# Patient Record
Sex: MALE | Race: WHITE | NOT HISPANIC OR LATINO
[De-identification: names, ages, dates, MRNs, and addresses within clinical notes are randomized per-mention and may not be internally consistent; named-entity substitution may affect disease eponyms.]

---

## 2023-04-03 ENCOUNTER — APPOINTMENT (OUTPATIENT)
Dept: HEPATOLOGY | Facility: CLINIC | Age: 63
End: 2023-04-03
Payer: MEDICARE

## 2023-04-03 VITALS
WEIGHT: 223 LBS | HEART RATE: 62 BPM | SYSTOLIC BLOOD PRESSURE: 127 MMHG | HEIGHT: 73 IN | RESPIRATION RATE: 15 BRPM | DIASTOLIC BLOOD PRESSURE: 77 MMHG | TEMPERATURE: 98.1 F | BODY MASS INDEX: 29.55 KG/M2 | OXYGEN SATURATION: 95 %

## 2023-04-03 DIAGNOSIS — Z79.4 TYPE 2 DIABETES MELLITUS W/OUT COMPLICATIONS: ICD-10-CM

## 2023-04-03 DIAGNOSIS — R21 RASH AND OTHER NONSPECIFIC SKIN ERUPTION: ICD-10-CM

## 2023-04-03 DIAGNOSIS — N18.2 CHRONIC KIDNEY DISEASE, STAGE 2 (MILD): ICD-10-CM

## 2023-04-03 DIAGNOSIS — E11.9 TYPE 2 DIABETES MELLITUS W/OUT COMPLICATIONS: ICD-10-CM

## 2023-04-03 DIAGNOSIS — F41.9 ANXIETY DISORDER, UNSPECIFIED: ICD-10-CM

## 2023-04-03 DIAGNOSIS — K70.30 ALCOHOLIC CIRRHOSIS OF LIVER W/OUT ASCITES: ICD-10-CM

## 2023-04-03 PROBLEM — Z00.00 ENCOUNTER FOR PREVENTIVE HEALTH EXAMINATION: Status: ACTIVE | Noted: 2023-04-03

## 2023-04-03 LAB
25(OH)D3 SERPL-MCNC: 44.3 NG/ML
ALBUMIN SERPL ELPH-MCNC: 4.5 G/DL
ALP BLD-CCNC: 146 U/L
ALT SERPL-CCNC: 14 U/L
ANION GAP SERPL CALC-SCNC: 13 MMOL/L
AST SERPL-CCNC: 22 U/L
BASOPHILS # BLD AUTO: 0.06 K/UL
BASOPHILS NFR BLD AUTO: 0.8 %
BILIRUB SERPL-MCNC: 1.2 MG/DL
BUN SERPL-MCNC: 27 MG/DL
CALCIUM SERPL-MCNC: 9.9 MG/DL
CHLORIDE SERPL-SCNC: 100 MMOL/L
CO2 SERPL-SCNC: 26 MMOL/L
CREAT SERPL-MCNC: 2.49 MG/DL
EGFR: 28 ML/MIN/1.73M2
EOSINOPHIL # BLD AUTO: 0.37 K/UL
EOSINOPHIL NFR BLD AUTO: 4.6 %
FERRITIN SERPL-MCNC: 38 NG/ML
GLUCOSE SERPL-MCNC: 109 MG/DL
HCT VFR BLD CALC: 40.3 %
HGB BLD-MCNC: 12.7 G/DL
IMM GRANULOCYTES NFR BLD AUTO: 0.1 %
IRON SATN MFR SERPL: 25 %
IRON SERPL-MCNC: 102 UG/DL
LYMPHOCYTES # BLD AUTO: 2.83 K/UL
LYMPHOCYTES NFR BLD AUTO: 35.4 %
MAGNESIUM SERPL-MCNC: 1.7 MG/DL
MAN DIFF?: NORMAL
MCHC RBC-ENTMCNC: 27.2 PG
MCHC RBC-ENTMCNC: 31.5 GM/DL
MCV RBC AUTO: 86.3 FL
MONOCYTES # BLD AUTO: 0.63 K/UL
MONOCYTES NFR BLD AUTO: 7.9 %
NEUTROPHILS # BLD AUTO: 4.1 K/UL
NEUTROPHILS NFR BLD AUTO: 51.2 %
PLATELET # BLD AUTO: 415 K/UL
POTASSIUM SERPL-SCNC: 4.6 MMOL/L
PROT SERPL-MCNC: 7.8 G/DL
RBC # BLD: 4.67 M/UL
RBC # FLD: 14.6 %
SODIUM SERPL-SCNC: 138 MMOL/L
TIBC SERPL-MCNC: 404 UG/DL
UIBC SERPL-MCNC: 302 UG/DL
WBC # FLD AUTO: 8 K/UL

## 2023-04-03 PROCEDURE — 99205 OFFICE O/P NEW HI 60 MIN: CPT

## 2023-04-04 LAB
CYCLOSPORINE SER-MCNC: 735 NG/ML
ESTIMATED AVERAGE GLUCOSE: 146 MG/DL
HBA1C MFR BLD HPLC: 6.7 %

## 2023-04-04 RX ORDER — CALCIUM CARBONATE/VITAMIN D3 500-10/5ML
400 LIQUID (ML) ORAL
Refills: 0 | Status: ACTIVE | COMMUNITY

## 2023-04-04 RX ORDER — MULTIVIT WITH MIN/MFOLATE/K2 340-15/3 G
50 MCG POWDER (GRAM) ORAL
Refills: 0 | Status: ACTIVE | COMMUNITY
Start: 2023-04-04

## 2023-04-04 NOTE — HISTORY OF PRESENT ILLNESS
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 \par He had a hx of b/l LE DVT (R Gaastrocnemius and L CFV through SFJ/ poplitial and post tibial s/p 3M A/C with resolution)\par Colonoscopy 3/24/21 - 1 TA repeat in 5-7Y\par \par PMH: DM ETOH USE, HLD, HTN, Cirrhosis, Axnsiety\par PSX: OLT\par No Smoking, drug use - ETOH use as above\par \par He presents to transfer his care from [facility] to [facility] since he lives in [place] and I am his primary Hepatologist\par He lives with his son \par SIster is a nurse that lives in the city\par He has had issues with financial / insurance issues in the past \par Currently he feels ok but has developed new rash in his nose and cheeks with some oozing - raised lesion not tender\par Has appointment with Derm pending\par \par \par Meds:\par Amlodi[pine 10mg\par ASA 81mg\par Vit D3 2000\par Cyclo 100mg BID\par Lantus 10u\par Admelog 5units qAC\par Metoprolol 100mg BID\par Magnesium 400mg TID\par Risperidone 1mg Daily\par MTV\par Pantoprazole\par Januvia\par

## 2023-04-04 NOTE — PHYSICAL EXAM
[Well Nourished] : well nourished [Healthy Appearing] : healthy appearing [No Acute Distress] : no acute distress [EOMI] : extra ocular movement intact [Normal Hearing] : normal hearing [Normal Oropharynx] : normal oropharynx [No Neck Mass] : no neck mass [Supple] : the neck was supple [No JVD] : no jugular venous distention [No Respiratory Distress] : no respiratory distress [No Accessory Muscle Use] : no accessory muscle use [Clear to Auscultation] : lungs were clear to auscultation bilaterally [Normal S1, S2] : normal S1 and S2 [No Murmurs] : no murmurs heard [Normal Rate/Rhythm] : normal rate/rhythm [Normal Bowel Sounds] : normal bowel sounds [Non Tender] : non-tender [Soft] : soft [No CVA Tenderness] : no CVA tenderness [No Spinal Tenderness] : no spinal tenderness [Normal Gait] : normal gait [Normal Strength/Tone] : normal strength/tone [No Focal Deficits] : no focal deficits [Normal Reflexes] : normal reflexes [No Motor Deficits] : no motor deficits [Normal Affect] : normal affect [Remote Memory Intact] : remote memory intact [Normal Insight/Judgement] : normal insight/judgement [Scleral Icterus] : no scleral icterus [Hepatojugular Reflux] : no hepatojugular reflux [Abdominal Bruit] : no abdominal bruit [Ascites Fluid Wave] : no ascites fluid wave [Splenomegaly] : no splenomegaly [Spider Angioma] : no spider angioma [Jaundice] : no jaundice [Palmar Erythema] : no palmar erythema [Asterixis] : no asterixis [de-identified] : dried skin, nodule on nose and on cheek - non tender

## 2023-05-01 RX ORDER — MULTIVITAMIN
TABLET ORAL DAILY
Qty: 90 | Refills: 3 | Status: ACTIVE | COMMUNITY
Start: 2023-04-04 | End: 1900-01-01

## 2023-05-01 RX ORDER — ASPIRIN 81 MG
81 TABLET, DELAYED RELEASE (ENTERIC COATED) ORAL
Refills: 0 | Status: DISCONTINUED | COMMUNITY
End: 2023-05-01

## 2023-05-04 ENCOUNTER — APPOINTMENT (OUTPATIENT)
Dept: DERMATOLOGY | Facility: CLINIC | Age: 63
End: 2023-05-04
Payer: MEDICARE

## 2023-05-04 DIAGNOSIS — L21.9 SEBORRHEIC DERMATITIS, UNSPECIFIED: ICD-10-CM

## 2023-05-04 DIAGNOSIS — D48.9 NEOPLASM OF UNCERTAIN BEHAVIOR, UNSPECIFIED: ICD-10-CM

## 2023-05-04 DIAGNOSIS — D48.5 NEOPLASM OF UNCERTAIN BEHAVIOR OF SKIN: ICD-10-CM

## 2023-05-04 PROCEDURE — 99204 OFFICE O/P NEW MOD 45 MIN: CPT | Mod: 25

## 2023-05-04 PROCEDURE — 11103 TANGNTL BX SKIN EA SEP/ADDL: CPT

## 2023-05-04 PROCEDURE — 11102 TANGNTL BX SKIN SINGLE LES: CPT

## 2023-05-04 RX ORDER — KETOCONAZOLE 20.5 MG/ML
2 SHAMPOO, SUSPENSION TOPICAL
Qty: 1 | Refills: 9 | Status: ACTIVE | COMMUNITY
Start: 2023-05-04 | End: 1900-01-01

## 2023-05-04 RX ORDER — KETOCONAZOLE 20 MG/G
2 CREAM TOPICAL
Qty: 1 | Refills: 5 | Status: ACTIVE | COMMUNITY
Start: 2023-05-04 | End: 1900-01-01

## 2023-05-11 LAB — PHOSPHATIDYETHANOL (PETH), WHOLE BLOOD: NEGATIVE

## 2023-05-12 ENCOUNTER — NON-APPOINTMENT (OUTPATIENT)
Age: 63
End: 2023-05-12

## 2023-05-15 LAB — DERMATOLOGY BIOPSY: NORMAL

## 2023-05-30 ENCOUNTER — APPOINTMENT (OUTPATIENT)
Dept: NEPHROLOGY | Facility: CLINIC | Age: 63
End: 2023-05-30
Payer: MEDICARE

## 2023-05-30 VITALS
RESPIRATION RATE: 15 BRPM | HEIGHT: 73 IN | SYSTOLIC BLOOD PRESSURE: 149 MMHG | TEMPERATURE: 97 F | BODY MASS INDEX: 29.16 KG/M2 | WEIGHT: 220 LBS | HEART RATE: 60 BPM | OXYGEN SATURATION: 95 % | DIASTOLIC BLOOD PRESSURE: 81 MMHG

## 2023-05-30 DIAGNOSIS — Z78.9 OTHER SPECIFIED HEALTH STATUS: ICD-10-CM

## 2023-05-30 PROCEDURE — 99205 OFFICE O/P NEW HI 60 MIN: CPT

## 2023-05-30 NOTE — HISTORY OF PRESENT ILLNESS
[FreeTextEntry1] : 63  years old fe-male, born in [place] ,referred by Dr. Mejia for evaluation of CKD. Patient has OLT in 2019 at [facility]. He believes this was from an infection. He knew about abnormal kidney function at that time.\par He is unsure why his kidney function is abnormal.\par Patient has known CKD (2019), received dialysis around the time of liver transplant for approx 1 week. He is  on follow up with Dr. Mejia for management of liver transplant. He is here for nephrology consultation.\par He has seen a nephrologist at [facility] but does not recall name and last seen approx 8 months prior to this.\par He has known DM (age 53) On insulin (age 53); HTN (age 53). H/o Hyperlipidemia/  no h/o Gout.\par No known h/o kidney stone/ Prostatism.\par He had DVT in left leg during post op period after liver transplant and received anticoagulation for approx 6 months.\par No hematuria/Transfusions\par Nocturia: 3 times\par No h/o Sleep apnea. No h/o Thyroid disease.\par Does not take any Coumadin/eliquis/Plavix/Brilinta. No known h/o tuberculosis or hepatitis.\par No h/o NSAID/pain medication use.\par No major weight loss/weight loss surgeries\par Has no h/o Pneumonia / UTI.\par No known h/o active CAD/CVA/PVD/neoplasia/active infections/bleeding/open wounds/falls/seizures/psych issues/COVID.\par COVID vaccination: Yes\par Reports no major allergies. \par Most recent hospitalization/for: 2019 for liver transplant\par Past surgeries:\par Liver transplant 2019\par No history of kidney/ bladder/ prostate surgery.\par Non smoker.\par Family: \par Lives with: Son, who is 31 years old.\par Parents are . Father -  Mother- 90 years old, healthy Siblings- 2 sisters, healthy.\par Children: two sons, Healthy. \par No family history of kidney disease\par Independent for ADL\par Able to walk two blocks, can climb stairs without difficulty.\par Assist devices: none\par Driving: Does not drive\par ROS: Has h/o shortness of breath on exertion. \par Vision: Ok\par Hearing:Ok\par Functional/employment status: retired insurance  (retired in 2019)\par Dialysis history: In 2019 for 1 week\par Kidney Biopsy: None\par \par Prior Studies:\par Cardiology: None\par Cancer Screen: Has had colonoscopy; Had basal cell ca\par Consultants:Dr Mejia, Hepatologist\par Primary MD: In [facility], not seen in more than 2 years\par Nephrologist: At [facility], has not seen in 8 months\par  \par Allergies: NKDA\par Medications:\par Amlodipine 5 mg BID\par Metoprolol Tartrate 25 Mg BID\par Januvia 50 mg Once daily\par  mg BID\par Aspirin 81 mg Once daily\par Vit D\par Magnesium TID\par Multivitamin\par Insulin Lantus 10 U in AM\par Humalog 5 u Premeal TID\par \par \par \par \par

## 2023-05-30 NOTE — ASSESSMENT
[FreeTextEntry1] : Elevated creatinine, Known CKD: Will repeat blood chemistry including phosphorus, uric acid,  immunofixation and Hepatitis B and C panel.\par Will get repeat urinalysis and urine protein/creatinine.\par Will get renal and post void bladder sonogram\par CKD risk factors- CSA use, DM, Hypertension, Overweight\par Discussed current level of renal function  and its implications.\par Discussed renal function preservation strategies including: Sleep hygiene, avoiding NSAIDs and herbal medications, avoiding excessive animal protein and sodium in diet, maintaining optimized weight, blood pressure, glucose and lipids.\par Discussed hyperinsulinemia, metabolic syndrome and implication for cardiovascular health.\par F/u in 6 weeks to review labs and imaging.\par

## 2023-05-30 NOTE — REASON FOR VISIT
[Consultation] : a consultation visit [FreeTextEntry1] : Here for evaluation of chronic kidney disease

## 2023-05-30 NOTE — PHYSICAL EXAM
[General Appearance - Alert] : alert [General Appearance - In No Acute Distress] : in no acute distress [Sclera] : the sclera and conjunctiva were normal [Extraocular Movements] : extraocular movements were intact [Outer Ear] : the ears and nose were normal in appearance [Jugular Venous Distention Increased] : there was no jugular-venous distention [Auscultation Breath Sounds / Voice Sounds] : lungs were clear to auscultation bilaterally [Heart Sounds Gallop] : no gallops [Heart Sounds Pericardial Friction Rub] : no pericardial rub [Edema] : there was no peripheral edema [Abdomen Soft] : soft [Abdomen Tenderness] : non-tender [Cervical Lymph Nodes Enlarged Posterior Bilaterally] : posterior cervical [Cervical Lymph Nodes Enlarged Anterior Bilaterally] : anterior cervical [Supraclavicular Lymph Nodes Enlarged Bilaterally] : supraclavicular [Involuntary Movements] : no involuntary movements were seen [] : no rash [FreeTextEntry1] : keratotic lesions noted [No Focal Deficits] : no focal deficits [Oriented To Time, Place, And Person] : oriented to person, place, and time [Impaired Insight] : insight and judgment were intact [Affect] : the affect was normal

## 2023-06-05 LAB
ALBUMIN SERPL ELPH-MCNC: 4.4 G/DL
ALP BLD-CCNC: 223 U/L
ALT SERPL-CCNC: 55 U/L
ANION GAP SERPL CALC-SCNC: 11 MMOL/L
APPEARANCE: CLEAR
AST SERPL-CCNC: 51 U/L
BACTERIA: NEGATIVE /HPF
BILIRUB SERPL-MCNC: 1.1 MG/DL
BILIRUBIN URINE: NEGATIVE
BLOOD URINE: NEGATIVE
BUN SERPL-MCNC: 22 MG/DL
CALCIUM SERPL-MCNC: 9.5 MG/DL
CALCIUM SERPL-MCNC: 9.5 MG/DL
CAST: 0 /LPF
CHLORIDE SERPL-SCNC: 99 MMOL/L
CHOLEST SERPL-MCNC: 429 MG/DL
CO2 SERPL-SCNC: 27 MMOL/L
COLOR: YELLOW
CREAT SERPL-MCNC: 2.14 MG/DL
CREAT SPEC-SCNC: 58 MG/DL
CREAT SPEC-SCNC: 58 MG/DL
CREAT/PROT UR: 0.3 RATIO
EGFR: 34 ML/MIN/1.73M2
EPITHELIAL CELLS: 0 /HPF
GLUCOSE QUALITATIVE U: NEGATIVE MG/DL
GLUCOSE SERPL-MCNC: 160 MG/DL
HBV SURFACE AG SER QL: NONREACTIVE
HCV AB SER QL: NONREACTIVE
HCV S/CO RATIO: 0.09 S/CO
HDLC SERPL-MCNC: 47 MG/DL
KETONES URINE: NEGATIVE MG/DL
LDLC SERPL CALC-MCNC: NORMAL MG/DL
LEUKOCYTE ESTERASE URINE: NEGATIVE
M PROTEIN SPEC IFE-MCNC: NORMAL
MAGNESIUM SERPL-MCNC: 1.9 MG/DL
MICROALBUMIN 24H UR DL<=1MG/L-MCNC: 7.5 MG/DL
MICROALBUMIN/CREAT 24H UR-RTO: 130 MG/G
MICROSCOPIC-UA: NORMAL
NITRITE URINE: NEGATIVE
NONHDLC SERPL-MCNC: 382 MG/DL
PARATHYROID HORMONE INTACT: 99 PG/ML
PH URINE: 7
PHOSPHATE SERPL-MCNC: 3.2 MG/DL
POTASSIUM SERPL-SCNC: 4.4 MMOL/L
PROT SERPL-MCNC: 7.5 G/DL
PROT UR-MCNC: 19 MG/DL
PROTEIN URINE: 30 MG/DL
RED BLOOD CELLS URINE: 0 /HPF
SODIUM SERPL-SCNC: 137 MMOL/L
SPECIFIC GRAVITY URINE: 1.01
TRIGL SERPL-MCNC: 781 MG/DL
URATE SERPL-MCNC: 9.4 MG/DL
UROBILINOGEN URINE: 0.2 MG/DL
WHITE BLOOD CELLS URINE: 0 /HPF

## 2023-06-14 ENCOUNTER — APPOINTMENT (OUTPATIENT)
Dept: DERMATOLOGY | Facility: CLINIC | Age: 63
End: 2023-06-14
Payer: MEDICARE

## 2023-06-14 PROCEDURE — 99214 OFFICE O/P EST MOD 30 MIN: CPT

## 2023-07-06 ENCOUNTER — APPOINTMENT (OUTPATIENT)
Dept: HEPATOLOGY | Facility: CLINIC | Age: 63
End: 2023-07-06
Payer: MEDICARE

## 2023-07-06 VITALS
HEIGHT: 73 IN | HEART RATE: 59 BPM | BODY MASS INDEX: 29.16 KG/M2 | TEMPERATURE: 97.3 F | WEIGHT: 220 LBS | RESPIRATION RATE: 14 BRPM | OXYGEN SATURATION: 94 % | DIASTOLIC BLOOD PRESSURE: 70 MMHG | SYSTOLIC BLOOD PRESSURE: 127 MMHG

## 2023-07-06 LAB
ALBUMIN SERPL ELPH-MCNC: 4.5 G/DL
ALP BLD-CCNC: 225 U/L
ALT SERPL-CCNC: 29 U/L
ANION GAP SERPL CALC-SCNC: 15 MMOL/L
AST SERPL-CCNC: 31 U/L
BILIRUB SERPL-MCNC: 1 MG/DL
BUN SERPL-MCNC: 20 MG/DL
CALCIUM SERPL-MCNC: 9 MG/DL
CHLORIDE SERPL-SCNC: 99 MMOL/L
CMV DNA SPEC QL NAA+PROBE: NOT DETECTED IU/ML
CMVPCR LOG: NOT DETECTED LOG10IU/ML
CO2 SERPL-SCNC: 22 MMOL/L
CREAT SERPL-MCNC: 2.16 MG/DL
CYCLOSPORINE SER-MCNC: 179 NG/ML
EGFR: 33 ML/MIN/1.73M2
GLUCOSE SERPL-MCNC: 156 MG/DL
MAGNESIUM SERPL-MCNC: 2.2 MG/DL
POTASSIUM SERPL-SCNC: 4.5 MMOL/L
PROT SERPL-MCNC: 7.7 G/DL
SODIUM SERPL-SCNC: 137 MMOL/L

## 2023-07-06 PROCEDURE — 99214 OFFICE O/P EST MOD 30 MIN: CPT

## 2023-07-10 NOTE — ASSESSMENT
[FreeTextEntry1] : 62M ETOH Cirrhosis s/p OLT complicated by DVT hyponatremia CKD on cyclosporine monotherapy - doing well\par Transitioning care to LI to follow me\par \par Repeat labs today and probably next week given he took cyclosporine today\par Has had stable graft function for a very long time\par Needs clarification of insurance at this time with financial\par F/U Derm for MOHS\par Check labs including CMV\par Urine testing for ETOH\par Bone scan UTD\par Colonoscopy UTD\par Vaccines UTD\par \par Referral to renal for management of CKD\par \par RTC 3M\par

## 2023-07-10 NOTE — PHYSICAL EXAM
[Well Nourished] : well nourished [Healthy Appearing] : healthy appearing [No Acute Distress] : no acute distress [EOMI] : extra ocular movement intact [Normal Hearing] : normal hearing [Normal Oropharynx] : normal oropharynx [No Neck Mass] : no neck mass [Supple] : the neck was supple [No JVD] : no jugular venous distention [No Respiratory Distress] : no respiratory distress [No Accessory Muscle Use] : no accessory muscle use [Clear to Auscultation] : lungs were clear to auscultation bilaterally [Normal S1, S2] : normal S1 and S2 [No Murmurs] : no murmurs heard [Normal Rate/Rhythm] : normal rate/rhythm [Normal Bowel Sounds] : normal bowel sounds [Non Tender] : non-tender [Soft] : soft [No CVA Tenderness] : no CVA tenderness [No Spinal Tenderness] : no spinal tenderness [Normal Gait] : normal gait [Normal Strength/Tone] : normal strength/tone [No Focal Deficits] : no focal deficits [Normal Reflexes] : normal reflexes [No Motor Deficits] : no motor deficits [Normal Affect] : normal affect [Remote Memory Intact] : remote memory intact [Normal Insight/Judgement] : normal insight/judgement [Scleral Icterus] : no scleral icterus [Hepatojugular Reflux] : no hepatojugular reflux [Abdominal Bruit] : no abdominal bruit [Ascites Fluid Wave] : no ascites fluid wave [Splenomegaly] : no splenomegaly [Spider Angioma] : no spider angioma [Jaundice] : no jaundice [Palmar Erythema] : no palmar erythema [Asterixis] : no asterixis [de-identified] : dried skin, nodule on nose and on cheek - non tender

## 2023-07-10 NOTE — HISTORY OF PRESENT ILLNESS
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 to [facility] since he lives in [place] and I am his primary Hepatologist\par He lives with his son \par SIster is a nurse that lives in the city\par He has had issues with financial / insurance issues in the past \par Currently he feels ok but has developed new rash in his nose and cheeks with some oozing - raised lesion not tender\par Has appointment with Derm pending for MOHS surgery for established BCC of nose chest and cheek\par He is planning on goint to [facility] for family trip next month as well\par Otherwise feeling good\par Had mild elevation in liver tests a few months ago \par \par Meds:\par Amlodipine 10mg\par ASA 81mg\par Vit D3 2000\par Cyclo 100mg BID\par Lantus 10u\par Ademelog 5units qAC\par Metoprolol 100mg BID\par Magnesium 400mg TID\par Risperidone 1mg Daily\par MTV\par Pantoprazole\par Januvia\par

## 2023-07-18 ENCOUNTER — APPOINTMENT (OUTPATIENT)
Dept: DERMATOLOGY | Facility: CLINIC | Age: 63
End: 2023-07-18
Payer: MEDICARE

## 2023-07-18 PROCEDURE — 17311 MOHS 1 STAGE H/N/HF/G: CPT

## 2023-07-18 PROCEDURE — 14061 TIS TRNFR E/N/E/L10.1-30SQCM: CPT

## 2023-07-21 ENCOUNTER — APPOINTMENT (OUTPATIENT)
Dept: DERMATOLOGY | Facility: CLINIC | Age: 63
End: 2023-07-21

## 2023-07-25 ENCOUNTER — APPOINTMENT (OUTPATIENT)
Dept: DERMATOLOGY | Facility: CLINIC | Age: 63
End: 2023-07-25
Payer: MEDICARE

## 2023-07-25 PROCEDURE — 99024 POSTOP FOLLOW-UP VISIT: CPT

## 2023-08-15 ENCOUNTER — APPOINTMENT (OUTPATIENT)
Dept: DERMATOLOGY | Facility: CLINIC | Age: 63
End: 2023-08-15
Payer: MEDICARE

## 2023-08-15 DIAGNOSIS — C44.519 BASAL CELL CARCINOMA OF SKIN OF OTHER PART OF TRUNK: ICD-10-CM

## 2023-08-15 PROCEDURE — 17313 MOHS 1 STAGE T/A/L: CPT | Mod: 79

## 2023-08-15 PROCEDURE — 17312 MOHS ADDL STAGE: CPT | Mod: 79

## 2023-08-15 PROCEDURE — 13132 CMPLX RPR F/C/C/M/N/AX/G/H/F: CPT | Mod: 79

## 2023-08-15 PROCEDURE — 17311 MOHS 1 STAGE H/N/HF/G: CPT | Mod: 59,79

## 2023-08-15 PROCEDURE — 12032 INTMD RPR S/A/T/EXT 2.6-7.5: CPT | Mod: 79

## 2023-08-16 PROBLEM — C44.519 BCC (BASAL CELL CARCINOMA), CHEST: Status: ACTIVE | Noted: 2023-06-14

## 2023-08-17 ENCOUNTER — APPOINTMENT (OUTPATIENT)
Dept: CARDIOLOGY | Facility: CLINIC | Age: 63
End: 2023-08-17
Payer: MEDICARE

## 2023-08-17 ENCOUNTER — NON-APPOINTMENT (OUTPATIENT)
Age: 63
End: 2023-08-17

## 2023-08-17 VITALS
HEART RATE: 94 BPM | DIASTOLIC BLOOD PRESSURE: 68 MMHG | OXYGEN SATURATION: 96 % | BODY MASS INDEX: 27.83 KG/M2 | HEIGHT: 73 IN | WEIGHT: 210 LBS | SYSTOLIC BLOOD PRESSURE: 130 MMHG

## 2023-08-17 PROCEDURE — 99205 OFFICE O/P NEW HI 60 MIN: CPT

## 2023-08-17 PROCEDURE — 93000 ELECTROCARDIOGRAM COMPLETE: CPT

## 2023-08-17 RX ORDER — ICOSAPENT ETHYL 1 G/1
1 CAPSULE ORAL
Qty: 360 | Refills: 3 | Status: ACTIVE | COMMUNITY
Start: 2023-08-17 | End: 1900-01-01

## 2023-08-17 NOTE — DISCUSSION/SUMMARY
[FreeTextEntry1] : In summary  Pleasant, 63 year old with a past medical history of HTN, Hyperlipidemia, [id number] S/p OLT 04/2019 (STATINS CONTRAINDICATED) On Immunoppressive therapy, CKD  =============== =============== Hyperlipidemia, [id number] S/p OLT (STATINS CONTRAINDICATED) On Immunoppressive therapy  - Statins absolutely contraindicated; But Praluent NOT yet indicated until see LDL or Calcium Score - DM per Endo - We discussed diet/exercise to be followed by nutritional counseling by an appointment to be made with our RD at the lipid center. - Start Vascepa         - Confirmed NO interactions with Cyclosporine - Calcium Score  - TTE  Mn CP new - TTE    [facility], it was a pleasure to participate in the care of your patient.   With kind thanks for the referral.  Sarath Moreno MD [facility] QUINN ASTUDILLO Director, Preventive Cardiology & Lipidology [facility]                                                                                                                                                                                                                                                                                                                                                              60 minutes spent in patient encounter explaining and formulating rationale for treatment plan. >50% of time spent in direct counseling reviewing all tests, labs, and imaging and conferring with patient, family member, and other physicians regarding patient care >50% of time spent in direct counseling reviewing all tests, labs, and imaging and conferring with patient, family member, and other physicians regarding patient care

## 2023-08-17 NOTE — HISTORY OF PRESENT ILLNESS
[FreeTextEntry1] : Dear ,Alcides   I had the pleasure of seeing your patient ANTHONY RODRIGUEZ for Cardiometabolic evaluation.   As you know, he  is a Pleasant, 63 year old with a past medical history of HTN, Hyperlipidemia, [id number] S/p OLT 04/2019 (STATINS CONTRAINDICATED) On Immunoppressive therapy, CKD  =============== =============== Hyperlipidemia, [id number] S/p OLT (STATINS CONTRAINDICATED) On Immunoppressive therapy  - Statins absolutely contraindicated; But Praluent NOT yet indicated until see LDL or Calcium Score - DM per Endo - We discussed diet/exercise to be followed by nutritional counseling by an appointment to be made with our RD at the lipid center. - Start Vascepa         - Confirmed NO interactions with Cyclosporine - Calcium Score  - TTE  Anton UMANA new - TTE    ----------------------------

## 2023-08-20 ENCOUNTER — NON-APPOINTMENT (OUTPATIENT)
Age: 63
End: 2023-08-20

## 2023-08-22 ENCOUNTER — APPOINTMENT (OUTPATIENT)
Dept: DERMATOLOGY | Facility: CLINIC | Age: 63
End: 2023-08-22
Payer: MEDICARE

## 2023-08-22 ENCOUNTER — APPOINTMENT (OUTPATIENT)
Dept: ULTRASOUND IMAGING | Facility: HOSPITAL | Age: 63
End: 2023-08-22

## 2023-08-22 ENCOUNTER — APPOINTMENT (OUTPATIENT)
Dept: CARDIOLOGY | Facility: CLINIC | Age: 63
End: 2023-08-22

## 2023-08-22 DIAGNOSIS — C44.319 BASAL CELL CARCINOMA OF SKIN OF OTHER PARTS OF FACE: ICD-10-CM

## 2023-08-22 PROCEDURE — 99024 POSTOP FOLLOW-UP VISIT: CPT

## 2023-08-29 ENCOUNTER — APPOINTMENT (OUTPATIENT)
Dept: NEPHROLOGY | Facility: CLINIC | Age: 63
End: 2023-08-29

## 2023-09-03 PROBLEM — C44.319 BASAL CELL CARCINOMA (BCC) OF LEFT CHEEK: Status: ACTIVE | Noted: 2023-06-14

## 2023-09-05 ENCOUNTER — APPOINTMENT (OUTPATIENT)
Dept: DERMATOLOGY | Facility: CLINIC | Age: 63
End: 2023-09-05

## 2023-10-10 ENCOUNTER — APPOINTMENT (OUTPATIENT)
Dept: HEPATOLOGY | Facility: CLINIC | Age: 63
End: 2023-10-10

## 2023-10-17 ENCOUNTER — APPOINTMENT (OUTPATIENT)
Dept: DERMATOLOGY | Facility: CLINIC | Age: 63
End: 2023-10-17

## 2023-10-30 ENCOUNTER — APPOINTMENT (OUTPATIENT)
Dept: HEPATOLOGY | Facility: CLINIC | Age: 63
End: 2023-10-30
Payer: MEDICARE

## 2023-10-30 VITALS
RESPIRATION RATE: 14 BRPM | OXYGEN SATURATION: 97 % | SYSTOLIC BLOOD PRESSURE: 132 MMHG | BODY MASS INDEX: 29.16 KG/M2 | HEIGHT: 73 IN | TEMPERATURE: 97.3 F | HEART RATE: 62 BPM | WEIGHT: 220 LBS | DIASTOLIC BLOOD PRESSURE: 73 MMHG

## 2023-10-30 PROCEDURE — 99214 OFFICE O/P EST MOD 30 MIN: CPT

## 2023-10-30 RX ORDER — CEPHALEXIN 500 MG/1
500 TABLET ORAL
Qty: 14 | Refills: 0 | Status: DISCONTINUED | COMMUNITY
Start: 2023-07-18 | End: 2023-10-30

## 2023-10-30 RX ORDER — CYCLOSPORINE 100 MG/1
100 CAPSULE, GELATIN COATED ORAL
Qty: 180 | Refills: 3 | Status: DISCONTINUED | COMMUNITY
Start: 2023-04-03 | End: 2023-10-30

## 2023-10-30 RX ORDER — INSULIN LISPRO 100 [IU]/ML
100 INJECTION, SOLUTION INTRAVENOUS; SUBCUTANEOUS
Qty: 2 | Refills: 3 | Status: DISCONTINUED | COMMUNITY
Start: 2023-04-03 | End: 2023-10-30

## 2023-10-30 RX ORDER — CYCLOSPORINE 25 MG/1
25 CAPSULE, GELATIN COATED ORAL TWICE DAILY
Qty: 540 | Refills: 3 | Status: ACTIVE | COMMUNITY
Start: 2023-10-30 | End: 1900-01-01

## 2023-10-30 RX ORDER — CEPHALEXIN 500 MG/1
500 CAPSULE ORAL TWICE DAILY
Qty: 14 | Refills: 0 | Status: DISCONTINUED | COMMUNITY
Start: 2023-08-15 | End: 2023-10-30

## 2023-10-30 RX ORDER — INSULIN GLARGINE 100 [IU]/ML
100 INJECTION, SOLUTION SUBCUTANEOUS DAILY
Qty: 1 | Refills: 1 | Status: DISCONTINUED | COMMUNITY
Start: 2023-04-03 | End: 2023-10-30

## 2023-11-24 ENCOUNTER — RX RENEWAL (OUTPATIENT)
Age: 63
End: 2023-11-24

## 2023-11-24 RX ORDER — SITAGLIPTIN 50 MG/1
50 TABLET, FILM COATED ORAL DAILY
Qty: 90 | Refills: 1 | Status: ACTIVE | COMMUNITY
Start: 2023-04-04 | End: 1900-01-01

## 2023-11-30 ENCOUNTER — RX RENEWAL (OUTPATIENT)
Age: 63
End: 2023-11-30

## 2023-12-18 ENCOUNTER — APPOINTMENT (OUTPATIENT)
Dept: CARDIOLOGY | Facility: CLINIC | Age: 63
End: 2023-12-18

## 2024-01-25 ENCOUNTER — RX RENEWAL (OUTPATIENT)
Age: 64
End: 2024-01-25

## 2024-01-25 RX ORDER — ASPIRIN 81 MG/1
81 TABLET, COATED ORAL
Qty: 90 | Refills: 1 | Status: ACTIVE | COMMUNITY
Start: 2024-01-25 | End: 1900-01-01

## 2024-04-23 ENCOUNTER — APPOINTMENT (OUTPATIENT)
Dept: HEPATOLOGY | Facility: CLINIC | Age: 64
End: 2024-04-23
Payer: MEDICARE

## 2024-04-23 ENCOUNTER — LABORATORY RESULT (OUTPATIENT)
Age: 64
End: 2024-04-23

## 2024-04-23 VITALS
BODY MASS INDEX: 30.75 KG/M2 | RESPIRATION RATE: 16 BRPM | TEMPERATURE: 98.6 F | HEART RATE: 60 BPM | WEIGHT: 232 LBS | HEIGHT: 73 IN | DIASTOLIC BLOOD PRESSURE: 64 MMHG | OXYGEN SATURATION: 96 % | SYSTOLIC BLOOD PRESSURE: 109 MMHG

## 2024-04-23 DIAGNOSIS — C44.311 BASAL CELL CARCINOMA OF SKIN OF NOSE: ICD-10-CM

## 2024-04-23 DIAGNOSIS — N18.4 CHRONIC KIDNEY DISEASE, STAGE 4 (SEVERE): ICD-10-CM

## 2024-04-23 LAB
25(OH)D3 SERPL-MCNC: 42.6 NG/ML
ALBUMIN SERPL ELPH-MCNC: 4.1 G/DL
ALP BLD-CCNC: 558 U/L
ALT SERPL-CCNC: 71 U/L
ANION GAP SERPL CALC-SCNC: 13 MMOL/L
AST SERPL-CCNC: 91 U/L
BASOPHILS # BLD AUTO: 0.04 K/UL
BASOPHILS NFR BLD AUTO: 0.5 %
BILIRUB SERPL-MCNC: 1.4 MG/DL
BUN SERPL-MCNC: 23 MG/DL
CALCIUM SERPL-MCNC: 9.3 MG/DL
CHLORIDE SERPL-SCNC: 93 MMOL/L
CHOLEST SERPL-MCNC: 384 MG/DL
CO2 SERPL-SCNC: 25 MMOL/L
CREAT SERPL-MCNC: 1.85 MG/DL
CYCLOSPORINE SER-MCNC: 120 NG/ML
EGFR: 40 ML/MIN/1.73M2
EOSINOPHIL # BLD AUTO: 0.23 K/UL
EOSINOPHIL NFR BLD AUTO: 3.1 %
GGT SERPL-CCNC: 1002 U/L
GLUCOSE SERPL-MCNC: 418 MG/DL
HCT VFR BLD CALC: 38.5 %
HDLC SERPL-MCNC: 33 MG/DL
HGB BLD-MCNC: 12.7 G/DL
IMM GRANULOCYTES NFR BLD AUTO: 0.3 %
INR PPP: 0.97 RATIO
LDLC SERPL CALC-MCNC: 207 MG/DL
LYMPHOCYTES # BLD AUTO: 2.37 K/UL
LYMPHOCYTES NFR BLD AUTO: 32.1 %
MAN DIFF?: NORMAL
MCHC RBC-ENTMCNC: 29.6 PG
MCHC RBC-ENTMCNC: 33 GM/DL
MCV RBC AUTO: 89.7 FL
MONOCYTES # BLD AUTO: 0.47 K/UL
MONOCYTES NFR BLD AUTO: 6.4 %
NEUTROPHILS # BLD AUTO: 4.26 K/UL
NEUTROPHILS NFR BLD AUTO: 57.6 %
NONHDLC SERPL-MCNC: 350 MG/DL
PLATELET # BLD AUTO: 314 K/UL
POTASSIUM SERPL-SCNC: 4.5 MMOL/L
PROT SERPL-MCNC: 7.2 G/DL
PT BLD: 11.1 SEC
RBC # BLD: 4.29 M/UL
RBC # FLD: 13.6 %
SODIUM SERPL-SCNC: 131 MMOL/L
TRIGL SERPL-MCNC: 640 MG/DL
WBC # FLD AUTO: 7.39 K/UL

## 2024-04-23 PROCEDURE — 99215 OFFICE O/P EST HI 40 MIN: CPT

## 2024-04-23 NOTE — ASSESSMENT
[FreeTextEntry1] : 63M s/p OLT with post transplant CKD otherwise doing well  #OLT - repeat labs today - on Cyclo monotherapy 75mg BID - Check CMV VL - Check PETH  #DM - A1c 6.7 - Endo referral - hopeful to get off insulin - maybe start GLP1 vs SGLT2?  #HCM - DERM F/U for  - DEXA - Colon 2026 hx of TA in 2021 - Vaccines up to date

## 2024-04-23 NOTE — PHYSICAL EXAM
[Well Nourished] : well nourished [Healthy Appearing] : healthy appearing [No Acute Distress] : no acute distress [EOMI] : extra ocular movement intact [Normal Hearing] : normal hearing [Normal Oropharynx] : normal oropharynx [No Neck Mass] : no neck mass [Supple] : the neck was supple [No JVD] : no jugular venous distention [No Respiratory Distress] : no respiratory distress [No Accessory Muscle Use] : no accessory muscle use [Clear to Auscultation] : lungs were clear to auscultation bilaterally [Normal S1, S2] : normal S1 and S2 [No Murmurs] : no murmurs heard [Normal Rate/Rhythm] : normal rate/rhythm [Normal Bowel Sounds] : normal bowel sounds [Non Tender] : non-tender [Soft] : soft [No CVA Tenderness] : no CVA tenderness [No Spinal Tenderness] : no spinal tenderness [Normal Gait] : normal gait [Normal Strength/Tone] : normal strength/tone [No Focal Deficits] : no focal deficits [Normal Reflexes] : normal reflexes [No Motor Deficits] : no motor deficits [Normal Affect] : normal affect [Remote Memory Intact] : remote memory intact [Normal Insight/Judgement] : normal insight/judgement [Scleral Icterus] : no scleral icterus [Hepatojugular Reflux] : no hepatojugular reflux [Abdominal Bruit] : no abdominal bruit [Ascites Fluid Wave] : no ascites fluid wave [Splenomegaly] : no splenomegaly [Spider Angioma] : no spider angioma [Jaundice] : no jaundice [Palmar Erythema] : no palmar erythema [Asterixis] : no asterixis [de-identified] : Ventral hernia [de-identified] : dried skin, nodule on nose and on cheek - non tender

## 2024-04-23 NOTE — HISTORY OF PRESENT ILLNESS
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  He had a hx of b/l LE DVT (R Gaastrocnemius and L CFV through SFJ/ poplitial and post tibial s/p 3M A/C with resolution) Colonoscopy 3/24/21 - 1 TA repeat in 5-7Y  PMH: DM ETOH USE, HLD, HTN, Cirrhosis, Anxiety PSX: OLT No Smoking, drug use - ETOH use as above  He presents to transfer his care from [facility] to [facility] since he lives in [place] and I am his primary Hepatologist He lives with his son  Sister is a nurse that lives in the city He has had issues with financial / insurance issues in the past but has since resolved   LOV 10/2023 Otherwise feeling good Had mild elevation in liver tests a few months ago that have since resolved He was referred to renal for management of post transplant CKD - has not gotten Renal US Cardiology has started on lipid lowering agents  His cyclo levels have not been accurate as he takes them after ingesting pill - he has remained on 75 mg BID  Meds: Amlodipine 10mg ASA 81mg Vit D3 2000 Cyclo 75mg BID Lantus 5 Ademelog 5units qAC Metoprolol 100mg BID Magnesium 400mg TID Risperidone 1mg Daily MTV Pantoprazole Januvia

## 2024-04-24 PROBLEM — C44.311 BASAL CELL CARCINOMA (BCC) OF DORSUM OF NOSE: Status: ACTIVE | Noted: 2023-06-14

## 2024-04-24 PROBLEM — N18.4 CHRONIC KIDNEY DISEASE (CKD), STAGE IV (SEVERE): Status: ACTIVE | Noted: 2023-05-30

## 2024-04-24 LAB
CMV DNA SPEC QL NAA+PROBE: NOT DETECTED IU/ML
CMVPCR LOG: NOT DETECTED LOG10IU/ML
ESTIMATED AVERAGE GLUCOSE: 301 MG/DL
HBA1C MFR BLD HPLC: 12.1 %

## 2024-04-24 RX ORDER — URSODIOL 300 MG/1
300 CAPSULE ORAL
Qty: 60 | Refills: 5 | Status: ACTIVE | COMMUNITY
Start: 2024-04-24 | End: 1900-01-01

## 2024-04-25 ENCOUNTER — APPOINTMENT (OUTPATIENT)
Dept: MRI IMAGING | Facility: CLINIC | Age: 64
End: 2024-04-25
Payer: MEDICARE

## 2024-04-25 PROCEDURE — 74183 MRI ABD W/O CNTR FLWD CNTR: CPT

## 2024-04-25 PROCEDURE — A9585: CPT

## 2024-04-26 LAB
ALBUMIN SERPL ELPH-MCNC: 4.2 G/DL
ALP BLD-CCNC: 570 U/L
ALT SERPL-CCNC: 81 U/L
ANION GAP SERPL CALC-SCNC: 13 MMOL/L
AST SERPL-CCNC: 103 U/L
BILIRUB SERPL-MCNC: 1.3 MG/DL
BUN SERPL-MCNC: 23 MG/DL
CALCIUM SERPL-MCNC: 8.6 MG/DL
CHLORIDE SERPL-SCNC: 95 MMOL/L
CO2 SERPL-SCNC: 25 MMOL/L
CREAT SERPL-MCNC: 1.83 MG/DL
EGFR: 40 ML/MIN/1.73M2
GLUCOSE SERPL-MCNC: 275 MG/DL
POTASSIUM SERPL-SCNC: 4.4 MMOL/L
PROT SERPL-MCNC: 7.5 G/DL
SODIUM SERPL-SCNC: 133 MMOL/L

## 2024-04-28 ENCOUNTER — INPATIENT (INPATIENT)
Facility: HOSPITAL | Age: 64
LOS: 9 days | Discharge: HOME | DRG: 445 | End: 2024-05-08
Payer: MEDICARE

## 2024-04-28 VITALS
OXYGEN SATURATION: 95 % | TEMPERATURE: 98 F | WEIGHT: 225.97 LBS | DIASTOLIC BLOOD PRESSURE: 78 MMHG | RESPIRATION RATE: 18 BRPM | SYSTOLIC BLOOD PRESSURE: 149 MMHG | HEART RATE: 58 BPM | HEIGHT: 73 IN

## 2024-04-28 DIAGNOSIS — T86.40 UNSPECIFIED COMPLICATION OF LIVER TRANSPLANT: ICD-10-CM

## 2024-04-28 DIAGNOSIS — E66.9 OBESITY, UNSPECIFIED: ICD-10-CM

## 2024-04-28 DIAGNOSIS — E11.65 TYPE 2 DIABETES MELLITUS WITH HYPERGLYCEMIA: ICD-10-CM

## 2024-04-28 DIAGNOSIS — Z94.4 LIVER TRANSPLANT STATUS: Chronic | ICD-10-CM

## 2024-04-28 DIAGNOSIS — E78.5 HYPERLIPIDEMIA, UNSPECIFIED: ICD-10-CM

## 2024-04-28 LAB
ALBUMIN SERPL ELPH-MCNC: 4 G/DL — SIGNIFICANT CHANGE UP (ref 3.3–5)
ALP SERPL-CCNC: 488 U/L — HIGH (ref 40–120)
ALT FLD-CCNC: 63 U/L — HIGH (ref 10–45)
ANION GAP SERPL CALC-SCNC: 12 MMOL/L — SIGNIFICANT CHANGE UP (ref 5–17)
AST SERPL-CCNC: 53 U/L — HIGH (ref 10–40)
BILIRUB SERPL-MCNC: 1.4 MG/DL — HIGH (ref 0.2–1.2)
BUN SERPL-MCNC: 27 MG/DL — HIGH (ref 7–23)
CALCIUM SERPL-MCNC: 9.6 MG/DL — SIGNIFICANT CHANGE UP (ref 8.4–10.5)
CHLORIDE SERPL-SCNC: 93 MMOL/L — LOW (ref 96–108)
CO2 SERPL-SCNC: 25 MMOL/L — SIGNIFICANT CHANGE UP (ref 22–31)
CREAT SERPL-MCNC: 1.86 MG/DL — HIGH (ref 0.5–1.3)
CYCLOSPORINE SER-MCNC: 204 NG/ML — SIGNIFICANT CHANGE UP (ref 150–400)
EGFR: 40 ML/MIN/1.73M2 — LOW
GLUCOSE BLDC GLUCOMTR-MCNC: 205 MG/DL — HIGH (ref 70–99)
GLUCOSE BLDC GLUCOMTR-MCNC: 256 MG/DL — HIGH (ref 70–99)
GLUCOSE BLDC GLUCOMTR-MCNC: 351 MG/DL — HIGH (ref 70–99)
GLUCOSE SERPL-MCNC: 356 MG/DL — HIGH (ref 70–99)
HCT VFR BLD CALC: 36.2 % — LOW (ref 39–50)
HGB BLD-MCNC: 12 G/DL — LOW (ref 13–17)
INR BLD: 1.08 RATIO — SIGNIFICANT CHANGE UP (ref 0.85–1.18)
MCHC RBC-ENTMCNC: 29 PG — SIGNIFICANT CHANGE UP (ref 27–34)
MCHC RBC-ENTMCNC: 33.1 GM/DL — SIGNIFICANT CHANGE UP (ref 32–36)
MCV RBC AUTO: 87.4 FL — SIGNIFICANT CHANGE UP (ref 80–100)
NRBC # BLD: 0 /100 WBCS — SIGNIFICANT CHANGE UP (ref 0–0)
PLATELET # BLD AUTO: 285 K/UL — SIGNIFICANT CHANGE UP (ref 150–400)
POTASSIUM SERPL-MCNC: 4.7 MMOL/L — SIGNIFICANT CHANGE UP (ref 3.5–5.3)
POTASSIUM SERPL-SCNC: 4.7 MMOL/L — SIGNIFICANT CHANGE UP (ref 3.5–5.3)
PROT SERPL-MCNC: 7.8 G/DL — SIGNIFICANT CHANGE UP (ref 6–8.3)
PROTHROM AB SERPL-ACNC: 11.3 SEC — SIGNIFICANT CHANGE UP (ref 9.5–13)
RBC # BLD: 4.14 M/UL — LOW (ref 4.2–5.8)
RBC # FLD: 13.2 % — SIGNIFICANT CHANGE UP (ref 10.3–14.5)
SODIUM SERPL-SCNC: 130 MMOL/L — LOW (ref 135–145)
WBC # BLD: 7.44 K/UL — SIGNIFICANT CHANGE UP (ref 3.8–10.5)
WBC # FLD AUTO: 7.44 K/UL — SIGNIFICANT CHANGE UP (ref 3.8–10.5)

## 2024-04-28 PROCEDURE — 99222 1ST HOSP IP/OBS MODERATE 55: CPT | Mod: GC

## 2024-04-28 PROCEDURE — 99222 1ST HOSP IP/OBS MODERATE 55: CPT

## 2024-04-28 RX ORDER — CYCLOSPORINE 100 MG/1
75 CAPSULE ORAL
Refills: 0 | Status: DISCONTINUED | OUTPATIENT
Start: 2024-04-28 | End: 2024-04-29

## 2024-04-28 RX ORDER — ONDANSETRON 8 MG/1
4 TABLET, FILM COATED ORAL EVERY 8 HOURS
Refills: 0 | Status: DISCONTINUED | OUTPATIENT
Start: 2024-04-28 | End: 2024-04-29

## 2024-04-28 RX ORDER — INSULIN LISPRO 100/ML
7 VIAL (ML) SUBCUTANEOUS
Refills: 0 | Status: DISCONTINUED | OUTPATIENT
Start: 2024-04-28 | End: 2024-05-01

## 2024-04-28 RX ORDER — AMLODIPINE BESYLATE 2.5 MG/1
5 TABLET ORAL DAILY
Refills: 0 | Status: DISCONTINUED | OUTPATIENT
Start: 2024-04-28 | End: 2024-05-04

## 2024-04-28 RX ORDER — GLUCAGON INJECTION, SOLUTION 0.5 MG/.1ML
1 INJECTION, SOLUTION SUBCUTANEOUS ONCE
Refills: 0 | Status: DISCONTINUED | OUTPATIENT
Start: 2024-04-28 | End: 2024-05-08

## 2024-04-28 RX ORDER — SODIUM CHLORIDE 9 MG/ML
1000 INJECTION, SOLUTION INTRAVENOUS
Refills: 0 | Status: DISCONTINUED | OUTPATIENT
Start: 2024-04-28 | End: 2024-05-08

## 2024-04-28 RX ORDER — DEXTROSE 50 % IN WATER 50 %
12.5 SYRINGE (ML) INTRAVENOUS ONCE
Refills: 0 | Status: DISCONTINUED | OUTPATIENT
Start: 2024-04-28 | End: 2024-05-08

## 2024-04-28 RX ORDER — DEXTROSE 10 % IN WATER 10 %
125 INTRAVENOUS SOLUTION INTRAVENOUS ONCE
Refills: 0 | Status: DISCONTINUED | OUTPATIENT
Start: 2024-04-28 | End: 2024-05-08

## 2024-04-28 RX ORDER — RISPERIDONE 4 MG/1
1 TABLET ORAL AT BEDTIME
Refills: 0 | Status: DISCONTINUED | OUTPATIENT
Start: 2024-04-28 | End: 2024-05-08

## 2024-04-28 RX ORDER — ASPIRIN/CALCIUM CARB/MAGNESIUM 324 MG
81 TABLET ORAL DAILY
Refills: 0 | Status: DISCONTINUED | OUTPATIENT
Start: 2024-04-28 | End: 2024-05-03

## 2024-04-28 RX ORDER — DEXTROSE 50 % IN WATER 50 %
15 SYRINGE (ML) INTRAVENOUS ONCE
Refills: 0 | Status: DISCONTINUED | OUTPATIENT
Start: 2024-04-28 | End: 2024-05-08

## 2024-04-28 RX ORDER — INSULIN GLARGINE 100 [IU]/ML
25 INJECTION, SOLUTION SUBCUTANEOUS AT BEDTIME
Refills: 0 | Status: DISCONTINUED | OUTPATIENT
Start: 2024-04-28 | End: 2024-04-28

## 2024-04-28 RX ORDER — INSULIN LISPRO 100/ML
5 VIAL (ML) SUBCUTANEOUS
Refills: 0 | Status: DISCONTINUED | OUTPATIENT
Start: 2024-04-28 | End: 2024-04-28

## 2024-04-28 RX ORDER — METOPROLOL TARTRATE 50 MG
100 TABLET ORAL EVERY 12 HOURS
Refills: 0 | Status: DISCONTINUED | OUTPATIENT
Start: 2024-04-28 | End: 2024-05-03

## 2024-04-28 RX ORDER — INSULIN LISPRO 100/ML
3 VIAL (ML) SUBCUTANEOUS
Refills: 0 | Status: DISCONTINUED | OUTPATIENT
Start: 2024-04-28 | End: 2024-04-28

## 2024-04-28 RX ORDER — INSULIN LISPRO 100/ML
VIAL (ML) SUBCUTANEOUS
Refills: 0 | Status: DISCONTINUED | OUTPATIENT
Start: 2024-04-28 | End: 2024-05-08

## 2024-04-28 RX ORDER — INSULIN LISPRO 100/ML
8 VIAL (ML) SUBCUTANEOUS
Refills: 0 | Status: DISCONTINUED | OUTPATIENT
Start: 2024-04-28 | End: 2024-04-28

## 2024-04-28 RX ORDER — INSULIN LISPRO 100/ML
6 VIAL (ML) SUBCUTANEOUS ONCE
Refills: 0 | Status: COMPLETED | OUTPATIENT
Start: 2024-04-28 | End: 2024-04-28

## 2024-04-28 RX ORDER — LANOLIN ALCOHOL/MO/W.PET/CERES
3 CREAM (GRAM) TOPICAL AT BEDTIME
Refills: 0 | Status: DISCONTINUED | OUTPATIENT
Start: 2024-04-28 | End: 2024-05-08

## 2024-04-28 RX ORDER — INSULIN GLARGINE 100 [IU]/ML
20 INJECTION, SOLUTION SUBCUTANEOUS AT BEDTIME
Refills: 0 | Status: DISCONTINUED | OUTPATIENT
Start: 2024-04-28 | End: 2024-04-29

## 2024-04-28 RX ORDER — HEPARIN SODIUM 5000 [USP'U]/ML
5000 INJECTION INTRAVENOUS; SUBCUTANEOUS EVERY 12 HOURS
Refills: 0 | Status: DISCONTINUED | OUTPATIENT
Start: 2024-04-28 | End: 2024-04-29

## 2024-04-28 RX ORDER — ACETAMINOPHEN 500 MG
650 TABLET ORAL EVERY 6 HOURS
Refills: 0 | Status: DISCONTINUED | OUTPATIENT
Start: 2024-04-28 | End: 2024-05-08

## 2024-04-28 RX ORDER — DEXTROSE 50 % IN WATER 50 %
25 SYRINGE (ML) INTRAVENOUS ONCE
Refills: 0 | Status: DISCONTINUED | OUTPATIENT
Start: 2024-04-28 | End: 2024-05-08

## 2024-04-28 RX ORDER — INSULIN LISPRO 100/ML
VIAL (ML) SUBCUTANEOUS AT BEDTIME
Refills: 0 | Status: DISCONTINUED | OUTPATIENT
Start: 2024-04-28 | End: 2024-04-30

## 2024-04-28 RX ADMIN — Medication 100 MILLIGRAM(S): at 18:12

## 2024-04-28 RX ADMIN — HEPARIN SODIUM 5000 UNIT(S): 5000 INJECTION INTRAVENOUS; SUBCUTANEOUS at 18:10

## 2024-04-28 RX ADMIN — Medication 8 UNIT(S): at 18:09

## 2024-04-28 RX ADMIN — Medication 3 UNIT(S): at 13:08

## 2024-04-28 RX ADMIN — Medication 6: at 18:09

## 2024-04-28 RX ADMIN — INSULIN GLARGINE 20 UNIT(S): 100 INJECTION, SOLUTION SUBCUTANEOUS at 22:42

## 2024-04-28 RX ADMIN — RISPERIDONE 1 MILLIGRAM(S): 4 TABLET ORAL at 21:07

## 2024-04-28 RX ADMIN — Medication 6 UNIT(S): at 13:16

## 2024-04-28 RX ADMIN — CYCLOSPORINE 75 MILLIGRAM(S): 100 CAPSULE ORAL at 21:06

## 2024-04-28 RX ADMIN — Medication 81 MILLIGRAM(S): at 13:16

## 2024-04-28 NOTE — PATIENT PROFILE ADULT - FALL HARM RISK - UNIVERSAL INTERVENTIONS
Bed in lowest position, wheels locked, appropriate side rails in place/Call bell, personal items and telephone in reach/Instruct patient to call for assistance before getting out of bed or chair/Non-slip footwear when patient is out of bed/[place] to call system/Physically safe environment - no spills, clutter or unnecessary equipment/Purposeful Proactive Rounding/Room/bathroom lighting operational, light cord in reach

## 2024-04-28 NOTE — PATIENT PROFILE ADULT - DO YOU NEED ADDITIONAL SERVICES TO MANAGE ANY OF THESE MEDICAL CONDITIONS AT HOME?
Initiate Treatment: Triamcinolone cream Detail Level: Zone Initiate Treatment: Bactrim 800mg-160mg x1 month \\nAbsorica 40mg 1x daily Plan: Patient confirmed in Ipledge. no

## 2024-04-28 NOTE — CONSULT NOTE ADULT - ATTENDING COMMENTS
63M with PMH of etoh cirrhosis s/p OLT 4/2019 who was sent in for elevated bilirubin and liver enzymes. Endocrine consulted for: Uncontrolled T2DM. Adjust insulin as above. Not currently ordered for steroids. Please inform endocrine if this plan changes. Needs outpatient endocrine follow up.

## 2024-04-28 NOTE — H&P ADULT - ATTENDING COMMENTS
63M ETOH Cirrhosis ACLF s/p OLT 5 years ago presenting with CBD stone and biliary obstruction possibly stricture    Repeat liver tests today  Continue Cylo 75mg BID  Plan for ERCP tomorrow  NPO at midnight  Poorly controlled DM - plan for ENDO consult in AM

## 2024-04-28 NOTE — CONSULT NOTE ADULT - SUBJECTIVE AND OBJECTIVE BOX
NOTE INCOMPLETE/ IN PROGRESS  *Please wait for attending attestation for official recommendations.     HPI:  Mr. Saab is a 63 yrs old male w/ hx of alcoholic cirrhosis complicated by HRS (chronic CKD at this time) s/p OLT 2019 at [facility] and b/l LE DVT (R Gaastrocnemius and L CFV through SFJ/ poplitial and post tibial s/p 3M A/C with resolution) who presented for elevated bilirubin and liver enzymes. Pt. had  donor w/ duct to duct without stent CMV R-/D+. 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 Patient transferred to [facility] for medical care and his primary hepatologist is Dr. Mejia. Last seen in clinic on , was found to have new onset liver enzyme elevation w/ bilirubin around 1.4, predominantly cholestatic pattern. Underwent MRCP which showed small 8 mm CBD stone upstream of the anastomosis site. Patient is asymptomatic, denied abd pain, nausea, and vomiting. No fevers or chills. Tolerating PO diet well. Denied bloody stools. Admitted under transplant hepatology.  (2024 11:54)      Consulted for:    PAST MEDICAL & SURGICAL HISTORY:  Hypertension      Diabetes mellitus      Alcoholic cirrhosis      Liver transplanted          FAMILY HISTORY:      Social History:    Outpatient Medications:    MEDICATIONS  (STANDING):  amLODIPine   Tablet 5 milliGRAM(s) Oral daily  aspirin  chewable 81 milliGRAM(s) Oral daily  cycloSPORINE  , modified (GENGRAF) 75 milliGRAM(s) Oral <User Schedule>  dextrose 10% Bolus 125 milliLiter(s) IV Bolus once  dextrose 5%. 1000 milliLiter(s) (100 mL/Hr) IV Continuous <Continuous>  dextrose 5%. 1000 milliLiter(s) (50 mL/Hr) IV Continuous <Continuous>  dextrose 50% Injectable 25 Gram(s) IV Push once  dextrose 50% Injectable 12.5 Gram(s) IV Push once  glucagon  Injectable 1 milliGRAM(s) IntraMuscular once  heparin   Injectable 5000 Unit(s) SubCutaneous every 12 hours  insulin glargine Injectable (LANTUS) 25 Unit(s) SubCutaneous at bedtime  insulin lispro (ADMELOG) corrective regimen sliding scale   SubCutaneous at bedtime  insulin lispro (ADMELOG) corrective regimen sliding scale   SubCutaneous three times a day before meals  insulin lispro Injectable (ADMELOG) 8 Unit(s) SubCutaneous three times a day before meals  metoprolol tartrate 100 milliGRAM(s) Oral every 12 hours  risperiDONE   Tablet 1 milliGRAM(s) Oral at bedtime    MEDICATIONS  (PRN):  acetaminophen     Tablet .. 650 milliGRAM(s) Oral every 6 hours PRN Temp greater or equal to 38C (100.4F), Mild Pain (1 - 3)  aluminum hydroxide/magnesium hydroxide/simethicone Suspension 30 milliLiter(s) Oral every 4 hours PRN Dyspepsia  dextrose Oral Gel 15 Gram(s) Oral once PRN Blood Glucose LESS THAN 70 milliGRAM(s)/deciliter  melatonin 3 milliGRAM(s) Oral at bedtime PRN Insomnia  ondansetron Injectable 4 milliGRAM(s) IV Push every 8 hours PRN Nausea and/or Vomiting      Allergies    No Known Allergies    Intolerances      Review of Systems:  Constitutional: No fever  Eyes: No blurry vision  Neuro: No tremors  HEENT: No pain  Cardiovascular: No chest pain, palpitations  Respiratory: No SOB, no cough  GI: No nausea, vomiting, abdominal pain  : No dysuria  Skin: no rash  Psych: no depression  Endocrine: no polyuria, polydipsia  Hem/lymph: no swelling  Osteoporosis: no fractures    ALL OTHER SYSTEMS REVIEWED AND NEGATIVE    UNABLE TO OBTAIN    PHYSICAL EXAM:  VITALS: T(C): 36.7 (24 @ 17:04)  T(F): 98.1 (24 @ 17:04), Max: 98.4 (24 @ 11:23)  HR: 52 (24 @ 17:04) (52 - 58)  BP: 135/76 (24 @ 17:04) (135/76 - 149/78)  RR:  (18 - 18)  SpO2:  (95% - 97%)  Wt(kg): --  GENERAL: NAD, well-groomed, well-developed  EYES: No proptosis, no lid lag, anicteric  HEENT:  Atraumatic, Normocephalic, moist mucous membranes  THYROID: Normal size, no palpable nodules  RESPIRATORY: Clear to auscultation bilaterally; No rales, rhonchi, wheezing  CARDIOVASCULAR: Regular rate and rhythm; No murmurs; no peripheral edema  GI: Soft, nontender, non distended, normal bowel sounds  SKIN: Dry, intact, No rashes or lesions  MUSCULOSKELETAL: Full range of motion, normal strength  NEURO: sensation intact, extraocular movements intact, no tremor  PSYCH: Alert and oriented x 3, normal affect, normal mood  CUSHING'S SIGNS: no striae      CAPILLARY BLOOD GLUCOSE      POCT Blood Glucose.: 351 mg/dL (2024 13:07)                            12.0   7.44  )-----------( 285      ( 2024 13:51 )             36.2           130<L>  |  93<L>  |  27<H>  ----------------------------<  356<H>  4.7   |  25  |  1.86<H>    eGFR: 40<L>    Ca    9.6          TPro  7.8  /  Alb  4.0  /  TBili  1.4<H>  /  DBili  x   /  AST  53<H>  /  ALT  63<H>  /  AlkPhos  488<H>        Thyroid Function Tests:              Radiology:              HPI:  Mr. Saab is a 63 yrs old male w/ hx of alcoholic cirrhosis complicated by HRS (chronic CKD at this time) s/p OLT 2019 at [facility] and b/l LE DVT (R Gaastrocnemius and L CFV through SFJ/ poplitial and post tibial s/p 3M A/C with resolution) who presented for elevated bilirubin and liver enzymes. Pt. had  donor w/ duct to duct without stent CMV R-/D+. 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 Patient transferred to [facility] for medical care and his primary hepatologist is Dr. Mejia. Last seen in clinic on , was found to have new onset liver enzyme elevation w/ bilirubin around 1.4, predominantly cholestatic pattern. Underwent MRCP which showed small 8 mm CBD stone upstream of the anastomosis site. Patient is asymptomatic, denied abd pain, nausea, and vomiting. No fevers or chills. Tolerating PO diet well. Denied bloody stools. Admitted under transplant hepatology.  (2024 11:54)      Consulted for: Uncontrolled T2DM    Diabetes history:  Diagnosed T2DM 7 yrs ago prior to liver transplant  Home regimen: Lantus 5 units QAM, Humalog 5 unit TIDAC, Januvia 50mg daily  Previous meds: Metformin  Recent A1c 12.1 outpatient (per HIE)  SMBG: Checks fasting FS every AM: 130s. Does not check any other time  Not on chronic steroids  Complications: No CAD, CVA, retinopathy. Occasional neuropathy. + CKD.      PAST MEDICAL & SURGICAL HISTORY:  Hypertension      Diabetes mellitus      Alcoholic cirrhosis      Liver transplanted          FAMILY HISTORY:      Social History:    Outpatient Medications:    MEDICATIONS  (STANDING):  amLODIPine   Tablet 5 milliGRAM(s) Oral daily  aspirin  chewable 81 milliGRAM(s) Oral daily  cycloSPORINE  , modified (GENGRAF) 75 milliGRAM(s) Oral <User Schedule>  dextrose 10% Bolus 125 milliLiter(s) IV Bolus once  dextrose 5%. 1000 milliLiter(s) (100 mL/Hr) IV Continuous <Continuous>  dextrose 5%. 1000 milliLiter(s) (50 mL/Hr) IV Continuous <Continuous>  dextrose 50% Injectable 25 Gram(s) IV Push once  dextrose 50% Injectable 12.5 Gram(s) IV Push once  glucagon  Injectable 1 milliGRAM(s) IntraMuscular once  heparin   Injectable 5000 Unit(s) SubCutaneous every 12 hours  insulin glargine Injectable (LANTUS) 25 Unit(s) SubCutaneous at bedtime  insulin lispro (ADMELOG) corrective regimen sliding scale   SubCutaneous at bedtime  insulin lispro (ADMELOG) corrective regimen sliding scale   SubCutaneous three times a day before meals  insulin lispro Injectable (ADMELOG) 8 Unit(s) SubCutaneous three times a day before meals  metoprolol tartrate 100 milliGRAM(s) Oral every 12 hours  risperiDONE   Tablet 1 milliGRAM(s) Oral at bedtime    MEDICATIONS  (PRN):  acetaminophen     Tablet .. 650 milliGRAM(s) Oral every 6 hours PRN Temp greater or equal to 38C (100.4F), Mild Pain (1 - 3)  aluminum hydroxide/magnesium hydroxide/simethicone Suspension 30 milliLiter(s) Oral every 4 hours PRN Dyspepsia  dextrose Oral Gel 15 Gram(s) Oral once PRN Blood Glucose LESS THAN 70 milliGRAM(s)/deciliter  melatonin 3 milliGRAM(s) Oral at bedtime PRN Insomnia  ondansetron Injectable 4 milliGRAM(s) IV Push every 8 hours PRN Nausea and/or Vomiting      Allergies    No Known Allergies    Intolerances      Review of Systems:  Constitutional: No fever  Eyes: No blurry vision  Neuro: No tremors  HEENT: No pain  Cardiovascular: No chest pain, palpitations  Respiratory: No SOB, no cough  GI: No nausea, vomiting, abdominal pain  : No dysuria  Skin: no rash  Psych: no depression  Endocrine: no polyuria, polydipsia  Hem/lymph: no swelling  Osteoporosis: no fractures    ALL OTHER SYSTEMS REVIEWED AND NEGATIVE    UNABLE TO OBTAIN    PHYSICAL EXAM:  VITALS: T(C): 36.7 (24 @ 17:04)  T(F): 98.1 (24 @ 17:04), Max: 98.4 (24 @ 11:23)  HR: 52 (24 @ 17:04) (52 - 58)  BP: 135/76 (24 @ 17:04) (135/76 - 149/78)  RR:  (18 - 18)  SpO2:  (95% - 97%)  Wt(kg): --  GENERAL: NAD, well-groomed, well-developed  EYES: No proptosis, no lid lag, anicteric  HEENT:  Atraumatic, Normocephalic, moist mucous membranes  THYROID: Normal size, no palpable nodules  RESPIRATORY: Clear to auscultation bilaterally; No rales, rhonchi, wheezing  CARDIOVASCULAR: Regular rate and rhythm; No murmurs; no peripheral edema  GI: Soft, nontender, non distended, normal bowel sounds  SKIN: Dry, intact, No rashes or lesions  MUSCULOSKELETAL: Full range of motion, normal strength  NEURO: sensation intact, extraocular movements intact, no tremor  PSYCH: Alert and oriented x 3, normal affect, normal mood  CUSHING'S SIGNS: no striae      CAPILLARY BLOOD GLUCOSE      POCT Blood Glucose.: 351 mg/dL (2024 13:07)                            12.0   7.44  )-----------( 285      ( 2024 13:51 )             36.2           130<L>  |  93<L>  |  27<H>  ----------------------------<  356<H>  4.7   |  25  |  1.86<H>    eGFR: 40<L>    Ca    9.6          TPro  7.8  /  Alb  4.0  /  TBili  1.4<H>  /  DBili  x   /  AST  53<H>  /  ALT  63<H>  /  AlkPhos  488<H>        Thyroid Function Tests:              Radiology:              HPI:  Mr. Saab is a 63 yrs old male w/ hx of alcoholic cirrhosis complicated by HRS (chronic CKD at this time) s/p OLT 2019 at [facility] and b/l LE DVT (R Gaastrocnemius and L CFV through SFJ/ poplitial and post tibial s/p 3M A/C with resolution) who presented for elevated bilirubin and liver enzymes. Pt. had  donor w/ duct to duct without stent CMV R-/D+. 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 Patient transferred to [facility] for medical care and his primary hepatologist is Dr. Mejia. Last seen in clinic on , was found to have new onset liver enzyme elevation w/ bilirubin around 1.4, predominantly cholestatic pattern. Underwent MRCP which showed small 8 mm CBD stone upstream of the anastomosis site. Patient is asymptomatic, denied abd pain, nausea, and vomiting. No fevers or chills. Tolerating PO diet well. Denied bloody stools. Admitted under transplant hepatology.  (2024 11:54)      Consulted for: Uncontrolled T2DM    Diabetes history:  Diagnosed T2DM 7 yrs ago prior to liver transplant  Home regimen: Lantus 5 units QAM, Humalog 5 unit TIDAC, Januvia 50mg daily  Previous meds: Metformin  Recent A1c 12.1 outpatient (per HIE)  SMBG: Checks fasting FS every AM: 130s. Does not check any other time  Not on chronic steroids  Complications: No CAD, CVA, retinopathy. Occasional neuropathy. + CKD.      PAST MEDICAL & SURGICAL HISTORY:  Hypertension      Diabetes mellitus      Alcoholic cirrhosis      Liver transplanted          FAMILY HISTORY:  no family hx of DM    Social History:  no alcohol    Outpatient Medications:  Lantus 5 units qam  humalog 5 units TIDAC  januvia 50mg daily    MEDICATIONS  (STANDING):  amLODIPine   Tablet 5 milliGRAM(s) Oral daily  aspirin  chewable 81 milliGRAM(s) Oral daily  cycloSPORINE  , modified (GENGRAF) 75 milliGRAM(s) Oral <User Schedule>  dextrose 10% Bolus 125 milliLiter(s) IV Bolus once  dextrose 5%. 1000 milliLiter(s) (100 mL/Hr) IV Continuous <Continuous>  dextrose 5%. 1000 milliLiter(s) (50 mL/Hr) IV Continuous <Continuous>  dextrose 50% Injectable 25 Gram(s) IV Push once  dextrose 50% Injectable 12.5 Gram(s) IV Push once  glucagon  Injectable 1 milliGRAM(s) IntraMuscular once  heparin   Injectable 5000 Unit(s) SubCutaneous every 12 hours  insulin glargine Injectable (LANTUS) 25 Unit(s) SubCutaneous at bedtime  insulin lispro (ADMELOG) corrective regimen sliding scale   SubCutaneous at bedtime  insulin lispro (ADMELOG) corrective regimen sliding scale   SubCutaneous three times a day before meals  insulin lispro Injectable (ADMELOG) 8 Unit(s) SubCutaneous three times a day before meals  metoprolol tartrate 100 milliGRAM(s) Oral every 12 hours  risperiDONE   Tablet 1 milliGRAM(s) Oral at bedtime    MEDICATIONS  (PRN):  acetaminophen     Tablet .. 650 milliGRAM(s) Oral every 6 hours PRN Temp greater or equal to 38C (100.4F), Mild Pain (1 - 3)  aluminum hydroxide/magnesium hydroxide/simethicone Suspension 30 milliLiter(s) Oral every 4 hours PRN Dyspepsia  dextrose Oral Gel 15 Gram(s) Oral once PRN Blood Glucose LESS THAN 70 milliGRAM(s)/deciliter  melatonin 3 milliGRAM(s) Oral at bedtime PRN Insomnia  ondansetron Injectable 4 milliGRAM(s) IV Push every 8 hours PRN Nausea and/or Vomiting      Allergies    No Known Allergies    Intolerances      Review of Systems:  Constitutional: No fever  Eyes: No blurry vision  Neuro: No tremors  HEENT: No pain  Cardiovascular: No chest pain, palpitations  Respiratory: No SOB, no cough  GI: No nausea, vomiting, abdominal pain  : No dysuria  Skin: no rash  Psych: no depression  Endocrine: no polyuria, polydipsia  Hem/lymph: no swelling  Osteoporosis: no fractures    ALL OTHER SYSTEMS REVIEWED AND NEGATIVE      PHYSICAL EXAM:  VITALS: T(C): 36.7 (24 @ 17:04)  T(F): 98.1 (24 @ 17:04), Max: 98.4 (24 @ 11:23)  HR: 52 (24 @ 17:04) (52 - 58)  BP: 135/76 (24 @ 17:04) (135/76 - 149/78)  RR:  (18 - 18)  SpO2:  (95% - 97%)  Wt(kg): --  GENERAL: NAD, obese  EYES: No proptosis, no lid lag, anicteric  HEENT:  Atraumatic, Normocephalic, moist mucous membranes  RESPIRATORY: no respiratory distress  CARDIOVASCULAR: no peripheral edema  GI: nondistended  SKIN: Dry  MUSCULOSKELETAL: PENG  NEURO: sensation intact, extraocular movements intact, no tremor  PSYCH: Alert and oriented x 3, normal affect, normal mood      CAPILLARY BLOOD GLUCOSE      POCT Blood Glucose.: 351 mg/dL (2024 13:07)                            12.0   7.44  )-----------( 285      ( 2024 13:51 )             36.2           130<L>  |  93<L>  |  27<H>  ----------------------------<  356<H>  4.7   |  25  |  1.86<H>    eGFR: 40<L>    Ca    9.6          TPro  7.8  /  Alb  4.0  /  TBili  1.4<H>  /  DBili  x   /  AST  53<H>  /  ALT  63<H>  /  AlkPhos  488<H>        Thyroid Function Tests:              Radiology:

## 2024-04-28 NOTE — CONSULT NOTE ADULT - ASSESSMENT
Impression:   63 yrs old male w/ hx of alcoholic cirrhosis complicated by HRS (chronic CKD at this time) s/p OLT 4/2019 at [facility] and b/l LE DVT (R Gaastrocnemius and L CFV through SFJ/ poplitial and post tibial s/p 3M A/C with resolution) who presented for elevated bilirubin and liver enzymes.    #Elevated liver enzymes s/p OLT 2019 for alcoholic cirrhosis  Had mildly elevated ALP 200s, w/  normal bilirubin and liver enzymes in 2023, now recent labs from 4/22 and 4/24 showed bili 1.4 w/ ALP 500s, and AST//90s. Patient was asymptomatic, but underwent MRCP which showed CBD stone  8 mm upstream of the anastomosis, mildly dilated 1.1 cm.   - Differentials choledocholithiasis, less concerned for cholangitis at this time w/ no fevers and minimal dilation of the CBD 1.1 cm on imaging s/p CCY.     Recommendations:   - There is no emergent indication for ERCP at this time.   - Will plan for possible ERCP tomorrow.   - Please keep him NPO after midnight.   - No absolute contraindications to continue with ASA daily.   - CMP daily.     Will need to discuss with attending tomorrow.     GI will continue to follow.     All recommendations are tentative until note is attested by an attending.     Pablo Agrawal, PGY-5  Gastroenterology/Hepatology Fellow  Available on [employer] Teams  [phone] (Short Range Pager)  [phone] (Long Range Pager)    After 5pm, please contact the on-call GI fellow. [phone]

## 2024-04-28 NOTE — H&P ADULT - NSHPLABSRESULTS_GEN_ALL_CORE
MRCP on 4/25    PROCEDURE DATE:  04/25/2024        INTERPRETATION:  CLINICAL INFORMATION: History of liver transplant. Elevated LFTs. Rule out biliary obstruction.    COMPARISON: None.    CONTRAST/COMPLICATIONS:  IV Contrast: Gadavist  10 cc administered   0 cc discarded  Oral Contrast: NONE  Complications: None reported at time of study completion    PROCEDURE:  MRI of the abdomen was performed.  MRCP was performed.    FINDINGS:  LOWER CHEST: Within normal limits.    LIVER: Status post liver transplant. Normal liver morphology. Mild hepatic steatosis. No focal liver lesion.  BILE DUCTS: Mild expected narrowing at the biliary anastomosis which is otherwise patent (6, 12). An 8 mm common duct stone just upstream from the anastomosis (6, 13). Minimally dilated common duct measuring up to 1.1 cm, although no significant intrahepatic biliary ductal dilatation.  GALLBLADDER: Cholecystectomy.  SPLEEN: Mildly enlarged.  PANCREAS: Within normal limits.  ADRENALS: Within normal limits.  KIDNEYS/URETERS: No hydronephrosis. Renal cysts.    VISUALIZED PORTIONS:  BOWEL: Within normal limits.  PERITONEUM: No ascites.  VESSELS: Patent transplant vasculature.  RETROPERITONEUM/LYMPH NODES: No lymphadenopathy.  ABDOMINAL WALL: Within normal limits.  BONES: Vertebral body hemangiomas.    IMPRESSION:  *  Status post liver transplant with normal liver morphology. Mild steatosis. Patent transplant vasculature.  *  Patent biliary anastomosis. An 8 mm ductal stone just upstream from the anastomosis. Slightly dilated common hepatic duct without intrahepatic biliary ductal dilatation.

## 2024-04-28 NOTE — CONSULT NOTE ADULT - ASSESSMENT
63M w/ hx of alcoholic cirrhosis complicated by HRS (chronic CKD at this time) s/p OLT 4/2019 at [facility] and b/l LE DVT who was sent in for elevated bilirubin and liver enzymes. Underwent MRCP which showed small 8 mm CBD stone upstream of the anastomosis site. Endocrine consulted for: Uncontrolled T2DM    #T2DM uncontrolled with hyperglycemia  Diagnosed T2DM 7 yrs ago prior to liver transplant  Home regimen: Lantus 5 units QAM, Humalog 5 unit TIDAC, Januvia 50mg daily  Previous meds: Metformin  Recent A1c 12.1 outpatient (per HIE)  SMBG: Checks fasting FS every AM: 130s. Does not check any other time  Not on chronic steroids  Complications: No CAD, CVA, retinopathy. Occasional neuropathy. + CKD.    Inpatient plan:  - Inpatient BG goal 100-180  - Will start weight based basal bolus insulin (weight in kg x 0.4):  - Lantus 20 units QHS  - Admelog 8 units TIDAC  - Mod dose admelog correction scale TIDAC  - Mod dose admelog correction scale QHS  - CC diet  - Please inform endocrine team if starting steroids    Discharge plan:  - Discharge regimen: Basal/bolus insulin, dose TBD. Would benefit from GLP1 agonist (would stop DPP4i if starting GLP1)  - Endocrine follow up: needs to establish follow up.  - Routine ophthalmology and podiatry follow up    #Obesity  - would benefit from GLP1 on discharge    #HLD  - check lipid panel outpatient      Philipp Cornelius MD  Endocrine Fellow  Can be reached via teams. For follow up questions, discharge recommendations, or new consults, please call answering service at [phone] (weekdays); [phone] (nights/weekends). 63M w/ hx of alcoholic cirrhosis complicated by HRS (chronic CKD at this time) s/p OLT 4/2019 at [facility] and b/l LE DVT who was sent in for elevated bilirubin and liver enzymes. Underwent MRCP which showed small 8 mm CBD stone upstream of the anastomosis site. Endocrine consulted for: Uncontrolled T2DM    #T2DM uncontrolled with hyperglycemia  Diagnosed T2DM 7 yrs ago prior to liver transplant  Home regimen: Lantus 5 units QAM, Humalog 5 unit TIDAC, Januvia 50mg daily  Previous meds: Metformin  Recent A1c 12.1 outpatient (per HIE)  SMBG: Checks fasting FS every AM: 130s. Does not check any other time  Not on chronic steroids  Complications: No CAD, CVA, retinopathy. Occasional neuropathy. + CKD.    Inpatient plan:  - Inpatient BG goal 100-180  - Will start weight based basal bolus insulin (weight in kg x 0.4):  - Lantus 20 units QHS  - Admelog 7 units TIDAC  - Mod dose admelog correction scale TIDAC  - Mod dose admelog correction scale QHS  - CC diet  - Please inform endocrine team if starting steroids    Discharge plan:  - Discharge regimen: Basal/bolus insulin, dose TBD. Would benefit from GLP1 agonist (would stop DPP4i if starting GLP1)  - Endocrine follow up: needs to establish follow up.  - Routine ophthalmology and podiatry follow up    #Obesity  - would benefit from GLP1 on discharge    #HLD  - check lipid panel outpatient      Philipp Cornelius MD  Endocrine Fellow  Can be reached via teams. For follow up questions, discharge recommendations, or new consults, please call answering service at [phone] (weekdays); [phone] (nights/weekends). 63M w/ hx of alcoholic cirrhosis complicated by HRS (chronic CKD at this time) s/p OLT 4/2019 at [facility] and b/l LE DVT who was sent in for elevated bilirubin and liver enzymes. Underwent MRCP which showed small 8 mm CBD stone upstream of the anastomosis site. Endocrine consulted for: Uncontrolled T2DM    #T2DM uncontrolled with hyperglycemia  Diagnosed T2DM 7 yrs ago prior to liver transplant  Home regimen: Lantus 5 units QAM, Humalog 5 unit TIDAC, Januvia 50mg daily  Previous meds: Metformin  Recent A1c 12.1 outpatient (per HIE)  SMBG: Checks fasting FS every AM: 130s. Does not check any other time  Not on chronic steroids and not currently ordered for steroids  Complications: No CAD, CVA, retinopathy. Occasional neuropathy. + CKD.    Inpatient plan:  - Inpatient BG goal 100-180  - Will start weight based basal bolus insulin (weight in kg x 0.4):  - Lantus 20 units QHS  - Admelog 7 units TIDAC  - Mod dose admelog correction scale TIDAC  - Mod dose admelog correction scale QHS  - CC diet  - Please inform endocrine team if starting steroids    Discharge plan:  - Discharge regimen: Basal/bolus insulin, dose TBD. Would benefit from GLP1 agonist (would stop DPP4i if starting GLP1)  - Endocrine follow up: needs to establish follow up.  - Routine ophthalmology and podiatry follow up    #Obesity  - would benefit from GLP1 on discharge    #HLD  - check lipid panel outpatient      Philipp Cornelius MD  Endocrine Fellow  Can be reached via teams. For follow up questions, discharge recommendations, or new consults, please call answering service at [phone] (weekdays); [phone] (nights/weekends).

## 2024-04-28 NOTE — H&P ADULT - NSHPPHYSICALEXAM_GEN_ALL_CORE
GENERAL:  No acute distress, obese male, sitting on the chair.   HEENT:  NCAT, no scleral icterus   CHEST:  no respiratory distress  HEART:  Regular rate and rhythm  ABDOMEN:  Soft, non-tender, moderately distended,   EXTREMITIES: No edema  SKIN:  Dry skin  NEURO:  Alert and oriented x 3, no asterixis

## 2024-04-28 NOTE — CONSULT NOTE ADULT - NSCONSULTADDITIONALINFOA_GEN_ALL_CORE
[facility] GI Attending Note    Patient seen and examined in the late afternoon of 4/29/24  Discussed with GI fellow earlier in the day.    See GI Fellow Chart Note on 4/29/24 for details.

## 2024-04-28 NOTE — H&P ADULT - HISTORY OF PRESENT ILLNESS
Mr. Saab is a 63 yrs old male w/ hx of alcoholic cirrhosis complicated by HRS (chronic CKD at this time) s/p OLT 2019 at [facility] and b/l LE DVT (R Gaastrocnemius and L CFV through SFJ/ poplitial and post tibial s/p 3M A/C with resolution) who presented for elevated bilirubin and liver enzymes. Pt. had  donor w/ duct to duct without stent CMV R-/D+. 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 Patient transferred to [facility] for medical care and his primary hepatologist is Dr. Mejia. Last seen in clinic on , was found to have new onset liver enzyme elevation w/ bilirubin around 1.4, predominantly cholestatic pattern. Underwent ERCP which showed small 8 mm CBD stone upstream of the anastomosis site. Patient is asymptomatic, denied abd pain, nausea, and vomiting. No fevers or chills. Tolerating PO diet well. Denied bloody stools. Admitted under transplant hepatology.  Mr. Saab is a 63 yrs old male w/ hx of alcoholic cirrhosis complicated by HRS (chronic CKD at this time) s/p OLT 2019 at [facility] and b/l LE DVT (R Gaastrocnemius and L CFV through SFJ/ poplitial and post tibial s/p 3M A/C with resolution) who presented for elevated bilirubin and liver enzymes. Pt. had  donor w/ duct to duct without stent CMV R-/D+. 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 Patient transferred to [facility] for medical care and his primary hepatologist is Dr. Mejia. Last seen in clinic on , was found to have new onset liver enzyme elevation w/ bilirubin around 1.4, predominantly cholestatic pattern. Underwent MRCP which showed small 8 mm CBD stone upstream of the anastomosis site. Patient is asymptomatic, denied abd pain, nausea, and vomiting. No fevers or chills. Tolerating PO diet well. Denied bloody stools. Admitted under transplant hepatology.

## 2024-04-28 NOTE — CONSULT NOTE ADULT - SUBJECTIVE AND OBJECTIVE BOX
HPI:    Mr. Saab is a 63 yrs old male w/ hx of alcoholic cirrhosis complicated by HRS (chronic CKD at this time) [facility] b/l LE DVT (R Gaastrocnemius and L CFV through SFJ/ poplitial and post tibial s/p 3M A/C with resolution) who presented for elevated bilirubin and liver enzymes. Pt. had  donor w/ duct to duct without stent CMV R-/D+. 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 Patient transferred to [facility] for medical care and his primary hepatologist is Dr. Mejia. Last seen in clinic on , was found to have new onset liver enzyme elevation w/ bilirubin around 1.4, predominantly cholestatic pattern. Underwent MRCP which showed small 8 mm CBD stone upstream of the anastomosis site. Patient is asymptomatic, denied abd pain, nausea, and vomiting. No fevers or chills. Tolerating PO diet well. Denied bloody stools. Advanced GI consulted for possible ERCP.     Allergies:  No Known Allergies    Home Medications:  Amlodipine 10mg  ASA 81mg  Vit D3 2000  Cyclo 75mg BID  Lantus 5  Ademelog 5units qAC  Metoprolol 100mg BID  Magnesium 400mg TID  Risperidone 1mg Daily  MTV  Pantoprazole  [facility] Medications:  acetaminophen     Tablet .. 650 milliGRAM(s) Oral every 6 hours PRN  aluminum hydroxide/magnesium hydroxide/simethicone Suspension 30 milliLiter(s) Oral every 4 hours PRN  amLODIPine   Tablet 5 milliGRAM(s) Oral daily  aspirin  chewable 81 milliGRAM(s) Oral daily  cycloSPORINE  , modified (GENGRAF) 75 milliGRAM(s) Oral <User Schedule>  dextrose 10% Bolus 125 milliLiter(s) IV Bolus once  dextrose 5%. 1000 milliLiter(s) IV Continuous <Continuous>  dextrose 5%. 1000 milliLiter(s) IV Continuous <Continuous>  dextrose 50% Injectable 25 Gram(s) IV Push once  dextrose 50% Injectable 12.5 Gram(s) IV Push once  dextrose Oral Gel 15 Gram(s) Oral once PRN  glucagon  Injectable 1 milliGRAM(s) IntraMuscular once  insulin lispro (ADMELOG) corrective regimen sliding scale   SubCutaneous at bedtime  insulin lispro (ADMELOG) corrective regimen sliding scale   SubCutaneous three times a day before meals  insulin lispro Injectable (ADMELOG) 3 Unit(s) SubCutaneous three times a day before meals  melatonin 3 milliGRAM(s) Oral at bedtime PRN  metoprolol tartrate 100 milliGRAM(s) Oral every 12 hours  ondansetron Injectable 4 milliGRAM(s) IV Push every 8 hours PRN  risperiDONE   Tablet 1 milliGRAM(s) Oral at bedtime    PMHX/PSHX:  Hypertension    Diabetes mellitus    Alcoholic cirrhosis    Liver transplanted    Family history:      Denies family history of colon cancer/polyps, stomach cancer/polyps, pancreatic cancer/masses, liver cancer/disease, ovarian cancer and endometrial cancer.    Social History:   Tob: Denies  EtOH: Denies  Illicit Drugs: Denies    ROS:     General:  No wt loss, fevers, chills, night sweats, fatigue  Eyes:  Good vision, no reported pain  ENT:  No sore throat, pain, runny nose, dysphagia  CV:  No pain, palpitations, hypo/hypertension  Pulm:  No dyspnea, cough, tachypnea, wheezing  GI:  see HPI  :  No pain, bleeding, incontinence, nocturia  Muscle:  No pain, weakness  Neuro:  No weakness, tingling, memory problems  Psych:  No fatigue, insomnia, mood problems, depression  Endocrine:  No polyuria, polydipsia, cold/heat intolerance  Heme:  No petechiae, ecchymosis, easy bruisability  Skin:  No rash, tattoos, scars, edema    PHYSICAL EXAM:     GENERAL:  No acute distress, obese male, sitting on the chair.   HEENT:  NCAT, no scleral icterus   CHEST:  no respiratory distress  HEART:  Regular rate and rhythm  ABDOMEN:  Soft, non-tender, moderately distended,   EXTREMITIES: No edema  SKIN:  Dry skin  NEURO:  Alert and oriented x 3, no asterixis    Vital Signs:  Vital Signs Last 24 Hrs  T(C): 36.9 (2024 11:23), Max: 36.9 (2024 11:23)  T(F): 98.4 (2024 11:23), Max: 98.4 (2024 11:23)  HR: 58 (2024 11:23) (58 - 58)  BP: 149/78 (2024 11:23) (149/78 - 149/78)  BP(mean): --  RR: 18 (2024 11:23) (18 - 18)  SpO2: 95% (2024 11:23) (95% - 95%)    Parameters below as of 2024 11:23  Patient On (Oxygen Delivery Method): room air      Daily Height in cm: 185.42 (2024 11:23)    Daily     LABS:    No recent labs.       Imaging:      MRCP on       Labs, Radiology, Cardiology, and Other Results: MRCP on     PROCEDURE DATE:  2024        INTERPRETATION:  CLINICAL INFORMATION: History of liver transplant. Elevated LFTs. Rule out biliary obstruction.    COMPARISON: None.    CONTRAST/COMPLICATIONS:  IV Contrast: Gadavist  10 cc administered   0 cc discarded  Oral Contrast: NONE  Complications: None reported at time of study completion    PROCEDURE:  MRI of the abdomen was performed.  MRCP was performed.    FINDINGS:  LOWER CHEST: Within normal limits.    LIVER: Status post liver transplant. Normal liver morphology. Mild hepatic steatosis. No focal liver lesion.  BILE DUCTS: Mild expected narrowing at the biliary anastomosis which is otherwise patent (6, 12). An 8 mm common duct stone just upstream from the anastomosis (6, 13). Minimally dilated common duct measuring up to 1.1 cm, although no significant intrahepatic biliary ductal dilatation.  GALLBLADDER: Cholecystectomy.  SPLEEN: Mildly enlarged.  PANCREAS: Within normal limits.  ADRENALS: Within normal limits.  KIDNEYS/URETERS: No hydronephrosis. Renal cysts.    VISUALIZED PORTIONS:  BOWEL: Within normal limits.  PERITONEUM: No ascites.  VESSELS: Patent transplant vasculature.  RETROPERITONEUM/LYMPH NODES: No lymphadenopathy.  ABDOMINAL WALL: Within normal limits.  BONES: Vertebral body hemangiomas.    IMPRESSION:  *  Status post liver transplant with normal liver morphology. Mild steatosis. Patent transplant vasculature.  *  Patent biliary anastomosis. An 8 mm ductal stone just upstream from the anastomosis. Slightly dilated common hepatic duct without intrahepatic biliary ductal dilatation.

## 2024-04-28 NOTE — H&P ADULT - ASSESSMENT
Impression:   63 yrs old male w/ hx of alcoholic cirrhosis complicated by HRS (chronic CKD at this time) s/p OLT 4/2019 at [facility] and b/l LE DVT (R Gaastrocnemius and L CFV through SFJ/ poplitial and post tibial s/p 3M A/C with resolution) who presented for elevated bilirubin and liver enzymes.    #Elevated liver enzymes s/p OLT 2019 for alcoholic cirrhosis  Had mildly elevated ALP 200s, w/  normal bilirubin and liver enzymes in 2023, now recent labs from 4/22 and 4/24 showed bili 1.4 w/ ALP 500s, and AST//90s. Patient was asymptomatic, but underwent MRCP which showed CBD stone  8 mm upstream of the anastomosis, mildly dilated 1.1 cm.   - Differentials likely obstruction, less concerned for ACR or sepsis at this time.   - will plan for possible ERCP tomorrow and consult advanced endoscopy.   - NPO after midnight for procedure.   - Will continue with cyclosporine 75 mg BID, home dose, last level 120 on 4/23, goal 100-150.   - will continue with ASA daily.     #Diabetes Mellitus  - Last A1C high 12, prior to that he was 6.7.   - Patient takes Januvia, along with Lantus 5 units in the morning and SA 5 units before meals.   - Will consult endocrinology.     #HTN   - continue with amlodipine 5 mg daily.   -Also on metoprolol 100 mg BID.     Discussed the case with Dr. Mejia.       All recommendations are tentative until note is attested by an attending.     Pablo Agrawal, PGY-5  Gastroenterology/Hepatology Fellow  Available on Microsoft Teams  [phone] (Short Range Pager)  [phone] (Long Range Pager)    After 5pm, please contact the on-call GI fellow. [phone]

## 2024-04-28 NOTE — H&P ADULT - NSHPREVIEWOFSYSTEMS_GEN_ALL_CORE
General:  No wt loss, fevers, chills, night sweats, fatigue  Eyes:  Good vision, no reported pain  ENT:  No sore throat, pain, runny nose, dysphagia  CV:  No pain, palpitations, hypo/hypertension  Pulm:  No dyspnea, cough, tachypnea, wheezing  GI:  see HPI  :  No pain, bleeding, incontinence, nocturia  Muscle:  No pain, weakness  Neuro:  No weakness, tingling, memory problems  Psych:  No fatigue, insomnia, mood problems, depression  Endocrine:  No polyuria, polydipsia, cold/heat intolerance  Heme:  No petechiae, ecchymosis, easy bruisability  Skin:  No rash, tattoos, scars, edema

## 2024-04-28 NOTE — PATIENT PROFILE ADULT - FUNCTIONAL SCREEN CURRENT LEVEL: COMMUNICATION, MLM
Detail Level: Detailed Detail Level: Zone Detail Level: Generalized Detail Level: Simple 0 = understands/communicates without difficulty

## 2024-04-29 LAB
A1C WITH ESTIMATED AVERAGE GLUCOSE RESULT: 11.8 % — HIGH (ref 4–5.6)
A1C WITH ESTIMATED AVERAGE GLUCOSE RESULT: 12.2 % — HIGH (ref 4–5.6)
ADD ON TEST-SPECIMEN IN LAB: SIGNIFICANT CHANGE UP
ALBUMIN SERPL ELPH-MCNC: 3.7 G/DL — SIGNIFICANT CHANGE UP (ref 3.3–5)
ALBUMIN SERPL ELPH-MCNC: 4.2 G/DL
ALP BLD-CCNC: 543 IU/L
ALP BLD-CCNC: 582 U/L
ALP BONE CFR SERPL: 32 %
ALP INTEST CFR SERPL: 0 %
ALP LIVER CFR SERPL: 68 %
ALP SERPL-CCNC: 443 U/L — HIGH (ref 40–120)
ALT FLD-CCNC: 53 U/L — HIGH (ref 10–45)
ALT SERPL-CCNC: 83 U/L
ANION GAP SERPL CALC-SCNC: 10 MMOL/L — SIGNIFICANT CHANGE UP (ref 5–17)
ANION GAP SERPL CALC-SCNC: 14 MMOL/L
AST SERPL-CCNC: 103 U/L
AST SERPL-CCNC: 45 U/L — HIGH (ref 10–40)
BASOPHILS # BLD AUTO: 0.04 K/UL
BASOPHILS NFR BLD AUTO: 0.5 %
BILIRUB DIRECT SERPL-MCNC: 0.7 MG/DL
BILIRUB SERPL-MCNC: 1.1 MG/DL — SIGNIFICANT CHANGE UP (ref 0.2–1.2)
BILIRUB SERPL-MCNC: 1.3 MG/DL
BUN SERPL-MCNC: 24 MG/DL
BUN SERPL-MCNC: 30 MG/DL — HIGH (ref 7–23)
CALCIUM SERPL-MCNC: 8.5 MG/DL
CALCIUM SERPL-MCNC: 9.3 MG/DL — SIGNIFICANT CHANGE UP (ref 8.4–10.5)
CHLORIDE SERPL-SCNC: 94 MMOL/L
CHLORIDE SERPL-SCNC: 97 MMOL/L — SIGNIFICANT CHANGE UP (ref 96–108)
CO2 SERPL-SCNC: 25 MMOL/L
CO2 SERPL-SCNC: 26 MMOL/L — SIGNIFICANT CHANGE UP (ref 22–31)
CREAT SERPL-MCNC: 1.78 MG/DL
CREAT SERPL-MCNC: 1.96 MG/DL — HIGH (ref 0.5–1.3)
CYCLOSPORINE SER-MCNC: 219 NG/ML — SIGNIFICANT CHANGE UP (ref 150–400)
EBV DNA SERPL NAA+PROBE-ACNC: NOT DETECTED IU/ML
EBVPCR LOG: NOT DETECTED LOG10IU/ML
EGFR: 37 ML/MIN/1.73M2 — LOW
EGFR: 42 ML/MIN/1.73M2
EOSINOPHIL # BLD AUTO: 0.2 K/UL
EOSINOPHIL NFR BLD AUTO: 2.4 %
ESTIMATED AVERAGE GLUCOSE: 292 MG/DL — HIGH (ref 68–114)
ESTIMATED AVERAGE GLUCOSE: 303 MG/DL — HIGH (ref 68–114)
GGT SERPL-CCNC: 1044 U/L
GLUCOSE BLDC GLUCOMTR-MCNC: 154 MG/DL — HIGH (ref 70–99)
GLUCOSE BLDC GLUCOMTR-MCNC: 192 MG/DL — HIGH (ref 70–99)
GLUCOSE BLDC GLUCOMTR-MCNC: 221 MG/DL — HIGH (ref 70–99)
GLUCOSE BLDC GLUCOMTR-MCNC: 225 MG/DL — HIGH (ref 70–99)
GLUCOSE BLDC GLUCOMTR-MCNC: 275 MG/DL — HIGH (ref 70–99)
GLUCOSE BLDC GLUCOMTR-MCNC: 289 MG/DL — HIGH (ref 70–99)
GLUCOSE SERPL-MCNC: 249 MG/DL — HIGH (ref 70–99)
GLUCOSE SERPL-MCNC: 274 MG/DL
HCT VFR BLD CALC: 35.6 % — LOW (ref 39–50)
HCT VFR BLD CALC: 38.7 %
HGB BLD-MCNC: 12.2 G/DL — LOW (ref 13–17)
HGB BLD-MCNC: 12.9 G/DL
IMM GRANULOCYTES NFR BLD AUTO: 0 %
INR BLD: 1.09 RATIO — SIGNIFICANT CHANGE UP (ref 0.85–1.18)
INR PPP: 0.95 RATIO
LYMPHOCYTES # BLD AUTO: 3.01 K/UL
LYMPHOCYTES NFR BLD AUTO: 36.1 %
MAGNESIUM SERPL-MCNC: 1.9 MG/DL — SIGNIFICANT CHANGE UP (ref 1.6–2.6)
MAN DIFF?: NORMAL
MCHC RBC-ENTMCNC: 29.4 PG — SIGNIFICANT CHANGE UP (ref 27–34)
MCHC RBC-ENTMCNC: 29.5 PG
MCHC RBC-ENTMCNC: 33.3 GM/DL
MCHC RBC-ENTMCNC: 34.3 GM/DL — SIGNIFICANT CHANGE UP (ref 32–36)
MCV RBC AUTO: 85.8 FL — SIGNIFICANT CHANGE UP (ref 80–100)
MCV RBC AUTO: 88.6 FL
MONOCYTES # BLD AUTO: 0.46 K/UL
MONOCYTES NFR BLD AUTO: 5.5 %
NEUTROPHILS # BLD AUTO: 4.63 K/UL
NEUTROPHILS NFR BLD AUTO: 55.5 %
NRBC # BLD: 0 /100 WBCS — SIGNIFICANT CHANGE UP (ref 0–0)
PETH 16:0/18:1: NEGATIVE NG/ML
PETH 16:0/18:2: NEGATIVE NG/ML
PETH COMMENTS: NORMAL
PHOSPHATE SERPL-MCNC: 3.4 MG/DL — SIGNIFICANT CHANGE UP (ref 2.5–4.5)
PLATELET # BLD AUTO: 242 K/UL — SIGNIFICANT CHANGE UP (ref 150–400)
PLATELET # BLD AUTO: 306 K/UL
POTASSIUM SERPL-MCNC: 4.2 MMOL/L — SIGNIFICANT CHANGE UP (ref 3.5–5.3)
POTASSIUM SERPL-SCNC: 4.2 MMOL/L — SIGNIFICANT CHANGE UP (ref 3.5–5.3)
POTASSIUM SERPL-SCNC: 4.3 MMOL/L
PROT SERPL-MCNC: 7.3 G/DL
PROT SERPL-MCNC: 7.4 G/DL — SIGNIFICANT CHANGE UP (ref 6–8.3)
PROTHROM AB SERPL-ACNC: 11.4 SEC — SIGNIFICANT CHANGE UP (ref 9.5–13)
PT BLD: 10.8 SEC
RBC # BLD: 4.15 M/UL — LOW (ref 4.2–5.8)
RBC # BLD: 4.37 M/UL
RBC # FLD: 13.2 % — SIGNIFICANT CHANGE UP (ref 10.3–14.5)
RBC # FLD: 13.4 %
SODIUM SERPL-SCNC: 133 MMOL/L
SODIUM SERPL-SCNC: 133 MMOL/L — LOW (ref 135–145)
WBC # BLD: 6.66 K/UL — SIGNIFICANT CHANGE UP (ref 3.8–10.5)
WBC # FLD AUTO: 6.66 K/UL — SIGNIFICANT CHANGE UP (ref 3.8–10.5)
WBC # FLD AUTO: 8.34 K/UL

## 2024-04-29 PROCEDURE — 99222 1ST HOSP IP/OBS MODERATE 55: CPT | Mod: GC

## 2024-04-29 PROCEDURE — 99232 SBSQ HOSP IP/OBS MODERATE 35: CPT | Mod: GC

## 2024-04-29 PROCEDURE — 99232 SBSQ HOSP IP/OBS MODERATE 35: CPT

## 2024-04-29 RX ORDER — INSULIN GLARGINE 100 [IU]/ML
22 INJECTION, SOLUTION SUBCUTANEOUS AT BEDTIME
Refills: 0 | Status: DISCONTINUED | OUTPATIENT
Start: 2024-04-29 | End: 2024-04-30

## 2024-04-29 RX ORDER — INSULIN GLARGINE 100 [IU]/ML
24 INJECTION, SOLUTION SUBCUTANEOUS AT BEDTIME
Refills: 0 | Status: DISCONTINUED | OUTPATIENT
Start: 2024-04-29 | End: 2024-04-29

## 2024-04-29 RX ORDER — ALBUMIN HUMAN 25 %
100 VIAL (ML) INTRAVENOUS EVERY 8 HOURS
Refills: 0 | Status: COMPLETED | OUTPATIENT
Start: 2024-04-29 | End: 2024-04-29

## 2024-04-29 RX ORDER — INSULIN LISPRO 100/ML
6 VIAL (ML) SUBCUTANEOUS ONCE
Refills: 0 | Status: COMPLETED | OUTPATIENT
Start: 2024-04-29 | End: 2024-04-29

## 2024-04-29 RX ADMIN — AMLODIPINE BESYLATE 5 MILLIGRAM(S): 2.5 TABLET ORAL at 06:22

## 2024-04-29 RX ADMIN — Medication 100 MILLIGRAM(S): at 21:41

## 2024-04-29 RX ADMIN — Medication 50 MILLILITER(S): at 08:28

## 2024-04-29 RX ADMIN — Medication 50 MILLILITER(S): at 21:52

## 2024-04-29 RX ADMIN — CYCLOSPORINE 75 MILLIGRAM(S): 100 CAPSULE ORAL at 08:28

## 2024-04-29 RX ADMIN — Medication 81 MILLIGRAM(S): at 12:28

## 2024-04-29 RX ADMIN — Medication 6 UNIT(S): at 13:11

## 2024-04-29 RX ADMIN — Medication 6: at 08:53

## 2024-04-29 RX ADMIN — Medication 100 MILLIGRAM(S): at 06:22

## 2024-04-29 RX ADMIN — Medication 50 MILLILITER(S): at 14:07

## 2024-04-29 RX ADMIN — Medication 2: at 21:40

## 2024-04-29 RX ADMIN — INSULIN GLARGINE 22 UNIT(S): 100 INJECTION, SOLUTION SUBCUTANEOUS at 21:52

## 2024-04-29 RX ADMIN — Medication 4: at 12:28

## 2024-04-29 RX ADMIN — Medication 7 UNIT(S): at 17:34

## 2024-04-29 RX ADMIN — Medication 2: at 17:34

## 2024-04-29 RX ADMIN — RISPERIDONE 1 MILLIGRAM(S): 4 TABLET ORAL at 21:41

## 2024-04-29 NOTE — PROGRESS NOTE ADULT - ASSESSMENT
63 yrs old male w/ hx of alcoholic cirrhosis complicated by HRS (chronic CKD at this time) s/p OLT 4/2019 at [facility] and b/l LE DVT (R Gaastrocnemius and L CFV through SFJ/ poplitial and post tibial s/p 3M A/C with resolution) who presented for elevated bilirubin and liver enzymes.    #Elevated liver enzymes s/p OLT 2019 for alcoholic cirrhosis  Had mildly elevated ALP 200s, w/  normal bilirubin and liver enzymes in 2023, now recent labs from 4/22 and 4/24 showed bili 1.4 w/ ALP 500s, and AST//90s. Patient was asymptomatic, but underwent MRCP which showed CBD stone  8 mm upstream of the anastomosis, mildly dilated 1.1 cm.   - Differentials likely obstruction, less concerned for ACR or sepsis at this time.   - ERCP today  - recheck cyclosporine level today  - Will continue with cyclosporine 75 mg BID, home dose. goal 100-150.   - continue ASA daily    #Diabetes Mellitus  - Last A1C high 12, prior to that he was 6.7.   - Patient takes Januvia, along with Lantus 5 units in the morning and SA 5 units before meals.   - appreciate endocrine recs  - nutrition consult    #HTN   -continue amlodipine 5 mg daily.   -cw metoprolol 100 mg BID.       All recommendations preliminary until note signed by service attending.    Thank you for involving us in the care of this patient. Please contact should any concern or questions arise.    Keith Hicks MD   Gastroenterology/Hepatology Fellow PGY-5  Available on Microsoft Teams 7am - 5pm  [phone]    NON-URGENT CONSULTS:  Please email:  [email or web address]   OR  [email or web address]    After 5pm, please contact the on-call GI fellow. [phone]    AT NIGHT AND ON WEEKENDS:  Contact on-call GI fellow via answering service ([phone]) from 5pm-8am and on weekends/holidays

## 2024-04-29 NOTE — PROGRESS NOTE ADULT - ASSESSMENT
63M w/ hx of alcoholic cirrhosis complicated by HRS (chronic CKD at this time) s/p OLT 4/2019 at [facility] and b/l LE DVT who was sent in for elevated bilirubin and liver enzymes. Underwent MRCP which showed small 8 mm CBD stone upstream of the anastomosis site. Endocrine consulted for: Uncontrolled T2DM    Last 24 hour BGs in 200s with fasting hyperglycemia in am. NPO for ERCP today     #T2DM uncontrolled with hyperglycemia  Diagnosed T2DM 7 yrs ago prior to liver transplant  Home regimen: Lantus 5 units QAM, Humalog 5 unit TIDAC, Januvia 50mg daily  Previous meds: Metformin  Recent A1c 12.1 outpatient (per HIE)  SMBG: Checks fasting FS every AM: 130s. Does not check any other time  Not on chronic steroids and not currently ordered for steroids  Complications: No CAD, CVA, retinopathy. Occasional neuropathy. + CKD.    Inpatient plan:  - Inpatient BG goal 100-180  - Will start weight based basal bolus insulin (weight in kg x 0.4):  - Increase Lantus 24 units QHS  - NPO today, may need to increase premeal insulin dose once pt start to eat and when able to evaluate insulin requirement.  continue Admelog 8 units TIDAC for now ( hold meal time insulin if NPO)  - Mod dose admelog correction scale TIDAC  - Mod dose admelog correction scale QHS  - CC diet  - Please inform endocrine team if starting steroids    Discharge plan:  - Discharge regimen: Basal/bolus insulin, dose TBD. Would benefit from GLP1 agonist (would stop DPP4i if starting GLP1)  - Endocrine follow up: needs to establish follow up.  - Routine ophthalmology and podiatry follow up    #Obesity  - would benefit from GLP1 on discharge    #HLD  - check lipid panel outpatient      Contact via Microsoft Teams during business hours  To reach covering provider access AMION via sunrise tools  For Urgent matters/after-hours/weekends/holidays please page endocrine fellow on call   For nonurgent matters please email [email or web address]    Please note that this patient may be followed by different provider tomorrow.  Notify endocrine 24 hours prior to discharge for final recommendations

## 2024-04-29 NOTE — CHART NOTE - NSCHARTNOTEFT_GEN_A_CORE
ERCP was cancelled for today due to hyperglycemia. Tentatively scheduled the procedure for tomorrow.     63 yrs old male w/ hx of alcoholic cirrhosis complicated by HRS (chronic CKD at this time) s/p OLT 4/2019 at [facility] and b/l LE DVT (R Gaastrocnemius and L CFV through SFJ/ poplitial and post tibial s/p 3M A/C with resolution) who presented for elevated bilirubin and liver enzymes.    #Elevated liver enzymes s/p OLT 2019 for alcoholic cirrhosis  Had mildly elevated ALP 200s, w/  normal bilirubin and liver enzymes in 2023, now recent labs from 4/22 and 4/24 showed bili 1.4 w/ ALP 500s, and AST//90s. Patient was asymptomatic, but underwent MRCP which showed CBD stone  8 mm upstream of the anastomosis, mildly dilated 1.1 cm.   - Differentials choledocholithiasis, less concerned for cholangitis at this time w/ no fevers and minimal dilation of the CBD 1.1 cm on imaging s/p CCY.   - Bilirubin normal and ALP trending down.     Recommendations:   - Scheduled for ERCP tomorrow.   - NPO after midnight.   - Please make sure the blood sugars are controlled between 120-200.   - CMP and CBC daily.     Discussed the case with Dr. Tovar.     GI will continue to follow.     Pablo Agrawal, PGY-5  Gastroenterology/Hepatology Fellow  Available on Microsoft Teams  [phone] (Short Range Pager)  [phone] (Long Range Pager)    After 5pm, please contact the on-call GI fellow. [phone] ERCP was cancelled for today due to hyperglycemia. Tentatively scheduled the procedure for tomorrow.     63 yrs old male w/ hx of alcoholic cirrhosis complicated by HRS (chronic CKD at this time) s/p OLT 4/2019 at [facility] and b/l LE DVT (R Gaastrocnemius and L CFV through SFJ/ poplitial and post tibial s/p 3M A/C with resolution) who presented for elevated bilirubin and liver enzymes.    #Elevated liver enzymes s/p OLT 2019 for alcoholic cirrhosis  Had mildly elevated ALP 200s, w/  normal bilirubin and liver enzymes in 2023, now recent labs from 4/22 and 4/24 showed bili 1.4 w/ ALP 500s, and AST//90s. Patient was asymptomatic, but underwent MRCP which showed CBD stone  8 mm upstream of the anastomosis, mildly dilated 1.1 cm.   - Differentials choledocholithiasis, less concerned for cholangitis at this time w/ no fevers and minimal dilation of the CBD 1.1 cm on imaging s/p CCY.   - Bilirubin normal and ALP trending down.     Recommendations:   - Scheduled for ERCP tomorrow.   - NPO after midnight.   - Please make sure the blood sugars are controlled between 120-200.   - CMP and CBC daily.     Discussed the case with Dr. Tovar.     GI will continue to follow.     Pablo Agrawal, PGY-5  Gastroenterology/Hepatology Fellow  Available on Microsoft Teams  [phone] (Short Range Pager)  [phone] (Long Range Pager)    After 5pm, please contact the on-call GI fellow        Advanced [facility] GI Attending Note    Patient seen and examined in the late afternoon.  Discussed with GI fellow earlier in the day.    Impression:    #1.  Consulted for 8 mm bile duct stone in the donor main bile duct above a relative narrowing of the biliary anastomosis, presenting as abnormal LFTs  #2.  History of orthotopic liver transplantation on 4/2019 for alcohol-related cirrhosis.  On immunosuppression.  #3.  Uncontrolled diabetes, hemoglobin A1c 12.    Recommendations:    #1.  Follow CBC/CMP  #2.  Glucose control per endocrinology at request of primary team and anesthesiology attending today  #3.  N.p.o. after midnight for ERCP on 4/30/2024, may need more than one ERCP for treatment of possible biliary stricture and subsequent stone clearance  #4.  May continue aspirin 81 mg

## 2024-04-29 NOTE — PROGRESS NOTE ADULT - SUBJECTIVE AND OBJECTIVE BOX
Seen earlier today     Chief Complaint: Diabetes Mellitus follow up    INTERVAL HX: " no pain, doing okay". NPO after MN yesterday for ERCP today. Last 24 hour BGs 200s with fasting  this am       Review of Systems:  General: As above  GI: No nausea, vomiting  Endocrine: no  S&Sx of hypoglycemia    Allergies    No Known Allergies    Intolerances      MEDICATIONS  (STANDING):  albumin human 25% IVPB 100 milliLiter(s) IV Intermittent every 8 hours  amLODIPine   Tablet 5 milliGRAM(s) Oral daily  aspirin  chewable 81 milliGRAM(s) Oral daily  dextrose 10% Bolus 125 milliLiter(s) IV Bolus once  dextrose 5%. 1000 milliLiter(s) (100 mL/Hr) IV Continuous <Continuous>  dextrose 5%. 1000 milliLiter(s) (50 mL/Hr) IV Continuous <Continuous>  dextrose 50% Injectable 25 Gram(s) IV Push once  dextrose 50% Injectable 12.5 Gram(s) IV Push once  glucagon  Injectable 1 milliGRAM(s) IntraMuscular once  insulin glargine Injectable (LANTUS) 24 Unit(s) SubCutaneous at bedtime  insulin lispro (ADMELOG) corrective regimen sliding scale   SubCutaneous at bedtime  insulin lispro (ADMELOG) corrective regimen sliding scale   SubCutaneous three times a day before meals  insulin lispro Injectable (ADMELOG) 7 Unit(s) SubCutaneous three times a day before meals  metoprolol tartrate 100 milliGRAM(s) Oral every 12 hours  risperiDONE   Tablet 1 milliGRAM(s) Oral at bedtime        insulin glargine Injectable (LANTUS)   20 Unit(s) SubCutaneous (04-28-24 @ 22:42)    insulin lispro (ADMELOG) corrective regimen sliding scale   4 Unit(s) SubCutaneous (04-29-24 @ 12:28)   6 Unit(s) SubCutaneous (04-29-24 @ 08:53)   6 Unit(s) SubCutaneous (04-28-24 @ 18:09)    insulin lispro Injectable (ADMELOG)   8 Unit(s) SubCutaneous (04-28-24 @ 18:09)    insulin lispro Injectable (ADMELOG).   6 Unit(s) SubCutaneous (04-29-24 @ 13:11)        PHYSICAL EXAM:  VITALS: T(C): 36.7 (04-29-24 @ 13:10)  T(F): 98 (04-29-24 @ 13:10), Max: 98.1 (04-28-24 @ 17:04)  HR: 54 (04-29-24 @ 13:10) (48 - 54)  BP: 134/75 (04-29-24 @ 13:10) (122/71 - 152/75)  RR:  (16 - 19)  SpO2:  (92% - 97%)  Wt(kg): --  GENERAL: Male sitting in chair , in NAD  Respiratory: Respirations unlabored   Extremities: Warm, no edema  NEURO: Alert , appropriate     LABS:  POCT Blood Glucose.: 192 mg/dL (04-29-24 @ 14:02)  POCT Blood Glucose.: 221 mg/dL (04-29-24 @ 12:53)  POCT Blood Glucose.: 225 mg/dL (04-29-24 @ 12:11)  POCT Blood Glucose.: 289 mg/dL (04-29-24 @ 08:26)  POCT Blood Glucose.: 205 mg/dL (04-28-24 @ 21:40)  POCT Blood Glucose.: 256 mg/dL (04-28-24 @ 18:01)  POCT Blood Glucose.: 351 mg/dL (04-28-24 @ 13:07)                          12.2   6.66  )-----------( 242      ( 29 Apr 2024 06:49 )             35.6     04-29    133<L>  |  97  |  30<H>  ----------------------------<  249<H>  4.2   |  26  |  1.96<H>    Ca    9.3      29 Apr 2024 06:49  Phos  3.4     04-29  Mg     1.9     04-29    TPro  7.4  /  Alb  3.7  /  TBili  1.1  /  DBili  x   /  AST  45<H>  /  ALT  53<H>  /  AlkPhos  443<H>  04-29    eGFR: 37 mL/min/1.73m2 (29 Apr 2024 06:49)      Thyroid Function Tests:      A1C with Estimated Average Glucose Result: 12.2 % (04-29-24 @ 06:49)  A1C with Estimated Average Glucose Result: 11.8 % (04-29-24 @ 06:49)    Estimated Average Glucose: 303 mg/dL (04-29-24 @ 06:49)  Estimated Average Glucose: 292 mg/dL (04-29-24 @ 06:49)        Diet, NPO after Midnight:      NPO Start Date: 29-Apr-2024,   NPO Start Time: 23:59  Except Medications (04-29-24 @ 15:05) [Active]  Diet, Regular:   Consistent Carbohydrate No Snacks (CSTCHO) (04-28-24 @ 11:51) [Active]

## 2024-04-29 NOTE — PROGRESS NOTE ADULT - SUBJECTIVE AND OBJECTIVE BOX
Interval Events:   -NAEON  -plan for ERCP today    Hospital Medications:  acetaminophen     Tablet .. 650 milliGRAM(s) Oral every 6 hours PRN  albumin human 25% IVPB 100 milliLiter(s) IV Intermittent every 8 hours  aluminum hydroxide/magnesium hydroxide/simethicone Suspension 30 milliLiter(s) Oral every 4 hours PRN  amLODIPine   Tablet 5 milliGRAM(s) Oral daily  aspirin  chewable 81 milliGRAM(s) Oral daily  dextrose 10% Bolus 125 milliLiter(s) IV Bolus once  dextrose 5%. 1000 milliLiter(s) IV Continuous <Continuous>  dextrose 5%. 1000 milliLiter(s) IV Continuous <Continuous>  dextrose 50% Injectable 25 Gram(s) IV Push once  dextrose 50% Injectable 12.5 Gram(s) IV Push once  dextrose Oral Gel 15 Gram(s) Oral once PRN  glucagon  Injectable 1 milliGRAM(s) IntraMuscular once  insulin glargine Injectable (LANTUS) 20 Unit(s) SubCutaneous at bedtime  insulin lispro (ADMELOG) corrective regimen sliding scale   SubCutaneous at bedtime  insulin lispro (ADMELOG) corrective regimen sliding scale   SubCutaneous three times a day before meals  insulin lispro Injectable (ADMELOG) 7 Unit(s) SubCutaneous three times a day before meals  melatonin 3 milliGRAM(s) Oral at bedtime PRN  metoprolol tartrate 100 milliGRAM(s) Oral every 12 hours  ondansetron Injectable 4 milliGRAM(s) IV Push every 8 hours PRN  risperiDONE   Tablet 1 milliGRAM(s) Oral at bedtime      PHYSICAL EXAM:   Vital Signs:  Vital Signs Last 24 Hrs  T(C): 36.7 (2024 09:41), Max: 36.7 (2024 17:04)  T(F): 98 (2024 09:41), Max: 98.1 (2024 17:04)  HR: 52 (:41) (48 - 54)  BP: 122/71 (2024 09:41) (122/71 - 152/75)  BP(mean): --  RR: 18 (2024 09:41) (16 - 19)  SpO2: 93% (2024 09:41) (92% - 97%)    Parameters below as of 2024 09:41  Patient On (Oxygen Delivery Method): room air      Daily     Daily Weight in k.9 (2024 06:17)    GENERAL:  No acute distress  HEENT:  NCAT, no scleral icterus   CHEST:  no respiratory distress  HEART:  Regular rate and rhythm  ABDOMEN:  Soft, non-tender, moderately distended  EXTREMITIES: No edema  SKIN:  Dry skin  NEURO:  Alert and oriented x 3, no asterixis                        12.2   6.66  )-----------( 242      ( 2024 06:49 )             35.6         133<L>  |  97  |  30<H>  ----------------------------<  249<H>  4.2   |  26  |  1.96<H>    Ca    9.3      2024 06:49  Phos  3.4       Mg     1.9         TPro  7.4  /  Alb  3.7  /  TBili  1.1  /  DBili  x   /  AST  45<H>  /  ALT  53<H>  /  AlkPhos  443<H>      LIVER FUNCTIONS - ( 2024 06:49 )  Alb: 3.7 g/dL / Pro: 7.4 g/dL / ALK PHOS: 443 U/L / ALT: 53 U/L / AST: 45 U/L / GGT: x

## 2024-04-30 ENCOUNTER — RESULT REVIEW (OUTPATIENT)
Age: 64
End: 2024-04-30

## 2024-04-30 LAB
ALBUMIN SERPL ELPH-MCNC: 4.3 G/DL — SIGNIFICANT CHANGE UP (ref 3.3–5)
ALP SERPL-CCNC: 435 U/L — HIGH (ref 40–120)
ALT FLD-CCNC: 51 U/L — HIGH (ref 10–45)
ANION GAP SERPL CALC-SCNC: 15 MMOL/L — SIGNIFICANT CHANGE UP (ref 5–17)
APTT BLD: 27 SEC — SIGNIFICANT CHANGE UP (ref 24.5–35.6)
AST SERPL-CCNC: 60 U/L — HIGH (ref 10–40)
BILIRUB SERPL-MCNC: 1 MG/DL — SIGNIFICANT CHANGE UP (ref 0.2–1.2)
BILIRUB SERPL-MCNC: 1 MG/DL — SIGNIFICANT CHANGE UP (ref 0.2–1.2)
BLD GP AB SCN SERPL QL: NEGATIVE — SIGNIFICANT CHANGE UP
BUN SERPL-MCNC: 32 MG/DL — HIGH (ref 7–23)
CALCIUM SERPL-MCNC: 9.2 MG/DL — SIGNIFICANT CHANGE UP (ref 8.4–10.5)
CHLORIDE SERPL-SCNC: 96 MMOL/L — SIGNIFICANT CHANGE UP (ref 96–108)
CO2 SERPL-SCNC: 22 MMOL/L — SIGNIFICANT CHANGE UP (ref 22–31)
CREAT SERPL-MCNC: 1.72 MG/DL — HIGH (ref 0.5–1.3)
CREAT SERPL-MCNC: 1.73 MG/DL — HIGH (ref 0.5–1.3)
CYCLOSPORINE SER-MCNC: 100 NG/ML — LOW (ref 150–400)
EGFR: 43 ML/MIN/1.73M2 — LOW
EGFR: 44 ML/MIN/1.73M2 — LOW
GLUCOSE BLDC GLUCOMTR-MCNC: 172 MG/DL — HIGH (ref 70–99)
GLUCOSE BLDC GLUCOMTR-MCNC: 196 MG/DL — HIGH (ref 70–99)
GLUCOSE BLDC GLUCOMTR-MCNC: 209 MG/DL — HIGH (ref 70–99)
GLUCOSE BLDC GLUCOMTR-MCNC: 262 MG/DL — HIGH (ref 70–99)
GLUCOSE BLDC GLUCOMTR-MCNC: 266 MG/DL — HIGH (ref 70–99)
GLUCOSE BLDC GLUCOMTR-MCNC: 269 MG/DL — HIGH (ref 70–99)
GLUCOSE BLDC GLUCOMTR-MCNC: 299 MG/DL — HIGH (ref 70–99)
GLUCOSE SERPL-MCNC: 272 MG/DL — HIGH (ref 70–99)
HCT VFR BLD CALC: 32.5 % — LOW (ref 39–50)
HGB BLD-MCNC: 11 G/DL — LOW (ref 13–17)
INR BLD: 0.93 RATIO — SIGNIFICANT CHANGE UP (ref 0.85–1.18)
INR BLD: 0.94 RATIO — SIGNIFICANT CHANGE UP (ref 0.85–1.18)
MAGNESIUM SERPL-MCNC: 1.9 MG/DL — SIGNIFICANT CHANGE UP (ref 1.6–2.6)
MCHC RBC-ENTMCNC: 29.3 PG — SIGNIFICANT CHANGE UP (ref 27–34)
MCHC RBC-ENTMCNC: 33.8 GM/DL — SIGNIFICANT CHANGE UP (ref 32–36)
MCV RBC AUTO: 86.7 FL — SIGNIFICANT CHANGE UP (ref 80–100)
MELD SCORE WITH DIALYSIS: 23 POINTS — SIGNIFICANT CHANGE UP
MELD SCORE WITHOUT DIALYSIS: 16 POINTS — SIGNIFICANT CHANGE UP
NRBC # BLD: 0 /100 WBCS — SIGNIFICANT CHANGE UP (ref 0–0)
PHOSPHATE SERPL-MCNC: 3.3 MG/DL — SIGNIFICANT CHANGE UP (ref 2.5–4.5)
PLATELET # BLD AUTO: 243 K/UL — SIGNIFICANT CHANGE UP (ref 150–400)
POTASSIUM SERPL-MCNC: 4.6 MMOL/L — SIGNIFICANT CHANGE UP (ref 3.5–5.3)
POTASSIUM SERPL-SCNC: 4.6 MMOL/L — SIGNIFICANT CHANGE UP (ref 3.5–5.3)
PROT SERPL-MCNC: 7.6 G/DL — SIGNIFICANT CHANGE UP (ref 6–8.3)
PROTHROM AB SERPL-ACNC: 10.3 SEC — SIGNIFICANT CHANGE UP (ref 9.5–13)
PROTHROM AB SERPL-ACNC: 10.4 SEC — SIGNIFICANT CHANGE UP (ref 9.5–13)
RBC # BLD: 3.75 M/UL — LOW (ref 4.2–5.8)
RBC # FLD: 13.2 % — SIGNIFICANT CHANGE UP (ref 10.3–14.5)
RH IG SCN BLD-IMP: POSITIVE — SIGNIFICANT CHANGE UP
SODIUM SERPL-SCNC: 133 MMOL/L — LOW (ref 135–145)
SODIUM SERPL-SCNC: 133 MMOL/L — LOW (ref 135–145)
WBC # BLD: 6.01 K/UL — SIGNIFICANT CHANGE UP (ref 3.8–10.5)
WBC # FLD AUTO: 6.01 K/UL — SIGNIFICANT CHANGE UP (ref 3.8–10.5)

## 2024-04-30 PROCEDURE — 88112 CYTOPATH CELL ENHANCE TECH: CPT | Mod: 26

## 2024-04-30 PROCEDURE — 88305 TISSUE EXAM BY PATHOLOGIST: CPT | Mod: 26

## 2024-04-30 PROCEDURE — 88341 IMHCHEM/IMCYTCHM EA ADD ANTB: CPT | Mod: 26

## 2024-04-30 PROCEDURE — 43264 ERCP REMOVE DUCT CALCULI: CPT | Mod: GC

## 2024-04-30 PROCEDURE — 99232 SBSQ HOSP IP/OBS MODERATE 35: CPT

## 2024-04-30 PROCEDURE — 99232 SBSQ HOSP IP/OBS MODERATE 35: CPT | Mod: GC

## 2024-04-30 PROCEDURE — 43260 ERCP W/SPECIMEN COLLECTION: CPT | Mod: GC

## 2024-04-30 PROCEDURE — 74328 X-RAY BILE DUCT ENDOSCOPY: CPT | Mod: 26

## 2024-04-30 PROCEDURE — 88342 IMHCHEM/IMCYTCHM 1ST ANTB: CPT | Mod: 26

## 2024-04-30 PROCEDURE — 43239 EGD BIOPSY SINGLE/MULTIPLE: CPT | Mod: GC

## 2024-04-30 PROCEDURE — 43274 ERCP DUCT STENT PLACEMENT: CPT | Mod: GC

## 2024-04-30 DEVICE — AUTOTOME CANNULATING SPHINCTEROTOME RX 44 20MM: Type: IMPLANTABLE DEVICE | Status: FUNCTIONAL

## 2024-04-30 DEVICE — CATH KT BLN DIL RX OLB 8MMX4CM: Type: IMPLANTABLE DEVICE | Status: FUNCTIONAL

## 2024-04-30 DEVICE — CATH BLLN EXTRACT DIST GUIDE WIRE 15MM 3LUM: Type: IMPLANTABLE DEVICE | Status: FUNCTIONAL

## 2024-04-30 DEVICE — BLLN EXTRACT FUSION QUATRO 8.5 10 12 15MM: Type: IMPLANTABLE DEVICE | Status: FUNCTIONAL

## 2024-04-30 DEVICE — IMPLANTABLE DEVICE: Type: IMPLANTABLE DEVICE | Status: FUNCTIONAL

## 2024-04-30 DEVICE — GWIRE JAGTOME REVOLUTION RX 260CM/0.025IN: Type: IMPLANTABLE DEVICE | Status: FUNCTIONAL

## 2024-04-30 RX ORDER — INSULIN GLARGINE 100 [IU]/ML
24 INJECTION, SOLUTION SUBCUTANEOUS AT BEDTIME
Refills: 0 | Status: DISCONTINUED | OUTPATIENT
Start: 2024-04-30 | End: 2024-05-01

## 2024-04-30 RX ORDER — INSULIN LISPRO 100/ML
6 VIAL (ML) SUBCUTANEOUS ONCE
Refills: 0 | Status: DISCONTINUED | OUTPATIENT
Start: 2024-04-30 | End: 2024-04-30

## 2024-04-30 RX ORDER — INSULIN LISPRO 100/ML
2 VIAL (ML) SUBCUTANEOUS AT BEDTIME
Refills: 0 | Status: DISCONTINUED | OUTPATIENT
Start: 2024-04-30 | End: 2024-05-01

## 2024-04-30 RX ORDER — INSULIN LISPRO 100/ML
VIAL (ML) SUBCUTANEOUS
Refills: 0 | Status: DISCONTINUED | OUTPATIENT
Start: 2024-04-30 | End: 2024-05-08

## 2024-04-30 RX ORDER — CYCLOSPORINE 100 MG/1
50 CAPSULE ORAL
Refills: 0 | Status: DISCONTINUED | OUTPATIENT
Start: 2024-04-30 | End: 2024-05-02

## 2024-04-30 RX ORDER — INSULIN LISPRO 100/ML
6 VIAL (ML) SUBCUTANEOUS ONCE
Refills: 0 | Status: COMPLETED | OUTPATIENT
Start: 2024-04-30 | End: 2024-04-30

## 2024-04-30 RX ORDER — SODIUM CHLORIDE 9 MG/ML
1000 INJECTION, SOLUTION INTRAVENOUS ONCE
Refills: 0 | Status: COMPLETED | OUTPATIENT
Start: 2024-04-30 | End: 2024-04-30

## 2024-04-30 RX ADMIN — CYCLOSPORINE 50 MILLIGRAM(S): 100 CAPSULE ORAL at 21:42

## 2024-04-30 RX ADMIN — Medication 2 UNIT(S): at 22:37

## 2024-04-30 RX ADMIN — Medication 6: at 08:52

## 2024-04-30 RX ADMIN — RISPERIDONE 1 MILLIGRAM(S): 4 TABLET ORAL at 22:38

## 2024-04-30 RX ADMIN — Medication 6: at 20:48

## 2024-04-30 RX ADMIN — Medication 4: at 12:39

## 2024-04-30 RX ADMIN — INSULIN GLARGINE 24 UNIT(S): 100 INJECTION, SOLUTION SUBCUTANEOUS at 21:42

## 2024-04-30 RX ADMIN — AMLODIPINE BESYLATE 5 MILLIGRAM(S): 2.5 TABLET ORAL at 08:52

## 2024-04-30 RX ADMIN — Medication 6 UNIT(S): at 06:42

## 2024-04-30 RX ADMIN — Medication 2: at 22:37

## 2024-04-30 RX ADMIN — SODIUM CHLORIDE 1000 MILLILITER(S): 9 INJECTION, SOLUTION INTRAVENOUS at 18:32

## 2024-04-30 NOTE — PROGRESS NOTE ADULT - SUBJECTIVE AND OBJECTIVE BOX
Interval Events:   -NAEON  -ERCP today    Hospital Medications:  acetaminophen     Tablet .. 650 milliGRAM(s) Oral every 6 hours PRN  amLODIPine   Tablet 5 milliGRAM(s) Oral daily  aspirin  chewable 81 milliGRAM(s) Oral daily  cycloSPORINE  , modified (GENGRAF) 50 milliGRAM(s) Oral <User Schedule>  dextrose 10% Bolus 125 milliLiter(s) IV Bolus once  dextrose 5%. 1000 milliLiter(s) IV Continuous <Continuous>  dextrose 5%. 1000 milliLiter(s) IV Continuous <Continuous>  dextrose 50% Injectable 25 Gram(s) IV Push once  dextrose 50% Injectable 12.5 Gram(s) IV Push once  dextrose Oral Gel 15 Gram(s) Oral once PRN  glucagon  Injectable 1 milliGRAM(s) IntraMuscular once  insulin glargine Injectable (LANTUS) 22 Unit(s) SubCutaneous at bedtime  insulin lispro (ADMELOG) corrective regimen sliding scale   SubCutaneous at bedtime  insulin lispro (ADMELOG) corrective regimen sliding scale   SubCutaneous three times a day before meals  insulin lispro Injectable (ADMELOG) 7 Unit(s) SubCutaneous three times a day before meals  melatonin 3 milliGRAM(s) Oral at bedtime PRN  metoprolol tartrate 100 milliGRAM(s) Oral every 12 hours  risperiDONE   Tablet 1 milliGRAM(s) Oral at bedtime      PHYSICAL EXAM:   Vital Signs:  Vital Signs Last 24 Hrs  T(C): 36.9 (2024 10:38), Max: 37.1 (2024 09:18)  T(F): 98.5 (2024 10:38), Max: 98.7 (2024 09:18)  HR: 60 (2024 10:38) (52 - 60)  BP: 150/81 (2024 10:38) (134/75 - 154/75)  BP(mean): --  RR: 18 (2024 10:38) (17 - 18)  SpO2: 100% (2024 10:38) (94% - 100%)    Parameters below as of 2024 09:18  Patient On (Oxygen Delivery Method): room air      Daily     Daily Weight in k.9 (2024 05:04)    GENERAL:  No acute distress  HEENT:  NCAT, no scleral icterus   CHEST:  no respiratory distress  HEART:  Regular rate and rhythm  ABDOMEN:  Soft, non-tender, moderately distended  EXTREMITIES: No edema  SKIN:  Dry skin  NEURO:  Alert and oriented x 3, no asterixis                                   11.0   6.01  )-----------( 243      ( 2024 06:40 )             32.5     04-30    133<L>  |  96  |  32<H>  ----------------------------<  272<H>  4.6   |  22  |  1.72<H>    Ca    9.2      2024 06:40  Phos  3.3     -  Mg     1.9         TPro  7.6  /  Alb  4.3  /  TBili  1.0  /  DBili  x   /  AST  60<H>  /  ALT  51<H>  /  AlkPhos  435<H>      LIVER FUNCTIONS - ( 2024 06:40 )  Alb: 4.3 g/dL / Pro: 7.6 g/dL / ALK PHOS: 435 U/L / ALT: 51 U/L / AST: 60 U/L / GGT: x             Interval Diagnostic Studies: see sunrise for full report

## 2024-04-30 NOTE — PROGRESS NOTE ADULT - ASSESSMENT
63M w/ hx of alcoholic cirrhosis complicated by HRS (chronic CKD at this time) s/p OLT 4/2019 at [facility] and b/l LE DVT who was sent in for elevated bilirubin and liver enzymes. Underwent MRCP which showed small 8 mm CBD stone upstream of the anastomosis site. Endocrine consulted for: Uncontrolled T2DM    Last 24 hour BGs 154-275s with fasting hyperglycemia in am. NPO for ERCP today     #T2DM uncontrolled with hyperglycemia  Diagnosed T2DM 7 yrs ago prior to liver transplant  Home regimen: Lantus 5 units QAM, Humalog 5 unit TIDAC, Januvia 50mg daily  Previous meds: Metformin  Recent A1c 12.1 outpatient (per HIE)  SMBG: Checks fasting FS every AM: 130s. Does not check any other time  Not on chronic steroids and not currently ordered for steroids  Complications: No CAD, CVA, retinopathy. Occasional neuropathy. + CKD.    Inpatient plan:  - Inpatient BG goal 100-180  - Increase Lantus 24 units QHS  - add snack insulin admelog 2 units at bedtime hold if not having a snack   - continue Admelog 7 units TIDAC for now ( hold meal time insulin if NPO)  - Mod dose admelog correction scale TIDAC  - Mod dose admelog correction scale QHS/2am  - CC diet  - Please inform endocrine team if starting steroids    Discharge plan:  - Discharge regimen: Basal/bolus insulin, dose TBD. Would benefit from GLP1 agonist (would stop DPP4i if starting GLP1)  - Endocrine follow up: needs to establish follow up.  - Routine ophthalmology and podiatry follow up    #Obesity  - would benefit from GLP1 on discharge    #HLD  - check lipid panel outpatient      Contact via Microsoft Teams during business hours  To reach covering provider access AMION via [employer]  For Urgent matters/after-hours/weekends/holidays please page endocrine fellow on call   For nonurgent matters please email [email or web address]    Please note that this patient may be followed by different provider tomorrow.  Notify endocrine 24 hours prior to discharge for final recommendations

## 2024-04-30 NOTE — PROGRESS NOTE ADULT - ASSESSMENT
63 yrs old male w/ hx of alcoholic cirrhosis complicated by HRS (chronic CKD at this time) s/p OLT 4/2019 at [facility] and b/l LE DVT (R Gaastrocnemius and L CFV through SFJ/ poplitial and post tibial s/p 3M A/C with resolution) who presented for elevated bilirubin and liver enzymes.    #Elevated liver enzymes s/p OLT 2019 for alcoholic cirrhosis  Had mildly elevated ALP 200s, w/  normal bilirubin and liver enzymes in 2023, now recent labs from 4/22 and 4/24 showed bili 1.4 w/ ALP 500s, and AST//90s. Patient was asymptomatic, but underwent MRCP which showed CBD stone  8 mm upstream of the anastomosis, mildly dilated 1.1 cm.   - Differentials likely obstruction, less concerned for ACR or sepsis at this time.   - ERCP today  - cyclosporine 50 mg BID, goal 100-150. (decr from 75 BiD for level 200 4/29)  - continue ASA daily    #Diabetes Mellitus  - Last A1C high 12, prior to that he was 6.7.   - Patient takes Januvia, along with Lantus 5 units in the morning and SA 5 units before meals.   - appreciate endocrine recs  - nutrition consult    #HTN   -continue amlodipine 5 mg daily.   -cw metoprolol 100 mg BID.       All recommendations preliminary until note signed by service attending.    Thank you for involving us in the care of this patient. Please contact should any concern or questions arise.    Keith Hicks MD   Gastroenterology/Hepatology Fellow PGY-5  Available on Microsoft Teams 7am - 5pm  [email or web address]    NON-URGENT CONSULTS:  Please email:  [email or web address]   OR  [email or web address]    After 5pm, please contact the on-call GI fellow. [phone]    AT NIGHT AND ON WEEKENDS:  Contact on-call GI fellow via answering service ([phone]) from 5pm-8am and on weekends/holidays

## 2024-04-30 NOTE — PROGRESS NOTE ADULT - SUBJECTIVE AND OBJECTIVE BOX
seen earlier today     Chief Complaint: Diabetes Mellitus follow up    INTERVAL HX: tolerating po, had oreos /gram crackers and coffee for snack last night prior ot bedtime BG check  counseled patient on healthier snack choices , bg above goal , scheduled for ercp today npo today      Review of Systems:  General: As above  GI: No nausea, vomiting  Endocrine: no  S&Sx of hypoglycemia    Allergies    No Known Allergies    Intolerances      MEDICATIONS  (STANDING):  amLODIPine   Tablet 5 milliGRAM(s) Oral daily  aspirin  chewable 81 milliGRAM(s) Oral daily  cycloSPORINE  , modified (GENGRAF) 50 milliGRAM(s) Oral <User Schedule>  dextrose 10% Bolus 125 milliLiter(s) IV Bolus once  dextrose 5%. 1000 milliLiter(s) (50 mL/Hr) IV Continuous <Continuous>  dextrose 5%. 1000 milliLiter(s) (100 mL/Hr) IV Continuous <Continuous>  dextrose 50% Injectable 25 Gram(s) IV Push once  dextrose 50% Injectable 12.5 Gram(s) IV Push once  glucagon  Injectable 1 milliGRAM(s) IntraMuscular once  insulin glargine Injectable (LANTUS) 22 Unit(s) SubCutaneous at bedtime  insulin lispro (ADMELOG) corrective regimen sliding scale   SubCutaneous three times a day before meals  insulin lispro (ADMELOG) corrective regimen sliding scale   SubCutaneous at bedtime  insulin lispro Injectable (ADMELOG) 7 Unit(s) SubCutaneous three times a day before meals  insulin lispro Injectable (ADMELOG) 2 Unit(s) SubCutaneous at bedtime  metoprolol tartrate 100 milliGRAM(s) Oral every 12 hours  risperiDONE   Tablet 1 milliGRAM(s) Oral at bedtime        insulin glargine Injectable (LANTUS)   22 Unit(s) SubCutaneous (04-29-24 @ 21:52)    insulin lispro (ADMELOG) corrective regimen sliding scale   4 Unit(s) SubCutaneous (04-30-24 @ 12:39)   6 Unit(s) SubCutaneous (04-30-24 @ 08:52)   2 Unit(s) SubCutaneous (04-29-24 @ 17:34)    insulin lispro (ADMELOG) corrective regimen sliding scale   2 Unit(s) SubCutaneous (04-29-24 @ 21:40)    insulin lispro Injectable (ADMELOG)   7 Unit(s) SubCutaneous (04-29-24 @ 17:34)    insulin lispro Injectable (ADMELOG).   6 Unit(s) SubCutaneous (04-30-24 @ 06:42)        PHYSICAL EXAM:  VITALS: T(C): 36.9 (04-30-24 @ 12:51)  T(F): 98.4 (04-30-24 @ 12:51), Max: 98.7 (04-30-24 @ 09:18)  HR: 55 (04-30-24 @ 12:51) (52 - 60)  BP: 146/77 (04-30-24 @ 12:51) (141/75 - 154/75)  RR:  (17 - 18)  SpO2:  (94% - 100%)  Wt(kg): --  GENERAL: NAD  Respiratory: Respirations unlabored   Extremities: Warm, no edema  NEURO: Alert , appropriate     LABS:  POCT Blood Glucose.: 209 mg/dL (04-30-24 @ 12:36)  POCT Blood Glucose.: 196 mg/dL (04-30-24 @ 10:37)  POCT Blood Glucose.: 269 mg/dL (04-30-24 @ 08:15)  POCT Blood Glucose.: 262 mg/dL (04-30-24 @ 06:15)  POCT Blood Glucose.: 275 mg/dL (04-29-24 @ 21:17)  POCT Blood Glucose.: 154 mg/dL (04-29-24 @ 16:45)  POCT Blood Glucose.: 192 mg/dL (04-29-24 @ 14:02)  POCT Blood Glucose.: 221 mg/dL (04-29-24 @ 12:53)  POCT Blood Glucose.: 225 mg/dL (04-29-24 @ 12:11)  POCT Blood Glucose.: 289 mg/dL (04-29-24 @ 08:26)  POCT Blood Glucose.: 205 mg/dL (04-28-24 @ 21:40)  POCT Blood Glucose.: 256 mg/dL (04-28-24 @ 18:01)  POCT Blood Glucose.: 351 mg/dL (04-28-24 @ 13:07)                          11.0   6.01  )-----------( 243      ( 30 Apr 2024 06:40 )             32.5     04-30    133<L>  |  96  |  32<H>  ----------------------------<  272<H>  4.6   |  22  |  1.72<H>    Ca    9.2      30 Apr 2024 06:40  Phos  3.3     04-30  Mg     1.9     04-30    TPro  7.6  /  Alb  4.3  /  TBili  1.0  /  DBili  x   /  AST  60<H>  /  ALT  51<H>  /  AlkPhos  435<H>  04-30    eGFR: 44 mL/min/1.73m2 (30 Apr 2024 06:40)  eGFR: 43 mL/min/1.73m2 (30 Apr 2024 06:40)      Thyroid Function Tests:      A1C with Estimated Average Glucose Result: 12.2 % (04-29-24 @ 06:49)  A1C with Estimated Average Glucose Result: 11.8 % (04-29-24 @ 06:49)    Estimated Average Glucose: 303 mg/dL (04-29-24 @ 06:49)  Estimated Average Glucose: 292 mg/dL (04-29-24 @ 06:49)        Diet, NPO after Midnight:      NPO Start Date: 29-Apr-2024,   NPO Start Time: 23:59  Except Medications (04-29-24 @ 15:05) [Active]  Diet, Regular:   Consistent Carbohydrate No Snacks (CSTCHO) (04-28-24 @ 11:51) [Active]

## 2024-05-01 LAB
ADD ON TEST-SPECIMEN IN LAB: SIGNIFICANT CHANGE UP
ALBUMIN SERPL ELPH-MCNC: 4.4 G/DL — SIGNIFICANT CHANGE UP (ref 3.3–5)
ALBUMIN SERPL ELPH-MCNC: 4.4 G/DL — SIGNIFICANT CHANGE UP (ref 3.3–5)
ALP SERPL-CCNC: 506 U/L — HIGH (ref 40–120)
ALP SERPL-CCNC: 514 U/L — HIGH (ref 40–120)
ALT FLD-CCNC: 58 U/L — HIGH (ref 10–45)
ALT FLD-CCNC: 58 U/L — HIGH (ref 10–45)
ANION GAP SERPL CALC-SCNC: 13 MMOL/L — SIGNIFICANT CHANGE UP (ref 5–17)
ANION GAP SERPL CALC-SCNC: 16 MMOL/L — SIGNIFICANT CHANGE UP (ref 5–17)
APTT BLD: 27.4 SEC — SIGNIFICANT CHANGE UP (ref 24.5–35.6)
AST SERPL-CCNC: 56 U/L — HIGH (ref 10–40)
AST SERPL-CCNC: 65 U/L — HIGH (ref 10–40)
BILIRUB SERPL-MCNC: 1.4 MG/DL — HIGH (ref 0.2–1.2)
BILIRUB SERPL-MCNC: 1.7 MG/DL — HIGH (ref 0.2–1.2)
BUN SERPL-MCNC: 31 MG/DL — HIGH (ref 7–23)
BUN SERPL-MCNC: 37 MG/DL — HIGH (ref 7–23)
CALCIUM SERPL-MCNC: 9.3 MG/DL — SIGNIFICANT CHANGE UP (ref 8.4–10.5)
CALCIUM SERPL-MCNC: 9.3 MG/DL — SIGNIFICANT CHANGE UP (ref 8.4–10.5)
CHLORIDE SERPL-SCNC: 92 MMOL/L — LOW (ref 96–108)
CHLORIDE SERPL-SCNC: 97 MMOL/L — SIGNIFICANT CHANGE UP (ref 96–108)
CO2 SERPL-SCNC: 21 MMOL/L — LOW (ref 22–31)
CO2 SERPL-SCNC: 21 MMOL/L — LOW (ref 22–31)
CREAT SERPL-MCNC: 1.67 MG/DL — HIGH (ref 0.5–1.3)
CREAT SERPL-MCNC: 1.76 MG/DL — HIGH (ref 0.5–1.3)
CYCLOSPORINE SER-MCNC: 120 NG/ML — LOW (ref 150–400)
EGFR: 42 ML/MIN/1.73M2 — LOW
EGFR: 45 ML/MIN/1.73M2 — LOW
GLUCOSE BLDC GLUCOMTR-MCNC: 256 MG/DL — HIGH (ref 70–99)
GLUCOSE BLDC GLUCOMTR-MCNC: 295 MG/DL — HIGH (ref 70–99)
GLUCOSE BLDC GLUCOMTR-MCNC: 296 MG/DL — HIGH (ref 70–99)
GLUCOSE BLDC GLUCOMTR-MCNC: 299 MG/DL — HIGH (ref 70–99)
GLUCOSE BLDC GLUCOMTR-MCNC: 392 MG/DL — HIGH (ref 70–99)
GLUCOSE BLDC GLUCOMTR-MCNC: 446 MG/DL — HIGH (ref 70–99)
GLUCOSE BLDC GLUCOMTR-MCNC: 448 MG/DL — HIGH (ref 70–99)
GLUCOSE SERPL-MCNC: 287 MG/DL — HIGH (ref 70–99)
GLUCOSE SERPL-MCNC: 401 MG/DL — HIGH (ref 70–99)
HCT VFR BLD CALC: 36.1 % — LOW (ref 39–50)
HGB BLD-MCNC: 12.2 G/DL — LOW (ref 13–17)
INR BLD: 1.01 RATIO — SIGNIFICANT CHANGE UP (ref 0.85–1.18)
LIDOCAIN IGE QN: 53 U/L — SIGNIFICANT CHANGE UP (ref 7–60)
MAGNESIUM SERPL-MCNC: 1.9 MG/DL — SIGNIFICANT CHANGE UP (ref 1.6–2.6)
MCHC RBC-ENTMCNC: 29.1 PG — SIGNIFICANT CHANGE UP (ref 27–34)
MCHC RBC-ENTMCNC: 33.8 GM/DL — SIGNIFICANT CHANGE UP (ref 32–36)
MCV RBC AUTO: 86.2 FL — SIGNIFICANT CHANGE UP (ref 80–100)
NRBC # BLD: 0 /100 WBCS — SIGNIFICANT CHANGE UP (ref 0–0)
PHOSPHATE SERPL-MCNC: 1.9 MG/DL — LOW (ref 2.5–4.5)
PLATELET # BLD AUTO: 235 K/UL — SIGNIFICANT CHANGE UP (ref 150–400)
POTASSIUM SERPL-MCNC: 5 MMOL/L — SIGNIFICANT CHANGE UP (ref 3.5–5.3)
POTASSIUM SERPL-MCNC: 6.3 MMOL/L — CRITICAL HIGH (ref 3.5–5.3)
POTASSIUM SERPL-SCNC: 5 MMOL/L — SIGNIFICANT CHANGE UP (ref 3.5–5.3)
POTASSIUM SERPL-SCNC: 6.3 MMOL/L — CRITICAL HIGH (ref 3.5–5.3)
PROT SERPL-MCNC: 7.9 G/DL — SIGNIFICANT CHANGE UP (ref 6–8.3)
PROT SERPL-MCNC: 8.2 G/DL — SIGNIFICANT CHANGE UP (ref 6–8.3)
PROTHROM AB SERPL-ACNC: 10.6 SEC — SIGNIFICANT CHANGE UP (ref 9.5–13)
RBC # BLD: 4.19 M/UL — LOW (ref 4.2–5.8)
RBC # FLD: 13 % — SIGNIFICANT CHANGE UP (ref 10.3–14.5)
SODIUM SERPL-SCNC: 129 MMOL/L — LOW (ref 135–145)
SODIUM SERPL-SCNC: 131 MMOL/L — LOW (ref 135–145)
WBC # BLD: 5.06 K/UL — SIGNIFICANT CHANGE UP (ref 3.8–10.5)
WBC # FLD AUTO: 5.06 K/UL — SIGNIFICANT CHANGE UP (ref 3.8–10.5)

## 2024-05-01 PROCEDURE — 99232 SBSQ HOSP IP/OBS MODERATE 35: CPT

## 2024-05-01 PROCEDURE — 99232 SBSQ HOSP IP/OBS MODERATE 35: CPT | Mod: GC

## 2024-05-01 RX ORDER — ALBUMIN HUMAN 25 %
100 VIAL (ML) INTRAVENOUS ONCE
Refills: 0 | Status: COMPLETED | OUTPATIENT
Start: 2024-05-01 | End: 2024-05-01

## 2024-05-01 RX ORDER — INSULIN LISPRO 100/ML
3 VIAL (ML) SUBCUTANEOUS AT BEDTIME
Refills: 0 | Status: DISCONTINUED | OUTPATIENT
Start: 2024-05-01 | End: 2024-05-02

## 2024-05-01 RX ORDER — INSULIN GLARGINE 100 [IU]/ML
28 INJECTION, SOLUTION SUBCUTANEOUS AT BEDTIME
Refills: 0 | Status: DISCONTINUED | OUTPATIENT
Start: 2024-05-01 | End: 2024-05-02

## 2024-05-01 RX ORDER — FUROSEMIDE 40 MG
40 TABLET ORAL ONCE
Refills: 0 | Status: COMPLETED | OUTPATIENT
Start: 2024-05-01 | End: 2024-05-01

## 2024-05-01 RX ORDER — SODIUM ZIRCONIUM CYCLOSILICATE 10 G/10G
10 POWDER, FOR SUSPENSION ORAL ONCE
Refills: 0 | Status: COMPLETED | OUTPATIENT
Start: 2024-05-01 | End: 2024-05-01

## 2024-05-01 RX ORDER — URSODIOL 250 MG/1
300 TABLET, FILM COATED ORAL EVERY 12 HOURS
Refills: 0 | Status: DISCONTINUED | OUTPATIENT
Start: 2024-05-01 | End: 2024-05-06

## 2024-05-01 RX ORDER — INSULIN LISPRO 100/ML
10 VIAL (ML) SUBCUTANEOUS
Refills: 0 | Status: DISCONTINUED | OUTPATIENT
Start: 2024-05-01 | End: 2024-05-01

## 2024-05-01 RX ORDER — INSULIN LISPRO 100/ML
12 VIAL (ML) SUBCUTANEOUS
Refills: 0 | Status: DISCONTINUED | OUTPATIENT
Start: 2024-05-01 | End: 2024-05-02

## 2024-05-01 RX ORDER — SODIUM CHLORIDE 9 MG/ML
500 INJECTION INTRAMUSCULAR; INTRAVENOUS; SUBCUTANEOUS ONCE
Refills: 0 | Status: COMPLETED | OUTPATIENT
Start: 2024-05-01 | End: 2024-05-01

## 2024-05-01 RX ADMIN — CYCLOSPORINE 50 MILLIGRAM(S): 100 CAPSULE ORAL at 19:55

## 2024-05-01 RX ADMIN — Medication 6: at 16:53

## 2024-05-01 RX ADMIN — Medication 81 MILLIGRAM(S): at 12:41

## 2024-05-01 RX ADMIN — AMLODIPINE BESYLATE 5 MILLIGRAM(S): 2.5 TABLET ORAL at 05:34

## 2024-05-01 RX ADMIN — URSODIOL 300 MILLIGRAM(S): 250 TABLET, FILM COATED ORAL at 09:34

## 2024-05-01 RX ADMIN — Medication 50 MILLILITER(S): at 17:20

## 2024-05-01 RX ADMIN — INSULIN GLARGINE 28 UNIT(S): 100 INJECTION, SOLUTION SUBCUTANEOUS at 21:30

## 2024-05-01 RX ADMIN — Medication 100 MILLIGRAM(S): at 17:20

## 2024-05-01 RX ADMIN — URSODIOL 300 MILLIGRAM(S): 250 TABLET, FILM COATED ORAL at 17:20

## 2024-05-01 RX ADMIN — Medication 40 MILLIGRAM(S): at 07:50

## 2024-05-01 RX ADMIN — Medication 12: at 12:40

## 2024-05-01 RX ADMIN — Medication 100 MILLIGRAM(S): at 09:34

## 2024-05-01 RX ADMIN — SODIUM CHLORIDE 1000 MILLILITER(S): 9 INJECTION INTRAMUSCULAR; INTRAVENOUS; SUBCUTANEOUS at 07:49

## 2024-05-01 RX ADMIN — SODIUM ZIRCONIUM CYCLOSILICATE 10 GRAM(S): 10 POWDER, FOR SUSPENSION ORAL at 07:50

## 2024-05-01 RX ADMIN — Medication 2: at 21:29

## 2024-05-01 RX ADMIN — Medication 2: at 01:42

## 2024-05-01 RX ADMIN — Medication 6: at 08:52

## 2024-05-01 RX ADMIN — CYCLOSPORINE 50 MILLIGRAM(S): 100 CAPSULE ORAL at 07:56

## 2024-05-01 RX ADMIN — Medication 7 UNIT(S): at 08:53

## 2024-05-01 RX ADMIN — Medication 12 UNIT(S): at 16:53

## 2024-05-01 RX ADMIN — Medication 3 UNIT(S): at 21:30

## 2024-05-01 RX ADMIN — RISPERIDONE 1 MILLIGRAM(S): 4 TABLET ORAL at 21:29

## 2024-05-01 NOTE — PROGRESS NOTE ADULT - SUBJECTIVE AND OBJECTIVE BOX
Interval Events:   -s/p ERCP  details  below  -HyperK to 6.3 this AM    Hospital Medications:  acetaminophen     Tablet .. 650 milliGRAM(s) Oral every 6 hours PRN  amLODIPine   Tablet 5 milliGRAM(s) Oral daily  aspirin  chewable 81 milliGRAM(s) Oral daily  cycloSPORINE  , modified (GENGRAF) 50 milliGRAM(s) Oral <User Schedule>  dextrose 10% Bolus 125 milliLiter(s) IV Bolus once  dextrose 5%. 1000 milliLiter(s) IV Continuous <Continuous>  dextrose 5%. 1000 milliLiter(s) IV Continuous <Continuous>  dextrose 50% Injectable 25 Gram(s) IV Push once  dextrose 50% Injectable 12.5 Gram(s) IV Push once  dextrose Oral Gel 15 Gram(s) Oral once PRN  glucagon  Injectable 1 milliGRAM(s) IntraMuscular once  insulin glargine Injectable (LANTUS) 24 Unit(s) SubCutaneous at bedtime  insulin lispro (ADMELOG) corrective regimen sliding scale   SubCutaneous <User Schedule>  insulin lispro (ADMELOG) corrective regimen sliding scale   SubCutaneous three times a day before meals  insulin lispro Injectable (ADMELOG) 2 Unit(s) SubCutaneous at bedtime  insulin lispro Injectable (ADMELOG) 7 Unit(s) SubCutaneous three times a day before meals  melatonin 3 milliGRAM(s) Oral at bedtime PRN  metoprolol tartrate 100 milliGRAM(s) Oral every 12 hours  risperiDONE   Tablet 1 milliGRAM(s) Oral at bedtime  ursodiol Capsule 300 milliGRAM(s) Oral every 12 hours      PHYSICAL EXAM:   Vital Signs:  Vital Signs Last 24 Hrs  T(C): 36.4 (01 May 2024 08:58), Max: 36.8 (2024 18:30)  T(F): 97.6 (01 May 2024 08:58), Max: 98.2 (2024 18:30)  HR: 76 (01 May 2024 08:58) (50 - 76)  BP: 165/77 (01 May 2024 08:58) (130/68 - 172/78)  BP(mean): --  RR: 17 (01 May 2024 08:58) (16 - 18)  SpO2: 96% (01 May 2024 08:58) (96% - 99%)    Parameters below as of 01 May 2024 08:58  Patient On (Oxygen Delivery Method): room air      Daily Height in cm: 185.42 (2024 16:35)    Daily Weight in k.1 (01 May 2024 05:00)    GENERAL:  No acute distress  HEENT:  NCAT, no scleral icterus   CHEST:  no respiratory distress  HEART:  Regular rate and rhythm  ABDOMEN:  Soft, non-tender, moderately distended  EXTREMITIES: No edema  SKIN:  Dry skin  NEURO:  Alert and oriented x 3, no asterixis                        12.2   5.06  )-----------( 235      ( 01 May 2024 06:22 )             36.1     05-    131<L>  |  97  |  31<H>  ----------------------------<  287<H>  6.3<HH>   |  21<L>  |  1.67<H>    Ca    9.3      01 May 2024 06:25  Phos  1.9     05-  Mg     1.9         TPro  7.9  /  Alb  4.4  /  TBili  1.7<H>  /  DBili  x   /  AST  65<H>  /  ALT  58<H>  /  AlkPhos  514<H>  05-    LIVER FUNCTIONS - ( 01 May 2024 06:25 )  Alb: 4.4 g/dL / Pro: 7.9 g/dL / ALK PHOS: 514 U/L / ALT: 58 U/L / AST: 65 U/L / GGT: x             Interval Diagnostic Studies:   < from: ERCP (24 @ 15:10) >  Impression:          - Small hiatal hernia.              - A few papules (nodules) found in the gastric cardia s/p biopsy.                       - The major papilla appeared to be bulging.                       - ERCP notable for anastomotic stricture and a filling defect consistent with                   a stone proximal to the stricture s/p sphincterotomy, brushing, balloon                        extraction of debris, dilation of anastomotic stricture to 8 mm and stent                        placement (8 mm x 10 mm Viabel)  Recommendation:      - [facility] for [facility].                       - Clear liquid diet today.                       - Followup pathology.                       - Repeat ERCP in 3 months to remove stone and stent.    < end of copied text >

## 2024-05-01 NOTE — DIETITIAN INITIAL EVALUATION ADULT - ORAL INTAKE PTA/DIET HISTORY
PTA per pt  -Intake: good PO intake:  Breakfast: smoothie with strawberries (1 cup), non-fat plain Greek yogurt, almond milk, honey  Lunch: ham, turkey sandwich on potato bread  Dinner: baked or breaded and fried chicken, rice or pasta (1/3 of plate), non-starchy veggies)   -Chewing/Swallowing: denies difficulty   -Allergies/Intolerances: NKFA

## 2024-05-01 NOTE — PROVIDER CONTACT NOTE (OTHER) - REASON
Blood glucose level: 448
Pt HR sustaining 48BPM on tele monitoring
pt glucose still elevated
Pt had episode of WCT for 5 beats on tele monitoring

## 2024-05-01 NOTE — PROGRESS NOTE ADULT - ASSESSMENT
Impression:   63 yrs old male w/ hx of alcoholic cirrhosis complicated by HRS (chronic CKD at this time) s/p OLT 4/2019 at [facility] and b/l LE DVT (R Gaastrocnemius and L CFV through SFJ/ poplitial and post tibial s/p 3M A/C with resolution) who presented for elevated bilirubin and liver enzymes.    #Elevated liver enzymes s/p OLT 2019 for alcoholic cirrhosis  #Anastomotic stricture with CBD stone  -s/p ERCP on 4/20/24 s/p sphincterotomy, brushing, balloon extraction of debris, dilation of anastomotic stricture to 8 mm and stent lacement (8 mm x 10 mm Viabel)    Recommendations:   - Will need repeat ERCP in 3 months  - Advance diet as tolerated  - rest per LT team    Recommendations preliminary until signed by attending.     Les Espinosa MD  Gastroenterology/Hepatology Fellow    [facility] Routine Consult 24/7: [email or web address] Consult 24/7: [email or web address]  For urgent consult during the weekends (all day) and Weeknights (5PM to 7 AM) please:  1. Contact on call GI team via page followed by TEAMS Call if no response  2. If no response, call the answering service ([phone])

## 2024-05-01 NOTE — PROGRESS NOTE ADULT - SUBJECTIVE AND OBJECTIVE BOX
Interval Events:   Patient denies any abdominal pain, nausea /vomiting, diarrhea or melena / bloody bm.     Hospital Medications:  acetaminophen     Tablet .. 650 milliGRAM(s) Oral every 6 hours PRN  amLODIPine   Tablet 5 milliGRAM(s) Oral daily  aspirin  chewable 81 milliGRAM(s) Oral daily  cycloSPORINE  , modified (GENGRAF) 50 milliGRAM(s) Oral <User Schedule>  dextrose 10% Bolus 125 milliLiter(s) IV Bolus once  dextrose 5%. 1000 milliLiter(s) IV Continuous <Continuous>  dextrose 5%. 1000 milliLiter(s) IV Continuous <Continuous>  dextrose 50% Injectable 25 Gram(s) IV Push once  dextrose 50% Injectable 12.5 Gram(s) IV Push once  dextrose Oral Gel 15 Gram(s) Oral once PRN  glucagon  Injectable 1 milliGRAM(s) IntraMuscular once  insulin glargine Injectable (LANTUS) 24 Unit(s) SubCutaneous at bedtime  insulin lispro (ADMELOG) corrective regimen sliding scale   SubCutaneous three times a day before meals  insulin lispro (ADMELOG) corrective regimen sliding scale   SubCutaneous <User Schedule>  insulin lispro Injectable (ADMELOG) 2 Unit(s) SubCutaneous at bedtime  insulin lispro Injectable (ADMELOG) 7 Unit(s) SubCutaneous three times a day before meals  melatonin 3 milliGRAM(s) Oral at bedtime PRN  metoprolol tartrate 100 milliGRAM(s) Oral every 12 hours  risperiDONE   Tablet 1 milliGRAM(s) Oral at bedtime  ursodiol Capsule 300 milliGRAM(s) Oral every 12 hours      ROS: All system reviewed and negative except as mentioned above.    PHYSICAL EXAM:   Vital Signs:  Vital Signs Last 24 Hrs  T(C): 36.4 (01 May 2024 08:58), Max: 36.9 (2024 12:51)  T(F): 97.6 (01 May 2024 08:58), Max: 98.4 (2024 12:51)  HR: 76 (01 May 2024 08:58) (50 - 76)  BP: 165/77 (01 May 2024 08:58) (130/68 - 172/78)  BP(mean): --  RR: 17 (01 May 2024 08:58) (16 - 18)  SpO2: 96% (01 May 2024 08:58) (95% - 99%)    Parameters below as of 01 May 2024 08:58  Patient On (Oxygen Delivery Method): room air      Daily Height in cm: 185.42 (2024 16:35)    Daily Weight in k.1 (01 May 2024 05:00)    GENERAL:  NAD, Appears stated age  HEENT:  NC/AT,  conjunctivae clear and pink, sclera -anicteric  CHEST:  Normal Effort, Breath sounds clear  HEART:  RRR, S1 + S2, no murmurs  ABDOMEN:  Soft, non-tender, non-distended, normoactive bowel sounds,  no masses  EXTREMITIES:  no cyanosis or edema  SKIN:  Warm & Dry. No rash or erythema  NEURO:  Alert, oriented, no focal deficit    LABS:                        12.2   5.06  )-----------( 235      ( 01 May 2024 06:22 )             36.1     Mean Cell Volume: 86.2 fl (24 @ 06:22)    05-    131<L>  |  97  |  31<H>  ----------------------------<  287<H>  6.3<HH>   |  21<L>  |  1.67<H>    Ca    9.3      01 May 2024 06:25  Phos  1.9     05-  Mg     1.9         TPro  7.9  /  Alb  4.4  /  TBili  1.7<H>  /  DBili  x   /  AST  65<H>  /  ALT  58<H>  /  AlkPhos  514<H>      LIVER FUNCTIONS - ( 01 May 2024 06:25 )  Alb: 4.4 g/dL / Pro: 7.9 g/dL / ALK PHOS: 514 U/L / ALT: 58 U/L / AST: 65 U/L / GGT: x           PT/INR - ( 01 May 2024 06:22 )   PT: 10.6 sec;   INR: 1.01 ratio         PTT - ( 01 May 2024 06:22 )  PTT:27.4 sec  Urinalysis Basic - ( 01 May 2024 06:25 )    Color: x / Appearance: x / SG: x / pH: x  Gluc: 287 mg/dL / Ketone: x  / Bili: x / Urobili: x   Blood: x / Protein: x / Nitrite: x   Leuk Esterase: x / RBC: x / WBC x   Sq Epi: x / Non Sq Epi: x / Bacteria: x      Amylase Serum--      Lipase serum53       Ammonia--                          12.2   5.06  )-----------( 235      ( 01 May 2024 06:22 )             36.1                         11.0   6.01  )-----------( 243      ( 2024 06:40 )             32.5                         12.2   6.66  )-----------( 242      ( 2024 06:49 )             35.6                         12.0   7.44  )-----------( 285      ( 2024 13:51 )             36.2       Imaging: Images reviewed.

## 2024-05-01 NOTE — DIETITIAN INITIAL EVALUATION ADULT - PERTINENT LABORATORY DATA
05-01    131<L>  |  97  |  31<H>  ----------------------------<  287<H>  6.3<HH>   |  21<L>  |  1.67<H>    Ca    9.3      01 May 2024 06:25  Phos  1.9     05-01  Mg     1.9     05-01    TPro  7.9  /  Alb  4.4  /  TBili  1.7<H>  /  DBili  x   /  AST  65<H>  /  ALT  58<H>  /  AlkPhos  514<H>  05-01  POCT Blood Glucose.: 256 mg/dL (05-01-24 @ 08:27)  A1C with Estimated Average Glucose Result: 12.2 % (04-29-24 @ 06:49)  A1C with Estimated Average Glucose Result: 11.8 % (04-29-24 @ 06:49)

## 2024-05-01 NOTE — DIETITIAN INITIAL EVALUATION ADULT - NUTRITIONGOAL OUTCOME1
Pt will be able to verbalize 3 teach back points; continue to follow Consistent carbohydrate diet upon d/c home

## 2024-05-01 NOTE — PROVIDER CONTACT NOTE (OTHER) - ACTION/TREATMENT ORDERED:
Remain NPO another 2 hours. No extra insulin as previous dose was given 2 hours ago. Recheck at 1630
continue to monitor HR on tele monitoring. Okay for tech to lower HR parameters to 48 BMP. Escalate any abnormal s/s or change in pt status to provider
Give pt sliding scale Admelog for BGL of 448. Nothing to eat 2 hrs post admelog administration. Recheck FS @1445. If fs glucose reading <300, pt can eat and pre-meal admelog to be administered.
continue to monitor pt on tele monitoring - assess morning labs and electrolytes. Escalate any change in pt condition to provider

## 2024-05-01 NOTE — DIETITIAN INITIAL EVALUATION ADULT - REASON INDICATOR FOR ASSESSMENT
Consult: nutrition assessment/education   Sources: Electronic Medical Record, Team (Rounds), Patient

## 2024-05-01 NOTE — PROGRESS NOTE ADULT - SUBJECTIVE AND OBJECTIVE BOX
Seen earlier today     Chief Complaint: Diabetes Mellitus follow up    INTERVAL HX: s/p ERCP yesterday with sphincterotomy, brushing, balloon extraction of debris, dilation of anastomotic stricture to 8 mm and stent placement. Denies discomfort, diet advanced this am, had clear liquid earlier then regular food this am. Severe hyperglycemia(  and 448) at prelunch. As per RN, he had clear liquid diet earlier and had scrambled egg after diet was advanced. Admelog 12 units given according to correction scale and patient kept NPO until BG < 300.         Review of Systems:  General: As above  GI: No nausea, vomiting  Endocrine: no  S&Sx of hypoglycemia    Allergies    No Known Allergies    Intolerances      MEDICATIONS  (STANDING):  albumin human 25% IVPB 100 milliLiter(s) IV Intermittent once  amLODIPine   Tablet 5 milliGRAM(s) Oral daily  aspirin  chewable 81 milliGRAM(s) Oral daily  cycloSPORINE  , modified (GENGRAF) 50 milliGRAM(s) Oral <User Schedule>  dextrose 10% Bolus 125 milliLiter(s) IV Bolus once  dextrose 5%. 1000 milliLiter(s) (100 mL/Hr) IV Continuous <Continuous>  dextrose 5%. 1000 milliLiter(s) (50 mL/Hr) IV Continuous <Continuous>  dextrose 50% Injectable 25 Gram(s) IV Push once  dextrose 50% Injectable 12.5 Gram(s) IV Push once  glucagon  Injectable 1 milliGRAM(s) IntraMuscular once  insulin glargine Injectable (LANTUS) 24 Unit(s) SubCutaneous at bedtime  insulin lispro (ADMELOG) corrective regimen sliding scale   SubCutaneous <User Schedule>  insulin lispro (ADMELOG) corrective regimen sliding scale   SubCutaneous three times a day before meals  insulin lispro Injectable (ADMELOG) 2 Unit(s) SubCutaneous at bedtime  insulin lispro Injectable (ADMELOG) 10 Unit(s) SubCutaneous three times a day before meals  metoprolol tartrate 100 milliGRAM(s) Oral every 12 hours  risperiDONE   Tablet 1 milliGRAM(s) Oral at bedtime  ursodiol Capsule 300 milliGRAM(s) Oral every 12 hours        insulin glargine Injectable (LANTUS)   24 Unit(s) SubCutaneous (04-30-24 @ 21:42)    insulin lispro (ADMELOG) corrective regimen sliding scale   2 Unit(s) SubCutaneous (05-01-24 @ 01:42)   2 Unit(s) SubCutaneous (04-30-24 @ 22:37)    insulin lispro (ADMELOG) corrective regimen sliding scale   12 Unit(s) SubCutaneous (05-01-24 @ 12:40)   6 Unit(s) SubCutaneous (05-01-24 @ 08:52)   6 Unit(s) SubCutaneous (04-30-24 @ 20:48)    insulin lispro Injectable (ADMELOG)   2 Unit(s) SubCutaneous (04-30-24 @ 22:37)    insulin lispro Injectable (ADMELOG)   7 Unit(s) SubCutaneous (05-01-24 @ 08:53)        PHYSICAL EXAM:  VITALS: T(C): 36.9 (05-01-24 @ 13:13)  T(F): 98.5 (05-01-24 @ 13:13), Max: 98.5 (05-01-24 @ 13:13)  HR: 63 (05-01-24 @ 13:13) (50 - 76)  BP: 143/76 (05-01-24 @ 13:13) (130/68 - 172/78)  RR:  (16 - 18)  SpO2:  (96% - 99%)  Wt(kg): --  GENERAL: Male sitting in chair, in NAD  Respiratory: Respirations unlabored   Extremities: Warm, no edema  NEURO: Alert , appropriate     LABS:  POCT Blood Glucose.: 392 mg/dL (05-01-24 @ 14:25)  POCT Blood Glucose.: 448 mg/dL (05-01-24 @ 12:32)  POCT Blood Glucose.: 446 mg/dL (05-01-24 @ 12:28)  POCT Blood Glucose.: 256 mg/dL (05-01-24 @ 08:27)  POCT Blood Glucose.: 295 mg/dL (05-01-24 @ 01:24)  POCT Blood Glucose.: 299 mg/dL (04-30-24 @ 22:36)  POCT Blood Glucose.: 266 mg/dL (04-30-24 @ 20:46)  POCT Blood Glucose.: 172 mg/dL (04-30-24 @ 14:58)  POCT Blood Glucose.: 209 mg/dL (04-30-24 @ 12:36)  POCT Blood Glucose.: 196 mg/dL (04-30-24 @ 10:37)  POCT Blood Glucose.: 269 mg/dL (04-30-24 @ 08:15)  POCT Blood Glucose.: 262 mg/dL (04-30-24 @ 06:15)  POCT Blood Glucose.: 275 mg/dL (04-29-24 @ 21:17)  POCT Blood Glucose.: 154 mg/dL (04-29-24 @ 16:45)  POCT Blood Glucose.: 192 mg/dL (04-29-24 @ 14:02)  POCT Blood Glucose.: 221 mg/dL (04-29-24 @ 12:53)  POCT Blood Glucose.: 225 mg/dL (04-29-24 @ 12:11)  POCT Blood Glucose.: 289 mg/dL (04-29-24 @ 08:26)  POCT Blood Glucose.: 205 mg/dL (04-28-24 @ 21:40)  POCT Blood Glucose.: 256 mg/dL (04-28-24 @ 18:01)                          12.2   5.06  )-----------( 235      ( 01 May 2024 06:22 )             36.1     05-01    129<L>  |  92<L>  |  37<H>  ----------------------------<  401<H>  5.0   |  21<L>  |  1.76<H>    Ca    9.3      01 May 2024 14:43  Phos  1.9     05-01  Mg     1.9     05-01    TPro  8.2  /  Alb  4.4  /  TBili  1.4<H>  /  DBili  x   /  AST  56<H>  /  ALT  58<H>  /  AlkPhos  506<H>  05-01    eGFR: 42 mL/min/1.73m2 (01 May 2024 14:43)  eGFR: 45 mL/min/1.73m2 (01 May 2024 06:25)      Thyroid Function Tests:      A1C with Estimated Average Glucose Result: 12.2 % (04-29-24 @ 06:49)  A1C with Estimated Average Glucose Result: 11.8 % (04-29-24 @ 06:49)    Estimated Average Glucose: 303 mg/dL (04-29-24 @ 06:49)  Estimated Average Glucose: 292 mg/dL (04-29-24 @ 06:49)        Diet, Regular:   Consistent Carbohydrate No Snacks (CSTCHO) (05-01-24 @ 09:18) [Active]

## 2024-05-01 NOTE — PROVIDER CONTACT NOTE (OTHER) - BACKGROUND
pt scheduled for ERCP today
Pt admitted with elevated LFTs
Pt has history of liver transplant, scheduled for ERCP in AM
Mr. Saab is a 63 yrs old male w/ hx of alcoholic cirrhosis OLT 4/2019. Underwent MRCP which showed small 8 mm CBD stone upstream of the anastomosis site. Planning for ERCP this admission.

## 2024-05-01 NOTE — DIETITIAN INITIAL EVALUATION ADULT - ADD RECOMMEND
1) Continue Consistent carbohydrate diet   2) Reinforce diet education PRN   3) Monitor PO intake, diet tolerance, weight trends, labs, GI function, and skin integrity    Corrine Bahena MS, RDN, CDN (Teams)

## 2024-05-01 NOTE — PROGRESS NOTE ADULT - ASSESSMENT
63M w/ hx of alcoholic cirrhosis complicated by HRS (chronic CKD at this time) s/p OLT 4/2019 at [facility] and b/l LE DVT who was sent in for elevated bilirubin and liver enzymes. Underwent MRCP which showed small 8 mm CBD stone upstream of the anastomosis site. Endocrine consulted for: Uncontrolled T2DM. BG Goal 100-180mg/dl     Severe hyperglycemia (  and 448 ) after eating 2nd tray ( clear liquid diet and ? only scrambled egg  from 2nd tray).  Last 24 hour BG values 200s-448, above goal. will adjust insulin dose for BG Goal 100-180mg/dl     #T2DM uncontrolled with hyperglycemia  Diagnosed T2DM 7 yrs ago prior to liver transplant  Home regimen: Lantus 5 units QAM, Humalog 5 unit TIDAC, Januvia 50mg daily  Previous meds: Metformin  Recent A1c 12.1 outpatient (per HIE)  SMBG: Checks fasting FS every AM: 130s. Does not check any other time  Not on chronic steroids and not currently ordered for steroids  Complications: No CAD, CVA, retinopathy. Occasional neuropathy. + CKD.    Inpatient plan:  - Inpatient BG goal 100-180  - Increase Lantus 28units QHS  - Increase snack insulin admelog 3 units at bedtime hold if not having a snack   - Increase Admelog 12 units TIDAC for now ( hold meal time insulin if NPO)  - Mod dose admelog correction scale TIDAC  - Mod dose admelog correction scale QHS/2am  - CC diet  - Please inform endocrine team if starting steroids    Discharge plan:  - Discharge regimen: Basal/bolus insulin, dose TBD. Would benefit from GLP1 agonist (would stop DPP4i if starting GLP1)  - Endocrine follow up: needs to establish follow up.  - Routine ophthalmology and podiatry follow up    #Obesity  - would benefit from GLP1 on discharge    #HLD  - check lipid panel outpatient      Contact via Microsoft Teams during business hours  To reach covering provider access AMION via sunrise tools  For Urgent matters/after-hours/weekends/holidays please page endocrine fellow on call   For nonurgent matters please email [email or web address]    Please note that this patient may be followed by different provider tomorrow.  Notify endocrine 24 hours prior to discharge for final recommendations 63M w/ hx of alcoholic cirrhosis complicated by HRS (chronic CKD at this time) s/p OLT 4/2019 at [facility] and b/l LE DVT who was sent in for elevated bilirubin and liver enzymes. Underwent MRCP which showed small 8 mm CBD stone upstream of the anastomosis site. Endocrine consulted for: Uncontrolled T2DM. BG Goal 100-180mg/dl     Severe hyperglycemia (  and 448 ) after eating 2nd tray ( clear liquid diet and ? only scrambled egg  from 2nd tray).  Last 24 hour BG values 200s-448, above goal. will adjust insulin dose for BG Goal 100-180mg/dl     #T2DM uncontrolled with hyperglycemia  Diagnosed T2DM 7 yrs ago prior to liver transplant  Home regimen: Lantus 5 units QAM, Humalog 5 unit TIDAC, Januvia 50mg daily  Previous meds: Metformin  Recent A1c 12.1 outpatient (per HIE)  SMBG: Checks fasting FS every AM: 130s. Does not check any other time  Not on chronic steroids and not currently ordered for steroids  Complications: No CAD, CVA, retinopathy. Occasional neuropathy. + CKD.    Inpatient plan:  - Inpatient BG goal 100-180  - Increase Lantus 28units QHS  - Increase snack insulin admelog 3 units at bedtime hold if not having a snack   - Increase Admelog 12 units TIDAC for now ( hold meal time insulin if NPO)  - Mod dose admelog correction scale TIDAC  - Mod dose admelog correction scale QHS/2am  - CC diet  - Please inform endocrine team if starting steroids    Discharge plan:  - Discharge regimen: Basal/bolus insulin, dose TBD.   Would benefit from GLP1 agonist but can not start GLP1 due to CBD stone   Can continue Januvia 50 mg daily   He is interested in using CGM but his phone is not compatible with Freestyle josefina or Dexcom.   Please send Rx for Freestyle Josefina 3 reader X1 and sensor X2  Please make sure he has DM management supplies ( Glucometer, lancets, strips and alcohol pads)   - Endocrine follow up: needs to establish follow up.  Can follow up with [facility] - [address], [address]. [place] [address]  Tel) [phone]   Email sent to [facility] coordinator on 5/1  - Routine ophthalmology and podiatry follow up    #Obesity  - would benefit from GLP1, however can not start at this time due to CBD stone   - Can followup with Dr. Christian brand   Weight control management clinic  [address] [place]   Phone No. [phone]    #HLD  - check lipid panel outpatient      Contact via Microsoft Teams during business hours  To reach covering provider access AMION via sunrise tools  For Urgent matters/after-hours/weekends/holidays please page endocrine fellow on call   For nonurgent matters please email [email or web address]    Please note that this patient may be followed by different provider tomorrow.  Notify endocrine 24 hours prior to discharge for final recommendations

## 2024-05-01 NOTE — PROVIDER CONTACT NOTE (OTHER) - ASSESSMENT
Pt currently resting in bed - no abnormal s/s to note at this time
Pt had episode of WCT for 5 beats on tele monitoring - pt resting in bed and denies any abnormal s/s at this time
Vitals charted. Pt with no signs and symptoms
Resting comfortably in chair at this time

## 2024-05-01 NOTE — DIETITIAN INITIAL EVALUATION ADULT - PERTINENT MEDS FT
MEDICATIONS  (STANDING):  amLODIPine   Tablet 5 milliGRAM(s) Oral daily  aspirin  chewable 81 milliGRAM(s) Oral daily  cycloSPORINE  , modified (GENGRAF) 50 milliGRAM(s) Oral <User Schedule>  dextrose 10% Bolus 125 milliLiter(s) IV Bolus once  dextrose 5%. 1000 milliLiter(s) (100 mL/Hr) IV Continuous <Continuous>  dextrose 5%. 1000 milliLiter(s) (50 mL/Hr) IV Continuous <Continuous>  dextrose 50% Injectable 25 Gram(s) IV Push once  dextrose 50% Injectable 12.5 Gram(s) IV Push once  glucagon  Injectable 1 milliGRAM(s) IntraMuscular once  insulin glargine Injectable (LANTUS) 24 Unit(s) SubCutaneous at bedtime  insulin lispro (ADMELOG) corrective regimen sliding scale   SubCutaneous three times a day before meals  insulin lispro (ADMELOG) corrective regimen sliding scale   SubCutaneous <User Schedule>  insulin lispro Injectable (ADMELOG) 2 Unit(s) SubCutaneous at bedtime  insulin lispro Injectable (ADMELOG) 7 Unit(s) SubCutaneous three times a day before meals  metoprolol tartrate 100 milliGRAM(s) Oral every 12 hours  risperiDONE   Tablet 1 milliGRAM(s) Oral at bedtime  ursodiol Capsule 300 milliGRAM(s) Oral every 12 hours    MEDICATIONS  (PRN):  acetaminophen     Tablet .. 650 milliGRAM(s) Oral every 6 hours PRN Temp greater or equal to 38C (100.4F), Mild Pain (1 - 3)  dextrose Oral Gel 15 Gram(s) Oral once PRN Blood Glucose LESS THAN 70 milliGRAM(s)/deciliter  melatonin 3 milliGRAM(s) Oral at bedtime PRN Insomnia

## 2024-05-01 NOTE — PROVIDER CONTACT NOTE (OTHER) - SITUATION
Pt BG still elevated
Pt had episode of WCT for 5 beats on tele monitoring - pt resting in bed and denies any abnormal s/s at this time
Pt with blood glucose level of 446, with recheck of 448
Pt sustaining HR of 48 on tele monitoring - pt denies any abnormal s/s at this time

## 2024-05-01 NOTE — PROVIDER CONTACT NOTE (CRITICAL VALUE NOTIFICATION) - BACKGROUND
hx of alcoholic cirrhosis complicated by HRS (chronic CKD at this time) s/p OLT 4/2019 at [facility] and b/l LE DVT (R Gaastrocnemius and L CFV through SFJ/ poplitial and post tibial s/p 3M A/C with resolution, s/p ERCP

## 2024-05-01 NOTE — PROGRESS NOTE ADULT - ASSESSMENT
63 yrs old male w/ hx of alcoholic cirrhosis complicated by HRS (chronic CKD at this time) s/p OLT 4/2019 at [facility] and b/l LE DVT (R Gaastrocnemius and L CFV through SFJ/ poplitial and post tibial s/p 3M A/C with resolution) who presented for elevated bilirubin and liver enzymes.    #Elevated liver enzymes s/p OLT 2019 for alcoholic cirrhosis  Had mildly elevated ALP 200s, w/  normal bilirubin and liver enzymes in 2023, now recent labs from 4/22 and 4/24 showed bili 1.4 w/ ALP 500s, and AST//90s. Patient was asymptomatic, but underwent MRCP which showed CBD stone  8 mm upstream of the anastomosis, mildly dilated 1.1 cm.   *ERCP 5/01: notable for anastomotic stricture and a filling defect consistent with a stone proximal to the stricture s/p sphincterotomy, brushing, balloon extraction of debris, dilation of anastomotic stricture to 8 mm and stent placement (8 mm x 10 mm Viabel)  - cyclosporine 50 mg BID, goal 100-150. (decr from 75 BiD for level 200 4/29)  - continue ASA daily    #Diabetes Mellitus  - Last A1C high 12, prior to that he was 6.7.   - Patient takes Januvia, along with Lantus 5 units in the morning and SA 5 units before meals.   - appreciate endocrine recs  - nutrition consult    #HTN   -continue amlodipine 5 mg daily.   -cw metoprolol 100 mg BID.     #HyperK  -hyperK cocktail  -repeat labs this afternoon      All recommendations preliminary until note signed by service attending.    Thank you for involving us in the care of this patient. Please contact should any concern or questions arise.    Keith Hicks MD   Gastroenterology/Hepatology Fellow PGY-5  Available on Microsoft Teams 7am - 5pm  [id number]    NON-URGENT CONSULTS:  Please email:  [email or web address]   OR  [email or web address]    After 5pm, please contact the on-call GI fellow. [phone]    AT NIGHT AND ON WEEKENDS:  Contact on-call GI fellow via answering service ([phone]) from 5pm-8am and on weekends/holidays

## 2024-05-01 NOTE — DIETITIAN INITIAL EVALUATION ADULT - PERSON TAUGHT/METHOD
Used current diet recall to educate Pt on DM education:  -Breakfast: no honey  -Lunch: whole wheat bread; open face sandwich (x1 slice)   -Dinner: whole grains    Declined written ed/verbal instruction/patient instructed

## 2024-05-01 NOTE — DIETITIAN INITIAL EVALUATION ADULT - REASON FOR ADMISSION
"63 yrs old male w/ hx of alcoholic cirrhosis complicated by HRS (chronic CKD at this time) s/p OLT 4/2019 at [facility] and b/l LE DVT (R Gaastrocnemius and L CFV through SFJ/ poplitial and post tibial s/p 3M A/C with resolution) who presented for elevated bilirubin and liver enzymes."

## 2024-05-01 NOTE — DIETITIAN INITIAL EVALUATION ADULT - OTHER INFO
GI/Intake:   -Tolerating clears; requesting fully advanced diet   -Last BM documented ; no bowel regimen ordered   -Hx of ETOH cirrhosis; s/p OLT 2019; Cyclosporine ordered     Endo:   -Post-transplant steroid regimen completed   -Uncontrolled DM; A1c: 11.8%   -Reports taking Januvia and insulin daily PTA   -24 units long acting, 7 units short acting TID, 2 units short acting (with nighttime snack), sliding scale insulin ordered in-house     Renal:  -Hyperkalemic  -Hypophosphatemic   -Hyponatremic     Weight Hx:  -Current dosin pounds   -Endorses as UBW

## 2024-05-02 LAB
ALBUMIN SERPL ELPH-MCNC: 4.2 G/DL — SIGNIFICANT CHANGE UP (ref 3.3–5)
ALBUMIN SERPL ELPH-MCNC: 5.7 G/DL — HIGH (ref 3.3–5)
ALP SERPL-CCNC: 409 U/L — HIGH (ref 40–120)
ALP SERPL-CCNC: 444 U/L — HIGH (ref 40–120)
ALT FLD-CCNC: 53 U/L — HIGH (ref 10–45)
ALT FLD-CCNC: 73 U/L — HIGH (ref 10–45)
ANION GAP SERPL CALC-SCNC: 17 MMOL/L — SIGNIFICANT CHANGE UP (ref 5–17)
ANION GAP SERPL CALC-SCNC: 17 MMOL/L — SIGNIFICANT CHANGE UP (ref 5–17)
APPEARANCE UR: CLEAR — SIGNIFICANT CHANGE UP
APTT BLD: 25.6 SEC — SIGNIFICANT CHANGE UP (ref 24.5–35.6)
APTT BLD: 27.4 SEC — SIGNIFICANT CHANGE UP (ref 24.5–35.6)
AST SERPL-CCNC: 61 U/L — HIGH (ref 10–40)
AST SERPL-CCNC: 91 U/L — HIGH (ref 10–40)
BILIRUB SERPL-MCNC: 1 MG/DL — SIGNIFICANT CHANGE UP (ref 0.2–1.2)
BILIRUB SERPL-MCNC: 1.1 MG/DL — SIGNIFICANT CHANGE UP (ref 0.2–1.2)
BILIRUB UR-MCNC: NEGATIVE — SIGNIFICANT CHANGE UP
BUN SERPL-MCNC: 42 MG/DL — HIGH (ref 7–23)
BUN SERPL-MCNC: 42 MG/DL — HIGH (ref 7–23)
CALCIUM SERPL-MCNC: 9.1 MG/DL — SIGNIFICANT CHANGE UP (ref 8.4–10.5)
CALCIUM SERPL-MCNC: 9.6 MG/DL — SIGNIFICANT CHANGE UP (ref 8.4–10.5)
CHLORIDE SERPL-SCNC: 98 MMOL/L — SIGNIFICANT CHANGE UP (ref 96–108)
CHLORIDE SERPL-SCNC: 98 MMOL/L — SIGNIFICANT CHANGE UP (ref 96–108)
CO2 SERPL-SCNC: 21 MMOL/L — LOW (ref 22–31)
CO2 SERPL-SCNC: 21 MMOL/L — LOW (ref 22–31)
COLOR SPEC: YELLOW — SIGNIFICANT CHANGE UP
CREAT ?TM UR-MCNC: 62 MG/DL — SIGNIFICANT CHANGE UP
CREAT SERPL-MCNC: 2.12 MG/DL — HIGH (ref 0.5–1.3)
CREAT SERPL-MCNC: 2.36 MG/DL — HIGH (ref 0.5–1.3)
CYCLOSPORINE SER-MCNC: 79 NG/ML — LOW (ref 150–400)
DIFF PNL FLD: NEGATIVE — SIGNIFICANT CHANGE UP
EGFR: 30 ML/MIN/1.73M2 — LOW
EGFR: 34 ML/MIN/1.73M2 — LOW
GLUCOSE BLDC GLUCOMTR-MCNC: 164 MG/DL — HIGH (ref 70–99)
GLUCOSE BLDC GLUCOMTR-MCNC: 183 MG/DL — HIGH (ref 70–99)
GLUCOSE BLDC GLUCOMTR-MCNC: 232 MG/DL — HIGH (ref 70–99)
GLUCOSE BLDC GLUCOMTR-MCNC: 276 MG/DL — HIGH (ref 70–99)
GLUCOSE BLDC GLUCOMTR-MCNC: 288 MG/DL — HIGH (ref 70–99)
GLUCOSE SERPL-MCNC: 154 MG/DL — HIGH (ref 70–99)
GLUCOSE SERPL-MCNC: 255 MG/DL — HIGH (ref 70–99)
GLUCOSE UR QL: NEGATIVE MG/DL — SIGNIFICANT CHANGE UP
HCT VFR BLD CALC: 32.9 % — LOW (ref 39–50)
HCT VFR BLD CALC: 37.5 % — LOW (ref 39–50)
HGB BLD-MCNC: 11.1 G/DL — LOW (ref 13–17)
HGB BLD-MCNC: 12.7 G/DL — LOW (ref 13–17)
INR BLD: 0.97 RATIO — SIGNIFICANT CHANGE UP (ref 0.85–1.18)
INR BLD: 1.02 RATIO — SIGNIFICANT CHANGE UP (ref 0.85–1.18)
KETONES UR-MCNC: NEGATIVE MG/DL — SIGNIFICANT CHANGE UP
LEUKOCYTE ESTERASE UR-ACNC: NEGATIVE — SIGNIFICANT CHANGE UP
MAGNESIUM SERPL-MCNC: 1.9 MG/DL — SIGNIFICANT CHANGE UP (ref 1.6–2.6)
MAGNESIUM SERPL-MCNC: 2 MG/DL — SIGNIFICANT CHANGE UP (ref 1.6–2.6)
MCHC RBC-ENTMCNC: 29.6 PG — SIGNIFICANT CHANGE UP (ref 27–34)
MCHC RBC-ENTMCNC: 29.7 PG — SIGNIFICANT CHANGE UP (ref 27–34)
MCHC RBC-ENTMCNC: 33.7 GM/DL — SIGNIFICANT CHANGE UP (ref 32–36)
MCHC RBC-ENTMCNC: 33.9 GM/DL — SIGNIFICANT CHANGE UP (ref 32–36)
MCV RBC AUTO: 87.7 FL — SIGNIFICANT CHANGE UP (ref 80–100)
MCV RBC AUTO: 87.8 FL — SIGNIFICANT CHANGE UP (ref 80–100)
NITRITE UR-MCNC: NEGATIVE — SIGNIFICANT CHANGE UP
NRBC # BLD: 0 /100 WBCS — SIGNIFICANT CHANGE UP (ref 0–0)
NRBC # BLD: 0 /100 WBCS — SIGNIFICANT CHANGE UP (ref 0–0)
OSMOLALITY UR: 319 MOS/KG — SIGNIFICANT CHANGE UP (ref 300–900)
PH UR: 5.5 — SIGNIFICANT CHANGE UP (ref 5–8)
PHOSPHATE SERPL-MCNC: 2.8 MG/DL — SIGNIFICANT CHANGE UP (ref 2.5–4.5)
PHOSPHATE SERPL-MCNC: 2.9 MG/DL — SIGNIFICANT CHANGE UP (ref 2.5–4.5)
PLATELET # BLD AUTO: 230 K/UL — SIGNIFICANT CHANGE UP (ref 150–400)
PLATELET # BLD AUTO: 274 K/UL — SIGNIFICANT CHANGE UP (ref 150–400)
POTASSIUM SERPL-MCNC: 4.2 MMOL/L — SIGNIFICANT CHANGE UP (ref 3.5–5.3)
POTASSIUM SERPL-MCNC: 4.6 MMOL/L — SIGNIFICANT CHANGE UP (ref 3.5–5.3)
POTASSIUM SERPL-SCNC: 4.2 MMOL/L — SIGNIFICANT CHANGE UP (ref 3.5–5.3)
POTASSIUM SERPL-SCNC: 4.6 MMOL/L — SIGNIFICANT CHANGE UP (ref 3.5–5.3)
POTASSIUM UR-SCNC: 16 MMOL/L — SIGNIFICANT CHANGE UP
PROT ?TM UR-MCNC: 22 MG/DL — HIGH (ref 0–12)
PROT SERPL-MCNC: 7.2 G/DL — SIGNIFICANT CHANGE UP (ref 6–8.3)
PROT SERPL-MCNC: 8.6 G/DL — HIGH (ref 6–8.3)
PROT UR-MCNC: SIGNIFICANT CHANGE UP MG/DL
PROT/CREAT UR-RTO: 0.4 RATIO — HIGH (ref 0–0.2)
PROTHROM AB SERPL-ACNC: 10.7 SEC — SIGNIFICANT CHANGE UP (ref 9.5–13)
PROTHROM AB SERPL-ACNC: 10.7 SEC — SIGNIFICANT CHANGE UP (ref 9.5–13)
RBC # BLD: 3.75 M/UL — LOW (ref 4.2–5.8)
RBC # BLD: 4.27 M/UL — SIGNIFICANT CHANGE UP (ref 4.2–5.8)
RBC # FLD: 13.4 % — SIGNIFICANT CHANGE UP (ref 10.3–14.5)
RBC # FLD: 13.6 % — SIGNIFICANT CHANGE UP (ref 10.3–14.5)
SODIUM SERPL-SCNC: 136 MMOL/L — SIGNIFICANT CHANGE UP (ref 135–145)
SODIUM SERPL-SCNC: 136 MMOL/L — SIGNIFICANT CHANGE UP (ref 135–145)
SODIUM UR-SCNC: 52 MMOL/L — SIGNIFICANT CHANGE UP
SP GR SPEC: 1.01 — SIGNIFICANT CHANGE UP (ref 1–1.03)
UROBILINOGEN FLD QL: 0.2 MG/DL — SIGNIFICANT CHANGE UP (ref 0.2–1)
WBC # BLD: 7.45 K/UL — SIGNIFICANT CHANGE UP (ref 3.8–10.5)
WBC # BLD: 8.41 K/UL — SIGNIFICANT CHANGE UP (ref 3.8–10.5)
WBC # FLD AUTO: 7.45 K/UL — SIGNIFICANT CHANGE UP (ref 3.8–10.5)
WBC # FLD AUTO: 8.41 K/UL — SIGNIFICANT CHANGE UP (ref 3.8–10.5)

## 2024-05-02 PROCEDURE — 99232 SBSQ HOSP IP/OBS MODERATE 35: CPT | Mod: GC

## 2024-05-02 PROCEDURE — 99232 SBSQ HOSP IP/OBS MODERATE 35: CPT

## 2024-05-02 RX ORDER — INSULIN LISPRO 100/ML
15 VIAL (ML) SUBCUTANEOUS
Refills: 0 | Status: DISCONTINUED | OUTPATIENT
Start: 2024-05-02 | End: 2024-05-05

## 2024-05-02 RX ORDER — SODIUM CHLORIDE 9 MG/ML
500 INJECTION INTRAMUSCULAR; INTRAVENOUS; SUBCUTANEOUS ONCE
Refills: 0 | Status: COMPLETED | OUTPATIENT
Start: 2024-05-02 | End: 2024-05-02

## 2024-05-02 RX ORDER — INSULIN LISPRO 100/ML
4 VIAL (ML) SUBCUTANEOUS AT BEDTIME
Refills: 0 | Status: DISCONTINUED | OUTPATIENT
Start: 2024-05-02 | End: 2024-05-08

## 2024-05-02 RX ORDER — INSULIN GLARGINE 100 [IU]/ML
30 INJECTION, SOLUTION SUBCUTANEOUS AT BEDTIME
Refills: 0 | Status: DISCONTINUED | OUTPATIENT
Start: 2024-05-02 | End: 2024-05-05

## 2024-05-02 RX ORDER — SODIUM CHLORIDE 9 MG/ML
1000 INJECTION INTRAMUSCULAR; INTRAVENOUS; SUBCUTANEOUS
Refills: 0 | Status: DISCONTINUED | OUTPATIENT
Start: 2024-05-02 | End: 2024-05-03

## 2024-05-02 RX ORDER — CYCLOSPORINE 100 MG/1
50 CAPSULE ORAL
Refills: 0 | Status: DISCONTINUED | OUTPATIENT
Start: 2024-05-02 | End: 2024-05-03

## 2024-05-02 RX ORDER — CYCLOSPORINE 100 MG/1
75 CAPSULE ORAL
Refills: 0 | Status: DISCONTINUED | OUTPATIENT
Start: 2024-05-03 | End: 2024-05-03

## 2024-05-02 RX ORDER — ALBUMIN HUMAN 25 %
100 VIAL (ML) INTRAVENOUS ONCE
Refills: 0 | Status: COMPLETED | OUTPATIENT
Start: 2024-05-02 | End: 2024-05-02

## 2024-05-02 RX ORDER — CYCLOSPORINE 100 MG/1
25 CAPSULE ORAL ONCE
Refills: 0 | Status: COMPLETED | OUTPATIENT
Start: 2024-05-02 | End: 2024-05-02

## 2024-05-02 RX ADMIN — URSODIOL 300 MILLIGRAM(S): 250 TABLET, FILM COATED ORAL at 05:01

## 2024-05-02 RX ADMIN — AMLODIPINE BESYLATE 5 MILLIGRAM(S): 2.5 TABLET ORAL at 05:00

## 2024-05-02 RX ADMIN — Medication 6: at 08:29

## 2024-05-02 RX ADMIN — SODIUM CHLORIDE 75 MILLILITER(S): 9 INJECTION INTRAMUSCULAR; INTRAVENOUS; SUBCUTANEOUS at 17:57

## 2024-05-02 RX ADMIN — Medication 2: at 01:36

## 2024-05-02 RX ADMIN — Medication 2: at 17:52

## 2024-05-02 RX ADMIN — SODIUM CHLORIDE 1000 MILLILITER(S): 9 INJECTION INTRAMUSCULAR; INTRAVENOUS; SUBCUTANEOUS at 10:44

## 2024-05-02 RX ADMIN — Medication 100 MILLIGRAM(S): at 05:00

## 2024-05-02 RX ADMIN — Medication 4 UNIT(S): at 21:24

## 2024-05-02 RX ADMIN — Medication 81 MILLIGRAM(S): at 11:33

## 2024-05-02 RX ADMIN — Medication 15 UNIT(S): at 17:52

## 2024-05-02 RX ADMIN — INSULIN GLARGINE 30 UNIT(S): 100 INJECTION, SOLUTION SUBCUTANEOUS at 21:24

## 2024-05-02 RX ADMIN — CYCLOSPORINE 50 MILLIGRAM(S): 100 CAPSULE ORAL at 08:28

## 2024-05-02 RX ADMIN — Medication 100 MILLIGRAM(S): at 17:51

## 2024-05-02 RX ADMIN — RISPERIDONE 1 MILLIGRAM(S): 4 TABLET ORAL at 21:24

## 2024-05-02 RX ADMIN — URSODIOL 300 MILLIGRAM(S): 250 TABLET, FILM COATED ORAL at 17:51

## 2024-05-02 RX ADMIN — Medication 12 UNIT(S): at 12:46

## 2024-05-02 RX ADMIN — Medication 12 UNIT(S): at 08:29

## 2024-05-02 RX ADMIN — CYCLOSPORINE 50 MILLIGRAM(S): 100 CAPSULE ORAL at 20:11

## 2024-05-02 RX ADMIN — Medication 50 MILLILITER(S): at 10:44

## 2024-05-02 RX ADMIN — CYCLOSPORINE 25 MILLIGRAM(S): 100 CAPSULE ORAL at 17:51

## 2024-05-02 NOTE — PROGRESS NOTE ADULT - SUBJECTIVE AND OBJECTIVE BOX
Interval Events:   -nAEON  -denies acute complaints  -Cr uptrend noted    Hospital Medications:  acetaminophen     Tablet .. 650 milliGRAM(s) Oral every 6 hours PRN  amLODIPine   Tablet 5 milliGRAM(s) Oral daily  aspirin  chewable 81 milliGRAM(s) Oral daily  cycloSPORINE  , modified (GENGRAF) 50 milliGRAM(s) Oral <User Schedule>  dextrose 10% Bolus 125 milliLiter(s) IV Bolus once  dextrose 5%. 1000 milliLiter(s) IV Continuous <Continuous>  dextrose 5%. 1000 milliLiter(s) IV Continuous <Continuous>  dextrose 50% Injectable 25 Gram(s) IV Push once  dextrose 50% Injectable 12.5 Gram(s) IV Push once  dextrose Oral Gel 15 Gram(s) Oral once PRN  glucagon  Injectable 1 milliGRAM(s) IntraMuscular once  insulin glargine Injectable (LANTUS) 30 Unit(s) SubCutaneous at bedtime  insulin lispro (ADMELOG) corrective regimen sliding scale   SubCutaneous three times a day before meals  insulin lispro (ADMELOG) corrective regimen sliding scale   SubCutaneous <User Schedule>  insulin lispro Injectable (ADMELOG) 4 Unit(s) SubCutaneous at bedtime  insulin lispro Injectable (ADMELOG) 15 Unit(s) SubCutaneous three times a day before meals  melatonin 3 milliGRAM(s) Oral at bedtime PRN  metoprolol tartrate 100 milliGRAM(s) Oral every 12 hours  risperiDONE   Tablet 1 milliGRAM(s) Oral at bedtime  ursodiol Capsule 300 milliGRAM(s) Oral every 12 hours      PHYSICAL EXAM:   Vital Signs:  Vital Signs Last 24 Hrs  T(C): 36.8 (02 May 2024 15:50), Max: 36.8 (02 May 2024 08:57)  T(F): 98.2 (02 May 2024 15:50), Max: 98.3 (02 May 2024 08:57)  HR: 57 (02 May 2024 15:50) (53 - 60)  BP: 143/74 (02 May 2024 15:50) (136/68 - 151/74)  BP(mean): --  RR: 18 (02 May 2024 15:50) (17 - 18)  SpO2: 98% (02 May 2024 15:50) (92% - 98%)    Parameters below as of 02 May 2024 15:50  Patient On (Oxygen Delivery Method): room air      Daily     Daily Weight in k.5 (02 May 2024 04:54)    GENERAL:  NAD, Appears stated age  HEENT:  NC/AT,  conjunctivae clear and pink, sclera -anicteric  CHEST:  Normal Effort, Breath sounds clear  HEART:  RRR, S1 + S2, no murmurs  ABDOMEN:  Soft, non-tender, non-distended, normoactive bowel sounds,  no masses  EXTREMITIES:  no cyanosis or edema  SKIN:  Warm & Dry. No rash or erythema  NEURO:  Alert, oriented, no focal deficit                        12.7   8.41  )-----------( 274      ( 02 May 2024 16:49 )             37.5     05-02    136  |  98  |  42<H>  ----------------------------<  255<H>  4.6   |  21<L>  |  2.12<H>    Ca    9.1      02 May 2024 06:29  Phos  2.9     05-02  Mg     2.0     05-    TPro  7.2  /  Alb  4.2  /  TBili  1.0  /  DBili  x   /  AST  61<H>  /  ALT  53<H>  /  AlkPhos  409<H>  05-    LIVER FUNCTIONS - ( 02 May 2024 06:29 )  Alb: 4.2 g/dL / Pro: 7.2 g/dL / ALK PHOS: 409 U/L / ALT: 53 U/L / AST: 61 U/L / GGT: x             Interval Diagnostic Studies: see sunrise for full report

## 2024-05-02 NOTE — PROGRESS NOTE ADULT - ASSESSMENT
63 yrs old male w/ hx of alcoholic cirrhosis complicated by HRS (chronic CKD at this time) s/p OLT 4/2019 at [facility] and b/l LE DVT (R Gaastrocnemius and L CFV through SFJ/ poplitial and post tibial s/p 3M A/C with resolution) who presented for elevated bilirubin and liver enzymes.    #Elevated liver enzymes s/p OLT 2019 for alcoholic cirrhosis  Had mildly elevated ALP 200s, w/  normal bilirubin and liver enzymes in 2023, now recent labs from 4/22 and 4/24 showed bili 1.4 w/ ALP 500s, and AST//90s. Patient was asymptomatic, but underwent MRCP which showed CBD stone  8 mm upstream of the anastomosis, mildly dilated 1.1 cm.   *ERCP 5/01: notable for anastomotic stricture and a filling defect consistent with a stone proximal to the stricture s/p sphincterotomy, brushing, balloon extraction of debris, dilation of anastomotic stricture to 8 mm and stent placement (8 mm x 10 mm Viabel)  - cyclosporine 50 mg BID, goal 100-150. (decr from 75 BiD for level 200 4/29)  - continue ASA daily    #KAYLA  -encourage PO hydration  -1L LR today  -repeat BMP this afternoon  -possible DC planning pending Cr improvement    #Diabetes Mellitus  - Last A1C high 12, prior to that he was 6.7.   - Patient takes Januvia, along with Lantus 5 units in the morning and SA 5 units before meals.   - appreciate endocrine recs  - appreciate nutrition recs    #HTN   -continue amlodipine 5 mg daily.   -cw metoprolol 100 mg BID.         All recommendations preliminary until note signed by service attending.    Thank you for involving us in the care of this patient. Please contact should any concern or questions arise.    Keith Hicks MD   Gastroenterology/Hepatology Fellow PGY-5  Available on [employer] Teams 7am - 5pm  [phone]    NON-URGENT CONSULTS:  Please email:  [email or web address]   OR  [email or web address]    After 5pm, please contact the on-call GI fellow. [phone]    AT NIGHT AND ON WEEKENDS:  Contact on-call GI fellow via answering service ([phone]) from 5pm-8am and on weekends/holidays

## 2024-05-02 NOTE — PROGRESS NOTE ADULT - ASSESSMENT
63M w/ hx of alcoholic cirrhosis complicated by HRS (chronic CKD at this time) s/p OLT 4/2019 at [facility] and b/l LE DVT who was sent in for elevated bilirubin and liver enzymes. Underwent MRCP which showed small 8 mm CBD stone upstream of the anastomosis site. Endocrine consulted for: Uncontrolled T2DM. BG Goal 100-180mg/dl     Last 24 hour BG values 200s-448, above goal. will adjust insulin dose for BG Goal 100-180mg/dl     #T2DM uncontrolled with hyperglycemia  Diagnosed T2DM 7 yrs ago prior to liver transplant  Home regimen: Lantus 5 units QAM, Humalog 5 unit TIDAC, Januvia 50mg daily  Previous meds: Metformin  Recent A1c 12.1 outpatient (per HIE)  SMBG: Checks fasting FS every AM: 130s. Does not check any other time  Not on chronic steroids and not currently ordered for steroids  Complications: No CAD, CVA, retinopathy. Occasional neuropathy. + CKD.    Inpatient plan:  - Inpatient BG goal 100-180  - Increase Lantus 30 units QHS  - Increase snack insulin admelog 4 units at bedtime hold if not having a snack   - Increase Admelog 15 units TIDAC for now ( hold meal time insulin if NPO)  - Mod dose admelog correction scale TIDAC  - Mod dose admelog correction scale QHS/2am  - CC diet  - Please inform endocrine team if starting steroids    Discharge plan:  - Discharge regimen: Basal/bolus insulin, dose TBD.   Would benefit from GLP1 agonist but can not start GLP1 due to CBD stone   Can continue Januvia 50 mg daily   He is interested in using CGM but his phone is not compatible with Freestyle josefina or Dexcom.   Please send Rx for Freestyle Josefina 3 reader X1 and sensor X2  Please make sure he has DM management supplies ( Glucometer, lancets, strips and alcohol pads)   - Endocrine follow up: needs to establish follow up.  Can follow up with [facility] - [address], [address]. [place] [address]  Tel) [phone]   Email sent to scheduling  coordinator on 5/1  - Routine ophthalmology and podiatry follow up    #Obesity  - would benefit from GLP1, however can not start at this time due to CBD stone   - Can followup with Dr. Christian brand   Weight control management clinic  [address]   Phone No. [phone]    #HLD  - check lipid panel outpatient      Contact via Microsoft Teams during business hours  To reach covering provider access AMION via sunrise tools  For Urgent matters/after-hours/weekends/holidays please page endocrine fellow on call   For nonurgent matters please email [email or web address]    Please note that this patient may be followed by different provider tomorrow.  Notify endocrine 24 hours prior to discharge for final recommendations

## 2024-05-02 NOTE — PROGRESS NOTE ADULT - SUBJECTIVE AND OBJECTIVE BOX
seen earlier today     Chief Complaint: Diabetes Mellitus follow up    INTERVAL HX:  Patient reports feeling better. Does not have any problem with his appetite. Eating well..  BG levels remain hyperglycemic on current insulin doses.     Review of Systems:  General: As above  GI: No nausea, vomiting  Endocrine: no  S&Sx of hypoglycemia    Allergies    No Known Allergies    Intolerances      MEDICATIONS  (STANDING):  amLODIPine   Tablet 5 milliGRAM(s) Oral daily  aspirin  chewable 81 milliGRAM(s) Oral daily  cycloSPORINE  , modified (GENGRAF) 50 milliGRAM(s) Oral <User Schedule>  dextrose 10% Bolus 125 milliLiter(s) IV Bolus once  dextrose 5%. 1000 milliLiter(s) (50 mL/Hr) IV Continuous <Continuous>  dextrose 5%. 1000 milliLiter(s) (100 mL/Hr) IV Continuous <Continuous>  dextrose 50% Injectable 25 Gram(s) IV Push once  dextrose 50% Injectable 12.5 Gram(s) IV Push once  glucagon  Injectable 1 milliGRAM(s) IntraMuscular once  insulin glargine Injectable (LANTUS) 28 Unit(s) SubCutaneous at bedtime  insulin lispro (ADMELOG) corrective regimen sliding scale   SubCutaneous <User Schedule>  insulin lispro (ADMELOG) corrective regimen sliding scale   SubCutaneous three times a day before meals  insulin lispro Injectable (ADMELOG) 3 Unit(s) SubCutaneous at bedtime  insulin lispro Injectable (ADMELOG) 12 Unit(s) SubCutaneous three times a day before meals  metoprolol tartrate 100 milliGRAM(s) Oral every 12 hours  risperiDONE   Tablet 1 milliGRAM(s) Oral at bedtime  ursodiol Capsule 300 milliGRAM(s) Oral every 12 hours        insulin glargine Injectable (LANTUS)   28 Unit(s) SubCutaneous (05-01-24 @ 21:30)    insulin lispro (ADMELOG) corrective regimen sliding scale   2 Unit(s) SubCutaneous (05-02-24 @ 01:36)   2 Unit(s) SubCutaneous (05-01-24 @ 21:29)    insulin lispro (ADMELOG) corrective regimen sliding scale   4 Unit(s) SubCutaneous (05-02-24 @ 12:45)   6 Unit(s) SubCutaneous (05-02-24 @ 08:29)   6 Unit(s) SubCutaneous (05-01-24 @ 16:53)    insulin lispro Injectable (ADMELOG)   3 Unit(s) SubCutaneous (05-01-24 @ 21:30)    insulin lispro Injectable (ADMELOG)   12 Unit(s) SubCutaneous (05-02-24 @ 12:46)   12 Unit(s) SubCutaneous (05-02-24 @ 08:29)   12 Unit(s) SubCutaneous (05-01-24 @ 16:53)        PHYSICAL EXAM:  VITALS: T(C): 36.8 (05-02-24 @ 12:51)  T(F): 98.3 (05-02-24 @ 12:51), Max: 98.3 (05-02-24 @ 08:57)  HR: 53 (05-02-24 @ 12:51) (53 - 60)  BP: 136/68 (05-02-24 @ 12:51) (136/68 - 151/74)  RR:  (17 - 18)  SpO2:  (92% - 97%)  Wt(kg): --  GENERAL: NAD  Respiratory: Respirations unlabored   Extremities: Warm, no edema  NEURO: Alert , appropriate     LABS:  POCT Blood Glucose.: 232 mg/dL (05-02-24 @ 12:23)  POCT Blood Glucose.: 288 mg/dL (05-02-24 @ 08:15)  POCT Blood Glucose.: 276 mg/dL (05-02-24 @ 01:27)  POCT Blood Glucose.: 299 mg/dL (05-01-24 @ 21:08)  POCT Blood Glucose.: 296 mg/dL (05-01-24 @ 16:44)  POCT Blood Glucose.: 392 mg/dL (05-01-24 @ 14:25)  POCT Blood Glucose.: 448 mg/dL (05-01-24 @ 12:32)  POCT Blood Glucose.: 446 mg/dL (05-01-24 @ 12:28)  POCT Blood Glucose.: 256 mg/dL (05-01-24 @ 08:27)  POCT Blood Glucose.: 295 mg/dL (05-01-24 @ 01:24)  POCT Blood Glucose.: 299 mg/dL (04-30-24 @ 22:36)  POCT Blood Glucose.: 266 mg/dL (04-30-24 @ 20:46)  POCT Blood Glucose.: 172 mg/dL (04-30-24 @ 14:58)  POCT Blood Glucose.: 209 mg/dL (04-30-24 @ 12:36)  POCT Blood Glucose.: 196 mg/dL (04-30-24 @ 10:37)  POCT Blood Glucose.: 269 mg/dL (04-30-24 @ 08:15)  POCT Blood Glucose.: 262 mg/dL (04-30-24 @ 06:15)  POCT Blood Glucose.: 275 mg/dL (04-29-24 @ 21:17)  POCT Blood Glucose.: 154 mg/dL (04-29-24 @ 16:45)                          11.1   7.45  )-----------( 230      ( 02 May 2024 06:29 )             32.9     05-02    136  |  98  |  42<H>  ----------------------------<  255<H>  4.6   |  21<L>  |  2.12<H>    Ca    9.1      02 May 2024 06:29  Phos  2.9     05-02  Mg     2.0     05-02    TPro  7.2  /  Alb  4.2  /  TBili  1.0  /  DBili  x   /  AST  61<H>  /  ALT  53<H>  /  AlkPhos  409<H>  05-02    eGFR: 34 mL/min/1.73m2 (02 May 2024 06:29)  eGFR: 42 mL/min/1.73m2 (01 May 2024 14:43)      Thyroid Function Tests:      A1C with Estimated Average Glucose Result: 12.2 % (04-29-24 @ 06:49)  A1C with Estimated Average Glucose Result: 11.8 % (04-29-24 @ 06:49)    Estimated Average Glucose: 303 mg/dL (04-29-24 @ 06:49)  Estimated Average Glucose: 292 mg/dL (04-29-24 @ 06:49)        Diet, Regular:   Consistent Carbohydrate No Snacks (CSTCHO) (05-01-24 @ 09:18) [Active]

## 2024-05-03 ENCOUNTER — RESULT REVIEW (OUTPATIENT)
Age: 64
End: 2024-05-03

## 2024-05-03 LAB
ALBUMIN SERPL ELPH-MCNC: 4.5 G/DL — SIGNIFICANT CHANGE UP (ref 3.3–5)
ALP SERPL-CCNC: 394 U/L — HIGH (ref 40–120)
ALT FLD-CCNC: 92 U/L — HIGH (ref 10–45)
ANION GAP SERPL CALC-SCNC: 14 MMOL/L — SIGNIFICANT CHANGE UP (ref 5–17)
APTT BLD: 26.3 SEC — SIGNIFICANT CHANGE UP (ref 24.5–35.6)
AST SERPL-CCNC: 121 U/L — HIGH (ref 10–40)
BILIRUB SERPL-MCNC: 0.9 MG/DL — SIGNIFICANT CHANGE UP (ref 0.2–1.2)
BLD GP AB SCN SERPL QL: NEGATIVE — SIGNIFICANT CHANGE UP
BUN SERPL-MCNC: 38 MG/DL — HIGH (ref 7–23)
CALCIUM SERPL-MCNC: 8.8 MG/DL — SIGNIFICANT CHANGE UP (ref 8.4–10.5)
CHLORIDE SERPL-SCNC: 102 MMOL/L — SIGNIFICANT CHANGE UP (ref 96–108)
CO2 SERPL-SCNC: 22 MMOL/L — SIGNIFICANT CHANGE UP (ref 22–31)
CREAT SERPL-MCNC: 2.11 MG/DL — HIGH (ref 0.5–1.3)
CYCLOSPORINE SER-MCNC: 76 NG/ML — LOW (ref 150–400)
EGFR: 34 ML/MIN/1.73M2 — LOW
GLUCOSE BLDC GLUCOMTR-MCNC: 132 MG/DL — HIGH (ref 70–99)
GLUCOSE BLDC GLUCOMTR-MCNC: 173 MG/DL — HIGH (ref 70–99)
GLUCOSE BLDC GLUCOMTR-MCNC: 211 MG/DL — HIGH (ref 70–99)
GLUCOSE BLDC GLUCOMTR-MCNC: 233 MG/DL — HIGH (ref 70–99)
GLUCOSE BLDC GLUCOMTR-MCNC: 297 MG/DL — HIGH (ref 70–99)
GLUCOSE BLDC GLUCOMTR-MCNC: 93 MG/DL — SIGNIFICANT CHANGE UP (ref 70–99)
GLUCOSE SERPL-MCNC: 228 MG/DL — HIGH (ref 70–99)
HCT VFR BLD CALC: 34.8 % — LOW (ref 39–50)
HEV IGM SER QL: NEGATIVE — SIGNIFICANT CHANGE UP
HGB BLD-MCNC: 11.2 G/DL — LOW (ref 13–17)
INR BLD: 1.01 RATIO — SIGNIFICANT CHANGE UP (ref 0.85–1.18)
MAGNESIUM SERPL-MCNC: 1.8 MG/DL — SIGNIFICANT CHANGE UP (ref 1.6–2.6)
MCHC RBC-ENTMCNC: 29.4 PG — SIGNIFICANT CHANGE UP (ref 27–34)
MCHC RBC-ENTMCNC: 32.2 GM/DL — SIGNIFICANT CHANGE UP (ref 32–36)
MCV RBC AUTO: 91.3 FL — SIGNIFICANT CHANGE UP (ref 80–100)
NRBC # BLD: 0 /100 WBCS — SIGNIFICANT CHANGE UP (ref 0–0)
PHOSPHATE SERPL-MCNC: 3 MG/DL — SIGNIFICANT CHANGE UP (ref 2.5–4.5)
PLATELET # BLD AUTO: 196 K/UL — SIGNIFICANT CHANGE UP (ref 150–400)
POTASSIUM SERPL-MCNC: 4.4 MMOL/L — SIGNIFICANT CHANGE UP (ref 3.5–5.3)
POTASSIUM SERPL-SCNC: 4.4 MMOL/L — SIGNIFICANT CHANGE UP (ref 3.5–5.3)
PROT SERPL-MCNC: 7.5 G/DL — SIGNIFICANT CHANGE UP (ref 6–8.3)
PROTHROM AB SERPL-ACNC: 10.6 SEC — SIGNIFICANT CHANGE UP (ref 9.5–13)
RBC # BLD: 3.81 M/UL — LOW (ref 4.2–5.8)
RBC # FLD: 13.6 % — SIGNIFICANT CHANGE UP (ref 10.3–14.5)
RH IG SCN BLD-IMP: POSITIVE — SIGNIFICANT CHANGE UP
SODIUM SERPL-SCNC: 138 MMOL/L — SIGNIFICANT CHANGE UP (ref 135–145)
UUN UR-MCNC: 520 MG/DL — SIGNIFICANT CHANGE UP
WBC # BLD: 7.36 K/UL — SIGNIFICANT CHANGE UP (ref 3.8–10.5)
WBC # FLD AUTO: 7.36 K/UL — SIGNIFICANT CHANGE UP (ref 3.8–10.5)

## 2024-05-03 PROCEDURE — 99232 SBSQ HOSP IP/OBS MODERATE 35: CPT | Mod: GC

## 2024-05-03 PROCEDURE — 88313 SPECIAL STAINS GROUP 2: CPT | Mod: 26

## 2024-05-03 PROCEDURE — 88341 IMHCHEM/IMCYTCHM EA ADD ANTB: CPT | Mod: 26

## 2024-05-03 PROCEDURE — 88307 TISSUE EXAM BY PATHOLOGIST: CPT | Mod: 26

## 2024-05-03 PROCEDURE — 88342 IMHCHEM/IMCYTCHM 1ST ANTB: CPT | Mod: 26

## 2024-05-03 RX ORDER — MAGNESIUM SULFATE 500 MG/ML
1 VIAL (ML) INJECTION ONCE
Refills: 0 | Status: COMPLETED | OUTPATIENT
Start: 2024-05-03 | End: 2024-05-03

## 2024-05-03 RX ORDER — INSULIN GLARGINE 100 [IU]/ML
15 INJECTION, SOLUTION SUBCUTANEOUS ONCE
Refills: 0 | Status: COMPLETED | OUTPATIENT
Start: 2024-05-03 | End: 2024-05-03

## 2024-05-03 RX ORDER — CYCLOSPORINE 100 MG/1
75 CAPSULE ORAL
Refills: 0 | Status: DISCONTINUED | OUTPATIENT
Start: 2024-05-03 | End: 2024-05-04

## 2024-05-03 RX ORDER — METOPROLOL TARTRATE 50 MG
100 TABLET ORAL EVERY 12 HOURS
Refills: 0 | Status: DISCONTINUED | OUTPATIENT
Start: 2024-05-03 | End: 2024-05-04

## 2024-05-03 RX ADMIN — Medication 100 MILLIGRAM(S): at 17:28

## 2024-05-03 RX ADMIN — Medication 15 UNIT(S): at 17:25

## 2024-05-03 RX ADMIN — URSODIOL 300 MILLIGRAM(S): 250 TABLET, FILM COATED ORAL at 17:26

## 2024-05-03 RX ADMIN — Medication 4: at 15:01

## 2024-05-03 RX ADMIN — INSULIN GLARGINE 15 UNIT(S): 100 INJECTION, SOLUTION SUBCUTANEOUS at 22:38

## 2024-05-03 RX ADMIN — Medication 100 GRAM(S): at 14:58

## 2024-05-03 RX ADMIN — URSODIOL 300 MILLIGRAM(S): 250 TABLET, FILM COATED ORAL at 05:40

## 2024-05-03 RX ADMIN — Medication 100 MILLIGRAM(S): at 05:40

## 2024-05-03 RX ADMIN — Medication 4: at 08:50

## 2024-05-03 RX ADMIN — Medication 15 UNIT(S): at 15:01

## 2024-05-03 RX ADMIN — Medication 6: at 17:25

## 2024-05-03 RX ADMIN — CYCLOSPORINE 75 MILLIGRAM(S): 100 CAPSULE ORAL at 20:27

## 2024-05-03 RX ADMIN — CYCLOSPORINE 75 MILLIGRAM(S): 100 CAPSULE ORAL at 08:51

## 2024-05-03 RX ADMIN — AMLODIPINE BESYLATE 5 MILLIGRAM(S): 2.5 TABLET ORAL at 05:40

## 2024-05-03 RX ADMIN — RISPERIDONE 1 MILLIGRAM(S): 4 TABLET ORAL at 22:38

## 2024-05-03 NOTE — PROCEDURE NOTE - PROCEDURE FINDINGS AND DETAILS
technically successful US guided transplant liver parenchymal biopsy. tract embolization performed with avitene slurry.

## 2024-05-03 NOTE — PROGRESS NOTE ADULT - SUBJECTIVE AND OBJECTIVE BOX
Interval Events:   -LFT's uptrending  -plan for liver bx today    Hospital Medications:  acetaminophen     Tablet .. 650 milliGRAM(s) Oral every 6 hours PRN  amLODIPine   Tablet 5 milliGRAM(s) Oral daily  cycloSPORINE  , modified (GENGRAF) 50 milliGRAM(s) Oral <User Schedule>  cycloSPORINE  , modified (GENGRAF) 75 milliGRAM(s) Oral <User Schedule>  dextrose 10% Bolus 125 milliLiter(s) IV Bolus once  dextrose 5%. 1000 milliLiter(s) IV Continuous <Continuous>  dextrose 5%. 1000 milliLiter(s) IV Continuous <Continuous>  dextrose 50% Injectable 25 Gram(s) IV Push once  dextrose 50% Injectable 12.5 Gram(s) IV Push once  dextrose Oral Gel 15 Gram(s) Oral once PRN  glucagon  Injectable 1 milliGRAM(s) IntraMuscular once  insulin glargine Injectable (LANTUS) 30 Unit(s) SubCutaneous at bedtime  insulin lispro (ADMELOG) corrective regimen sliding scale   SubCutaneous <User Schedule>  insulin lispro (ADMELOG) corrective regimen sliding scale   SubCutaneous three times a day before meals  insulin lispro Injectable (ADMELOG) 4 Unit(s) SubCutaneous at bedtime  insulin lispro Injectable (ADMELOG) 15 Unit(s) SubCutaneous three times a day before meals  melatonin 3 milliGRAM(s) Oral at bedtime PRN  metoprolol tartrate 100 milliGRAM(s) Oral every 12 hours  risperiDONE   Tablet 1 milliGRAM(s) Oral at bedtime  sodium chloride 0.9%. 1000 milliLiter(s) IV Continuous <Continuous>  ursodiol Capsule 300 milliGRAM(s) Oral every 12 hours      PHYSICAL EXAM:   Vital Signs:  Vital Signs Last 24 Hrs  T(C): 36.6 (03 May 2024 09:48), Max: 36.9 (03 May 2024 01:40)  T(F): 97.9 (03 May 2024 09:19), Max: 98.4 (03 May 2024 01:40)  HR: 52 (03 May 2024 09:48) (52 - 59)  BP: 145/64 (03 May 2024 09:48) (136/68 - 160/77)  BP(mean): --  RR: 18 (03 May 2024 09:48) (16 - 18)  SpO2: 92% (03 May 2024 09:48) (92% - 98%)    Parameters below as of 03 May 2024 06:17  Patient On (Oxygen Delivery Method): room air      Daily Height in cm: 185.42 (03 May 2024 09:48)    Daily Weight in k (03 May 2024 06:17)    GENERAL:  NAD, Appears stated age  HEENT:  NC/AT,  conjunctivae clear and pink, sclera -anicteric  CHEST:  Normal Effort, Breath sounds clear  HEART:  RRR, S1 + S2, no murmurs  ABDOMEN:  Soft, non-tender, non-distended, normoactive bowel sounds,  no masses  EXTREMITIES:  no cyanosis or edema  SKIN:  Warm & Dry. No rash or erythema  NEURO:  Alert, oriented, no focal deficit                        11.2   7.36  )-----------( 196      ( 03 May 2024 06:24 )             34.8     05-03    138  |  102  |  38<H>  ----------------------------<  228<H>  4.4   |  22  |  2.11<H>    Ca    8.8      03 May 2024 06:24  Phos  3.0     05-03  Mg     1.8     05-03    TPro  7.5  /  Alb  4.5  /  TBili  0.9  /  DBili  x   /  AST  121<H>  /  ALT  92<H>  /  AlkPhos  394<H>  05-03    LIVER FUNCTIONS - ( 03 May 2024 06:24 )  Alb: 4.5 g/dL / Pro: 7.5 g/dL / ALK PHOS: 394 U/L / ALT: 92 U/L / AST: 121 U/L / GGT: x

## 2024-05-03 NOTE — PRE PROCEDURE NOTE - PRE PROCEDURE EVALUATION
Attending Physician:  Chucho                       Procedure: EGD/ERCP    Indication for Procedure: CBD stone  ________________________________________________________  PAST MEDICAL & SURGICAL HISTORY:  Hypertension      Diabetes mellitus      Alcoholic cirrhosis      Liver transplanted        ALLERGIES:  No Known Allergies    HOME MEDICATIONS:    AICD/PPM: [ ] yes   [x] no    PERTINENT LAB DATA:                        11.0   6.01  )-----------( 243      ( 30 Apr 2024 06:40 )             32.5     04-30    133<L>  |  96  |  32<H>  ----------------------------<  272<H>  4.6   |  22  |  1.72<H>    Ca    9.2      30 Apr 2024 06:40  Phos  3.3     04-30  Mg     1.9     04-30    TPro  7.6  /  Alb  4.3  /  TBili  1.0  /  DBili  x   /  AST  60<H>  /  ALT  51<H>  /  AlkPhos  435<H>  04-30    PT/INR - ( 30 Apr 2024 06:40 )   PT: 10.3 sec;   INR: 0.93 ratio         PTT - ( 30 Apr 2024 06:40 )  PTT:27.0 sec            PHYSICAL EXAMINATION:    T(C): 36.1  HR: 52  BP: 161/74  RR: 16  SpO2: 98%    Constitutional: NAD  HEENT: PERRLA, EOMI,    Neck:  No JVD  Respiratory: CTAB/L  Cardiovascular: S1 and S2  Gastrointestinal: BS+, soft, NT/ND  Extremities: No peripheral edema  Neurological: A/O x 3, no focal deficits  Psychiatric: Normal mood, normal affect  Skin: No rashes    ASA Class: I [ ]  II [ ]  III [ ]  IV [ ]    COMMENTS:    The patient is a suitable candidate for the planned procedure unless box checked [ ]  No, explain:    
Vascular & Interventional Radiology Pre-Procedure Note    Procedure Name: transplant liver parenchymal biopsy    HPI: 63y Male with h/o cirrhosis s/p liver transplant 2019 now with transaminitis of uncertain etiology presents for image guided transplant liver parenchymal biopsy.     Allergies: No Known Allergies      Medications:   amLODIPine   Tablet: 5 milliGRAM(s) Oral (05-03 @ 05:40)  aspirin  chewable: 81 milliGRAM(s) Oral (05-02 @ 11:33)  metoprolol tartrate: 100 milliGRAM(s) Oral (05-03 @ 05:40)      Data:  Vital Signs Last 24 Hrs  T(C): 36.6 (03 May 2024 09:19), Max: 36.9 (03 May 2024 01:40)  T(F): 97.9 (03 May 2024 09:19), Max: 98.4 (03 May 2024 01:40)  HR: 52 (03 May 2024 09:19) (52 - 59)  BP: 145/64 (03 May 2024 09:19) (136/68 - 160/77)  BP(mean): --  RR: 18 (03 May 2024 09:19) (16 - 18)  SpO2: 92% (03 May 2024 09:19) (92% - 98%)    Parameters below as of 03 May 2024 06:17  Patient On (Oxygen Delivery Method): room air        LABS:                        11.2   7.36  )-----------( 196      ( 03 May 2024 06:24 )             34.8     05-03    138  |  102  |  38<H>  ----------------------------<  228<H>  4.4   |  22  |  2.11<H>    Ca    8.8      03 May 2024 06:24  Phos  3.0     05-03  Mg     1.8     05-03    TPro  7.5  /  Alb  4.5  /  TBili  0.9  /  DBili  x   /  AST  121<H>  /  ALT  92<H>  /  AlkPhos  394<H>  05-03    PT/INR - ( 03 May 2024 06:24 )   PT: 10.6 sec;   INR: 1.01 ratio         PTT - ( 03 May 2024 06:24 )  PTT:26.3 sec    Plan:   -63y Male presents for transplant liver parenchymal biopsy  -Risks/Benefits/alternatives explained with the patient and witnessed informed consent obtained.

## 2024-05-03 NOTE — PRE-ANESTHESIA EVALUATION ADULT - NSANTHOSAYNRD_GEN_A_CORE
No. JONATHON screening performed.  STOP BANG Legend: 0-2 = LOW Risk; 3-4 = INTERMEDIATE Risk; 5-8 = HIGH Risk
No. JONATHON screening performed.  STOP BANG Legend: 0-2 = LOW Risk; 3-4 = INTERMEDIATE Risk; 5-8 = HIGH Risk

## 2024-05-03 NOTE — PROGRESS NOTE ADULT - ASSESSMENT
63 yrs old male w/ hx of alcoholic cirrhosis complicated by HRS (chronic CKD at this time) s/p OLT 4/2019 at [facility] and b/l LE DVT (R Gaastrocnemius and L CFV through SFJ/ poplitial and post tibial s/p 3M A/C with resolution) who presented for elevated bilirubin and liver enzymes.    #Elevated liver enzymes s/p OLT 2019 for alcoholic cirrhosis  Had mildly elevated ALP 200s, w/  normal bilirubin and liver enzymes in 2023, now recent labs from 4/22 and 4/24 showed bili 1.4 w/ ALP 500s, and AST//90s. Patient was asymptomatic, but underwent MRCP which showed CBD stone  8 mm upstream of the anastomosis, mildly dilated 1.1 cm.   *ERCP 5/01: notable for anastomotic stricture and a filling defect consistent with a stone proximal to the stricture s/p sphincterotomy, brushing, balloon extraction of debris, dilation of anastomotic stricture to 8 mm and stent placement (8 mm x 10 mm Viabel)  - cyclosporine 75/50 mg BID, goal 100-150  - continue ASA daily  - IR liver bx today iso elevated LFT's    #KAYLA (Cr improving)  -encourage PO hydration  -repeat BMP this afternoon    #Diabetes Mellitus  - Last A1C high 12, prior to that he was 6.7.   - Patient takes Januvia, along with Lantus 5 units in the morning and SA 5 units before meals.   - appreciate endocrine recs  - appreciate nutrition recs    #HTN   -continue amlodipine 5 mg daily.   -cw metoprolol 100 mg BID.         All recommendations preliminary until note signed by service attending.    Thank you for involving us in the care of this patient. Please contact should any concern or questions arise.    Keith Hicks MD   Gastroenterology/Hepatology Fellow PGY-5  Available on Microsoft Teams 7am - 5pm  [id number]    NON-URGENT CONSULTS:  Please email:  [email or web address]   OR  [email or web address]    After 5pm, please contact the on-call GI fellow. [phone]    AT NIGHT AND ON WEEKENDS:  Contact on-call GI fellow via answering service ([phone]) from 5pm-8am and on weekends/holidays

## 2024-05-04 LAB
ALBUMIN SERPL ELPH-MCNC: 3.9 G/DL — SIGNIFICANT CHANGE UP (ref 3.3–5)
ALP SERPL-CCNC: 388 U/L — HIGH (ref 40–120)
ALT FLD-CCNC: 100 U/L — HIGH (ref 10–45)
ANION GAP SERPL CALC-SCNC: 13 MMOL/L — SIGNIFICANT CHANGE UP (ref 5–17)
APTT BLD: 26.4 SEC — SIGNIFICANT CHANGE UP (ref 24.5–35.6)
AST SERPL-CCNC: 116 U/L — HIGH (ref 10–40)
BILIRUB SERPL-MCNC: 0.8 MG/DL — SIGNIFICANT CHANGE UP (ref 0.2–1.2)
BUN SERPL-MCNC: 31 MG/DL — HIGH (ref 7–23)
CALCIUM SERPL-MCNC: 8.7 MG/DL — SIGNIFICANT CHANGE UP (ref 8.4–10.5)
CHLORIDE SERPL-SCNC: 103 MMOL/L — SIGNIFICANT CHANGE UP (ref 96–108)
CO2 SERPL-SCNC: 20 MMOL/L — LOW (ref 22–31)
CREAT SERPL-MCNC: 1.82 MG/DL — HIGH (ref 0.5–1.3)
CYCLOSPORINE SER-MCNC: 199 NG/ML — SIGNIFICANT CHANGE UP (ref 150–400)
EGFR: 41 ML/MIN/1.73M2 — LOW
GLUCOSE BLDC GLUCOMTR-MCNC: 114 MG/DL — HIGH (ref 70–99)
GLUCOSE BLDC GLUCOMTR-MCNC: 127 MG/DL — HIGH (ref 70–99)
GLUCOSE BLDC GLUCOMTR-MCNC: 164 MG/DL — HIGH (ref 70–99)
GLUCOSE BLDC GLUCOMTR-MCNC: 229 MG/DL — HIGH (ref 70–99)
GLUCOSE BLDC GLUCOMTR-MCNC: 242 MG/DL — HIGH (ref 70–99)
GLUCOSE SERPL-MCNC: 244 MG/DL — HIGH (ref 70–99)
HCT VFR BLD CALC: 33.4 % — LOW (ref 39–50)
HGB BLD-MCNC: 11.1 G/DL — LOW (ref 13–17)
INR BLD: 0.93 RATIO — SIGNIFICANT CHANGE UP (ref 0.85–1.18)
MAGNESIUM SERPL-MCNC: 1.7 MG/DL — SIGNIFICANT CHANGE UP (ref 1.6–2.6)
MCHC RBC-ENTMCNC: 29.5 PG — SIGNIFICANT CHANGE UP (ref 27–34)
MCHC RBC-ENTMCNC: 33.2 GM/DL — SIGNIFICANT CHANGE UP (ref 32–36)
MCV RBC AUTO: 88.8 FL — SIGNIFICANT CHANGE UP (ref 80–100)
NON-GYNECOLOGICAL CYTOLOGY STUDY: SIGNIFICANT CHANGE UP
NRBC # BLD: 0 /100 WBCS — SIGNIFICANT CHANGE UP (ref 0–0)
PHOSPHATE SERPL-MCNC: 2.9 MG/DL — SIGNIFICANT CHANGE UP (ref 2.5–4.5)
PLATELET # BLD AUTO: 205 K/UL — SIGNIFICANT CHANGE UP (ref 150–400)
POTASSIUM SERPL-MCNC: 4.3 MMOL/L — SIGNIFICANT CHANGE UP (ref 3.5–5.3)
POTASSIUM SERPL-SCNC: 4.3 MMOL/L — SIGNIFICANT CHANGE UP (ref 3.5–5.3)
PROT SERPL-MCNC: 6.8 G/DL — SIGNIFICANT CHANGE UP (ref 6–8.3)
PROTHROM AB SERPL-ACNC: 10.2 SEC — SIGNIFICANT CHANGE UP (ref 9.5–13)
RBC # BLD: 3.76 M/UL — LOW (ref 4.2–5.8)
RBC # FLD: 13.5 % — SIGNIFICANT CHANGE UP (ref 10.3–14.5)
SODIUM SERPL-SCNC: 136 MMOL/L — SIGNIFICANT CHANGE UP (ref 135–145)
WBC # BLD: 6.16 K/UL — SIGNIFICANT CHANGE UP (ref 3.8–10.5)
WBC # FLD AUTO: 6.16 K/UL — SIGNIFICANT CHANGE UP (ref 3.8–10.5)

## 2024-05-04 RX ORDER — MAGNESIUM SULFATE 500 MG/ML
1 VIAL (ML) INJECTION ONCE
Refills: 0 | Status: COMPLETED | OUTPATIENT
Start: 2024-05-04 | End: 2024-05-04

## 2024-05-04 RX ORDER — CYCLOSPORINE 100 MG/1
75 CAPSULE ORAL
Refills: 0 | Status: DISCONTINUED | OUTPATIENT
Start: 2024-05-05 | End: 2024-05-05

## 2024-05-04 RX ORDER — AMLODIPINE BESYLATE 2.5 MG/1
5 TABLET ORAL ONCE
Refills: 0 | Status: COMPLETED | OUTPATIENT
Start: 2024-05-04 | End: 2024-05-04

## 2024-05-04 RX ORDER — METOPROLOL TARTRATE 50 MG
50 TABLET ORAL
Refills: 0 | Status: DISCONTINUED | OUTPATIENT
Start: 2024-05-04 | End: 2024-05-05

## 2024-05-04 RX ORDER — HEPARIN SODIUM 5000 [USP'U]/ML
5000 INJECTION INTRAVENOUS; SUBCUTANEOUS EVERY 12 HOURS
Refills: 0 | Status: DISCONTINUED | OUTPATIENT
Start: 2024-05-04 | End: 2024-05-08

## 2024-05-04 RX ORDER — CYCLOSPORINE 100 MG/1
50 CAPSULE ORAL
Refills: 0 | Status: DISCONTINUED | OUTPATIENT
Start: 2024-05-04 | End: 2024-05-05

## 2024-05-04 RX ORDER — AMLODIPINE BESYLATE 2.5 MG/1
10 TABLET ORAL DAILY
Refills: 0 | Status: DISCONTINUED | OUTPATIENT
Start: 2024-05-05 | End: 2024-05-08

## 2024-05-04 RX ADMIN — URSODIOL 300 MILLIGRAM(S): 250 TABLET, FILM COATED ORAL at 05:20

## 2024-05-04 RX ADMIN — INSULIN GLARGINE 30 UNIT(S): 100 INJECTION, SOLUTION SUBCUTANEOUS at 21:18

## 2024-05-04 RX ADMIN — Medication 15 UNIT(S): at 17:27

## 2024-05-04 RX ADMIN — CYCLOSPORINE 50 MILLIGRAM(S): 100 CAPSULE ORAL at 21:17

## 2024-05-04 RX ADMIN — CYCLOSPORINE 75 MILLIGRAM(S): 100 CAPSULE ORAL at 08:06

## 2024-05-04 RX ADMIN — Medication 15 UNIT(S): at 12:48

## 2024-05-04 RX ADMIN — Medication 100 GRAM(S): at 08:06

## 2024-05-04 RX ADMIN — HEPARIN SODIUM 5000 UNIT(S): 5000 INJECTION INTRAVENOUS; SUBCUTANEOUS at 17:31

## 2024-05-04 RX ADMIN — RISPERIDONE 1 MILLIGRAM(S): 4 TABLET ORAL at 21:17

## 2024-05-04 RX ADMIN — AMLODIPINE BESYLATE 5 MILLIGRAM(S): 2.5 TABLET ORAL at 12:12

## 2024-05-04 RX ADMIN — Medication 100 MILLIGRAM(S): at 05:20

## 2024-05-04 RX ADMIN — Medication 15 UNIT(S): at 08:43

## 2024-05-04 RX ADMIN — URSODIOL 300 MILLIGRAM(S): 250 TABLET, FILM COATED ORAL at 17:28

## 2024-05-04 RX ADMIN — Medication 4: at 08:42

## 2024-05-04 RX ADMIN — Medication 50 MILLIGRAM(S): at 17:27

## 2024-05-04 RX ADMIN — AMLODIPINE BESYLATE 5 MILLIGRAM(S): 2.5 TABLET ORAL at 05:20

## 2024-05-04 NOTE — PROGRESS NOTE ADULT - SUBJECTIVE AND OBJECTIVE BOX
Interval Events:   - Afebrile VSS  -  Liver bx done, pending path          MEDICATIONS  (STANDING):  amLODIPine   Tablet 5 milliGRAM(s) Oral once  cycloSPORINE  , modified (GENGRAF) 50 milliGRAM(s) Oral <User Schedule>  dextrose 10% Bolus 125 milliLiter(s) IV Bolus once  dextrose 5%. 1000 milliLiter(s) (100 mL/Hr) IV Continuous <Continuous>  dextrose 5%. 1000 milliLiter(s) (50 mL/Hr) IV Continuous <Continuous>  dextrose 50% Injectable 25 Gram(s) IV Push once  dextrose 50% Injectable 12.5 Gram(s) IV Push once  glucagon  Injectable 1 milliGRAM(s) IntraMuscular once  heparin   Injectable 5000 Unit(s) SubCutaneous every 12 hours  insulin glargine Injectable (LANTUS) 30 Unit(s) SubCutaneous at bedtime  insulin lispro (ADMELOG) corrective regimen sliding scale   SubCutaneous <User Schedule>  insulin lispro (ADMELOG) corrective regimen sliding scale   SubCutaneous three times a day before meals  insulin lispro Injectable (ADMELOG) 15 Unit(s) SubCutaneous three times a day before meals  insulin lispro Injectable (ADMELOG) 4 Unit(s) SubCutaneous at bedtime  metoprolol tartrate 50 milliGRAM(s) Oral two times a day  risperiDONE   Tablet 1 milliGRAM(s) Oral at bedtime  ursodiol Capsule 300 milliGRAM(s) Oral every 12 hours    MEDICATIONS  (PRN):  acetaminophen     Tablet .. 650 milliGRAM(s) Oral every 6 hours PRN Temp greater or equal to 38C (100.4F), Mild Pain (1 - 3)  dextrose Oral Gel 15 Gram(s) Oral once PRN Blood Glucose LESS THAN 70 milliGRAM(s)/deciliter  melatonin 3 milliGRAM(s) Oral at bedtime PRN Insomnia      PAST MEDICAL & SURGICAL HISTORY:  Hypertension      Diabetes mellitus      Alcoholic cirrhosis      Liver transplanted          Vital Signs Last 24 Hrs  T(C): 36.6 (04 May 2024 08:54), Max: 37 (04 May 2024 01:00)  T(F): 97.9 (04 May 2024 08:54), Max: 98.6 (04 May 2024 01:00)  HR: 63 (04 May 2024 08:54) (48 - 65)  BP: 161/74 (04 May 2024 08:54) (120/56 - 162/78)  BP(mean): --  RR: 20 (04 May 2024 08:54) (16 - 20)  SpO2: 95% (04 May 2024 08:54) (92% - 95%)    Parameters below as of 04 May 2024 08:54  Patient On (Oxygen Delivery Method): room air        I&O's Summary    03 May 2024 07:01  -  04 May 2024 07:00  --------------------------------------------------------  IN: 1430 mL / OUT: 3100 mL / NET: -1670 mL    04 May 2024 07:01  -  04 May 2024 12:01  --------------------------------------------------------  IN: 200 mL / OUT: 650 mL / NET: -450 mL                              11.1   6.16  )-----------( 205      ( 04 May 2024 06:31 )             33.4     05-04    136  |  103  |  31<H>  ----------------------------<  244<H>  4.3   |  20<L>  |  1.82<H>    Ca    8.7      04 May 2024 06:33  Phos  2.9     05-04  Mg     1.7     05-04    TPro  6.8  /  Alb  3.9  /  TBili  0.8  /  DBili  x   /  AST  116<H>  /  ALT  100<H>  /  AlkPhos  388<H>  05-04          PHYSICAL EXAM:       GENERAL:  NAD, Appears stated age  HEENT:  NC/AT,  conjunctivae clear and pink, sclera -anicteric  CHEST:  Normal Effort, Breath sounds clear  HEART:  RRR, S1 + S2, no murmurs  ABDOMEN:  Soft, non-tender, non-distended, normoactive bowel sounds,  no masses  EXTREMITIES:  no cyanosis or edema  SKIN:  Warm & Dry. No rash or erythema  NEURO:  Alert, oriented, no focal deficit                Interval Events:   - Afebrile VSS  -  Liver bx done, pending path      MEDICATIONS  (STANDING):  amLODIPine   Tablet 5 milliGRAM(s) Oral once  cycloSPORINE  , modified (GENGRAF) 50 milliGRAM(s) Oral <User Schedule>  dextrose 10% Bolus 125 milliLiter(s) IV Bolus once  dextrose 5%. 1000 milliLiter(s) (100 mL/Hr) IV Continuous <Continuous>  dextrose 5%. 1000 milliLiter(s) (50 mL/Hr) IV Continuous <Continuous>  dextrose 50% Injectable 25 Gram(s) IV Push once  dextrose 50% Injectable 12.5 Gram(s) IV Push once  glucagon  Injectable 1 milliGRAM(s) IntraMuscular once  heparin   Injectable 5000 Unit(s) SubCutaneous every 12 hours  insulin glargine Injectable (LANTUS) 30 Unit(s) SubCutaneous at bedtime  insulin lispro (ADMELOG) corrective regimen sliding scale   SubCutaneous <User Schedule>  insulin lispro (ADMELOG) corrective regimen sliding scale   SubCutaneous three times a day before meals  insulin lispro Injectable (ADMELOG) 15 Unit(s) SubCutaneous three times a day before meals  insulin lispro Injectable (ADMELOG) 4 Unit(s) SubCutaneous at bedtime  metoprolol tartrate 50 milliGRAM(s) Oral two times a day  risperiDONE   Tablet 1 milliGRAM(s) Oral at bedtime  ursodiol Capsule 300 milliGRAM(s) Oral every 12 hours    MEDICATIONS  (PRN):  acetaminophen     Tablet .. 650 milliGRAM(s) Oral every 6 hours PRN Temp greater or equal to 38C (100.4F), Mild Pain (1 - 3)  dextrose Oral Gel 15 Gram(s) Oral once PRN Blood Glucose LESS THAN 70 milliGRAM(s)/deciliter  melatonin 3 milliGRAM(s) Oral at bedtime PRN Insomnia      PAST MEDICAL & SURGICAL HISTORY:  Hypertension      Diabetes mellitus      Alcoholic cirrhosis      Liver transplanted          Vital Signs Last 24 Hrs  T(C): 36.6 (04 May 2024 08:54), Max: 37 (04 May 2024 01:00)  T(F): 97.9 (04 May 2024 08:54), Max: 98.6 (04 May 2024 01:00)  HR: 63 (04 May 2024 08:54) (48 - 65)  BP: 161/74 (04 May 2024 08:54) (120/56 - 162/78)  BP(mean): --  RR: 20 (04 May 2024 08:54) (16 - 20)  SpO2: 95% (04 May 2024 08:54) (92% - 95%)    Parameters below as of 04 May 2024 08:54  Patient On (Oxygen Delivery Method): room air        I&O's Summary    03 May 2024 07:01  -  04 May 2024 07:00  --------------------------------------------------------  IN: 1430 mL / OUT: 3100 mL / NET: -1670 mL    04 May 2024 07:01  -  04 May 2024 12:01  --------------------------------------------------------  IN: 200 mL / OUT: 650 mL / NET: -450 mL                              11.1   6.16  )-----------( 205      ( 04 May 2024 06:31 )             33.4     05-04    136  |  103  |  31<H>  ----------------------------<  244<H>  4.3   |  20<L>  |  1.82<H>    Ca    8.7      04 May 2024 06:33  Phos  2.9     05-04  Mg     1.7     05-04    TPro  6.8  /  Alb  3.9  /  TBili  0.8  /  DBili  x   /  AST  116<H>  /  ALT  100<H>  /  AlkPhos  388<H>  05-04        PHYSICAL EXAM:       GENERAL:  NAD, Appears stated age  HEENT:  NC/AT,  conjunctivae clear and pink, sclera -anicteric  CHEST:  Normal Effort, Breath sounds clear  HEART:  RRR, S1 + S2, no murmurs  ABDOMEN:  Soft, non-tender, non-distended, normoactive bowel sounds,  no masses  EXTREMITIES:  no cyanosis or edema  SKIN:  Warm & Dry. No rash or erythema  NEURO:  Alert, oriented, no focal deficit

## 2024-05-04 NOTE — PROGRESS NOTE ADULT - ASSESSMENT
63 yrs old male w/ hx of alcoholic cirrhosis complicated by HRS (chronic CKD at this time) s/p OLT 4/2019 at [facility] and b/l LE DVT (R Gaastrocnemius and L CFV through SFJ/ poplitial and post tibial s/p 3M A/C with resolution) who presented for elevated bilirubin and liver enzymes.    #Elevated liver enzymes s/p OLT 2019 for alcoholic cirrhosis  Had mildly elevated ALP 200s, w/  normal bilirubin and liver enzymes in 2023, now recent labs from 4/22 and 4/24 showed bili 1.4 w/ ALP 500s, and AST//90s. Patient was asymptomatic, but underwent MRCP which showed CBD stone  8 mm upstream of the anastomosis, mildly dilated 1.1 cm.   *ERCP 5/01: notable for anastomotic stricture and a filling defect consistent with a stone proximal to the stricture s/p sphincterotomy, brushing, balloon extraction of debris, dilation of anastomotic stricture to 8 mm and stent placement (8 mm x 10 mm Viabel)  - cyclosporine 75/50 mg BID, goal 100-150  - ASA held   - IR liver bx 5/3 pending path    #KAYLA (Cr improving)  -encourage PO hydration      #Diabetes Mellitus  - Last A1C high 12, prior to that he was 6.7.   - Patient takes Januvia, along with Lantus 5 units in the morning and SA 5 units before meals.   - appreciate endocrine recs  - appreciate nutrition recs    #HTN   -inc amlodipine 10  mg daily.   -dec metoprolol 50 mg BID.              63 yrs old male w/ hx of alcoholic cirrhosis complicated by HRS (chronic CKD at this time) s/p OLT 4/2019 at [facility] and b/l LE DVT (R Gaastrocnemius and L CFV through SFJ/ poplitial and post tibial s/p 3M A/C with resolution) who presented for elevated bilirubin and liver enzymes.    [] Elevated liver enzymes s/p OLT 2019 for alcoholic cirrhosis    - cyclosporine 75/50 mg BID, goal 100-150  - ASA held   - IR liver bx 5/3 pending path    [] KAYLA   - encourage PO hydration  - Cr downtrending     [] Diabetes Mellitus  - Last A1C high 12, prior he was 6.7.   - Patient takes Januvia, along with Lantus 5 units in the morning and SA 5 units before meals.   - appreciate endocrine recs  - appreciate nutrition recs    [] HTN   -inc amlodipine 10  mg daily.   -dec metoprolol 50 mg BID.

## 2024-05-05 LAB
ALBUMIN SERPL ELPH-MCNC: 4 G/DL — SIGNIFICANT CHANGE UP (ref 3.3–5)
ALP SERPL-CCNC: 415 U/L — HIGH (ref 40–120)
ALT FLD-CCNC: 117 U/L — HIGH (ref 10–45)
ANION GAP SERPL CALC-SCNC: 14 MMOL/L — SIGNIFICANT CHANGE UP (ref 5–17)
APTT BLD: 27.8 SEC — SIGNIFICANT CHANGE UP (ref 24.5–35.6)
AST SERPL-CCNC: 112 U/L — HIGH (ref 10–40)
BILIRUB SERPL-MCNC: 0.9 MG/DL — SIGNIFICANT CHANGE UP (ref 0.2–1.2)
BUN SERPL-MCNC: 24 MG/DL — HIGH (ref 7–23)
CALCIUM SERPL-MCNC: 9 MG/DL — SIGNIFICANT CHANGE UP (ref 8.4–10.5)
CHLORIDE SERPL-SCNC: 104 MMOL/L — SIGNIFICANT CHANGE UP (ref 96–108)
CO2 SERPL-SCNC: 21 MMOL/L — LOW (ref 22–31)
CREAT SERPL-MCNC: 1.56 MG/DL — HIGH (ref 0.5–1.3)
CYCLOSPORINE SER-MCNC: 87 NG/ML — LOW (ref 150–400)
EGFR: 49 ML/MIN/1.73M2 — LOW
GLUCOSE BLDC GLUCOMTR-MCNC: 152 MG/DL — HIGH (ref 70–99)
GLUCOSE BLDC GLUCOMTR-MCNC: 173 MG/DL — HIGH (ref 70–99)
GLUCOSE BLDC GLUCOMTR-MCNC: 202 MG/DL — HIGH (ref 70–99)
GLUCOSE BLDC GLUCOMTR-MCNC: 202 MG/DL — HIGH (ref 70–99)
GLUCOSE BLDC GLUCOMTR-MCNC: 235 MG/DL — HIGH (ref 70–99)
GLUCOSE SERPL-MCNC: 173 MG/DL — HIGH (ref 70–99)
HCT VFR BLD CALC: 36.2 % — LOW (ref 39–50)
HGB BLD-MCNC: 11.6 G/DL — LOW (ref 13–17)
INR BLD: 1 RATIO — SIGNIFICANT CHANGE UP (ref 0.85–1.18)
MAGNESIUM SERPL-MCNC: 1.7 MG/DL — SIGNIFICANT CHANGE UP (ref 1.6–2.6)
MCHC RBC-ENTMCNC: 29 PG — SIGNIFICANT CHANGE UP (ref 27–34)
MCHC RBC-ENTMCNC: 32 GM/DL — SIGNIFICANT CHANGE UP (ref 32–36)
MCV RBC AUTO: 90.5 FL — SIGNIFICANT CHANGE UP (ref 80–100)
NRBC # BLD: 0 /100 WBCS — SIGNIFICANT CHANGE UP (ref 0–0)
PHOSPHATE SERPL-MCNC: 3.5 MG/DL — SIGNIFICANT CHANGE UP (ref 2.5–4.5)
PLATELET # BLD AUTO: 214 K/UL — SIGNIFICANT CHANGE UP (ref 150–400)
POTASSIUM SERPL-MCNC: 4.1 MMOL/L — SIGNIFICANT CHANGE UP (ref 3.5–5.3)
POTASSIUM SERPL-SCNC: 4.1 MMOL/L — SIGNIFICANT CHANGE UP (ref 3.5–5.3)
PROT SERPL-MCNC: 7.2 G/DL — SIGNIFICANT CHANGE UP (ref 6–8.3)
PROTHROM AB SERPL-ACNC: 10.5 SEC — SIGNIFICANT CHANGE UP (ref 9.5–13)
RBC # BLD: 4 M/UL — LOW (ref 4.2–5.8)
RBC # FLD: 13.6 % — SIGNIFICANT CHANGE UP (ref 10.3–14.5)
SODIUM SERPL-SCNC: 139 MMOL/L — SIGNIFICANT CHANGE UP (ref 135–145)
WBC # BLD: 6.13 K/UL — SIGNIFICANT CHANGE UP (ref 3.8–10.5)
WBC # FLD AUTO: 6.13 K/UL — SIGNIFICANT CHANGE UP (ref 3.8–10.5)

## 2024-05-05 RX ORDER — INSULIN LISPRO 100/ML
15 VIAL (ML) SUBCUTANEOUS
Refills: 0 | Status: DISCONTINUED | OUTPATIENT
Start: 2024-05-05 | End: 2024-05-05

## 2024-05-05 RX ORDER — ASPIRIN/CALCIUM CARB/MAGNESIUM 324 MG
81 TABLET ORAL DAILY
Refills: 0 | Status: DISCONTINUED | OUTPATIENT
Start: 2024-05-05 | End: 2024-05-08

## 2024-05-05 RX ORDER — MAGNESIUM SULFATE 500 MG/ML
1 VIAL (ML) INJECTION ONCE
Refills: 0 | Status: COMPLETED | OUTPATIENT
Start: 2024-05-05 | End: 2024-05-05

## 2024-05-05 RX ORDER — METOPROLOL TARTRATE 50 MG
25 TABLET ORAL ONCE
Refills: 0 | Status: COMPLETED | OUTPATIENT
Start: 2024-05-05 | End: 2024-05-05

## 2024-05-05 RX ORDER — MYCOPHENOLATE MOFETIL 250 MG/1
1000 CAPSULE ORAL
Refills: 0 | Status: DISCONTINUED | OUTPATIENT
Start: 2024-05-05 | End: 2024-05-06

## 2024-05-05 RX ORDER — MYCOPHENOLATE MOFETIL 250 MG/1
1000 CAPSULE ORAL ONCE
Refills: 0 | Status: COMPLETED | OUTPATIENT
Start: 2024-05-05 | End: 2024-05-05

## 2024-05-05 RX ORDER — CYCLOSPORINE 100 MG/1
75 CAPSULE ORAL
Refills: 0 | Status: DISCONTINUED | OUTPATIENT
Start: 2024-05-06 | End: 2024-05-08

## 2024-05-05 RX ORDER — INSULIN LISPRO 100/ML
17 VIAL (ML) SUBCUTANEOUS
Refills: 0 | Status: DISCONTINUED | OUTPATIENT
Start: 2024-05-05 | End: 2024-05-06

## 2024-05-05 RX ORDER — INSULIN GLARGINE 100 [IU]/ML
35 INJECTION, SOLUTION SUBCUTANEOUS AT BEDTIME
Refills: 0 | Status: DISCONTINUED | OUTPATIENT
Start: 2024-05-05 | End: 2024-05-06

## 2024-05-05 RX ORDER — CYCLOSPORINE 100 MG/1
50 CAPSULE ORAL
Refills: 0 | Status: DISCONTINUED | OUTPATIENT
Start: 2024-05-05 | End: 2024-05-08

## 2024-05-05 RX ORDER — METOPROLOL TARTRATE 50 MG
75 TABLET ORAL
Refills: 0 | Status: DISCONTINUED | OUTPATIENT
Start: 2024-05-05 | End: 2024-05-08

## 2024-05-05 RX ORDER — MAGNESIUM OXIDE 400 MG ORAL TABLET 241.3 MG
400 TABLET ORAL
Refills: 0 | Status: DISCONTINUED | OUTPATIENT
Start: 2024-05-05 | End: 2024-05-06

## 2024-05-05 RX ADMIN — Medication 25 MILLIGRAM(S): at 08:49

## 2024-05-05 RX ADMIN — URSODIOL 300 MILLIGRAM(S): 250 TABLET, FILM COATED ORAL at 05:18

## 2024-05-05 RX ADMIN — Medication 81 MILLIGRAM(S): at 17:08

## 2024-05-05 RX ADMIN — HEPARIN SODIUM 5000 UNIT(S): 5000 INJECTION INTRAVENOUS; SUBCUTANEOUS at 17:09

## 2024-05-05 RX ADMIN — RISPERIDONE 1 MILLIGRAM(S): 4 TABLET ORAL at 21:02

## 2024-05-05 RX ADMIN — CYCLOSPORINE 50 MILLIGRAM(S): 100 CAPSULE ORAL at 21:01

## 2024-05-05 RX ADMIN — MAGNESIUM OXIDE 400 MG ORAL TABLET 400 MILLIGRAM(S): 241.3 TABLET ORAL at 17:14

## 2024-05-05 RX ADMIN — Medication 4: at 08:45

## 2024-05-05 RX ADMIN — CYCLOSPORINE 75 MILLIGRAM(S): 100 CAPSULE ORAL at 08:47

## 2024-05-05 RX ADMIN — MYCOPHENOLATE MOFETIL 1000 MILLIGRAM(S): 250 CAPSULE ORAL at 10:11

## 2024-05-05 RX ADMIN — INSULIN GLARGINE 35 UNIT(S): 100 INJECTION, SOLUTION SUBCUTANEOUS at 21:02

## 2024-05-05 RX ADMIN — Medication 15 UNIT(S): at 12:39

## 2024-05-05 RX ADMIN — AMLODIPINE BESYLATE 10 MILLIGRAM(S): 2.5 TABLET ORAL at 05:17

## 2024-05-05 RX ADMIN — Medication 15 UNIT(S): at 08:46

## 2024-05-05 RX ADMIN — MYCOPHENOLATE MOFETIL 1000 MILLIGRAM(S): 250 CAPSULE ORAL at 21:02

## 2024-05-05 RX ADMIN — Medication 4: at 12:38

## 2024-05-05 RX ADMIN — Medication 17 UNIT(S): at 17:08

## 2024-05-05 RX ADMIN — Medication 100 GRAM(S): at 10:11

## 2024-05-05 RX ADMIN — URSODIOL 300 MILLIGRAM(S): 250 TABLET, FILM COATED ORAL at 17:08

## 2024-05-05 RX ADMIN — HEPARIN SODIUM 5000 UNIT(S): 5000 INJECTION INTRAVENOUS; SUBCUTANEOUS at 05:17

## 2024-05-05 RX ADMIN — Medication 4: at 17:08

## 2024-05-05 RX ADMIN — Medication 75 MILLIGRAM(S): at 17:09

## 2024-05-05 NOTE — PROGRESS NOTE ADULT - SUBJECTIVE AND OBJECTIVE BOX
Interval Events:   - no acute events overnight   - HTN'sive: incr Norvasc 10mg daily and metoprolol 75mg bid      MEDICATIONS  (STANDING):  amLODIPine   Tablet 10 milliGRAM(s) Oral daily  aspirin enteric coated 81 milliGRAM(s) Oral daily  cycloSPORINE  , modified (GENGRAF) 50 milliGRAM(s) Oral <User Schedule>  dextrose 10% Bolus 125 milliLiter(s) IV Bolus once  dextrose 5%. 1000 milliLiter(s) (100 mL/Hr) IV Continuous <Continuous>  dextrose 5%. 1000 milliLiter(s) (50 mL/Hr) IV Continuous <Continuous>  dextrose 50% Injectable 25 Gram(s) IV Push once  dextrose 50% Injectable 12.5 Gram(s) IV Push once  glucagon  Injectable 1 milliGRAM(s) IntraMuscular once  heparin   Injectable 5000 Unit(s) SubCutaneous every 12 hours  insulin glargine Injectable (LANTUS) 30 Unit(s) SubCutaneous at bedtime  insulin lispro (ADMELOG) corrective regimen sliding scale   SubCutaneous <User Schedule>  insulin lispro (ADMELOG) corrective regimen sliding scale   SubCutaneous three times a day before meals  insulin lispro Injectable (ADMELOG) 15 Unit(s) SubCutaneous three times a day before meals  insulin lispro Injectable (ADMELOG) 4 Unit(s) SubCutaneous at bedtime  magnesium oxide 400 milliGRAM(s) Oral two times a day with meals  metoprolol tartrate 75 milliGRAM(s) Oral two times a day  mycophenolate mofetil 1000 milliGRAM(s) Oral <User Schedule>  risperiDONE   Tablet 1 milliGRAM(s) Oral at bedtime  ursodiol Capsule 300 milliGRAM(s) Oral every 12 hours    MEDICATIONS  (PRN):  acetaminophen     Tablet .. 650 milliGRAM(s) Oral every 6 hours PRN Temp greater or equal to 38C (100.4F), Mild Pain (1 - 3)  dextrose Oral Gel 15 Gram(s) Oral once PRN Blood Glucose LESS THAN 70 milliGRAM(s)/deciliter  melatonin 3 milliGRAM(s) Oral at bedtime PRN Insomnia      PAST MEDICAL & SURGICAL HISTORY:  Hypertension  Diabetes mellitus  Alcoholic cirrhosis  Liver transplanted    Vital Signs Last 24 Hrs  T(C): 36.6 (05 May 2024 12:24), Max: 36.7 (04 May 2024 17:15)  T(F): 97.8 (05 May 2024 12:24), Max: 98 (04 May 2024 17:15)  HR: 88 (05 May 2024 12:24) (57 - 88)  BP: 166/81 (05 May 2024 12:24) (155/79 - 179/78)  BP(mean): --  RR: 20 (05 May 2024 12:24) (18 - 20)  SpO2: 96% (05 May 2024 12:24) (93% - 97%)    Parameters below as of 05 May 2024 12:24  Patient On (Oxygen Delivery Method): room air    I&O's Summary    04 May 2024 07:01  -  05 May 2024 07:00  --------------------------------------------------------  IN: 1340 mL / OUT: 3040 mL / NET: -1700 mL    05 May 2024 07:01  -  05 May 2024 15:23  --------------------------------------------------------  IN: 0 mL / OUT: 300 mL / NET: -300 mL                        11.6   6.13  )-----------( 214      ( 05 May 2024 06:57 )             36.2     05-05    139  |  104  |  24<H>  ----------------------------<  173<H>  4.1   |  21<L>  |  1.56<H>    Ca    9.0      05 May 2024 07:02  Phos  3.5     05-05  Mg     1.7     05-05    TPro  7.2  /  Alb  4.0  /  TBili  0.9  /  DBili  x   /  AST  112<H>  /  ALT  117<H>  /  AlkPhos  415<H>  05-05      PHYSICAL EXAM:   GENERAL:  NAD, Appears stated age  HEENT:  NC/AT,  conjunctivae clear and pink, sclera -anicteric  CHEST:  Normal Effort, Breath sounds clear  HEART:  RRR, S1 + S2, no murmurs  ABDOMEN:  Soft, non-tender, non-distended, normoactive bowel sounds,  no masses  EXTREMITIES:  no cyanosis or edema  SKIN:  Warm & Dry. No rash or erythema  NEURO:  Alert, oriented, no focal deficit

## 2024-05-05 NOTE — CHART NOTE - NSCHARTNOTEFT_GEN_A_CORE
63M w/ hx of alcoholic cirrhosis complicated by HRS (chronic CKD at this time) s/p OLT 4/2019 at [facility] and b/l LE DVT who was sent in for elevated bilirubin and liver enzymes. Underwent MRCP which showed small 8 mm CBD stone upstream of the anastomosis site. Endocrine consulted for: Uncontrolled T2DM. BG Goal 100-180mg/dl     Chart reviewed. Last 24 hour BG values 100s-200s, in 200s today above goal. will adjust insulin dose for BG Goal 100-180mg/dl     Diet, Regular:   Consistent Carbohydrate {No Snacks} (CSTCHO) (05-03-24 @ 14:36) [Active]  Diet, Regular (05-03-24 @ 10:17) [Available for Activation]      POCT Blood Glucose:  202 mg/dL (05-05-24 @ 16:34)  235 mg/dL (05-05-24 @ 11:51)  202 mg/dL (05-05-24 @ 08:28)  173 mg/dL (05-05-24 @ 00:46)  164 mg/dL (05-04-24 @ 21:08)      eMAR:  insulin glargine Injectable (LANTUS)   30 Unit(s) SubCutaneous (05-04-24 @ 21:18)    insulin lispro (ADMELOG) corrective regimen sliding scale   4 Unit(s) SubCutaneous (05-05-24 @ 17:08)   4 Unit(s) SubCutaneous (05-05-24 @ 12:38)   4 Unit(s) SubCutaneous (05-05-24 @ 08:45)    insulin lispro Injectable (ADMELOG)   15 Unit(s) SubCutaneous (05-05-24 @ 12:39)   15 Unit(s) SubCutaneous (05-05-24 @ 08:46)    insulin lispro Injectable (ADMELOG)   17 Unit(s) SubCutaneous (05-05-24 @ 17:08)        #T2DM uncontrolled with hyperglycemia      - Inpatient BG goal 100-180  - Increase Lantus 35 units QHS  - Continue snack insulin admelog 4 units at bedtime hold if not having a snack   - Increase Admelog 17 units TIDAC for now ( hold meal time insulin if NPO)  - Mod dose admelog correction scale TIDAC  - Mod dose admelog correction scale QHS/2am  - CC diet  - Please inform endocrine team if starting steroids        Contact via [employer] Teams during business hours  To reach covering provider access AMION via sunrise tools  For Urgent matters/after-hours/weekends/holidays please page endocrine fellow on call   For nonurgent matters please email [email or web address]    Please note that this patient may be followed by different provider tomorrow.  Notify endocrine 24 hours prior to discharge for final recommendations

## 2024-05-05 NOTE — PROGRESS NOTE ADULT - ASSESSMENT
63 yrs old male w/ hx of alcoholic cirrhosis complicated by HRS (chronic CKD at this time) s/p OLT 4/2019 at [facility] and b/l LE DVT (R Gaastrocnemius and L CFV through SFJ/ poplitial and post tibial s/p 3M A/C with resolution) who presented for elevated bilirubin and liver enzymes.    [] Elevated liver enzymes s/p OLT 2019 for alcoholic cirrhosis  - cyclosporine 75/50 mg BID, start MMF 1/1  - IR liver bx 5/3 pending path  - resume ASA    [] KAYLA   - encourage PO hydration  - Cr downtrending     [] Diabetes Mellitus  - Last A1C high 12, prior he was 6.7.   - Patient takes Januvia, along with Lantus 5 units in the morning and SA 5 units before meals.   - appreciate endocrine recs  - appreciate nutrition recs    [] HTN   -inc amlodipine 10  mg daily.   -incr metoprolol 75 mg BID.

## 2024-05-06 LAB
ADD ON TEST-SPECIMEN IN LAB: SIGNIFICANT CHANGE UP
ALBUMIN SERPL ELPH-MCNC: 3.9 G/DL — SIGNIFICANT CHANGE UP (ref 3.3–5)
ALP SERPL-CCNC: 415 U/L — HIGH (ref 40–120)
ALT FLD-CCNC: 115 U/L — HIGH (ref 10–45)
ANION GAP SERPL CALC-SCNC: 17 MMOL/L — SIGNIFICANT CHANGE UP (ref 5–17)
APTT BLD: 26.8 SEC — SIGNIFICANT CHANGE UP (ref 24.5–35.6)
AST SERPL-CCNC: 114 U/L — HIGH (ref 10–40)
BILIRUB SERPL-MCNC: 1 MG/DL — SIGNIFICANT CHANGE UP (ref 0.2–1.2)
BLD GP AB SCN SERPL QL: NEGATIVE — SIGNIFICANT CHANGE UP
BUN SERPL-MCNC: 26 MG/DL — HIGH (ref 7–23)
CALCIUM SERPL-MCNC: 9.3 MG/DL — SIGNIFICANT CHANGE UP (ref 8.4–10.5)
CHLORIDE SERPL-SCNC: 102 MMOL/L — SIGNIFICANT CHANGE UP (ref 96–108)
CO2 SERPL-SCNC: 18 MMOL/L — LOW (ref 22–31)
CREAT SERPL-MCNC: 1.6 MG/DL — HIGH (ref 0.5–1.3)
CYCLOSPORINE SER-MCNC: 132 NG/ML — LOW (ref 150–400)
EGFR: 48 ML/MIN/1.73M2 — LOW
FLUAV AG NPH QL: SIGNIFICANT CHANGE UP
FLUBV AG NPH QL: SIGNIFICANT CHANGE UP
GGT SERPL-CCNC: 1013 U/L — HIGH (ref 9–50)
GLUCOSE BLDC GLUCOMTR-MCNC: 137 MG/DL — HIGH (ref 70–99)
GLUCOSE BLDC GLUCOMTR-MCNC: 164 MG/DL — HIGH (ref 70–99)
GLUCOSE BLDC GLUCOMTR-MCNC: 168 MG/DL — HIGH (ref 70–99)
GLUCOSE BLDC GLUCOMTR-MCNC: 197 MG/DL — HIGH (ref 70–99)
GLUCOSE BLDC GLUCOMTR-MCNC: 270 MG/DL — HIGH (ref 70–99)
GLUCOSE SERPL-MCNC: 160 MG/DL — HIGH (ref 70–99)
HAV IGM SER-ACNC: SIGNIFICANT CHANGE UP
HBV CORE IGM SER-ACNC: SIGNIFICANT CHANGE UP
HBV SURFACE AG SER-ACNC: SIGNIFICANT CHANGE UP
HCT VFR BLD CALC: 35.3 % — LOW (ref 39–50)
HCV AB S/CO SERPL IA: 0.07 S/CO — SIGNIFICANT CHANGE UP (ref 0–0.99)
HCV AB SERPL-IMP: SIGNIFICANT CHANGE UP
HGB BLD-MCNC: 11.7 G/DL — LOW (ref 13–17)
IGA FLD-MCNC: 418 MG/DL — SIGNIFICANT CHANGE UP (ref 84–499)
IGG FLD-MCNC: 1070 MG/DL — SIGNIFICANT CHANGE UP (ref 610–1660)
IGM SERPL-MCNC: 137 MG/DL — SIGNIFICANT CHANGE UP (ref 35–242)
INR BLD: 0.98 RATIO — SIGNIFICANT CHANGE UP (ref 0.85–1.18)
KAPPA LC SER QL IFE: 4.9 MG/DL — HIGH (ref 0.33–1.94)
KAPPA/LAMBDA FREE LIGHT CHAIN RATIO, SERUM: 1.57 RATIO — SIGNIFICANT CHANGE UP (ref 0.26–1.65)
LAMBDA LC SER QL IFE: 3.13 MG/DL — HIGH (ref 0.57–2.63)
MAGNESIUM SERPL-MCNC: 1.7 MG/DL — SIGNIFICANT CHANGE UP (ref 1.6–2.6)
MCHC RBC-ENTMCNC: 29.4 PG — SIGNIFICANT CHANGE UP (ref 27–34)
MCHC RBC-ENTMCNC: 33.1 GM/DL — SIGNIFICANT CHANGE UP (ref 32–36)
MCV RBC AUTO: 88.7 FL — SIGNIFICANT CHANGE UP (ref 80–100)
NRBC # BLD: 0 /100 WBCS — SIGNIFICANT CHANGE UP (ref 0–0)
PHOSPHATE SERPL-MCNC: 3.2 MG/DL — SIGNIFICANT CHANGE UP (ref 2.5–4.5)
PLATELET # BLD AUTO: 221 K/UL — SIGNIFICANT CHANGE UP (ref 150–400)
POTASSIUM SERPL-MCNC: 4.2 MMOL/L — SIGNIFICANT CHANGE UP (ref 3.5–5.3)
POTASSIUM SERPL-SCNC: 4.2 MMOL/L — SIGNIFICANT CHANGE UP (ref 3.5–5.3)
PROT SERPL-MCNC: 7.1 G/DL — SIGNIFICANT CHANGE UP (ref 6–8.3)
PROTHROM AB SERPL-ACNC: 10.3 SEC — SIGNIFICANT CHANGE UP (ref 9.5–13)
RAPID RVP RESULT: SIGNIFICANT CHANGE UP
RBC # BLD: 3.98 M/UL — LOW (ref 4.2–5.8)
RBC # FLD: 13.4 % — SIGNIFICANT CHANGE UP (ref 10.3–14.5)
RH IG SCN BLD-IMP: POSITIVE — SIGNIFICANT CHANGE UP
RSV RNA NPH QL NAA+NON-PROBE: SIGNIFICANT CHANGE UP
SARS-COV-2 RNA SPEC QL NAA+PROBE: SIGNIFICANT CHANGE UP
SARS-COV-2 RNA SPEC QL NAA+PROBE: SIGNIFICANT CHANGE UP
SODIUM SERPL-SCNC: 137 MMOL/L — SIGNIFICANT CHANGE UP (ref 135–145)
WBC # BLD: 5.55 K/UL — SIGNIFICANT CHANGE UP (ref 3.8–10.5)
WBC # FLD AUTO: 5.55 K/UL — SIGNIFICANT CHANGE UP (ref 3.8–10.5)

## 2024-05-06 PROCEDURE — 99231 SBSQ HOSP IP/OBS SF/LOW 25: CPT

## 2024-05-06 PROCEDURE — 99232 SBSQ HOSP IP/OBS MODERATE 35: CPT

## 2024-05-06 PROCEDURE — 76942 ECHO GUIDE FOR BIOPSY: CPT | Mod: 26

## 2024-05-06 PROCEDURE — 47000 NEEDLE BIOPSY OF LIVER PERQ: CPT

## 2024-05-06 RX ORDER — MAGNESIUM OXIDE 400 MG ORAL TABLET 241.3 MG
400 TABLET ORAL
Refills: 0 | Status: DISCONTINUED | OUTPATIENT
Start: 2024-05-06 | End: 2024-05-08

## 2024-05-06 RX ORDER — URSODIOL 250 MG/1
500 TABLET, FILM COATED ORAL EVERY 12 HOURS
Refills: 0 | Status: DISCONTINUED | OUTPATIENT
Start: 2024-05-06 | End: 2024-05-08

## 2024-05-06 RX ORDER — MAGNESIUM SULFATE 500 MG/ML
2 VIAL (ML) INJECTION ONCE
Refills: 0 | Status: COMPLETED | OUTPATIENT
Start: 2024-05-06 | End: 2024-05-06

## 2024-05-06 RX ORDER — INSULIN GLARGINE 100 [IU]/ML
40 INJECTION, SOLUTION SUBCUTANEOUS AT BEDTIME
Refills: 0 | Status: DISCONTINUED | OUTPATIENT
Start: 2024-05-06 | End: 2024-05-07

## 2024-05-06 RX ORDER — URSODIOL 250 MG/1
500 TABLET, FILM COATED ORAL EVERY 12 HOURS
Refills: 0 | Status: DISCONTINUED | OUTPATIENT
Start: 2024-05-06 | End: 2024-05-06

## 2024-05-06 RX ORDER — INSULIN LISPRO 100/ML
19 VIAL (ML) SUBCUTANEOUS
Refills: 0 | Status: DISCONTINUED | OUTPATIENT
Start: 2024-05-06 | End: 2024-05-07

## 2024-05-06 RX ADMIN — Medication 75 MILLIGRAM(S): at 06:02

## 2024-05-06 RX ADMIN — Medication 19 UNIT(S): at 17:32

## 2024-05-06 RX ADMIN — URSODIOL 500 MILLIGRAM(S): 250 TABLET, FILM COATED ORAL at 17:31

## 2024-05-06 RX ADMIN — URSODIOL 300 MILLIGRAM(S): 250 TABLET, FILM COATED ORAL at 06:02

## 2024-05-06 RX ADMIN — Medication 60 MILLIGRAM(S): at 14:36

## 2024-05-06 RX ADMIN — MAGNESIUM OXIDE 400 MG ORAL TABLET 400 MILLIGRAM(S): 241.3 TABLET ORAL at 08:54

## 2024-05-06 RX ADMIN — Medication 2: at 08:55

## 2024-05-06 RX ADMIN — Medication 2: at 17:31

## 2024-05-06 RX ADMIN — Medication 17 UNIT(S): at 08:55

## 2024-05-06 RX ADMIN — MAGNESIUM OXIDE 400 MG ORAL TABLET 400 MILLIGRAM(S): 241.3 TABLET ORAL at 17:31

## 2024-05-06 RX ADMIN — Medication 25 GRAM(S): at 14:37

## 2024-05-06 RX ADMIN — Medication 17 UNIT(S): at 12:29

## 2024-05-06 RX ADMIN — HEPARIN SODIUM 5000 UNIT(S): 5000 INJECTION INTRAVENOUS; SUBCUTANEOUS at 17:31

## 2024-05-06 RX ADMIN — AMLODIPINE BESYLATE 10 MILLIGRAM(S): 2.5 TABLET ORAL at 06:02

## 2024-05-06 RX ADMIN — Medication 81 MILLIGRAM(S): at 12:29

## 2024-05-06 RX ADMIN — Medication 2: at 12:29

## 2024-05-06 RX ADMIN — CYCLOSPORINE 50 MILLIGRAM(S): 100 CAPSULE ORAL at 22:25

## 2024-05-06 RX ADMIN — HEPARIN SODIUM 5000 UNIT(S): 5000 INJECTION INTRAVENOUS; SUBCUTANEOUS at 06:04

## 2024-05-06 RX ADMIN — RISPERIDONE 1 MILLIGRAM(S): 4 TABLET ORAL at 22:25

## 2024-05-06 RX ADMIN — MYCOPHENOLATE MOFETIL 1000 MILLIGRAM(S): 250 CAPSULE ORAL at 08:55

## 2024-05-06 RX ADMIN — CYCLOSPORINE 75 MILLIGRAM(S): 100 CAPSULE ORAL at 08:55

## 2024-05-06 RX ADMIN — Medication 2: at 22:25

## 2024-05-06 RX ADMIN — INSULIN GLARGINE 40 UNIT(S): 100 INJECTION, SOLUTION SUBCUTANEOUS at 22:27

## 2024-05-06 RX ADMIN — Medication 75 MILLIGRAM(S): at 17:31

## 2024-05-06 NOTE — PROGRESS NOTE ADULT - SUBJECTIVE AND OBJECTIVE BOX
Interventional Radiology Follow-Up Note    This is a 63y Male s/p transplant liver bx on 5/3 in Interventional Radiology with Dr. Olmedo.     S: Patient seen and examined @ bedside. No complaints offered. Denies abdominal pain.    Medication:     amLODIPine   Tablet: (05-06)  aspirin enteric coated: (05-06)  heparin   Injectable: (05-06)  metoprolol tartrate: (05-05)  metoprolol tartrate: (05-06)  metoprolol tartrate: (05-04)    Vitals:   T(F): 98.2, Max: 98.6 (17:00)  HR: 59  BP: 162/81  RR: 20  SpO2: 95%    Physical Exam:  General: Nontoxic, in NAD, A&O x3.  Abdomen: soft, NTND, no peritoneal signs. Dressing over bx site removed. site soft nttp, no evidence of hematoma.       LABS:  WBC 5.55 / Hgb 11.7 / Hct 35.3 / Plt 221  Na -- / K -- / CO2 -- / Cl -- / BUN -- / Cr -- / Glucose --  ALT -- / AST -- / Alk Phos -- / Tbili --  Ptt 26.8 / Pt 10.3 / INR 0.98      Assessment/Plan:  63y Male with h/o cirrhosis s/p liver transplant 2019 now with transaminitis of uncertain etiology s/p transplant liver bx on 5/3 in Interventional Radiology with Dr. Olmedo.     -h/h stable   -trend vs/labs  -f/u bx results- per primary team  -continue global management per primary team   -IR will sign off  -Please call IR at extension [phone] with any questions, concerns, or issues regarding above.      Radha Harris PA-C  Available on teams

## 2024-05-06 NOTE — PROGRESS NOTE ADULT - SUBJECTIVE AND OBJECTIVE BOX
Interval Events:   -NAEON  -severe steatohepatitis on bx, no signs of rejection  -LFT's stable/elevated    Hospital Medications:  acetaminophen     Tablet .. 650 milliGRAM(s) Oral every 6 hours PRN  amLODIPine   Tablet 10 milliGRAM(s) Oral daily  aspirin enteric coated 81 milliGRAM(s) Oral daily  cycloSPORINE  , modified (GENGRAF) 75 milliGRAM(s) Oral <User Schedule>  cycloSPORINE  , modified (GENGRAF) 50 milliGRAM(s) Oral <User Schedule>  dextrose 10% Bolus 125 milliLiter(s) IV Bolus once  dextrose 5%. 1000 milliLiter(s) IV Continuous <Continuous>  dextrose 5%. 1000 milliLiter(s) IV Continuous <Continuous>  dextrose 50% Injectable 25 Gram(s) IV Push once  dextrose 50% Injectable 12.5 Gram(s) IV Push once  dextrose Oral Gel 15 Gram(s) Oral once PRN  glucagon  Injectable 1 milliGRAM(s) IntraMuscular once  heparin   Injectable 5000 Unit(s) SubCutaneous every 12 hours  insulin glargine Injectable (LANTUS) 35 Unit(s) SubCutaneous at bedtime  insulin lispro (ADMELOG) corrective regimen sliding scale   SubCutaneous three times a day before meals  insulin lispro (ADMELOG) corrective regimen sliding scale   SubCutaneous <User Schedule>  insulin lispro Injectable (ADMELOG) 4 Unit(s) SubCutaneous at bedtime  insulin lispro Injectable (ADMELOG) 17 Unit(s) SubCutaneous three times a day before meals  magnesium oxide 400 milliGRAM(s) Oral two times a day with meals  melatonin 3 milliGRAM(s) Oral at bedtime PRN  metoprolol tartrate 75 milliGRAM(s) Oral two times a day  risperiDONE   Tablet 1 milliGRAM(s) Oral at bedtime  ursodiol Capsule 300 milliGRAM(s) Oral every 12 hours      PHYSICAL EXAM:   Vital Signs:  Vital Signs Last 24 Hrs  T(C): 36.5 (06 May 2024 09:09), Max: 37 (05 May 2024 17:00)  T(F): 97.7 (06 May 2024 09:09), Max: 98.6 (05 May 2024 17:00)  HR: 59 (06 May 2024 09:09) (59 - 88)  BP: 145/78 (06 May 2024 09:09) (145/78 - 169/90)  BP(mean): --  RR: 20 (06 May 2024 09:09) (18 - 20)  SpO2: 95% (06 May 2024 09:09) (94% - 99%)    Parameters below as of 06 May 2024 09:09  Patient On (Oxygen Delivery Method): room air      Daily     Daily Weight in k.5 (06 May 2024 06:02)    GENERAL:  NAD, Appears stated age  HEENT:  NC/AT,  conjunctivae clear and pink, sclera -anicteric  CHEST:  Normal Effort, Breath sounds clear  HEART:  RRR, S1 + S2, no murmurs  ABDOMEN:  Soft, non-tender, non-distended, normoactive bowel sounds,  no masses  EXTREMITIES:  no cyanosis or edema  SKIN:  Warm & Dry. No rash or erythema  NEURO:  Alert, oriented, no focal deficit                        11.7   5.55  )-----------( 221      ( 06 May 2024 07:12 )             35.3     05-06    137  |  102  |  26<H>  ----------------------------<  160<H>  4.2   |  18<L>  |  1.60<H>    Ca    9.3      06 May 2024 07:09  Phos  3.2     05-06  Mg     1.7     05-06    TPro  7.1  /  Alb  3.9  /  TBili  1.0  /  DBili  x   /  AST  114<H>  /  ALT  115<H>  /  AlkPhos  415<H>  05-06    LIVER FUNCTIONS - ( 06 May 2024 07:09 )  Alb: 3.9 g/dL / Pro: 7.1 g/dL / ALK PHOS: 415 U/L / ALT: 115 U/L / AST: 114 U/L / GGT: x             Interval Diagnostic Studies:   Received Date/Time: 2024 18:02 EDT   Non-Gynecologic Report - Auth (Verified)   Specimen(s) Submitted   BILE DUCT BRUSH   Final Diagnosis   BILE DUCT BRUSH   NEGATIVE FOR MALIGNANT CELLS.   Cytology slides reveal abundant columnar epithelial cells with   bland-appearing basally located nuclei and acellular debris.   Please see concurrent report: 10-S-    Screened by: Silvia Negro CT(ASCP)   Verified by: Charlotte Renee MD

## 2024-05-06 NOTE — PROGRESS NOTE ADULT - SUBJECTIVE AND OBJECTIVE BOX
seen earlier today     Chief Complaint: Diabetes Mellitus follow up    INTERVAL HX:      Review of Systems:  General: As above  GI: No nausea, vomiting  Endocrine: no  S&Sx of hypoglycemia    Allergies    No Known Allergies    Intolerances      MEDICATIONS  (STANDING):  amLODIPine   Tablet 10 milliGRAM(s) Oral daily  aspirin enteric coated 81 milliGRAM(s) Oral daily  cycloSPORINE  , modified (GENGRAF) 50 milliGRAM(s) Oral <User Schedule>  cycloSPORINE  , modified (GENGRAF) 75 milliGRAM(s) Oral <User Schedule>  dextrose 10% Bolus 125 milliLiter(s) IV Bolus once  dextrose 5%. 1000 milliLiter(s) (100 mL/Hr) IV Continuous <Continuous>  dextrose 5%. 1000 milliLiter(s) (50 mL/Hr) IV Continuous <Continuous>  dextrose 50% Injectable 25 Gram(s) IV Push once  dextrose 50% Injectable 12.5 Gram(s) IV Push once  glucagon  Injectable 1 milliGRAM(s) IntraMuscular once  heparin   Injectable 5000 Unit(s) SubCutaneous every 12 hours  insulin glargine Injectable (LANTUS) 35 Unit(s) SubCutaneous at bedtime  insulin lispro (ADMELOG) corrective regimen sliding scale   SubCutaneous <User Schedule>  insulin lispro (ADMELOG) corrective regimen sliding scale   SubCutaneous three times a day before meals  insulin lispro Injectable (ADMELOG) 4 Unit(s) SubCutaneous at bedtime  insulin lispro Injectable (ADMELOG) 17 Unit(s) SubCutaneous three times a day before meals  magnesium oxide 400 milliGRAM(s) Oral three times a day with meals  magnesium sulfate  IVPB 2 Gram(s) IV Intermittent once  metoprolol tartrate 75 milliGRAM(s) Oral two times a day  predniSONE   Tablet 60 milliGRAM(s) Oral daily  risperiDONE   Tablet 1 milliGRAM(s) Oral at bedtime  ursodiol Capsule 300 milliGRAM(s) Oral every 12 hours  ursodiol Tablet 500 milliGRAM(s) Oral every 12 hours        insulin glargine Injectable (LANTUS)   35 Unit(s) SubCutaneous (05-05-24 @ 21:02)    insulin lispro (ADMELOG) corrective regimen sliding scale   2 Unit(s) SubCutaneous (05-06-24 @ 12:29)   2 Unit(s) SubCutaneous (05-06-24 @ 08:55)   4 Unit(s) SubCutaneous (05-05-24 @ 17:08)    insulin lispro Injectable (ADMELOG)   17 Unit(s) SubCutaneous (05-06-24 @ 12:29)   17 Unit(s) SubCutaneous (05-06-24 @ 08:55)   17 Unit(s) SubCutaneous (05-05-24 @ 17:08)        PHYSICAL EXAM:  VITALS: T(C): 36.8 (05-06-24 @ 12:11)  T(F): 98.2 (05-06-24 @ 12:11), Max: 98.6 (05-05-24 @ 17:00)  HR: 59 (05-06-24 @ 12:11) (59 - 69)  BP: 162/81 (05-06-24 @ 12:11) (145/78 - 169/90)  RR:  (18 - 20)  SpO2:  (94% - 99%)  Wt(kg): --  GENERAL: NAD  Respiratory: Respirations unlabored   Extremities: Warm, no edema  NEURO: Alert , appropriate     LABS:  POCT Blood Glucose.: 197 mg/dL (05-06-24 @ 11:36)  POCT Blood Glucose.: 168 mg/dL (05-06-24 @ 08:17)  POCT Blood Glucose.: 137 mg/dL (05-06-24 @ 00:41)  POCT Blood Glucose.: 152 mg/dL (05-05-24 @ 20:58)  POCT Blood Glucose.: 202 mg/dL (05-05-24 @ 16:34)  POCT Blood Glucose.: 235 mg/dL (05-05-24 @ 11:51)  POCT Blood Glucose.: 202 mg/dL (05-05-24 @ 08:28)  POCT Blood Glucose.: 173 mg/dL (05-05-24 @ 00:46)  POCT Blood Glucose.: 164 mg/dL (05-04-24 @ 21:08)  POCT Blood Glucose.: 114 mg/dL (05-04-24 @ 17:09)  POCT Blood Glucose.: 127 mg/dL (05-04-24 @ 12:13)  POCT Blood Glucose.: 229 mg/dL (05-04-24 @ 08:39)  POCT Blood Glucose.: 242 mg/dL (05-04-24 @ 01:52)  POCT Blood Glucose.: 132 mg/dL (05-03-24 @ 22:37)  POCT Blood Glucose.: 93 mg/dL (05-03-24 @ 21:12)  POCT Blood Glucose.: 297 mg/dL (05-03-24 @ 16:39)  POCT Blood Glucose.: 233 mg/dL (05-03-24 @ 14:51)                          11.7   5.55  )-----------( 221      ( 06 May 2024 07:12 )             35.3     05-06    137  |  102  |  26<H>  ----------------------------<  160<H>  4.2   |  18<L>  |  1.60<H>    Ca    9.3      06 May 2024 07:09  Phos  3.2     05-06  Mg     1.7     05-06    TPro  7.1  /  Alb  3.9  /  TBili  1.0  /  DBili  x   /  AST  114<H>  /  ALT  115<H>  /  AlkPhos  415<H>  05-06    eGFR: 48 mL/min/1.73m2 (06 May 2024 07:09)      Thyroid Function Tests:      A1C with Estimated Average Glucose Result: 12.2 % (04-29-24 @ 06:49)  A1C with Estimated Average Glucose Result: 11.8 % (04-29-24 @ 06:49)    Estimated Average Glucose: 303 mg/dL (04-29-24 @ 06:49)  Estimated Average Glucose: 292 mg/dL (04-29-24 @ 06:49)        Diet, Regular:   Consistent Carbohydrate No Snacks (CSTCHO) (05-03-24 @ 14:36) [Active]  Diet, Regular (05-03-24 @ 10:17) [Available for Activation]              seen earlier today     Chief Complaint: Diabetes Mellitus follow up    INTERVAL HX:  Patient seen at bedside. Reports he is eating well and tolerating POs. BG over the last 24 hours mostly hyperglycemic postprandial in [id number].   FBG are within goal range of 100-180mg/dl.      Review of Systems:  General: As above  GI: No nausea, vomiting  Endocrine: no  S&Sx of hypoglycemia    Allergies    No Known Allergies    Intolerances      MEDICATIONS  (STANDING):  amLODIPine   Tablet 10 milliGRAM(s) Oral daily  aspirin enteric coated 81 milliGRAM(s) Oral daily  cycloSPORINE  , modified (GENGRAF) 50 milliGRAM(s) Oral <User Schedule>  cycloSPORINE  , modified (GENGRAF) 75 milliGRAM(s) Oral <User Schedule>  dextrose 10% Bolus 125 milliLiter(s) IV Bolus once  dextrose 5%. 1000 milliLiter(s) (100 mL/Hr) IV Continuous <Continuous>  dextrose 5%. 1000 milliLiter(s) (50 mL/Hr) IV Continuous <Continuous>  dextrose 50% Injectable 25 Gram(s) IV Push once  dextrose 50% Injectable 12.5 Gram(s) IV Push once  glucagon  Injectable 1 milliGRAM(s) IntraMuscular once  heparin   Injectable 5000 Unit(s) SubCutaneous every 12 hours  insulin glargine Injectable (LANTUS) 35 Unit(s) SubCutaneous at bedtime  insulin lispro (ADMELOG) corrective regimen sliding scale   SubCutaneous <User Schedule>  insulin lispro (ADMELOG) corrective regimen sliding scale   SubCutaneous three times a day before meals  insulin lispro Injectable (ADMELOG) 4 Unit(s) SubCutaneous at bedtime  insulin lispro Injectable (ADMELOG) 17 Unit(s) SubCutaneous three times a day before meals  magnesium oxide 400 milliGRAM(s) Oral three times a day with meals  magnesium sulfate  IVPB 2 Gram(s) IV Intermittent once  metoprolol tartrate 75 milliGRAM(s) Oral two times a day  predniSONE   Tablet 60 milliGRAM(s) Oral daily  risperiDONE   Tablet 1 milliGRAM(s) Oral at bedtime  ursodiol Capsule 300 milliGRAM(s) Oral every 12 hours  ursodiol Tablet 500 milliGRAM(s) Oral every 12 hours        insulin glargine Injectable (LANTUS)   35 Unit(s) SubCutaneous (05-05-24 @ 21:02)    insulin lispro (ADMELOG) corrective regimen sliding scale   2 Unit(s) SubCutaneous (05-06-24 @ 12:29)   2 Unit(s) SubCutaneous (05-06-24 @ 08:55)   4 Unit(s) SubCutaneous (05-05-24 @ 17:08)    insulin lispro Injectable (ADMELOG)   17 Unit(s) SubCutaneous (05-06-24 @ 12:29)   17 Unit(s) SubCutaneous (05-06-24 @ 08:55)   17 Unit(s) SubCutaneous (05-05-24 @ 17:08)        PHYSICAL EXAM:  VITALS: T(C): 36.8 (05-06-24 @ 12:11)  T(F): 98.2 (05-06-24 @ 12:11), Max: 98.6 (05-05-24 @ 17:00)  HR: 59 (05-06-24 @ 12:11) (59 - 69)  BP: 162/81 (05-06-24 @ 12:11) (145/78 - 169/90)  RR:  (18 - 20)  SpO2:  (94% - 99%)  Wt(kg): --  GENERAL: NAD  Respiratory: Respirations unlabored   Extremities: Warm, no edema  NEURO: Alert , appropriate     LABS:  POCT Blood Glucose.: 197 mg/dL (05-06-24 @ 11:36)  POCT Blood Glucose.: 168 mg/dL (05-06-24 @ 08:17)  POCT Blood Glucose.: 137 mg/dL (05-06-24 @ 00:41)  POCT Blood Glucose.: 152 mg/dL (05-05-24 @ 20:58)  POCT Blood Glucose.: 202 mg/dL (05-05-24 @ 16:34)  POCT Blood Glucose.: 235 mg/dL (05-05-24 @ 11:51)  POCT Blood Glucose.: 202 mg/dL (05-05-24 @ 08:28)  POCT Blood Glucose.: 173 mg/dL (05-05-24 @ 00:46)  POCT Blood Glucose.: 164 mg/dL (05-04-24 @ 21:08)  POCT Blood Glucose.: 114 mg/dL (05-04-24 @ 17:09)  POCT Blood Glucose.: 127 mg/dL (05-04-24 @ 12:13)  POCT Blood Glucose.: 229 mg/dL (05-04-24 @ 08:39)  POCT Blood Glucose.: 242 mg/dL (05-04-24 @ 01:52)  POCT Blood Glucose.: 132 mg/dL (05-03-24 @ 22:37)  POCT Blood Glucose.: 93 mg/dL (05-03-24 @ 21:12)  POCT Blood Glucose.: 297 mg/dL (05-03-24 @ 16:39)  POCT Blood Glucose.: 233 mg/dL (05-03-24 @ 14:51)                          11.7   5.55  )-----------( 221      ( 06 May 2024 07:12 )             35.3     05-06    137  |  102  |  26<H>  ----------------------------<  160<H>  4.2   |  18<L>  |  1.60<H>    Ca    9.3      06 May 2024 07:09  Phos  3.2     05-06  Mg     1.7     05-06    TPro  7.1  /  Alb  3.9  /  TBili  1.0  /  DBili  x   /  AST  114<H>  /  ALT  115<H>  /  AlkPhos  415<H>  05-06    eGFR: 48 mL/min/1.73m2 (06 May 2024 07:09)      Thyroid Function Tests:      A1C with Estimated Average Glucose Result: 12.2 % (04-29-24 @ 06:49)  A1C with Estimated Average Glucose Result: 11.8 % (04-29-24 @ 06:49)    Estimated Average Glucose: 303 mg/dL (04-29-24 @ 06:49)  Estimated Average Glucose: 292 mg/dL (04-29-24 @ 06:49)        Diet, Regular:   Consistent Carbohydrate No Snacks (CSTCHO) (05-03-24 @ 14:36) [Active]  Diet, Regular (05-03-24 @ 10:17) [Available for Activation]

## 2024-05-06 NOTE — PROGRESS NOTE ADULT - ASSESSMENT
63 yrs old male w/ hx of alcoholic cirrhosis complicated by HRS (chronic CKD at this time) s/p OLT 4/2019 at [facility] and b/l LE DVT (R Gaastrocnemius and L CFV through SFJ/ poplitial and post tibial s/p 3M A/C with resolution) who presented for elevated bilirubin and liver enzymes.    [] Elevated liver enzymes s/p OLT 2019 for alcoholic cirrhosis  *severe steatohepatitis on bx 5/01  - cyclosporine 75/50 mg BID  - stop cellcept  - c/w ASA  - f/u cyclo level (goal 100 - 150)  - obtain acute viral panel (A, B, C, E, CMV, EBV)  - obtain AI w/u - LILLIAN, AMA, Ig panel, Anti LKM, ASMA  - possible DC tomorrow 5/07    [] KAYLA (downtrended)  - encourage PO hydration    [] Diabetes Mellitus  - Last A1C high 12, prior he was 6.7.   - Patient takes Januvia, and  basal-bolus regimen  - appreciate endocrine recs  - appreciate nutrition recs    [] HTN   -amlodipine 10  mg daily.   -metoprolol 75 mg BID.              63 yrs old male w/ hx of alcoholic cirrhosis complicated by HRS (chronic CKD at this time) s/p OLT 4/2019 at [facility] and b/l LE DVT (R Gaastrocnemius and L CFV through SFJ/ poplitial and post tibial s/p 3M A/C with resolution) who presented for elevated bilirubin and liver enzymes.    [] Elevated liver enzymes s/p OLT 2019 for alcoholic cirrhosis  *severe steatohepatitis on bx 5/01  - cyclosporine 75/50 mg BID  - stop cellcept  - c/w ASA  - f/u cyclo level (goal 100 - 150)  - obtain acute viral panel (A, B, C, E, CMV, EBV), ensure Hep E PCR  - obtain AI w/u - LILLIAN, AMA, Ig panel, Anti LKM, ASMA  - obtain GGT  - obtain PETH  - possible DC tomorrow 5/07    [] KAYLA (downtrended)  - encourage PO hydration    [] Diabetes Mellitus  - Last A1C high 12, prior he was 6.7.   - Patient takes Januvia, and  basal-bolus regimen  - appreciate endocrine recs  - appreciate nutrition recs    [] HTN   -amlodipine 10  mg daily.   -metoprolol 75 mg BID.

## 2024-05-06 NOTE — PROGRESS NOTE ADULT - ASSESSMENT
63M w/ hx of alcoholic cirrhosis complicated by HRS (chronic CKD at this time) s/p OLT 4/2019 at [facility] and b/l LE DVT who was sent in for elevated bilirubin and liver enzymes. Underwent MRCP which showed small 8 mm CBD stone upstream of the anastomosis site. Endocrine consulted for: Uncontrolled T2DM. BG Goal 100-180mg/dl     Last 24 hour BG values 200s-448, above goal. will adjust insulin dose for BG Goal 100-180mg/dl     #T2DM uncontrolled with hyperglycemia  Diagnosed T2DM 7 yrs ago prior to liver transplant  Home regimen: Lantus 5 units QAM, Humalog 5 unit TIDAC, Januvia 50mg daily  Previous meds: Metformin  Recent A1c 12.1 outpatient (per HIE)  SMBG: Checks fasting FS every AM: 130s. Does not check any other time  Not on chronic steroids and not currently ordered for steroids  Complications: No CAD, CVA, retinopathy. Occasional neuropathy. + CKD.    Inpatient plan:  - Inpatient BG goal 100-180  - Increase Lantus 30 units QHS  - Increase snack insulin admelog 4 units at bedtime hold if not having a snack   - Increase Admelog 15 units TIDAC for now ( hold meal time insulin if NPO)  - Mod dose admelog correction scale TIDAC  - Mod dose admelog correction scale QHS/2am  - CC diet  - Please inform endocrine team if starting steroids    Discharge plan:  - Discharge regimen: Basal/bolus insulin, dose TBD.   Would benefit from GLP1 agonist but can not start GLP1 due to CBD stone   Can continue Januvia 50 mg daily   He is interested in using CGM but his phone is not compatible with Freestyle josefina or Dexcom.   Please send Rx for Freestyle Josefina 3 reader X1 and sensor X2  Please make sure he has DM management supplies ( Glucometer, lancets, strips and alcohol pads)   - Endocrine follow up: needs to establish follow up.  Can follow up with [facility] - [address], [address]. [place] [address]  Tel) [phone]   Email sent to scheduling  coordinator on 5/1  - Routine ophthalmology and podiatry follow up    #Obesity  - would benefit from GLP1, however can not start at this time due to CBD stone   - Can followup with Dr. Christian brand   Weight control management clinic  [address] [facility] [address] [facility]   Phone No. [phone]    #HLD  - check lipid panel outpatient      Contact via Microsoft Teams during business hours  To reach covering provider access AMION via sunrise tools  For Urgent matters/after-hours/weekends/holidays please page endocrine fellow on call   For nonurgent matters please email [email or web address]    Please note that this patient may be followed by different provider tomorrow.  Notify endocrine 24 hours prior to discharge for final recommendations 63M w/ hx of alcoholic cirrhosis complicated by HRS (chronic CKD at this time) s/p OLT 4/2019 at [facility] and b/l LE DVT who was sent in for elevated bilirubin and liver enzymes. Underwent MRCP which showed small 8 mm CBD stone upstream of the anastomosis site. Endocrine consulted for: Uncontrolled T2DM. BG Goal 100-180mg/dl     Last 24 hour BG values with post prandial hyperglycemia, dose adjusted yesterday. Prednisone 60mg will be started today per team. Will adjust doses for steroid effect.     #T2DM uncontrolled with hyperglycemia  Diagnosed T2DM 7 yrs ago prior to liver transplant  Home regimen: Lantus 5 units QAM, Humalog 5 unit TIDAC, Januvia 50mg daily  Previous meds: Metformin  Recent A1c 12.1 outpatient (per HIE)  SMBG: Checks fasting FS every AM: 130s. Does not check any other time  Prednisone 60mg daily started today 5/6.   Complications: No CAD, CVA, retinopathy. Occasional neuropathy. + CKD.    Inpatient plan:  - Inpatient BG goal 100-180  - Increase Lantus 40 units QHS  - Continue snack insulin admelog 4 units at bedtime hold if not having a snack   - Increase Admelog 19 units TIDAC for now ( hold meal time insulin if NPO)  - Mod dose admelog correction scale TIDAC  - Mod dose admelog correction scale QHS/2am  - CC diet  - Please inform endocrine team if there are any changes to steroid doses.    Discharge plan:  - Discharge regimen: Basal/bolus insulin, dose TBD.   Would benefit from GLP1 agonist but can not start GLP1 due to CBD stone   Can continue Januvia 50 mg daily   He is interested in using CGM but his phone is not compatible with Freestyle josefina or Dexcom.   Please send Rx for Freestyle Josefina 3 reader X1 and sensor X2  Please make sure he has DM management supplies ( Glucometer, lancets, strips and alcohol pads)   - Endocrine follow up: needs to establish follow up.  Can follow up with [facility] - [address] [place] [address], [address]. [place] [address]  Tel) [phone]   Email sent to scheduling  coordinator on 5/1. someone will contact patient for an appointment.   - Routine ophthalmology and podiatry follow up    #Obesity  - would benefit from GLP1, however can not start at this time due to CBD stone   - Can followup with Dr. Christian brand   Weight control management clinic  [address] [facility]   Phone No. [phone]    #HLD  - check lipid panel outpatient      Contact via Microsoft Teams during business hours  To reach covering provider access AMION via sunrise tools  For Urgent matters/after-hours/weekends/holidays please page endocrine fellow on call   For nonurgent matters please email [email or web address]    Please note that this patient may be followed by different provider tomorrow.  Notify endocrine 24 hours prior to discharge for final recommendations

## 2024-05-07 LAB
ALBUMIN SERPL ELPH-MCNC: 3.9 G/DL — SIGNIFICANT CHANGE UP (ref 3.3–5)
ALP SERPL-CCNC: 464 U/L — HIGH (ref 40–120)
ALT FLD-CCNC: 123 U/L — HIGH (ref 10–45)
ANA TITR SER: NEGATIVE — SIGNIFICANT CHANGE UP
ANION GAP SERPL CALC-SCNC: 16 MMOL/L — SIGNIFICANT CHANGE UP (ref 5–17)
ANNOTATION COMMENT IMP: SIGNIFICANT CHANGE UP
APTT BLD: 28.8 SEC — SIGNIFICANT CHANGE UP (ref 24.5–35.6)
AST SERPL-CCNC: 96 U/L — HIGH (ref 10–40)
BILIRUB SERPL-MCNC: 1.2 MG/DL — SIGNIFICANT CHANGE UP (ref 0.2–1.2)
BUN SERPL-MCNC: 29 MG/DL — HIGH (ref 7–23)
CALCIUM SERPL-MCNC: 9.3 MG/DL — SIGNIFICANT CHANGE UP (ref 8.4–10.5)
CHLORIDE SERPL-SCNC: 100 MMOL/L — SIGNIFICANT CHANGE UP (ref 96–108)
CMV DNA CSF QL NAA+PROBE: SIGNIFICANT CHANGE UP IU/ML
CMV DNA SPEC NAA+PROBE-LOG#: SIGNIFICANT CHANGE UP LOG10IU/ML
CO2 SERPL-SCNC: 19 MMOL/L — LOW (ref 22–31)
CREAT SERPL-MCNC: 1.63 MG/DL — HIGH (ref 0.5–1.3)
CYCLOSPORINE SER-MCNC: 100 NG/ML — LOW (ref 150–400)
EGFR: 46 ML/MIN/1.73M2 — LOW
GLUCOSE BLDC GLUCOMTR-MCNC: 303 MG/DL — HIGH (ref 70–99)
GLUCOSE BLDC GLUCOMTR-MCNC: 330 MG/DL — HIGH (ref 70–99)
GLUCOSE BLDC GLUCOMTR-MCNC: 336 MG/DL — HIGH (ref 70–99)
GLUCOSE BLDC GLUCOMTR-MCNC: 347 MG/DL — HIGH (ref 70–99)
GLUCOSE BLDC GLUCOMTR-MCNC: 384 MG/DL — HIGH (ref 70–99)
GLUCOSE BLDC GLUCOMTR-MCNC: 406 MG/DL — HIGH (ref 70–99)
GLUCOSE BLDC GLUCOMTR-MCNC: 409 MG/DL — HIGH (ref 70–99)
GLUCOSE SERPL-MCNC: 312 MG/DL — HIGH (ref 70–99)
HCT VFR BLD CALC: 35.2 % — LOW (ref 39–50)
HEV RNA SERPL NAA+PROBE-ACNC: SIGNIFICANT CHANGE UP IU/ML
HEV RNA SERPL NAA+PROBE-LOG IU: <3.3 LOG IU/ML — SIGNIFICANT CHANGE UP
HGB BLD-MCNC: 11.5 G/DL — LOW (ref 13–17)
INR BLD: 0.99 RATIO — SIGNIFICANT CHANGE UP (ref 0.85–1.18)
MAGNESIUM SERPL-MCNC: 2 MG/DL — SIGNIFICANT CHANGE UP (ref 1.6–2.6)
MCHC RBC-ENTMCNC: 29.3 PG — SIGNIFICANT CHANGE UP (ref 27–34)
MCHC RBC-ENTMCNC: 32.7 GM/DL — SIGNIFICANT CHANGE UP (ref 32–36)
MCV RBC AUTO: 89.6 FL — SIGNIFICANT CHANGE UP (ref 80–100)
MITOCHONDRIA AB SER-ACNC: SIGNIFICANT CHANGE UP
NRBC # BLD: 0 /100 WBCS — SIGNIFICANT CHANGE UP (ref 0–0)
PHOSPHATE SERPL-MCNC: 2.4 MG/DL — LOW (ref 2.5–4.5)
PLATELET # BLD AUTO: 245 K/UL — SIGNIFICANT CHANGE UP (ref 150–400)
POTASSIUM SERPL-MCNC: 4.8 MMOL/L — SIGNIFICANT CHANGE UP (ref 3.5–5.3)
POTASSIUM SERPL-SCNC: 4.8 MMOL/L — SIGNIFICANT CHANGE UP (ref 3.5–5.3)
PROT SERPL-MCNC: 7.5 G/DL — SIGNIFICANT CHANGE UP (ref 6–8.3)
PROTHROM AB SERPL-ACNC: 10.4 SEC — SIGNIFICANT CHANGE UP (ref 9.5–13)
RBC # BLD: 3.93 M/UL — LOW (ref 4.2–5.8)
RBC # FLD: 13.2 % — SIGNIFICANT CHANGE UP (ref 10.3–14.5)
SMOOTH MUSCLE AB SER-ACNC: SIGNIFICANT CHANGE UP
SODIUM SERPL-SCNC: 135 MMOL/L — SIGNIFICANT CHANGE UP (ref 135–145)
SPECIMEN SOURCE: SIGNIFICANT CHANGE UP
WBC # BLD: 5.57 K/UL — SIGNIFICANT CHANGE UP (ref 3.8–10.5)
WBC # FLD AUTO: 5.57 K/UL — SIGNIFICANT CHANGE UP (ref 3.8–10.5)

## 2024-05-07 PROCEDURE — 74183 MRI ABD W/O CNTR FLWD CNTR: CPT | Mod: 26

## 2024-05-07 PROCEDURE — 71045 X-RAY EXAM CHEST 1 VIEW: CPT | Mod: 26

## 2024-05-07 PROCEDURE — 99223 1ST HOSP IP/OBS HIGH 75: CPT

## 2024-05-07 RX ORDER — HUMAN INSULIN 100 [IU]/ML
10 INJECTION, SUSPENSION SUBCUTANEOUS ONCE
Refills: 0 | Status: COMPLETED | OUTPATIENT
Start: 2024-05-07 | End: 2024-05-07

## 2024-05-07 RX ORDER — INSULIN LISPRO 100/ML
22 VIAL (ML) SUBCUTANEOUS
Refills: 0 | Status: DISCONTINUED | OUTPATIENT
Start: 2024-05-07 | End: 2024-05-08

## 2024-05-07 RX ORDER — INSULIN GLARGINE 100 [IU]/ML
46 INJECTION, SOLUTION SUBCUTANEOUS AT BEDTIME
Refills: 0 | Status: DISCONTINUED | OUTPATIENT
Start: 2024-05-07 | End: 2024-05-08

## 2024-05-07 RX ADMIN — Medication 19 UNIT(S): at 13:02

## 2024-05-07 RX ADMIN — Medication 8: at 13:02

## 2024-05-07 RX ADMIN — HEPARIN SODIUM 5000 UNIT(S): 5000 INJECTION INTRAVENOUS; SUBCUTANEOUS at 05:43

## 2024-05-07 RX ADMIN — HUMAN INSULIN 10 UNIT(S): 100 INJECTION, SUSPENSION SUBCUTANEOUS at 17:35

## 2024-05-07 RX ADMIN — MAGNESIUM OXIDE 400 MG ORAL TABLET 400 MILLIGRAM(S): 241.3 TABLET ORAL at 17:36

## 2024-05-07 RX ADMIN — Medication 60 MILLIGRAM(S): at 05:45

## 2024-05-07 RX ADMIN — Medication 75 MILLIGRAM(S): at 17:36

## 2024-05-07 RX ADMIN — Medication 12: at 17:35

## 2024-05-07 RX ADMIN — INSULIN GLARGINE 46 UNIT(S): 100 INJECTION, SOLUTION SUBCUTANEOUS at 21:44

## 2024-05-07 RX ADMIN — MAGNESIUM OXIDE 400 MG ORAL TABLET 400 MILLIGRAM(S): 241.3 TABLET ORAL at 13:02

## 2024-05-07 RX ADMIN — CYCLOSPORINE 50 MILLIGRAM(S): 100 CAPSULE ORAL at 19:50

## 2024-05-07 RX ADMIN — RISPERIDONE 1 MILLIGRAM(S): 4 TABLET ORAL at 21:44

## 2024-05-07 RX ADMIN — Medication 4: at 21:44

## 2024-05-07 RX ADMIN — MAGNESIUM OXIDE 400 MG ORAL TABLET 400 MILLIGRAM(S): 241.3 TABLET ORAL at 08:30

## 2024-05-07 RX ADMIN — Medication 4: at 01:41

## 2024-05-07 RX ADMIN — Medication 19 UNIT(S): at 08:31

## 2024-05-07 RX ADMIN — HEPARIN SODIUM 5000 UNIT(S): 5000 INJECTION INTRAVENOUS; SUBCUTANEOUS at 17:36

## 2024-05-07 RX ADMIN — URSODIOL 500 MILLIGRAM(S): 250 TABLET, FILM COATED ORAL at 05:45

## 2024-05-07 RX ADMIN — CYCLOSPORINE 75 MILLIGRAM(S): 100 CAPSULE ORAL at 08:30

## 2024-05-07 RX ADMIN — URSODIOL 500 MILLIGRAM(S): 250 TABLET, FILM COATED ORAL at 17:37

## 2024-05-07 RX ADMIN — Medication 8: at 08:30

## 2024-05-07 RX ADMIN — AMLODIPINE BESYLATE 10 MILLIGRAM(S): 2.5 TABLET ORAL at 05:43

## 2024-05-07 RX ADMIN — Medication 81 MILLIGRAM(S): at 13:02

## 2024-05-07 RX ADMIN — Medication 22 UNIT(S): at 17:35

## 2024-05-07 RX ADMIN — Medication 75 MILLIGRAM(S): at 05:43

## 2024-05-07 NOTE — PROGRESS NOTE ADULT - SUBJECTIVE AND OBJECTIVE BOX
Interval Events:   -NAEON  -LFT's fluctuating  -started on prednisone 1 day ago, bG noted with elevation from prior [facility]    Hospital Medications:  acetaminophen     Tablet .. 650 milliGRAM(s) Oral every 6 hours PRN  amLODIPine   Tablet 10 milliGRAM(s) Oral daily  aspirin enteric coated 81 milliGRAM(s) Oral daily  cycloSPORINE  , modified (GENGRAF) 50 milliGRAM(s) Oral <User Schedule>  cycloSPORINE  , modified (GENGRAF) 75 milliGRAM(s) Oral <User Schedule>  dextrose 10% Bolus 125 milliLiter(s) IV Bolus once  dextrose 5%. 1000 milliLiter(s) IV Continuous <Continuous>  dextrose 5%. 1000 milliLiter(s) IV Continuous <Continuous>  dextrose 50% Injectable 25 Gram(s) IV Push once  dextrose 50% Injectable 12.5 Gram(s) IV Push once  dextrose Oral Gel 15 Gram(s) Oral once PRN  glucagon  Injectable 1 milliGRAM(s) IntraMuscular once  heparin   Injectable 5000 Unit(s) SubCutaneous every 12 hours  insulin glargine Injectable (LANTUS) 40 Unit(s) SubCutaneous at bedtime  insulin lispro (ADMELOG) corrective regimen sliding scale   SubCutaneous three times a day before meals  insulin lispro (ADMELOG) corrective regimen sliding scale   SubCutaneous <User Schedule>  insulin lispro Injectable (ADMELOG) 4 Unit(s) SubCutaneous at bedtime  insulin lispro Injectable (ADMELOG) 19 Unit(s) SubCutaneous three times a day before meals  magnesium oxide 400 milliGRAM(s) Oral three times a day with meals  melatonin 3 milliGRAM(s) Oral at bedtime PRN  metoprolol tartrate 75 milliGRAM(s) Oral two times a day  predniSONE   Tablet 60 milliGRAM(s) Oral daily  risperiDONE   Tablet 1 milliGRAM(s) Oral at bedtime  ursodiol Tablet 500 milliGRAM(s) Oral every 12 hours      PHYSICAL EXAM:   Vital Signs:  Vital Signs Last 24 Hrs  T(C): 36.5 (07 May 2024 09:00), Max: 37.1 (06 May 2024 17:00)  T(F): 97.7 (07 May 2024 09:00), Max: 98.7 (06 May 2024 17:00)  HR: 58 (07 May 2024 09:00) (58 - 80)  BP: 169/81 (07 May 2024 09:00) (150/69 - 178/88)  BP(mean): --  RR: 18 (07 May 2024 09:00) (18 - 20)  SpO2: 98% (07 May 2024 09:00) (94% - 98%)    Parameters below as of 07 May 2024 09:00  Patient On (Oxygen Delivery Method): room air      Daily     Daily Weight in k.8 (07 May 2024 06:21)    GENERAL:  NAD  HEENT:  NC/AT, sclera anicteric  CHEST:  Normal Effort, Breath sounds clear  HEART:  RRR, S1 + S2, no murmurs  ABDOMEN:  Soft, non-tender, non-distended  EXTREMITIES: no b/l LE edema  SKIN:  Warm & Dry. No rash or erythema  NEURO:  Alert, oriented, no focal deficit                        11.5   5.57  )-----------( 245      ( 07 May 2024 06:20 )             35.2     05-07    135  |  100  |  29<H>  ----------------------------<  312<H>  4.8   |  19<L>  |  1.63<H>    Ca    9.3      07 May 2024 06:20  Phos  2.4     05-07  Mg     2.0     05-07    TPro  7.5  /  Alb  3.9  /  TBili  1.2  /  DBili  x   /  AST  96<H>  /  ALT  123<H>  /  AlkPhos  464<H>  05-07    LIVER FUNCTIONS - ( 07 May 2024 06:20 )  Alb: 3.9 g/dL / Pro: 7.5 g/dL / ALK PHOS: 464 U/L / ALT: 123 U/L / AST: 96 U/L / GGT: x

## 2024-05-07 NOTE — CONSULT NOTE ADULT - SUBJECTIVE AND OBJECTIVE BOX
Patient is a 63y old  Male who presents with a chief complaint of elevated LFT's (07 May 2024 10:40)    HPI:    63 yrs old male w/ hx of alcoholic cirrhosis complicated by HRS (chronic CKD at this time) s/p OLT 2019 at [facility] and b/l LE DVT (R Gastrocnemius and L CFV through SFJ/ poplitial and post tibial s/p 3M A/C with resolution) who presented for elevated bilirubin and liver enzymes. Pt. had  donor w/ duct to duct without stent CMV R-/D+. 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 Patient transferred to [facility] for medical care and his primary hepatologist is Dr. Mejia. Last seen in clinic on , was found to have new onset liver enzyme elevation w/ bilirubin around 1.4, predominantly cholestatic pattern. Underwent MRCP which showed small 8 mm CBD stone upstream of the anastomosis site. Patient is asymptomatic, denied abd pain, nausea, and vomiting. No fevers or chills. Tolerating PO diet well. Denied bloody stools. Admitted under transplant hepatology.  (2024 11:54)     On  is status post ERCP which was notable for anastomotic stricture and filling defect consistent with stone proximal to stricture.  Status post enterotomy and stent placement    On May 3 status post liver parenchymal biopsy    RVP (May 6) negative    prior hospital charts reviewed [  ]  primary team notes reviewed [  ]  other consultant notes reviewed [  ]    PAST MEDICAL & SURGICAL HISTORY:  Hypertension      Diabetes mellitus      Alcoholic cirrhosis      Liver transplanted          Allergies  No Known Allergies    ANTIMICROBIALS (past 90 days)  MEDICATIONS  (STANDING):      ANTIMICROBIALS:      OTHER MEDS: MEDICATIONS  (STANDING):  acetaminophen     Tablet .. 650 every 6 hours PRN  amLODIPine   Tablet 10 daily  aspirin enteric coated 81 daily  cycloSPORINE  , modified (GENGRAF) 50 <User Schedule>  cycloSPORINE  , modified (GENGRAF) 75 <User Schedule>  dextrose 50% Injectable 25 once  dextrose 50% Injectable 12.5 once  dextrose Oral Gel 15 once PRN  glucagon  Injectable 1 once  heparin   Injectable 5000 every 12 hours  insulin glargine Injectable (LANTUS) 40 at bedtime  insulin lispro (ADMELOG) corrective regimen sliding scale  <User Schedule>  insulin lispro (ADMELOG) corrective regimen sliding scale  three times a day before meals  insulin lispro Injectable (ADMELOG) 4 at bedtime  insulin lispro Injectable (ADMELOG) 19 three times a day before meals  melatonin 3 at bedtime PRN  metoprolol tartrate 75 two times a day  predniSONE   Tablet 60 daily  risperiDONE   Tablet 1 at bedtime  ursodiol Tablet 500 every 12 hours    SOCIAL HISTORY:   hx smoking  non-smoker    FAMILY HISTORY:    REVIEW OF SYSTEMS  [  ] ROS unobtainable because:    [  ] All other systems negative except as noted below:	    Constitutional:  [ ] fever [ ] chills  [ ] weight loss  [ ] weakness  Skin:  [ ] rash [ ] phlebitis	  Eyes: [ ] icterus [ ] pain  [ ] discharge	  ENMT: [ ] sore throat  [ ] thrush [ ] ulcers [ ] exudates  Respiratory: [ ] dyspnea [ ] hemoptysis [ ] cough [ ] sputum	  Cardiovascular:  [ ] chest pain [ ] palpitations [ ] edema	  Gastrointestinal:  [ ] nausea [ ] vomiting [ ] diarrhea [ ] constipation [ ] pain	  Genitourinary:  [ ] dysuria [ ] frequency [ ] hematuria [ ] discharge [ ] flank pain  [ ] incontinence  Musculoskeletal:  [ ] myalgias [ ] arthralgias [ ] arthritis  [ ] back pain  Neurological:  [ ] headache [ ] seizures  [ ] confusion/altered mental status  Psychiatric:  [ ] anxiety [ ] depression	  Hematology/Lymphatics:  [ ] lymphadenopathy  Endocrine:  [ ] adrenal [ ] thyroid  Allergic/Immunologic:	 [ ] transplant [ ] seasonal    Vital Signs Last 24 Hrs  T(F): 97.7 (24 @ 09:00), Max: 98.7 (24 @ 17:00)  Vital Signs Last 24 Hrs  HR: 58 (24 @ 09:00) (58 - 80)  BP: 169/81 (24 @ 09:00) (150/69 - 178/88)  RR: 18 (24 @ 09:00)  SpO2: 98% (24 @ 09:00) (94% - 98%)  Wt(kg): --    PHYSICAL EXAMINATION:  General: Alert and Awake, NAD  HEENT: PERRL, EOMI, No subconjunctival hemorrhages, Oropharynx Clear, MMM  Neck: Supple, No RUTH  Cardiac: RRR, No M/R/G  Resp: CTAB, No Wh/Rh/Ra  Abdomen: NBS, NT/ND, No HSM, No rigidity or guarding  MSK: No LE edema. No stigmata of IE. No evidence of phlebitis. No evidence of synovitis.  : No cherry  Skin: No rashes or lesions. Skin is warm and dry to the touch.   Neuro: Alert and Awake. CN 2-12 Grossly intact. Moves all four extremities spontaneously.  Psych: Calm, Pleasant, Cooperative                          11.5   5.57  )-----------( 245      ( 07 May 2024 06:20 )             35.2         135  |  100  |  29<H>  ----------------------------<  312<H>  4.8   |  19<L>  |  1.63<H>    Ca    9.3      07 May 2024 06:20  Phos  2.4       Mg     2.0         TPro  7.5  /  Alb  3.9  /  TBili  1.2  /  DBili  x   /  AST  96<H>  /  ALT  123<H>  /  AlkPhos  464<H>      Urinalysis Basic - ( 07 May 2024 06:20 )    Color: x / Appearance: x / SG: x / pH: x  Gluc: 312 mg/dL / Ketone: x  / Bili: x / Urobili: x   Blood: x / Protein: x / Nitrite: x   Leuk Esterase: x / RBC: x / WBC x   Sq Epi: x / Non Sq Epi: x / Bacteria: x    MICROBIOLOGY:          Rapid RVP Result: NotDetec ( @ 18:28)        RADIOLOGY:    <The imaging below has been reviewed and visualized by me independently. Findings as detailed in report below>     Patient is a 63y old  Male who presents with a chief complaint of elevated LFT's (07 May 2024 10:40)    HPI:    63 yrs old male w/ hx of alcoholic cirrhosis complicated by HRS (chronic CKD at this time) s/p OLT 2019 at [facility] and b/l LE DVT (R Gastrocnemius and L CFV through SFJ/ poplitial and post tibial s/p 3M A/C with resolution) who presented for elevated bilirubin and liver enzymes. Pt. had  donor w/ duct to duct without stent CMV R-/D+. 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 Patient transferred to [facility] for medical care and his primary hepatologist is Dr. Mejia. Last seen in clinic on , was found to have new onset liver enzyme elevation w/ bilirubin around 1.4, predominantly cholestatic pattern. Underwent MRCP which showed small 8 mm CBD stone upstream of the anastomosis site. Patient is asymptomatic, denied abd pain, nausea, and vomiting. No fevers or chills. Tolerating PO diet well. Denied bloody stools. Admitted under transplant hepatology.  (2024 11:54)     On  is status post ERCP which was notable for anastomotic stricture and filling defect consistent with stone proximal to stricture.  Status post enterotomy and stent placement    On May 3 status post liver parenchymal biopsy    RVP (May 6) negative    prior hospital charts reviewed [  ]  primary team notes reviewed [ x ]  other consultant notes reviewed [ x ]    PAST MEDICAL & SURGICAL HISTORY:  Hypertension      Diabetes mellitus      Alcoholic cirrhosis      Liver transplanted          Allergies  No Known Allergies    ANTIMICROBIALS (past 90 days)  MEDICATIONS  (STANDING):      ANTIMICROBIALS:      OTHER MEDS: MEDICATIONS  (STANDING):  acetaminophen     Tablet .. 650 every 6 hours PRN  amLODIPine   Tablet 10 daily  aspirin enteric coated 81 daily  cycloSPORINE  , modified (GENGRAF) 50 <User Schedule>  cycloSPORINE  , modified (GENGRAF) 75 <User Schedule>  dextrose 50% Injectable 25 once  dextrose 50% Injectable 12.5 once  dextrose Oral Gel 15 once PRN  glucagon  Injectable 1 once  heparin   Injectable 5000 every 12 hours  insulin glargine Injectable (LANTUS) 40 at bedtime  insulin lispro (ADMELOG) corrective regimen sliding scale  <User Schedule>  insulin lispro (ADMELOG) corrective regimen sliding scale  three times a day before meals  insulin lispro Injectable (ADMELOG) 4 at bedtime  insulin lispro Injectable (ADMELOG) 19 three times a day before meals  melatonin 3 at bedtime PRN  metoprolol tartrate 75 two times a day  predniSONE   Tablet 60 daily  risperiDONE   Tablet 1 at bedtime  ursodiol Tablet 500 every 12 hours    SOCIAL HISTORY:  no smoking or etoh use.     FAMILY HISTORY: noncontributory    REVIEW OF SYSTEMS  [  ] ROS unobtainable because:    [ x ] All other systems negative except as noted below:	    Constitutional:  [ ] fever [ ] chills  [ ] weight loss  [ ] weakness  Skin:  [ ] rash [ ] phlebitis	  Eyes: [ ] icterus [ ] pain  [ ] discharge	  ENMT: [ ] sore throat  [ ] thrush [ ] ulcers [ ] exudates  Respiratory: [ ] dyspnea [ ] hemoptysis [ ] cough [ ] sputum	  Cardiovascular:  [ ] chest pain [ ] palpitations [ ] edema	  Gastrointestinal:  [ ] nausea [ ] vomiting [ ] diarrhea [ ] constipation [ ] pain	  Genitourinary:  [ ] dysuria [ ] frequency [ ] hematuria [ ] discharge [ ] flank pain  [ ] incontinence  Musculoskeletal:  [ ] myalgias [ ] arthralgias [ ] arthritis  [ ] back pain  Neurological:  [ ] headache [ ] seizures  [ ] confusion/altered mental status  Psychiatric:  [ ] anxiety [ ] depression	  Hematology/Lymphatics:  [ ] lymphadenopathy  Endocrine:  [ ] adrenal [ ] thyroid  Allergic/Immunologic:	 [ ] transplant [ ] seasonal    Vital Signs Last 24 Hrs  T(F): 97.7 (24 @ 09:00), Max: 98.7 (24 @ 17:00)  Vital Signs Last 24 Hrs  HR: 58 (24 @ 09:00) (58 - 80)  BP: 169/81 (24 @ 09:00) (150/69 - 178/88)  RR: 18 (24 @ 09:00)  SpO2: 98% (24 @ 09:00) (94% - 98%)  Wt(kg): --    PHYSICAL EXAMINATION:  General: Alert and Awake, NAD  HEENT: PERRL, EOMI, No subconjunctival hemorrhages, Oropharynx Clear, MMM  Neck: Supple, No RUTH  Cardiac: RRR, No M/R/G  Resp: CTAB, No Wh/Rh/Ra  Abdomen: NBS, NT/ND, No HSM, No rigidity or guarding  MSK: No LE edema. No stigmata of IE. No evidence of phlebitis. No evidence of synovitis.  : No cherry  Skin: No rashes or lesions. Skin is warm and dry to the touch.   Neuro: Alert and Awake. CN 2-12 Grossly intact. Moves all four extremities spontaneously.  Psych: Calm, Pleasant, Cooperative                          11.5   5.57  )-----------( 245      ( 07 May 2024 06:20 )             35.2         135  |  100  |  29<H>  ----------------------------<  312<H>  4.8   |  19<L>  |  1.63<H>    Ca    9.3      07 May 2024 06:20  Phos  2.4       Mg     2.0         TPro  7.5  /  Alb  3.9  /  TBili  1.2  /  DBili  x   /  AST  96<H>  /  ALT  123<H>  /  AlkPhos  464<H>      Urinalysis Basic - ( 07 May 2024 06:20 )    Color: x / Appearance: x / SG: x / pH: x  Gluc: 312 mg/dL / Ketone: x  / Bili: x / Urobili: x   Blood: x / Protein: x / Nitrite: x   Leuk Esterase: x / RBC: x / WBC x   Sq Epi: x / Non Sq Epi: x / Bacteria: x    MICROBIOLOGY:    Rapid RVP Result: NotDetec ( @ 18:28)    RADIOLOGY:    <The imaging below has been reviewed and visualized by me independently. Findings as detailed in report below>    MRI abdomen () mild steatosis of liver transplant patent transplant vasculature.  8 mm ductal stone just upstream from biliary anastomosis without intrahepatic biliary dilatation

## 2024-05-07 NOTE — CONSULT NOTE ADULT - ASSESSMENT
63 yrs old male w/ hx of alcoholic cirrhosis complicated by HRS (chronic CKD at this time) s/p OLT 2019 at [facility] and b/l LE DVT (R Gastrocnemius and L CFV through SFJ/ poplitial and post tibial s/p 3M A/C with resolution) who presented for elevated bilirubin and liver enzymes. Pt. had  donor w/ duct to duct without stent CMV R-/D+. 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 Patient transferred to [facility] for medical care and his primary hepatologist is Dr. Mejia. Last seen in clinic on , was found to have new onset liver enzyme elevation    MRI abdomen () mild steatosis of liver transplant patent transplant vasculature.  8 mm ductal stone just upstream from biliary anastomosis without intrahepatic biliary dilatation    On  is status post ERCP which was notable for anastomotic stricture and filling defect consistent with stone proximal to stricture.  Status post enterotomy and stent placement    On May 3 status post liver parenchymal biopsy    Hepatitis B core IgM negative  Hepatitis B surface antigen negative  Hepatitis A IgM negative  Hepatitis C IgG negative  Hepatitis E IgM negative  Hepatitis E PCR negative    RVP (May 6) negative    #Transaminitis, alkaline phosphatase elevation, hyperbilirubinemia, liver transplant recipient  -- Recommend hepatitis B core total antibody & HBV PCR  -- Recommend hepatitis C PCR quantitative  -- Recommend Parvovirus PCR, Herpes-6 PCR, Adenovirus PCR  -- Follow-up on CMV PCR  -- Follow-up on EBV PCR  -- Follow-up on liver biopsy pathology  --Continue to follow CBC with diff  --Continue to follow transaminases  --Continue to follow temperature curve  --Follow up on preliminary blood cultures  --Follow up on preliminary urine culture    I will continue to follow. Please feel free to contact me with any further questions.    Leno Davenport M.D.  [facility] Division of Infectious Disease  8AM-5PM Monday - Friday: Available on Microsoft Teams  After Hours and Holidays (or if no response on Microsoft Teams): Please contact the Infectious Diseases Office at [phone] 63 yrs old male w/ hx of alcoholic cirrhosis complicated by HRS (chronic CKD at this time) s/p OLT 2019 at [facility] and b/l LE DVT (R Gastrocnemius and L CFV through SFJ/ poplitial and post tibial s/p 3M A/C with resolution) who presented for elevated bilirubin and liver enzymes. Pt. had  donor w/ duct to duct without stent CMV R-/D+. 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 Patient transferred to [facility] for medical care and his primary hepatologist is Dr. Mejia. Last seen in clinic on , was found to have new onset liver enzyme elevation    MRI abdomen () mild steatosis of liver transplant patent transplant vasculature.  8 mm ductal stone just upstream from biliary anastomosis without intrahepatic biliary dilatation    On  is status post ERCP which was notable for anastomotic stricture and filling defect consistent with stone proximal to stricture.  Status post enterotomy and stent placement    On May 3 status post liver parenchymal biopsy    Hepatitis B core IgM negative  Hepatitis B surface antigen negative  Hepatitis A IgM negative  Hepatitis C IgG negative  Hepatitis E IgM negative  Hepatitis E PCR negative    RVP (May 6) negative    At this point unclear if infectious contribution to persistent transaminitis.  Patient to send for CMV and Carter-Barr PCR's.  Would add additional viral PCR's as below    #Transaminitis, alkaline phosphatase elevation, hyperbilirubinemia, liver transplant recipient  -- Recommend hepatitis B core total antibody & HBV PCR  -- Recommend hepatitis C PCR quantitative  -- Recommend Parvovirus PCR, Herpes-6 PCR, Adenovirus PCR  -- Follow-up on CMV PCR  -- Follow-up on EBV PCR  -- Follow-up on liver biopsy pathology  --Continue to follow CBC with diff  --Continue to follow transaminases  --Continue to follow temperature curve  --Follow up on preliminary blood cultures  --Follow up on preliminary urine culture    I will continue to follow. Please feel free to contact me with any further questions.    Leno Davenport M.D.  [facility] Division of Infectious Disease  8AM-5PM Monday - Friday: Available on Microsoft Teams  After Hours and Holidays (or if no response on Microsoft Teams): Please contact the Infectious Diseases Office at [phone]    The above assessment and plan were discussed with Rufino, transplant surgery PA

## 2024-05-07 NOTE — PROGRESS NOTE ADULT - ASSESSMENT
63 yrs old male w/ hx of alcoholic cirrhosis complicated by HRS (chronic CKD at this time) s/p OLT 4/2019 at [facility] and b/l LE DVT (R Gaastrocnemius and L CFV through SFJ/ poplitial and post tibial s/p 3M A/C with resolution) who presented for elevated bilirubin and liver enzymes.    [] Elevated liver enzymes s/p OLT 2019 for alcoholic cirrhosis  *ERCP 4/30/2024 -  anastomotic stricture and a filling defect consistent with a stone proximal to the stricture s/p sphincterotomy, brushing, balloon extraction of debris, dilation of anastomotic stricture to 8 mm and stent placement (8 mm x 10 mm Viabel)  *steatohepatitis on liver bx 5/01  - c/w prednisone 60mg qD - if no response, will stop within 1-2 days wo taper  - cyclosporine 75/50 mg BID  - off cellcept  - c/w ASA  - f/u cyclo level (goal 100 - 150)  - f/u CMV, EBV, Hep E PCR's  - f/u AI w/u - LILLIAN, AMA, Ig panel, Anti LKM, ASMA  - f/u PETH  - obtain MRCP    [] KAYLA (downtrended)  - encourage PO hydration    [] Diabetes Mellitus  - Last A1C high 12, prior he was 6.7.   - Patient takes Januvia, and  basal-bolus regimen  - appreciate endocrine recs  - appreciate nutrition recs    [] HTN   -amlodipine 10  mg daily.   -metoprolol 75 mg BID.

## 2024-05-07 NOTE — CHART NOTE - NSCHARTNOTEFT_GEN_A_CORE
Age: 63y    Gender: Male    POCT Blood Glucose:  347 mg/dL (05-07-24 @ 12:46)  303 mg/dL (05-07-24 @ 08:22)  330 mg/dL (05-07-24 @ 01:20)  270 mg/dL (05-06-24 @ 21:36)  164 mg/dL (05-06-24 @ 17:19)      eMAR:  insulin glargine Injectable (LANTUS)   40 Unit(s) SubCutaneous (05-06-24 @ 22:27)    insulin lispro (ADMELOG) corrective regimen sliding scale   4 Unit(s) SubCutaneous (05-07-24 @ 01:41)   2 Unit(s) SubCutaneous (05-06-24 @ 22:25)    insulin lispro (ADMELOG) corrective regimen sliding scale   8 Unit(s) SubCutaneous (05-07-24 @ 13:02)   8 Unit(s) SubCutaneous (05-07-24 @ 08:30)   2 Unit(s) SubCutaneous (05-06-24 @ 17:31)    insulin lispro Injectable (ADMELOG)   19 Unit(s) SubCutaneous (05-07-24 @ 13:02)   19 Unit(s) SubCutaneous (05-07-24 @ 08:31)   19 Unit(s) SubCutaneous (05-06-24 @ 17:32)    predniSONE   Tablet   60 milliGRAM(s) Oral (05-07-24 @ 05:45)    POC glucose, insulin requirements, lab values reviewed.   BGs are now hyperglycemic in the 300s. Patient now receiving   prednisone 60mg daily. First dose was yesterday afternoon.  Today received 60mg dose  at 0545.      Will increase Lantus to 46 units QHS  Will Increase Admelog to 22 units with each meal, HOLD if not eating  Continue moderate dose correctional scale AC/HS and 0200  Continue admelog 4units at bedtime to be given when patient is having a snack. Hold   if not eating a snack.     Contact via Microsoft Teams during business hours  To reach covering provider access AMION via sunrise tools  For Urgent matters/after-hours/weekends/holidays please page endocrine fellow on call   For nonurgent matters please email [email or web address]    Please note that this patient may be followed by different provider tomorrow.  Notify endocrine 24 hours prior to discharge for final recommendations

## 2024-05-07 NOTE — CHART NOTE - NSCHARTNOTEFT_GEN_A_CORE
NUTRITION FOLLOW UP NOTE    PATIENT SEEN FOR: nutrition follow up     SOURCE: [x] Patient  [x] Current Medical Record  [] RN  [] Family/support person at bedside  [] Patient unavailable/inappropriate  [] Other:    CHART REVIEWED/EVENTS NOTED.  [x] No changes to nutrition care plan to note  [] Nutrition Status:    DIET ORDER:   Diet, Regular:   Consistent Carbohydrate {No Snacks} (CSTCHO) (24)  Diet, Regular (24)    CURRENT DIET ORDER IS:  [x] Appropriate:  [] Inadequate:  [] Other:    NUTRITION INTAKE/PROVISION:  [x] PO: good PO intake, finishing >75% of meals   [] Enteral Nutrition:  [] Parenteral Nutrition:    ANTHROPOMETRICS:  Drug Dosing Weight  Height (cm): 185.4 (03 May 2024 09:48)  Weight (kg): 102.5 (03 May 2024 09:48)  BMI (kg/m2): 29.8 (03 May 2024 09:48)  Weights:   Daily Weight in k.8 (), Weight in k.5 (-), Weight in k.6 (-), Weight in k.6 (-), Weight in k (-), Weight in k.5 (-), Weight in k.1 ()   -No significant weight changes notes     NUTRITIONALLY PERTINENT MEDICATIONS:  MEDICATIONS  (STANDING):  amLODIPine   Tablet  dextrose 10% Bolus  dextrose 5%.  dextrose 5%.  dextrose 50% Injectable  dextrose 50% Injectable  glucagon  Injectable  insulin glargine Injectable (LANTUS)  insulin lispro (ADMELOG) corrective regimen sliding scale  insulin lispro (ADMELOG) corrective regimen sliding scale  insulin lispro Injectable (ADMELOG)  insulin lispro Injectable (ADMELOG)  magnesium oxide  metoprolol tartrate  predniSONE   Tablet  ursodiol Tablet       NUTRITIONALLY PERTINENT LABS:   Na135 mmol/L Glu 312 mg/dL<H> K+ 4.8 mmol/L Cr  1.63 mg/dL<H> BUN 29 mg/dL<H>  Phos 2.4 mg/dL<L>  Alb 3.9 g/dL  U/L<H> AST 96 U/L<H> Alkaline Phosphatase 464 U/L<H>  04-29-24 @ 06:49 a1c 12.2    A1C with Estimated Average Glucose Result: 12.2 % (24 @ 06:49)  A1C with Estimated Average Glucose Result: 11.8 % (24 @ 06:49)          Finger Sticks:  POCT Blood Glucose.: 303 mg/dL ( @ 08:22)  POCT Blood Glucose.: 330 mg/dL ( @ 01:20)  POCT Blood Glucose.: 270 mg/dL ( @ 21:36)  POCT Blood Glucose.: 164 mg/dL ( @ 17:19)      NUTRITIONALLY PERTINENT MEDICATIONS/LABS:  [x] Reviewed  [x] Relevant notes on medications/labs:  -Insulin regimen: 40 units Lantus, 19 units short acting TID, 4 units short acting at bedtime, sliding scale insulin for coverage   -Steroid: Prednisone   -Immunosuppression: Cyclosporine   -Hypophosphatemic     EDEMA:  [x] Reviewed  [] Relevant notes:    GI/ I&O:  [x] Reviewed  [] Relevant notes:  [] Other:    SKIN:   [x] No pressure injuries documented, per nursing flowsheet  [] Pressure injury previously noted  [] Change in pressure injury documentation:  [] Other:    ESTIMATED NEEDS:  [x] No change:  [] Updated:  Energy:   1918-2335kcal/day (23-28 kcal/kg)  Protein:   83-100g/day (1.0-1.2 g/kg)  Fluid:   ml/day or [x] defer to team  Based on: 83.4kg (IBW)    NUTRITION DIAGNOSIS:  [x] Prior Dx:  1) Altered nutrition related lab values   [] New Dx:    EDUCATION:  [] Yes:  [x] Not appropriate/warranted    NUTRITION CARE PLAN:  1. Diet: Consistent carbohydrate   2. Supplements: N/A  3. Multivitamin/mineral supplementation: N/A  4: Reinforce DM diet education PRN     [x] Achieved - Continue current nutrition intervention(s)  [] Current medical condition precludes nutrition intervention at this time.    MONITORING AND EVALUATION:   RD remains available upon request and will follow up per protocol.    Corrine Bahena, MS, RDN, CDN (Teams)   Available on MS TEAMS

## 2024-05-08 ENCOUNTER — TRANSCRIPTION ENCOUNTER (OUTPATIENT)
Age: 64
End: 2024-05-08

## 2024-05-08 VITALS
SYSTOLIC BLOOD PRESSURE: 152 MMHG | OXYGEN SATURATION: 98 % | HEART RATE: 62 BPM | RESPIRATION RATE: 18 BRPM | TEMPERATURE: 98 F | DIASTOLIC BLOOD PRESSURE: 71 MMHG

## 2024-05-08 PROBLEM — K70.30 ALCOHOLIC CIRRHOSIS OF LIVER WITHOUT ASCITES: Chronic | Status: ACTIVE | Noted: 2024-04-28

## 2024-05-08 PROBLEM — E11.9 TYPE 2 DIABETES MELLITUS WITHOUT COMPLICATIONS: Chronic | Status: ACTIVE | Noted: 2024-04-28

## 2024-05-08 PROBLEM — I10 ESSENTIAL (PRIMARY) HYPERTENSION: Chronic | Status: ACTIVE | Noted: 2024-04-28

## 2024-05-08 LAB
ALBUMIN SERPL ELPH-MCNC: 4.1 G/DL — SIGNIFICANT CHANGE UP (ref 3.3–5)
ALP SERPL-CCNC: 392 U/L — HIGH (ref 40–120)
ALT FLD-CCNC: 99 U/L — HIGH (ref 10–45)
ANION GAP SERPL CALC-SCNC: 15 MMOL/L — SIGNIFICANT CHANGE UP (ref 5–17)
APTT BLD: 26.3 SEC — SIGNIFICANT CHANGE UP (ref 24.5–35.6)
AST SERPL-CCNC: 66 U/L — HIGH (ref 10–40)
BILIRUB SERPL-MCNC: 0.8 MG/DL — SIGNIFICANT CHANGE UP (ref 0.2–1.2)
BUN SERPL-MCNC: 44 MG/DL — HIGH (ref 7–23)
CALCIUM SERPL-MCNC: 9.6 MG/DL — SIGNIFICANT CHANGE UP (ref 8.4–10.5)
CHLORIDE SERPL-SCNC: 102 MMOL/L — SIGNIFICANT CHANGE UP (ref 96–108)
CO2 SERPL-SCNC: 18 MMOL/L — LOW (ref 22–31)
CREAT SERPL-MCNC: 2.03 MG/DL — HIGH (ref 0.5–1.3)
CRYPTOC AG FLD QL: NEGATIVE — SIGNIFICANT CHANGE UP
CYCLOSPORINE SER-MCNC: 88 NG/ML — LOW (ref 150–400)
EBV DNA SERPL NAA+PROBE-ACNC: SIGNIFICANT CHANGE UP IU/ML
EBV DNA SERPL NAA+PROBE-ACNC: SIGNIFICANT CHANGE UP IU/ML
EBVPCR LOG: SIGNIFICANT CHANGE UP LOG10IU/ML
EBVPCR LOG: SIGNIFICANT CHANGE UP LOG10IU/ML
EGFR: 36 ML/MIN/1.73M2 — LOW
GLUCOSE BLDC GLUCOMTR-MCNC: 209 MG/DL — HIGH (ref 70–99)
GLUCOSE BLDC GLUCOMTR-MCNC: 217 MG/DL — HIGH (ref 70–99)
GLUCOSE BLDC GLUCOMTR-MCNC: 223 MG/DL — HIGH (ref 70–99)
GLUCOSE SERPL-MCNC: 203 MG/DL — HIGH (ref 70–99)
HBV CORE AB SER-ACNC: SIGNIFICANT CHANGE UP
HBV DNA # SERPL NAA+PROBE: SIGNIFICANT CHANGE UP
HBV DNA SERPL NAA+PROBE-LOG#: SIGNIFICANT CHANGE UP LOGIU/ML
HCT VFR BLD CALC: 33.7 % — LOW (ref 39–50)
HCV RNA SPEC NAA+PROBE-LOG IU: SIGNIFICANT CHANGE UP
HCV RNA SPEC NAA+PROBE-LOG IU: SIGNIFICANT CHANGE UP LOGIU/ML
HGB BLD-MCNC: 10.8 G/DL — LOW (ref 13–17)
INR BLD: 0.89 RATIO — SIGNIFICANT CHANGE UP (ref 0.85–1.18)
MAGNESIUM SERPL-MCNC: 2.3 MG/DL — SIGNIFICANT CHANGE UP (ref 1.6–2.6)
MCHC RBC-ENTMCNC: 28.8 PG — SIGNIFICANT CHANGE UP (ref 27–34)
MCHC RBC-ENTMCNC: 32 GM/DL — SIGNIFICANT CHANGE UP (ref 32–36)
MCV RBC AUTO: 89.9 FL — SIGNIFICANT CHANGE UP (ref 80–100)
NRBC # BLD: 0 /100 WBCS — SIGNIFICANT CHANGE UP (ref 0–0)
PHOSPHATE SERPL-MCNC: 3.5 MG/DL — SIGNIFICANT CHANGE UP (ref 2.5–4.5)
PLATELET # BLD AUTO: 237 K/UL — SIGNIFICANT CHANGE UP (ref 150–400)
POTASSIUM SERPL-MCNC: 4.8 MMOL/L — SIGNIFICANT CHANGE UP (ref 3.5–5.3)
POTASSIUM SERPL-SCNC: 4.8 MMOL/L — SIGNIFICANT CHANGE UP (ref 3.5–5.3)
PROT SERPL-MCNC: 7.2 G/DL — SIGNIFICANT CHANGE UP (ref 6–8.3)
PROTHROM AB SERPL-ACNC: 9.8 SEC — SIGNIFICANT CHANGE UP (ref 9.5–13)
RBC # BLD: 3.75 M/UL — LOW (ref 4.2–5.8)
RBC # FLD: 13.6 % — SIGNIFICANT CHANGE UP (ref 10.3–14.5)
SODIUM SERPL-SCNC: 135 MMOL/L — SIGNIFICANT CHANGE UP (ref 135–145)
WBC # BLD: 7.89 K/UL — SIGNIFICANT CHANGE UP (ref 3.8–10.5)
WBC # FLD AUTO: 7.89 K/UL — SIGNIFICANT CHANGE UP (ref 3.8–10.5)

## 2024-05-08 PROCEDURE — 88305 TISSUE EXAM BY PATHOLOGIST: CPT

## 2024-05-08 PROCEDURE — 84300 ASSAY OF URINE SODIUM: CPT

## 2024-05-08 PROCEDURE — 88341 IMHCHEM/IMCYTCHM EA ADD ANTB: CPT

## 2024-05-08 PROCEDURE — 87517 HEPATITIS B DNA QUANT: CPT

## 2024-05-08 PROCEDURE — 88342 IMHCHEM/IMCYTCHM 1ST ANTB: CPT

## 2024-05-08 PROCEDURE — 86790 VIRUS ANTIBODY NOS: CPT

## 2024-05-08 PROCEDURE — 74330 X-RAY BILE/PANC ENDOSCOPY: CPT

## 2024-05-08 PROCEDURE — 86255 FLUORESCENT ANTIBODY SCREEN: CPT

## 2024-05-08 PROCEDURE — 82784 ASSAY IGA/IGD/IGG/IGM EACH: CPT

## 2024-05-08 PROCEDURE — 84100 ASSAY OF PHOSPHORUS: CPT

## 2024-05-08 PROCEDURE — 86900 BLOOD TYPING SEROLOGIC ABO: CPT

## 2024-05-08 PROCEDURE — 86381 MITOCHONDRIAL ANTIBODY EACH: CPT

## 2024-05-08 PROCEDURE — 87040 BLOOD CULTURE FOR BACTERIA: CPT

## 2024-05-08 PROCEDURE — 99232 SBSQ HOSP IP/OBS MODERATE 35: CPT

## 2024-05-08 PROCEDURE — C1874: CPT

## 2024-05-08 PROCEDURE — 87799 DETECT AGENT NOS DNA QUANT: CPT

## 2024-05-08 PROCEDURE — 87522 HEPATITIS C REVRS TRNSCRPJ: CPT

## 2024-05-08 PROCEDURE — C1769: CPT

## 2024-05-08 PROCEDURE — 0225U NFCT DS DNA&RNA 21 SARSCOV2: CPT

## 2024-05-08 PROCEDURE — 36415 COLL VENOUS BLD VENIPUNCTURE: CPT

## 2024-05-08 PROCEDURE — P9047: CPT

## 2024-05-08 PROCEDURE — 76942 ECHO GUIDE FOR BIOPSY: CPT

## 2024-05-08 PROCEDURE — 84156 ASSAY OF PROTEIN URINE: CPT

## 2024-05-08 PROCEDURE — 80074 ACUTE HEPATITIS PANEL: CPT

## 2024-05-08 PROCEDURE — 87533 HHV-6 DNA QUANT: CPT

## 2024-05-08 PROCEDURE — 83935 ASSAY OF URINE OSMOLALITY: CPT

## 2024-05-08 PROCEDURE — 82977 ASSAY OF GGT: CPT

## 2024-05-08 PROCEDURE — 88313 SPECIAL STAINS GROUP 2: CPT

## 2024-05-08 PROCEDURE — 87637 SARSCOV2&INF A&B&RSV AMP PRB: CPT

## 2024-05-08 PROCEDURE — 88112 CYTOPATH CELL ENHANCE TECH: CPT

## 2024-05-08 PROCEDURE — 74183 MRI ABD W/O CNTR FLWD CNTR: CPT | Mod: MC

## 2024-05-08 PROCEDURE — 84540 ASSAY OF URINE/UREA-N: CPT

## 2024-05-08 PROCEDURE — 71045 X-RAY EXAM CHEST 1 VIEW: CPT

## 2024-05-08 PROCEDURE — 86038 ANTINUCLEAR ANTIBODIES: CPT

## 2024-05-08 PROCEDURE — 82570 ASSAY OF URINE CREATININE: CPT

## 2024-05-08 PROCEDURE — 88307 TISSUE EXAM BY PATHOLOGIST: CPT

## 2024-05-08 PROCEDURE — 83690 ASSAY OF LIPASE: CPT

## 2024-05-08 PROCEDURE — 85730 THROMBOPLASTIN TIME PARTIAL: CPT

## 2024-05-08 PROCEDURE — A9585: CPT

## 2024-05-08 PROCEDURE — C1726: CPT

## 2024-05-08 PROCEDURE — 82962 GLUCOSE BLOOD TEST: CPT

## 2024-05-08 PROCEDURE — 80053 COMPREHEN METABOLIC PANEL: CPT

## 2024-05-08 PROCEDURE — 81003 URINALYSIS AUTO W/O SCOPE: CPT

## 2024-05-08 PROCEDURE — 84133 ASSAY OF URINE POTASSIUM: CPT

## 2024-05-08 PROCEDURE — G0480: CPT

## 2024-05-08 PROCEDURE — 83036 HEMOGLOBIN GLYCOSYLATED A1C: CPT

## 2024-05-08 PROCEDURE — 85027 COMPLETE CBC AUTOMATED: CPT

## 2024-05-08 PROCEDURE — 86403 PARTICLE AGGLUT ANTBDY SCRN: CPT

## 2024-05-08 PROCEDURE — 47000 NEEDLE BIOPSY OF LIVER PERQ: CPT

## 2024-05-08 PROCEDURE — 83735 ASSAY OF MAGNESIUM: CPT

## 2024-05-08 PROCEDURE — 86901 BLOOD TYPING SEROLOGIC RH(D): CPT

## 2024-05-08 PROCEDURE — 86850 RBC ANTIBODY SCREEN: CPT

## 2024-05-08 PROCEDURE — 80158 DRUG ASSAY CYCLOSPORINE: CPT

## 2024-05-08 PROCEDURE — 86704 HEP B CORE ANTIBODY TOTAL: CPT

## 2024-05-08 PROCEDURE — 85610 PROTHROMBIN TIME: CPT

## 2024-05-08 RX ORDER — INSULIN GLARGINE 100 [IU]/ML
46 INJECTION, SOLUTION SUBCUTANEOUS
Qty: 0 | Refills: 0 | DISCHARGE
Start: 2024-05-08

## 2024-05-08 RX ORDER — ASPIRIN/CALCIUM CARB/MAGNESIUM 324 MG
1 TABLET ORAL
Qty: 0 | Refills: 0 | DISCHARGE
Start: 2024-05-08

## 2024-05-08 RX ORDER — SODIUM CHLORIDE 9 MG/ML
500 INJECTION INTRAMUSCULAR; INTRAVENOUS; SUBCUTANEOUS ONCE
Refills: 0 | Status: COMPLETED | OUTPATIENT
Start: 2024-05-08 | End: 2024-05-08

## 2024-05-08 RX ORDER — AMLODIPINE BESYLATE 2.5 MG/1
1 TABLET ORAL
Qty: 30 | Refills: 0
Start: 2024-05-08 | End: 2024-06-06

## 2024-05-08 RX ORDER — AMLODIPINE BESYLATE 2.5 MG/1
1 TABLET ORAL
Qty: 0 | Refills: 0 | DISCHARGE
Start: 2024-05-08

## 2024-05-08 RX ORDER — METOPROLOL TARTRATE 50 MG
1 TABLET ORAL
Qty: 0 | Refills: 0 | DISCHARGE
Start: 2024-05-08

## 2024-05-08 RX ORDER — RISPERIDONE 4 MG/1
1 TABLET ORAL
Qty: 0 | Refills: 0 | DISCHARGE
Start: 2024-05-08

## 2024-05-08 RX ORDER — INSULIN LISPRO 100/ML
26 VIAL (ML) SUBCUTANEOUS
Refills: 0 | Status: DISCONTINUED | OUTPATIENT
Start: 2024-05-08 | End: 2024-05-08

## 2024-05-08 RX ORDER — URSODIOL 250 MG/1
1 TABLET, FILM COATED ORAL
Qty: 60 | Refills: 0
Start: 2024-05-08 | End: 2024-06-06

## 2024-05-08 RX ORDER — CYCLOSPORINE 100 MG/1
3 CAPSULE ORAL
Qty: 0 | Refills: 0 | DISCHARGE
Start: 2024-05-08

## 2024-05-08 RX ORDER — METOPROLOL TARTRATE 50 MG
1 TABLET ORAL
Qty: 60 | Refills: 0
Start: 2024-05-08 | End: 2024-06-06

## 2024-05-08 RX ORDER — NYSTATIN 500MM UNIT
500000 POWDER (EA) MISCELLANEOUS THREE TIMES A DAY
Refills: 0 | Status: DISCONTINUED | OUTPATIENT
Start: 2024-05-08 | End: 2024-05-08

## 2024-05-08 RX ORDER — VALGANCICLOVIR 450 MG/1
1 TABLET, FILM COATED ORAL
Qty: 30 | Refills: 0
Start: 2024-05-08 | End: 2024-06-06

## 2024-05-08 RX ORDER — CYCLOSPORINE 100 MG/1
75 CAPSULE ORAL
Refills: 0 | Status: DISCONTINUED | OUTPATIENT
Start: 2024-05-08 | End: 2024-05-08

## 2024-05-08 RX ORDER — ASPIRIN/CALCIUM CARB/MAGNESIUM 324 MG
1 TABLET ORAL
Qty: 30 | Refills: 0
Start: 2024-05-08 | End: 2024-06-06

## 2024-05-08 RX ORDER — URSODIOL 250 MG/1
1 TABLET, FILM COATED ORAL
Qty: 0 | Refills: 0 | DISCHARGE
Start: 2024-05-08

## 2024-05-08 RX ORDER — VALGANCICLOVIR 450 MG/1
1 TABLET, FILM COATED ORAL
Qty: 0 | Refills: 0 | DISCHARGE
Start: 2024-05-08

## 2024-05-08 RX ORDER — INSULIN LISPRO 100/ML
24 VIAL (ML) SUBCUTANEOUS
Qty: 0 | Refills: 0 | DISCHARGE
Start: 2024-05-08

## 2024-05-08 RX ORDER — NYSTATIN 500MM UNIT
5 POWDER (EA) MISCELLANEOUS
Qty: 450 | Refills: 0
Start: 2024-05-08 | End: 2024-06-06

## 2024-05-08 RX ORDER — VALGANCICLOVIR 450 MG/1
450 TABLET, FILM COATED ORAL DAILY
Refills: 0 | Status: DISCONTINUED | OUTPATIENT
Start: 2024-05-08 | End: 2024-05-08

## 2024-05-08 RX ORDER — NYSTATIN 500MM UNIT
5 POWDER (EA) MISCELLANEOUS
Qty: 0 | Refills: 0 | DISCHARGE
Start: 2024-05-08

## 2024-05-08 RX ADMIN — Medication 26 UNIT(S): at 12:57

## 2024-05-08 RX ADMIN — MAGNESIUM OXIDE 400 MG ORAL TABLET 400 MILLIGRAM(S): 241.3 TABLET ORAL at 09:23

## 2024-05-08 RX ADMIN — VALGANCICLOVIR 450 MILLIGRAM(S): 450 TABLET, FILM COATED ORAL at 12:57

## 2024-05-08 RX ADMIN — Medication 75 MILLIGRAM(S): at 05:35

## 2024-05-08 RX ADMIN — HEPARIN SODIUM 5000 UNIT(S): 5000 INJECTION INTRAVENOUS; SUBCUTANEOUS at 05:35

## 2024-05-08 RX ADMIN — URSODIOL 500 MILLIGRAM(S): 250 TABLET, FILM COATED ORAL at 05:34

## 2024-05-08 RX ADMIN — AMLODIPINE BESYLATE 10 MILLIGRAM(S): 2.5 TABLET ORAL at 05:35

## 2024-05-08 RX ADMIN — Medication 81 MILLIGRAM(S): at 12:58

## 2024-05-08 RX ADMIN — Medication 1 TABLET(S): at 12:58

## 2024-05-08 RX ADMIN — CYCLOSPORINE 75 MILLIGRAM(S): 100 CAPSULE ORAL at 09:22

## 2024-05-08 RX ADMIN — SODIUM CHLORIDE 250 MILLILITER(S): 9 INJECTION INTRAMUSCULAR; INTRAVENOUS; SUBCUTANEOUS at 12:02

## 2024-05-08 RX ADMIN — Medication 4: at 09:21

## 2024-05-08 RX ADMIN — MAGNESIUM OXIDE 400 MG ORAL TABLET 400 MILLIGRAM(S): 241.3 TABLET ORAL at 12:58

## 2024-05-08 RX ADMIN — Medication 60 MILLIGRAM(S): at 05:35

## 2024-05-08 RX ADMIN — Medication 500000 UNIT(S): at 13:41

## 2024-05-08 RX ADMIN — Medication 22 UNIT(S): at 09:22

## 2024-05-08 RX ADMIN — Medication 4: at 12:57

## 2024-05-08 NOTE — PROGRESS NOTE ADULT - ASSESSMENT
63M w/ hx of alcoholic cirrhosis complicated by HRS (chronic CKD at this time) s/p OLT 4/2019 at [facility] and b/l LE DVT who was sent in for elevated bilirubin and liver enzymes. Underwent MRCP which showed small 8 mm CBD stone upstream of the anastomosis site. s/p ERCP. Endocrine consulted for: Uncontrolled T2DM. BG Goal 100-180mg/dl     Last 24 hour BG values 200-406 with severe hypreglycemia yesterday afternoon. s/p NPH 10 units X1. fasting  today. BGs above goal while on prednisone. Discharge today on prednisone taper as per team     Prednisone taper plan   Prednisone 60 mg daily X 3 days   Prednisone 40 mg daily X3 days  Prednisone 20 mg daily and continues       #T2DM uncontrolled with hyperglycemia  Diagnosed T2DM 7 yrs ago prior to liver transplant  Home regimen: Lantus 5 units QAM, Humalog 5 unit TIDAC, Januvia 50mg daily  Previous meds: Metformin  Recent A1c 12.1 outpatient (per HIE)  SMBG: Checks fasting FS every AM: 130s. Does not check any other time  Prednisone 60mg daily started today 5/6.   Complications: No CAD, CVA, retinopathy. Occasional neuropathy. + CKD.    Inpatient plan:  - Inpatient BG goal 100-180  - Increase Lantus 52 units QHS  - Continue snack insulin admelog 4 units at bedtime hold if not having a snack   - Increase Admelog 26 units TIDAC for now ( hold meal time insulin if NPO)  - Mod dose admelog correction scale TIDAC  - Mod dose admelog correction scale QHS/2am  - CC diet  - Please inform endocrine team if there are any changes to steroid doses.    Discharge plan:  - Discharge regimen: Basal/bolus insulin  While on Prednisone 60 mg daily- Lantus 52 units at HS and Humalog 26 units before each meals ( hold if not eating )  While on Prednisone 40 mg daily - Lantus 43 units at HS and Humalog 24 units before each meals ( Hold if not eating )  While on Prednisone 20 mg daily - Lantus 38 units at HS and Humalog 22 units before each meals ( Hold if not eating )   *patient should monitor BGs ACTID and at HS, contact PCP if BG< 70 X1, > 400 X1 or BGs persistently > 200  Would benefit from GLP1 agonist but can not start GLP1 due to CBD stone   Can continue Januvia 50 mg daily   He is interested in using CGM but his phone is not compatible with Freestyle josefina or Dexcom.   Please send Rx for Dexcom G7 reacer X1 and seosor X3 or Freestyle Josefina 3 reader X1 and sensor X2  Please make sure he has DM management supplies ( Glucometer, lancets, strips and alcohol pads)   - Endocrine follow up: needs to establish follow up.  Can follow up with [facility] - [address], [address]. [place] [address]  Tel) [phone]   Email sent to scheduling  coordinator on 5/1. Office will contact patient for an appointment.   - Routine ophthalmology and podiatry follow up    #Obesity  - would benefit from GLP1, however can not start at this time due to CBD stone   - Can followup with Dr. Christian brand   Weight control management clinic  [address]  Phone No. [phone]    #HLD  - check lipid panel outpatient      Contact via Microsoft Teams during business hours  To reach covering provider access AMION via sunrise tools  For Urgent matters/after-hours/weekends/holidays please page endocrine fellow on call   For nonurgent matters please email [email or web address]    Please note that this patient may be followed by different provider tomorrow.  Notify endocrine 24 hours prior to discharge for final recommendations

## 2024-05-08 NOTE — DISCHARGE NOTE NURSING/CASE MANAGEMENT/SOCIAL WORK - NSDCFUADDAPPT_GEN_ALL_CORE_FT
[facility] - [address], [address]. [place] [address]   (Tel) [phone]     Follow up with Nephrology

## 2024-05-08 NOTE — PROGRESS NOTE ADULT - REASON FOR ADMISSION
elevated LFT's
elevated LFT's
elevated LFT
elevated LFT's
LFT elevation
LFT elevation in cholestatic pattern
elevated LFT
elevated LFT
elevated LFT's
Choledocholithiasis

## 2024-05-08 NOTE — DISCHARGE NOTE PROVIDER - CARE PROVIDER_API CALL
Gage Mejia  Transplant Hepatology  [address] [place] [address]  Phone: [phone]  Fax: [phone]  Follow Up Time:

## 2024-05-08 NOTE — DISCHARGE NOTE NURSING/CASE MANAGEMENT/SOCIAL WORK - PATIENT PORTAL LINK FT
You can access the FollowMyHealth Patient Portal offered by [facility] by registering at the following website: [email or web address]. By joining [employer]’s FollowMyHealth portal, you will also be able to view your health information using other applications (apps) compatible with our system.

## 2024-05-08 NOTE — PROGRESS NOTE ADULT - ASSESSMENT
63 yrs old male w/ hx of alcoholic cirrhosis complicated by HRS (chronic CKD at this time) s/p OLT 4/2019 at [facility] and b/l LE DVT (R Gaastrocnemius and L CFV through SFJ/ poplitial and post tibial s/p 3M A/C with resolution) who presented for elevated bilirubin and liver enzymes.    [] Elevated liver enzymes s/p OLT 2019 for alcoholic cirrhosis  *ERCP 4/30/2024 -  anastomotic stricture and a filling defect consistent with a stone proximal to the stricture s/p sphincterotomy, brushing, balloon extraction of debris, dilation of anastomotic stricture to 8 mm and stent placement (8 mm x 10 mm Viabel)  *steatohepatitis on liver bx 5/01  - Give maintenance fluids of LR prior to discharge   - c/w prednisone 60mg qD -will discharge patient on prednisone taper  - cyclosporine 75/50 mg BID  - off cellcept  - c/w ASA  - f/u cyclo level (goal 100 - 150)  - f/u PETH  - Follow up outpatient with nephrology, endocrinology and Dr. 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 No main duct dilation. Will need follow-up abdominal MRI with IV contrast and MRCP in 6 months      [] KAYLA  Worsened today  Will start patient on IV fluids   Outpatient follow up with nephrology    [] Diabetes Mellitus  - Last A1C high 12, prior he was 6.7.   - Patient takes Januvia, and  basal-bolus regimen  - appreciate endocrine recs  - appreciate nutrition recs    [] HTN   -amlodipine 10  mg daily.   -metoprolol 75 mg BID.

## 2024-05-08 NOTE — PROGRESS NOTE ADULT - SUBJECTIVE AND OBJECTIVE BOX
Follow Up:  Transamnitis    Interval History/ROS: Afebrile, VSS stable. No significant event overnight.    Allergies  No Known Allergies        ANTIMICROBIALS:      OTHER MEDS:  MEDICATIONS  (STANDING):  acetaminophen     Tablet .. 650 every 6 hours PRN  amLODIPine   Tablet 10 daily  aspirin enteric coated 81 daily  cycloSPORINE  , modified (GENGRAF) 75 <User Schedule>  cycloSPORINE  , modified (GENGRAF) 50 <User Schedule>  dextrose 50% Injectable 25 once  dextrose 50% Injectable 12.5 once  dextrose Oral Gel 15 once PRN  glucagon  Injectable 1 once  heparin   Injectable 5000 every 12 hours  insulin glargine Injectable (LANTUS) 46 at bedtime  insulin lispro (ADMELOG) corrective regimen sliding scale  <User Schedule>  insulin lispro (ADMELOG) corrective regimen sliding scale  three times a day before meals  insulin lispro Injectable (ADMELOG) 4 at bedtime  insulin lispro Injectable (ADMELOG) 22 three times a day before meals  melatonin 3 at bedtime PRN  metoprolol tartrate 75 two times a day  predniSONE   Tablet 60 daily  risperiDONE   Tablet 1 at bedtime  ursodiol Tablet 500 every 12 hours      Vital Signs Last 24 Hrs  T(C): 36.5 (08 May 2024 09:00), Max: 37.3 (07 May 2024 13:23)  T(F): 97.7 (08 May 2024 09:00), Max: 99.2 (07 May 2024 13:23)  HR: 60 (08 May 2024 09:00) (60 - 80)  BP: 153/76 (08 May 2024 09:00) (140/79 - 155/78)  BP(mean): --  RR: 18 (08 May 2024 09:00) (18 - 18)  SpO2: 94% (08 May 2024 09:00) (94% - 100%)    Parameters below as of 08 May 2024 09:00  Patient On (Oxygen Delivery Method): room air        PHYSICAL EXAM:                                  10.8   7.89  )-----------( 237      ( 08 May 2024 06:37 )             33.7       05-08    135  |  102  |  44<H>  ----------------------------<  203<H>  4.8   |  18<L>  |  2.03<H>    Ca    9.6      08 May 2024 06:39  Phos  3.5     05-08  Mg     2.3     05-08    TPro  7.2  /  Alb  4.1  /  TBili  0.8  /  DBili  x   /  AST  66<H>  /  ALT  99<H>  /  AlkPhos  392<H>  05-08      Urinalysis Basic - ( 08 May 2024 06:39 )    Color: x / Appearance: x / SG: x / pH: x  Gluc: 203 mg/dL / Ketone: x  / Bili: x / Urobili: x   Blood: x / Protein: x / Nitrite: x   Leuk Esterase: x / RBC: x / WBC x   Sq Epi: x / Non Sq Epi: x / Bacteria: x        MICROBIOLOGY:  v            Rapid RVP Result: NotDetec (05-06 @ 18:28)  CMVPCR Log: NotDetec Siz71RA/mL (05-06 @ 10:12)        RADIOLOGY:   Follow Up:  Transaminitis    Interval History/ROS: Afebrile, VSS stable. No significant event overnight.    Allergies  No Known Allergies        ANTIMICROBIALS:      OTHER MEDS:  MEDICATIONS  (STANDING):  acetaminophen     Tablet .. 650 every 6 hours PRN  amLODIPine   Tablet 10 daily  aspirin enteric coated 81 daily  cycloSPORINE  , modified (GENGRAF) 75 <User Schedule>  cycloSPORINE  , modified (GENGRAF) 50 <User Schedule>  dextrose 50% Injectable 25 once  dextrose 50% Injectable 12.5 once  dextrose Oral Gel 15 once PRN  glucagon  Injectable 1 once  heparin   Injectable 5000 every 12 hours  insulin glargine Injectable (LANTUS) 46 at bedtime  insulin lispro (ADMELOG) corrective regimen sliding scale  <User Schedule>  insulin lispro (ADMELOG) corrective regimen sliding scale  three times a day before meals  insulin lispro Injectable (ADMELOG) 4 at bedtime  insulin lispro Injectable (ADMELOG) 22 three times a day before meals  melatonin 3 at bedtime PRN  metoprolol tartrate 75 two times a day  predniSONE   Tablet 60 daily  risperiDONE   Tablet 1 at bedtime  ursodiol Tablet 500 every 12 hours      Vital Signs Last 24 Hrs  T(C): 36.5 (08 May 2024 09:00), Max: 37.3 (07 May 2024 13:23)  T(F): 97.7 (08 May 2024 09:00), Max: 99.2 (07 May 2024 13:23)  HR: 60 (08 May 2024 09:00) (60 - 80)  BP: 153/76 (08 May 2024 09:00) (140/79 - 155/78)  BP(mean): --  RR: 18 (08 May 2024 09:00) (18 - 18)  SpO2: 94% (08 May 2024 09:00) (94% - 100%)    Parameters below as of 08 May 2024 09:00  Patient On (Oxygen Delivery Method): room air        PHYSICAL EXAMINATION:  General: Alert and Awake, NAD  HEENT: Normocephalic / Atraumatic  Resp: No accessory muscles of respiration utilized  Abdomen: Not distended.  MSK: No LE edema.   : No cherry  Skin: No rashes or lesions.    Neuro: Alert and Awake. CN 2-12 Grossly intact. Moves all four extremities spontaneously.  Psych: Calm, Pleasant, Cooperative                          10.8   7.89  )-----------( 237      ( 08 May 2024 06:37 )             33.7       05-08    135  |  102  |  44<H>  ----------------------------<  203<H>  4.8   |  18<L>  |  2.03<H>    Ca    9.6      08 May 2024 06:39  Phos  3.5     05-08  Mg     2.3     05-08    TPro  7.2  /  Alb  4.1  /  TBili  0.8  /  DBili  x   /  AST  66<H>  /  ALT  99<H>  /  AlkPhos  392<H>  05-08      Urinalysis Basic - ( 08 May 2024 06:39 )    Color: x / Appearance: x / SG: x / pH: x  Gluc: 203 mg/dL / Ketone: x  / Bili: x / Urobili: x   Blood: x / Protein: x / Nitrite: x   Leuk Esterase: x / RBC: x / WBC x   Sq Epi: x / Non Sq Epi: x / Bacteria: x        MICROBIOLOGY:  v            Rapid RVP Result: NotDetec (05-06 @ 18:28)  CMVPCR Log: NotDetec [id number]/mL (05-06 @ 10:12)        RADIOLOGY:    <The imaging below has been reviewed and visualized by me independently. 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 No main duct dilation.   Consider follow-up abdominal MRI with IV contrast and MRCP in 6 months    < end of copied text >

## 2024-05-08 NOTE — DISCHARGE NOTE PROVIDER - HOSPITAL COURSE
Mr. Saab is a 63 yrs old male w/ hx of alcoholic cirrhosis complicated by HRS (chronic CKD at this time) s/p OLT 2019 at [facility] and b/l LE DVT (R Gastrocnemius and L CFV through SFJ/ poplitial and post tibial s/p 3M A/C with resolution) who presented for elevated bilirubin and liver enzymes. Pt. had  donor w/ duct to duct without stent CMV R-/D+. 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 Last seen in clinic on , was found to have new onset liver enzyme elevation w/ bilirubin around 1.4, predominantly cholestatic pattern.     Underwent MRCP which showed small 8 mm CBD stone upstream of the anastomosis site. On , patient underwent ERCP. An anastomotic stricture was identified with sphincterotomy, brushing, dilation and metal stent placement. LFTs were persistently elevated after ERCP, patient was taken for Liver biopsy on 5/3 which found focal interface hepatitis of unclear significance, 5% steatohepatitis and no typical cellular rejection. 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 No main duct dilation. Recommended for FU MCRP in 6 months regarding pancreas.   Patient had KAYLA present throughout hospital stay, with Cr rising and falling between 1-2. He was managed each day with fluids and diuretics as necessary. Pt. will FU with outpatient nephrology. Blood glucose not well controlled throughout stay, finger sticks between 200s-400s, managed with lantus and lispro. pt will followup with outpatient Endocrinology.    IMMUNOSUPPRESSION: CYCLO - today() levels were 88 on 75/50, patient will be discharged on 75/75 Mr. Saab is a 63 yrs old male w/ hx of alcoholic cirrhosis complicated by HRS (chronic CKD at this time) s/p OLT (CMV -/+) on 4/2019 at [facility] [place] and b/l LE DVT (R Gastrocnemius and L CFV through SFJ/ poplitial and post tibial s/p 3M A/C with resolution) who presented for elevated bilirubin and liver enzymes. 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 Seen in clinic on 4/22, was found to have new onset of elevated LFTs and admitted for further management.     Underwent MRCP which showed small 8 mm CBD stone upstream of the anastomosis site. On 4/30, patient underwent ERCP. An anastomotic stricture was identified with sphincterotomy, brushing, dilation and metal stent placement. LFTs were persistently elevated after ERCP, started on oral Pred taper, underwent Liver biopsy on 5/3 which found focal interface hepatitis of unclear significance, 5% steatohepatitis and no typical cellular rejection. 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 No main duct dilation. Patient had KAYLA present throughout hospital stay  managed each day with fluids and diuretics as necessary. Blood glucose not well controlled throughout stay, finger sticks between 200s-400s, managed with lantus and lispro. Instructed to follow up with Neprhology and Endocrinologist on discharge.     IMMUNOSUPPRESSION:   CYCLO - today(5/8) levels were 88 on 75/50, patient will be discharged on 75/75  Pred taper:   Prednisone 60mg daily x 3 doses  Prednisone 40mg daily x 3 doses  Prednisone 20mg daily and continue

## 2024-05-08 NOTE — DISCHARGE NOTE PROVIDER - NSDCQMSTROKE_NEU_ALL_CORE
ASSESSMENT & PLAN  Patient Instructions     1. Primary osteoarthritis of left knee    2. Chronic pain of left knee      -Patient has chronic left knee pain and swelling due to mild arthritis  -Patient will start Voltaren gel as needed for pain and swelling.  Patient may take oral ibuprofen or Tylenol as needed sparingly for more significant breakthrough pain  -Patient will start home exercise program.  Handouts were given today  -Patient will follow up if pain or swelling worsen  -Call direct clinic number [[phone]] at any time with questions or concerns.    Albert Yeo MD [facility]            -----    SUBJECTIVE  Thomas Pierce is a/an 51 year old male who is seen in consultation at the request of  Leyla Underwood PA-C for evaluation of left knee pain. The patient is seen by themselves.    Onset: 4 month(s) ago. Reports insidious onset without acute precipitating event.  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  Full sensation in posterior knee over past ~ 2- 3 weeks.  Location of Pain: left knee medial joint line, posterior knee with radiation to medial lower leg  Rating of Pain at worst: 7/10  Rating of Pain Currently: 1/10  Worsened by: climbing into truck, knee bends, squatting  Better with: rest, knee brace  Treatments tried: rest/activity avoidance, ice, ibuprofen and casting/splinting/bracing  Associated symptoms: swelling and joint stiffness  Orthopedic history: NO  Relevant surgical history: NO  Social history: social history: works as a     Past Medical History:   Diagnosis Date     Hyperlipidemia LDL goal <160 10/20/2011     Hypertension goal BP (blood pressure) < 140/90 10/20/2011     Prediabetes      Social History     Socioeconomic History     Marital status:      Spouse name: Lucy     Number of children: 0     Years of education: Not on file     Highest education level:  Not on file   Occupational History     Occupation:    Tobacco Use     Smoking status: Never Smoker     Smokeless tobacco: Never Used   Substance and Sexual Activity     Alcohol use: Yes     Comment: occasionally      Drug use: No     Sexual activity: Never   Other Topics Concern     Parent/sibling w/ CABG, MI or angioplasty before 65F 55M? No   Social History Narrative     Not on file     Social Determinants of Health     Financial Resource Strain: Not on file   Food Insecurity: Not on file   Transportation Needs: Not on file   Physical Activity: Not on file   Stress: Not on file   Social Connections: Not on file   Intimate Partner Violence: Not on file   Housing Stability: Not on file         Patient's past medical, surgical, social, and family histories were reviewed today and no changes are noted.    REVIEW OF SYSTEMS:  10 point ROS is negative other than symptoms noted above in HPI, Past Medical History or as stated below  Constitutional: NEGATIVE for fever, chills, change in weight  Skin: NEGATIVE for worrisome rashes, moles or lesions  GI/: NEGATIVE for bowel or bladder changes  Neuro: NEGATIVE for weakness, dizziness or paresthesias    OBJECTIVE:  /70   Ht 1.829 m (6')   Wt 111.6 kg (246 lb)   BMI 33.36 kg/m     General: healthy, alert and in no distress  HEENT: no scleral icterus or conjunctival erythema  Skin: no suspicious lesions or rash. No jaundice.  CV: no pedal edema  Resp: normal respiratory effort without conversational dyspnea   Psych: normal mood and affect  Gait: normal steady gait with appropriate coordination and balance  Neuro: Normal light sensory exam of lower extremity  MSK:  LEFT KNEE  Inspection:    normal alignment  Palpation:    Tender about the medial joint line. Remainder of bony and ligamentous landmarks are nontender.    Trace effusion is present    Patellofemoral crepitus is Absent  Range of Motion:     00 extension to 1100 flexion  Strength:     Quadriceps grossly intact    Extensor mechanism intact  Special Tests:    Positive: none    Negative: MCL/valgus stress (0 & 30 deg), LCL/varus stress (0 & 30 deg), Lachman's, anterior drawer, posterior drawer, Nathan's    Independent visualization of the below image:  Recent Results (from the past 24 hour(s))   XR Knee Standing AP Bilat [place] Bilat Lat Left    Narrative    Mild medial compartment joint space narrowing.  Small osteophytes in the   medial and patellofemoral compartments.  No acute fracture or dislocation.         Albert Yeo MD [facility]     No

## 2024-05-08 NOTE — DISCHARGE NOTE PROVIDER - NSDCFUSCHEDAPPT_GEN_ALL_CORE_FT
[facility]  HEPATOLOGY [address] [place] [address]   Scheduled Appointment: 05/14/2024

## 2024-05-08 NOTE — PROGRESS NOTE ADULT - ATTENDING COMMENTS
63 yrs old male w/ hx of alcoholic cirrhosis complicated by HRS (chronic CKD at this time) s/p OLT 2019 at [facility] and b/l LE DVT (R Gastrocnemius and L CFV through SFJ/ poplitial and post tibial s/p 3M A/C with resolution) who presented for elevated bilirubin and liver enzymes. Pt. had  donor w/ duct to duct without stent CMV R-/D+. 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 Patient transferred to [facility] for medical care and his primary hepatologist is Dr. Mejia. Last seen in clinic on , was found to have new onset liver enzyme elevation    MRI abdomen () mild steatosis of liver transplant patent transplant vasculature.  8 mm ductal stone just upstream from biliary anastomosis without intrahepatic biliary dilatation    On  is status post ERCP which was notable for anastomotic stricture and filling defect consistent with stone proximal to stricture.  Status post enterotomy and stent placement    On May 3 status post liver parenchymal biopsy    Hepatitis B core IgM negative  Hepatitis B surface antigen negative  Hepatitis A IgM negative  Hepatitis C IgG negative  Hepatitis E IgM negative  Hepatitis E PCR negative    RVP (May 6) negative    CMV PCR  Notdetected  Hepatitis B core ab total negative    At this point unclear if infectious contribution to persistent transaminitis.      #Transaminitis, alkaline phosphatase elevation, hyperbilirubinemia, liver transplant recipient  -- Follow up hepatitis C PCR quantitative  -- Follow up Parvovirus PCR, Herpes-6 PCR, Adenovirus PCR  -- Follow-up on EBV PCR  -- Follow-up on liver biopsy pathology  --Follow up on preliminary blood cultures    I will continue to follow. Please feel free to contact me with any further questions.    Leno Davenport M.D.  [facility] Division of Infectious Disease  8AM-5PM Monday - Friday: Available on [employer] Teams  After Hours and Holidays (or if no response on [employer] Teams): Please contact the Infectious Diseases Office at [phone]    The above assessment and plan were discussed with Ce, transplant surgery PA
Advanced [facility]oscopy GI Attending Note    Patient seen and examined in the late afternoon.  Discussed with GI fellow earlier in the day.    Impression:    #1.  Consulted for 8 mm bile duct stone in the donor main bile duct above a relative narrowing of the biliary anastomosis, presenting as abnormal LFTs.  Now s/p ERCP 4/30/24 with biliary sphincterotomy, dilation of anastomotic biliary stricture, partial removal of choledocholithiasis, and placement of fully covered metal bilary stent  #2.  History of orthotopic liver transplantation on 4/2019 for alcohol-related cirrhosis.  On immunosuppression.  #3.  Uncontrolled diabetes, hemoglobin A1c 12.    Recommendations:    #1.  Follow CBC/CMP  #2.  Glucose control per endocrinology  #3.  Repeat ERCP in 2 months as outpatient.  #4.  May continue aspirin 81 mg.
63M ETOH Cirrhosis ACLF s/p OLT 5 years ago presenting with CBD stone and biliary obstruction possibly stricture    Repeat liver tests today stable  Elevated Cyclo level - hold Cyclo for now - supratherapeutic probably because of biliary obstruction  Plan for ERCP today  Poorly controlled DM - plan for ENDO consult in AM - consideration for additional agents  Close outpatient follow up  Nutrition eval today
63M ETOH Cirrhosis ACLF s/p OLT 5 years ago presenting with CBD stone and biliary obstruction possibly stricture - now s/p ERCP with stone extraction and stent placement    Patient with worsening KAYLA and Hyperkalemia from yesterday  Liver tests rising  Increase Cyclo 75/50  Likely all related to biliary instrumentation  Renal Eval in AM  Appreciate Endocrine follow up  Otis 300mg BID initiated as outpatient -will continue for now    Unlikely to be ACR but if no improvement will need to consider bx
63M ETOH Cirrhosis ACLF s/p OLT 5 years ago presenting with CBD stone and biliary obstruction possibly stricture - now s/p ERCP with stone extraction and stent placement    Repeat liver tests today stable but not normal - elevated bilirubin likely from instrumentation  Elevated Cyclo level held yesterday - restart at 50mg BID with good level today - continue for now  Poorly controlled DM - titrate insulin  Outpatient GLP1 - stop Januvia as outpatient  Nutrition eval   Hyperkalemia - Lokelma and IVF - repeat labs in afternoon  If labs downtrend then plan for d/c tomorrow with close outpatient follow up
63M ETOH Cirrhosis ACLF s/p OLT 5 years ago presenting with CBD stone and biliary obstruction possibly stricture    Repeat liver tests today stable but not normal   Elevated Cyclo level held yesterday - restart at 50mg   Plan for ERCP today  Poorly controlled DM - titrate insulin  Outpatient GLP1  Nutrition eval     Post ERCP can be d/c tomorrow with close outpatient f/u
63M ETOH Cirrhosis ACLF s/p OLT 5 years ago presenting with CBD stone and biliary obstruction possibly stricture - now s/p ERCP with stone extraction and stent placement    Patient with worsening KAYLA and Hyperkalemia from yesterday and Liver tests rising  Increase Cyclo 75/75  Likely all related to biliary instrumentation but concerned about ACR  Will schedule liver biopsy for today  Continue Otis 300mg BID  Optimize insulin regimen - plan for GLP1 as outpatient   D/C IVF  Encourage hydration  reduce Metoprolol and continue to optimize CCB given relative bradycardia  Renal Eval today

## 2024-05-08 NOTE — DISCHARGE NOTE PROVIDER - NSDCMRMEDTOKEN_GEN_ALL_CORE_FT
amLODIPine 10 mg oral tablet: 1 tab(s) orally once a day  aspirin 81 mg oral delayed release tablet: 1 tab(s) orally once a day  cycloSPORINE modified 25 mg oral capsule: 3 cap(s) orally 2 times a day  insulin glargine 100 units/mL subcutaneous solution: 46 unit(s) subcutaneous once a day (at bedtime) 43 units QHS while on Prednisone 40 mg  38 units QHS while on Prednisone 20 mg  insulin lispro 100 units/mL injectable solution: 24 unit(s) injectable 3 times a day (with meals)  metoprolol tartrate 75 mg oral tablet: 1 tab(s) orally 2 times a day  nystatin 100,000 units/mL oral suspension: 5 milliliter(s) orally 3 times a day  predniSONE 20 mg oral tablet: 2 tab(s) orally once a day Take prednisone 40 mg daily from 5/9 - 5/11    Take prednisone 20 mg daily on 5/12  risperiDONE 1 mg oral tablet: 1 tab(s) orally once a day (at bedtime)  sulfamethoxazole-trimethoprim 400 mg-80 mg oral tablet: 1 tab(s) orally once a day  ursodiol 500 mg oral tablet: 1 tab(s) orally every 12 hours  valGANciclovir 450 mg oral tablet: 1 tab(s) orally once a day

## 2024-05-08 NOTE — DISCHARGE NOTE PROVIDER - NSDCCPCAREPLAN_GEN_ALL_CORE_FT
PRINCIPAL DISCHARGE DIAGNOSIS  Diagnosis: Choledocholithiasis  Assessment and Plan of Treatment: Pt. underwent MRCP to identify CBD stone followed by ERCP (sphincterotomy and stent placement). Follow up with Transplant Clinic, return to the Emergency Department if you have fever, chills, nausea, vomiting, diarrhea, chest pain or shortness of breath.      SECONDARY DISCHARGE DIAGNOSES  Diagnosis: KAYLA (acute kidney injury)  Assessment and Plan of Treatment: Pt. has persistently elevated creatinine, follow up with outpatient Nephrology.

## 2024-05-08 NOTE — PROGRESS NOTE ADULT - SUBJECTIVE AND OBJECTIVE BOX
Seen earlier today     Chief Complaint: Diabetes Mellitus follow up    INTERVAL HX: Tolerating PO, eats full meals. Currently on Prednisone 60 mg daily, plan for taper as per team ( 60 mg X3 days, 40 mg X 43 days then 20 mg daily and continues ). Severe hyperglycemia 406 yesterday afternoon, noted NPH 10 unit X1 given. Today BGs in 200s with fasting       Review of Systems:  General: As above  GI: No nausea, vomiting  Endocrine: no  S&Sx of hypoglycemia    Allergies    No Known Allergies    Intolerances      MEDICATIONS  (STANDING):  amLODIPine   Tablet 10 milliGRAM(s) Oral daily  aspirin enteric coated 81 milliGRAM(s) Oral daily  cycloSPORINE  , modified (GENGRAF) 75 milliGRAM(s) Oral <User Schedule>  dextrose 10% Bolus 125 milliLiter(s) IV Bolus once  dextrose 5%. 1000 milliLiter(s) (100 mL/Hr) IV Continuous <Continuous>  dextrose 5%. 1000 milliLiter(s) (50 mL/Hr) IV Continuous <Continuous>  dextrose 50% Injectable 25 Gram(s) IV Push once  dextrose 50% Injectable 12.5 Gram(s) IV Push once  glucagon  Injectable 1 milliGRAM(s) IntraMuscular once  heparin   Injectable 5000 Unit(s) SubCutaneous every 12 hours  insulin glargine Injectable (LANTUS) 46 Unit(s) SubCutaneous at bedtime  insulin lispro (ADMELOG) corrective regimen sliding scale   SubCutaneous <User Schedule>  insulin lispro (ADMELOG) corrective regimen sliding scale   SubCutaneous three times a day before meals  insulin lispro Injectable (ADMELOG) 4 Unit(s) SubCutaneous at bedtime  insulin lispro Injectable (ADMELOG) 26 Unit(s) SubCutaneous three times a day before meals  magnesium oxide 400 milliGRAM(s) Oral three times a day with meals  metoprolol tartrate 75 milliGRAM(s) Oral two times a day  nystatin    Suspension 177786 Unit(s) Oral three times a day  predniSONE   Tablet 60 milliGRAM(s) Oral daily  risperiDONE   Tablet 1 milliGRAM(s) Oral at bedtime  trimethoprim   80 mG/sulfamethoxazole 400 mG 1 Tablet(s) Oral daily  ursodiol Tablet 500 milliGRAM(s) Oral every 12 hours  valGANciclovir 450 milliGRAM(s) Oral daily        insulin glargine Injectable (LANTUS)   46 Unit(s) SubCutaneous (05-07-24 @ 21:44)    insulin lispro (ADMELOG) corrective regimen sliding scale   4 Unit(s) SubCutaneous (05-07-24 @ 21:44)    insulin lispro (ADMELOG) corrective regimen sliding scale   4 Unit(s) SubCutaneous (05-08-24 @ 12:57)   4 Unit(s) SubCutaneous (05-08-24 @ 09:21)   12 Unit(s) SubCutaneous (05-07-24 @ 17:35)    insulin lispro Injectable (ADMELOG)   26 Unit(s) SubCutaneous (05-08-24 @ 12:57)    insulin lispro Injectable (ADMELOG)   22 Unit(s) SubCutaneous (05-08-24 @ 09:22)   22 Unit(s) SubCutaneous (05-07-24 @ 17:35)    insulin NPH human recombinant   10 Unit(s) SubCutaneous (05-07-24 @ 17:35)    predniSONE   Tablet   60 milliGRAM(s) Oral (05-08-24 @ 05:35)        PHYSICAL EXAM:  VITALS: T(C): 36.8 (05-08-24 @ 13:00)  T(F): 98.2 (05-08-24 @ 13:00), Max: 98.9 (05-07-24 @ 21:22)  HR: 62 (05-08-24 @ 13:00) (60 - 74)  BP: 152/71 (05-08-24 @ 13:00) (140/79 - 154/82)  RR:  (18 - 18)  SpO2:  (94% - 100%)  Wt(kg): --  GENERAL: Male sitting in chair, in NAD  Respiratory: Respirations unlabored   Extremities: Warm, no edema  NEURO: Alert , appropriate     LABS:  POCT Blood Glucose.: 209 mg/dL (05-08-24 @ 12:45)  POCT Blood Glucose.: 217 mg/dL (05-08-24 @ 08:26)  POCT Blood Glucose.: 223 mg/dL (05-08-24 @ 01:54)  POCT Blood Glucose.: 336 mg/dL (05-07-24 @ 21:38)  POCT Blood Glucose.: 384 mg/dL (05-07-24 @ 20:03)  POCT Blood Glucose.: 406 mg/dL (05-07-24 @ 17:17)  POCT Blood Glucose.: 409 mg/dL (05-07-24 @ 17:04)  POCT Blood Glucose.: 347 mg/dL (05-07-24 @ 12:46)  POCT Blood Glucose.: 303 mg/dL (05-07-24 @ 08:22)  POCT Blood Glucose.: 330 mg/dL (05-07-24 @ 01:20)  POCT Blood Glucose.: 270 mg/dL (05-06-24 @ 21:36)  POCT Blood Glucose.: 164 mg/dL (05-06-24 @ 17:19)  POCT Blood Glucose.: 197 mg/dL (05-06-24 @ 11:36)  POCT Blood Glucose.: 168 mg/dL (05-06-24 @ 08:17)  POCT Blood Glucose.: 137 mg/dL (05-06-24 @ 00:41)  POCT Blood Glucose.: 152 mg/dL (05-05-24 @ 20:58)  POCT Blood Glucose.: 202 mg/dL (05-05-24 @ 16:34)                          10.8   7.89  )-----------( 237      ( 08 May 2024 06:37 )             33.7     05-08    135  |  102  |  44<H>  ----------------------------<  203<H>  4.8   |  18<L>  |  2.03<H>    Ca    9.6      08 May 2024 06:39  Phos  3.5     05-08  Mg     2.3     05-08    TPro  7.2  /  Alb  4.1  /  TBili  0.8  /  DBili  x   /  AST  66<H>  /  ALT  99<H>  /  AlkPhos  392<H>  05-08    eGFR: 36 mL/min/1.73m2 (08 May 2024 06:39)      Thyroid Function Tests:      A1C with Estimated Average Glucose Result: 12.2 % (04-29-24 @ 06:49)  A1C with Estimated Average Glucose Result: 11.8 % (04-29-24 @ 06:49)    Estimated Average Glucose: 303 mg/dL (04-29-24 @ 06:49)  Estimated Average Glucose: 292 mg/dL (04-29-24 @ 06:49)        Diet, Regular:   Consistent Carbohydrate No Snacks (CSTCHO) (05-03-24 @ 14:36) [Active]  Diet, Regular (05-03-24 @ 10:17) [Available for Activation]

## 2024-05-08 NOTE — PROGRESS NOTE ADULT - SUBJECTIVE AND OBJECTIVE BOX
Interval Events:   patient seen and examined at bedside, patient feeling well, denies any complaints today. MRCP performed yesterday, showed the resolution of the previously seen choledocholithiasis.         PHYSICAL EXAM:   Vital Signs:  Vital Signs Last 24 Hrs  T(C): 36.5 (08 May 2024 09:00), Max: 37.3 (07 May 2024 13:23)  T(F): 97.7 (08 May 2024 09:00), Max: 99.2 (07 May 2024 13:23)  HR: 60 (08 May 2024 09:00) (60 - 80)  BP: 153/76 (08 May 2024 09:00) (140/79 - 155/78)  BP(mean): --  RR: 18 (08 May 2024 09:00) (18 - 18)  SpO2: 94% (08 May 2024 09:00) (94% - 100%)    Parameters below as of 08 May 2024 09:00  Patient On (Oxygen Delivery Method): room air      Daily     Daily Weight in k.1 (08 May 2024 05:41)    GENERAL:  morbidly obese unkempt  male in NAD   HEENT:  NC/AT, sclera anicteric  CHEST:  Normal Effort, Breath sounds clear  HEART:  RRR, S1 + S2, no murmurs  ABDOMEN:  Morbidly obese abdomen, Soft, non-tender, non-distended  EXTREMITIES: no b/l LE edema  SKIN:  Warm & Dry. No rash or erythema  NEURO:  Alert, oriented, no focal deficit     Interval Events:   No acute events overnight.  Patient without acute symptoms at this time.    ROS:   12 point review of systems performed and negative except otherwise noted in HPI.    Hospital Medications:  acetaminophen     Tablet .. 650 milliGRAM(s) Oral every 6 hours PRN  amLODIPine   Tablet 10 milliGRAM(s) Oral daily  aspirin enteric coated 81 milliGRAM(s) Oral daily  cycloSPORINE  , modified (GENGRAF) 75 milliGRAM(s) Oral <User Schedule>  cycloSPORINE  , modified (GENGRAF) 50 milliGRAM(s) Oral <User Schedule>  dextrose 10% Bolus 125 milliLiter(s) IV Bolus once  dextrose 5%. 1000 milliLiter(s) IV Continuous <Continuous>  dextrose 5%. 1000 milliLiter(s) IV Continuous <Continuous>  dextrose 50% Injectable 25 Gram(s) IV Push once  dextrose 50% Injectable 12.5 Gram(s) IV Push once  dextrose Oral Gel 15 Gram(s) Oral once PRN  glucagon  Injectable 1 milliGRAM(s) IntraMuscular once  heparin   Injectable 5000 Unit(s) SubCutaneous every 12 hours  insulin glargine Injectable (LANTUS) 46 Unit(s) SubCutaneous at bedtime  insulin lispro (ADMELOG) corrective regimen sliding scale   SubCutaneous <User Schedule>  insulin lispro (ADMELOG) corrective regimen sliding scale   SubCutaneous three times a day before meals  insulin lispro Injectable (ADMELOG) 26 Unit(s) SubCutaneous three times a day before meals  insulin lispro Injectable (ADMELOG) 4 Unit(s) SubCutaneous at bedtime  magnesium oxide 400 milliGRAM(s) Oral three times a day with meals  melatonin 3 milliGRAM(s) Oral at bedtime PRN  metoprolol tartrate 75 milliGRAM(s) Oral two times a day  predniSONE   Tablet 60 milliGRAM(s) Oral daily  risperiDONE   Tablet 1 milliGRAM(s) Oral at bedtime  ursodiol Tablet 500 milliGRAM(s) Oral every 12 hours      LABS: reviewed                        10.8   7.89  )-----------( 237      ( 08 May 2024 06:37 )             33.7     05-08    135  |  102  |  44<H>  ----------------------------<  203<H>  4.8   |  18<L>  |  2.03<H>    Ca    9.6      08 May 2024 06:39  Phos  3.5     05-08  Mg     2.3     05-    TPro  7.2  /  Alb  4.1  /  TBili  0.8  /  DBili  x   /  AST  66<H>  /  ALT  99<H>  /  AlkPhos  392<H>  05-    LIVER FUNCTIONS - ( 08 May 2024 06:39 )  Alb: 4.1 g/dL / Pro: 7.2 g/dL / ALK PHOS: 392 U/L / ALT: 99 U/L / AST: 66 U/L / GGT: x             Interval Diagnostic Studies: see sunrise for full report

## 2024-05-08 NOTE — PROGRESS NOTE ADULT - ASSESSMENT
63 yrs old male w/ hx of alcoholic cirrhosis complicated by HRS (chronic CKD at this time) s/p OLT 2019 at [facility] and b/l LE DVT (R Gastrocnemius and L CFV through SFJ/ poplitial and post tibial s/p 3M A/C with resolution) who presented for elevated bilirubin and liver enzymes. Pt. had  donor w/ duct to duct without stent CMV R-/D+. 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 Patient transferred to [facility] for medical care and his primary hepatologist is Dr. Mejia. Last seen in clinic on , was found to have new onset liver enzyme elevation    MRI abdomen () mild steatosis of liver transplant patent transplant vasculature.  8 mm ductal stone just upstream from biliary anastomosis without intrahepatic biliary dilatation    On  is status post ERCP which was notable for anastomotic stricture and filling defect consistent with stone proximal to stricture.  Status post enterotomy and stent placement    On May 3 status post liver parenchymal biopsy    Hepatitis B core IgM negative  Hepatitis B surface antigen negative  Hepatitis A IgM negative  Hepatitis C IgG negative  Hepatitis E IgM negative  Hepatitis E PCR negative    RVP (May 6) negative    CMV PCR  Notdetected  Hepatitis B core ab total negative    At this point unclear if infectious contribution to persistent transaminitis.  Patient to send for CMV and Carter-Barr PCR's.  Would add additional viral PCR's as below    #Transaminitis, alkaline phosphatase elevation, hyperbilirubinemia, liver transplant recipient  -- Follow up hepatitis C PCR quantitative  -- Follow up Parvovirus PCR, Herpes-6 PCR, Adenovirus PCR  -- Follow-up on EBV PCR  -- Follow-up on liver biopsy pathology  --Continue to follow CBC with diff  --Continue to follow transaminases; trending down  --Continue to follow temperature curve  --Follow up on preliminary blood cultures  --Follow up on preliminary urine culture          All recommendations are tentative pending Attending Attestation.    Chirag Maddox MD, PGY-4  ID Fellow  [employer] Teams Preferred  After 5pm/weekends call [phone]     63 yrs old male w/ hx of alcoholic cirrhosis complicated by HRS (chronic CKD at this time) s/p OLT 2019 at [facility] and b/l LE DVT (R Gastrocnemius and L CFV through SFJ/ poplitial and post tibial s/p 3M A/C with resolution) who presented for elevated bilirubin and liver enzymes. Pt. had  donor w/ duct to duct without stent CMV R-/D+. 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 Patient transferred to [facility] for medical care and his primary hepatologist is Dr. Mejia. Last seen in clinic on , was found to have new onset liver enzyme elevation    MRI abdomen () mild steatosis of liver transplant patent transplant vasculature.  8 mm ductal stone just upstream from biliary anastomosis without intrahepatic biliary dilatation    On  is status post ERCP which was notable for anastomotic stricture and filling defect consistent with stone proximal to stricture.  Status post enterotomy and stent placement    On May 3 status post liver parenchymal biopsy    Hepatitis B core IgM negative  Hepatitis B surface antigen negative  Hepatitis A IgM negative  Hepatitis C IgG negative  Hepatitis E IgM negative  Hepatitis E PCR negative    RVP (May 6) negative    CMV PCR  Notdetected  Hepatitis B core ab total negative    At this point unclear if infectious contribution to persistent transaminitis.  Patient to send for CMV and Carter-Barr PCR's.  Would add additional viral PCR's as below    #Transaminitis, alkaline phosphatase elevation, hyperbilirubinemia, liver transplant recipient  -- Follow up hepatitis C PCR quantitative  -- Follow up Parvovirus PCR, Herpes-6 PCR, Adenovirus PCR  -- Follow-up on EBV PCR  -- Follow-up on liver biopsy pathology  --Continue to follow CBC with diff  --Continue to follow transaminases; trending down  --Continue to follow temperature curve  --Follow up on preliminary blood cultures    All recommendations are tentative pending Attending Attestation.    Chirag Maddox MD, PGY-4  ID Fellow  [employer] Teams Preferred  After 5pm/weekends call [phone]

## 2024-05-08 NOTE — DISCHARGE NOTE NURSING/CASE MANAGEMENT/SOCIAL WORK - NSDCPEFALRISK_GEN_ALL_CORE
For information on Fall & Injury Prevention, visit: [email or web address] OR  [email or web address] OR  https://www.cdc.gov/steadi/patient.html

## 2024-05-08 NOTE — DISCHARGE NOTE PROVIDER - NSDCFUADDAPPT_GEN_ALL_CORE_FT
[facility] - [address], [address]. [place] [address]   (Tel) [phone]     Follow up with Nephrology

## 2024-05-09 LAB
B19V DNA FLD QL NAA+PROBE: SIGNIFICANT CHANGE UP IU/ML
HADV DNA FLD NAA+PROBE-LOG#: SIGNIFICANT CHANGE UP COPIES/ML
HERPES-6 (HSV-6) PCR: SIGNIFICANT CHANGE UP COPIES/ML
PETH 16:0/18:1: NEGATIVE NG/ML — SIGNIFICANT CHANGE UP
PETH 16:0/18:2: NEGATIVE NG/ML — SIGNIFICANT CHANGE UP
PETH COMMENTS: SIGNIFICANT CHANGE UP

## 2024-05-10 LAB
ANNOTATION COMMENT IMP: SIGNIFICANT CHANGE UP
HEV RNA SERPL NAA+PROBE-ACNC: SIGNIFICANT CHANGE UP IU/ML
HEV RNA SERPL NAA+PROBE-LOG IU: <3.3 LOG IU/ML — SIGNIFICANT CHANGE UP
SPECIMEN SOURCE: SIGNIFICANT CHANGE UP

## 2024-05-12 LAB
CULTURE RESULTS: SIGNIFICANT CHANGE UP
CULTURE RESULTS: SIGNIFICANT CHANGE UP
SPECIMEN SOURCE: SIGNIFICANT CHANGE UP
SPECIMEN SOURCE: SIGNIFICANT CHANGE UP

## 2024-05-14 ENCOUNTER — APPOINTMENT (OUTPATIENT)
Dept: HEPATOLOGY | Facility: CLINIC | Age: 64
End: 2024-05-14
Payer: MEDICARE

## 2024-05-14 VITALS
TEMPERATURE: 97.2 F | OXYGEN SATURATION: 96 % | SYSTOLIC BLOOD PRESSURE: 163 MMHG | HEIGHT: 73 IN | HEART RATE: 59 BPM | WEIGHT: 220 LBS | DIASTOLIC BLOOD PRESSURE: 80 MMHG | BODY MASS INDEX: 29.16 KG/M2 | RESPIRATION RATE: 16 BRPM

## 2024-05-14 PROCEDURE — 99215 OFFICE O/P EST HI 40 MIN: CPT

## 2024-05-14 RX ORDER — SEMAGLUTIDE 0.68 MG/ML
2 INJECTION, SOLUTION SUBCUTANEOUS
Qty: 1 | Refills: 2 | Status: ACTIVE | COMMUNITY
Start: 2024-05-14 | End: 1900-01-01

## 2024-05-14 RX ORDER — NYSTATIN 100000 [USP'U]/ML
100000 SUSPENSION ORAL 3 TIMES DAILY
Qty: 900 | Refills: 0 | Status: ACTIVE | COMMUNITY
Start: 2024-05-14 | End: 1900-01-01

## 2024-05-15 LAB
ALBUMIN SERPL ELPH-MCNC: 4.2 G/DL
ALP BLD-CCNC: 302 U/L
ALT SERPL-CCNC: 165 U/L
ANION GAP SERPL CALC-SCNC: 14 MMOL/L
AST SERPL-CCNC: 111 U/L
BASOPHILS # BLD AUTO: 0.01 K/UL
BASOPHILS NFR BLD AUTO: 0.1 %
BILIRUB SERPL-MCNC: 0.8 MG/DL
BUN SERPL-MCNC: 48 MG/DL
CALCIUM SERPL-MCNC: 9.3 MG/DL
CHLORIDE SERPL-SCNC: 98 MMOL/L
CMV DNA SPEC QL NAA+PROBE: NOT DETECTED IU/ML
CMVPCR LOG: NOT DETECTED LOG10IU/ML
CO2 SERPL-SCNC: 25 MMOL/L
CREAT SERPL-MCNC: 1.85 MG/DL
CYCLOSPORINE SER-MCNC: 40 NG/ML
EGFR: 40 ML/MIN/1.73M2
EOSINOPHIL # BLD AUTO: 0.01 K/UL
EOSINOPHIL NFR BLD AUTO: 0.1 %
GLUCOSE SERPL-MCNC: 146 MG/DL
HCT VFR BLD CALC: 39.4 %
HGB BLD-MCNC: 12.5 G/DL
IMM GRANULOCYTES NFR BLD AUTO: 0.7 %
LYMPHOCYTES # BLD AUTO: 1.54 K/UL
LYMPHOCYTES NFR BLD AUTO: 12.3 %
MAGNESIUM SERPL-MCNC: 2 MG/DL
MAN DIFF?: NORMAL
MCHC RBC-ENTMCNC: 29.4 PG
MCHC RBC-ENTMCNC: 31.7 GM/DL
MCV RBC AUTO: 92.7 FL
MONOCYTES # BLD AUTO: 0.26 K/UL
MONOCYTES NFR BLD AUTO: 2.1 %
NEUTROPHILS # BLD AUTO: 10.56 K/UL
NEUTROPHILS NFR BLD AUTO: 84.7 %
PLATELET # BLD AUTO: 359 K/UL
POTASSIUM SERPL-SCNC: 5.2 MMOL/L
PROT SERPL-MCNC: 7.1 G/DL
RBC # BLD: 4.25 M/UL
RBC # FLD: 13.6 %
SODIUM SERPL-SCNC: 137 MMOL/L
WBC # FLD AUTO: 12.47 K/UL

## 2024-05-15 RX ORDER — MYCOPHENOLATE MOFETIL 500 MG/1
500 TABLET ORAL TWICE DAILY
Qty: 180 | Refills: 3 | Status: ACTIVE | COMMUNITY
Start: 2024-05-15 | End: 1900-01-01

## 2024-05-15 NOTE — PHYSICAL EXAM
[Well Nourished] : well nourished [Healthy Appearing] : healthy appearing [No Acute Distress] : no acute distress [EOMI] : extra ocular movement intact [Normal Hearing] : normal hearing [Normal Oropharynx] : normal oropharynx [No Neck Mass] : no neck mass [Supple] : the neck was supple [No JVD] : no jugular venous distention [No Respiratory Distress] : no respiratory distress [No Accessory Muscle Use] : no accessory muscle use [Clear to Auscultation] : lungs were clear to auscultation bilaterally [Normal S1, S2] : normal S1 and S2 [No Murmurs] : no murmurs heard [Normal Rate/Rhythm] : normal rate/rhythm [Normal Bowel Sounds] : normal bowel sounds [Non Tender] : non-tender [Soft] : soft [No CVA Tenderness] : no CVA tenderness [No Spinal Tenderness] : no spinal tenderness [Normal Gait] : normal gait [Normal Strength/Tone] : normal strength/tone [No Focal Deficits] : no focal deficits [Normal Reflexes] : normal reflexes [No Motor Deficits] : no motor deficits [Normal Affect] : normal affect [Remote Memory Intact] : remote memory intact [Normal Insight/Judgement] : normal insight/judgement [Scleral Icterus] : no scleral icterus [Hepatojugular Reflux] : no hepatojugular reflux [Abdominal Bruit] : no abdominal bruit [Ascites Fluid Wave] : no ascites fluid wave [Splenomegaly] : no splenomegaly [Spider Angioma] : no spider angioma [Jaundice] : no jaundice [Palmar Erythema] : no palmar erythema [Asterixis] : no asterixis [de-identified] : Ventral hernia [de-identified] : dried skin, nodule on nose and on cheek - non tender

## 2024-05-15 NOTE — HISTORY OF PRESENT ILLNESS
[Alcoholic Liver Disease] : Alcoholic Liver Disease [Liver] : Liver [None] : None [Basiliximab] : Basiliximab [FreeTextEntry1] : 63M hx of OLT 4/22/2019 at [facility] for ACLF ETOH Cirrhosis complicated by HRS - with chronic CKD at this time Donor 55 - DBD, duct to duct without stent CMV R-/D+ He had a history of altered mental status after transplant and was converted to Cyclosporine He had low level CMV Viremia in the past  He had a hx of b/l LE DVT (R Gaastrocnemius and L CFV through SFJ/ poplitial and post tibial s/p 3M A/C with resolution) Colonoscopy 3/24/21 - 1 TA repeat in 5-7Y  PMH: DM ETOH USE, HLD, HTN, Cirrhosis, Anxiety PSX: OLT No Smoking, drug use - ETOH use as above  He presents to transfer his care from [facility] to [facility] since he lives in [place] and I am his primary Hepatologist He lives with his son  Sister is a nurse that lives in the city He has had issues with financial / insurance issues in the past but has since resolved   LOV 10/2023 Otherwise feeling good Had mild elevation in liver tests a few months ago that have since resolved He was referred to renal for management of post transplant CKD - has not gotten Renal US Cardiology has started on lipid lowering agents  His cyclo levels have not been accurate as he takes them after ingesting pill - he has remained on 75 mg BID  Meds: Amlodipine 10mg ASA 81mg Vit D3 2000 Cyclo 75mg BID Lantus 5 Ademelog 5units qAC Metoprolol 100mg BID Magnesium 400mg TID Risperidone 1mg Daily MTV Pantoprazole  Started Pred 20 - -> increasing to 40 Nystatin Ozempic Bactrim  Valcyte MMF 500mg BID - will restart   Interval Hx - patient admitted to [facility] for elevated liver tests - had anastamotic stricutre with CBD stone s/p ERCP and stent placement - persistent elevation in liver tests - s/p liver biopsy - Focal areas of inflammation with lymphocytes not equivalent for ACR with pericellular fibrosis and steatosis - Patient treated with PO Steroid pulse - restarting IS - D/C with follow  up - seen by ENDO and nutritition in hospital - feels well at this time

## 2024-05-17 LAB
PETH 16:0/18:1: NEGATIVE NG/ML
PETH 16:0/18:2: NEGATIVE NG/ML
PETH COMMENTS: NORMAL

## 2024-05-20 ENCOUNTER — APPOINTMENT (OUTPATIENT)
Dept: ENDOCRINOLOGY | Facility: CLINIC | Age: 64
End: 2024-05-20
Payer: MEDICARE

## 2024-05-20 VITALS
HEIGHT: 73 IN | OXYGEN SATURATION: 97 % | SYSTOLIC BLOOD PRESSURE: 142 MMHG | HEART RATE: 72 BPM | BODY MASS INDEX: 31.14 KG/M2 | WEIGHT: 235 LBS | DIASTOLIC BLOOD PRESSURE: 66 MMHG

## 2024-05-20 DIAGNOSIS — I10 ESSENTIAL (PRIMARY) HYPERTENSION: ICD-10-CM

## 2024-05-20 DIAGNOSIS — E11.9 TYPE 2 DIABETES MELLITUS W/OUT COMPLICATIONS: ICD-10-CM

## 2024-05-20 DIAGNOSIS — Z79.4 TYPE 2 DIABETES MELLITUS W/OUT COMPLICATIONS: ICD-10-CM

## 2024-05-20 PROCEDURE — 99204 OFFICE O/P NEW MOD 45 MIN: CPT

## 2024-05-20 PROCEDURE — G2211 COMPLEX E/M VISIT ADD ON: CPT

## 2024-05-20 PROCEDURE — 99214 OFFICE O/P EST MOD 30 MIN: CPT

## 2024-05-21 PROBLEM — I10 PRIMARY HYPERTENSION: Status: ACTIVE | Noted: 2023-05-10

## 2024-05-21 LAB
ALBUMIN SERPL ELPH-MCNC: 3.9 G/DL
ALP BLD-CCNC: 281 U/L
ALT SERPL-CCNC: 213 U/L
ANION GAP SERPL CALC-SCNC: 15 MMOL/L
AST SERPL-CCNC: 94 U/L
BILIRUB SERPL-MCNC: 0.8 MG/DL
BUN SERPL-MCNC: 43 MG/DL
CALCIUM SERPL-MCNC: 8.8 MG/DL
CHLORIDE SERPL-SCNC: 101 MMOL/L
CO2 SERPL-SCNC: 24 MMOL/L
CREAT SERPL-MCNC: 1.84 MG/DL
EGFR: 40 ML/MIN/1.73M2
GLUCOSE SERPL-MCNC: 117 MG/DL
POTASSIUM SERPL-SCNC: 4.4 MMOL/L
PROT SERPL-MCNC: 6.5 G/DL
SODIUM SERPL-SCNC: 140 MMOL/L

## 2024-05-21 RX ORDER — INSULIN LISPRO 100 [IU]/ML
100 INJECTION, SOLUTION INTRAVENOUS; SUBCUTANEOUS
Qty: 12 | Refills: 3 | Status: ACTIVE | COMMUNITY
Start: 2023-10-30 | End: 1900-01-01

## 2024-05-21 RX ORDER — INSULIN GLARGINE 100 [IU]/ML
100 INJECTION, SOLUTION SUBCUTANEOUS
Qty: 6 | Refills: 3 | Status: ACTIVE | COMMUNITY
Start: 2023-10-30 | End: 1900-01-01

## 2024-05-21 NOTE — HISTORY OF PRESENT ILLNESS
[FreeTextEntry1] : 64 year old gentleman with PMH of T2DM, CKD, HTN, alcoholic liver disease and cirrhosis s/p liver transplant 2019, recent discharge from hospital with CBD obstruction + portal inflammation. Commenced on high dose steroids. Pt presents today for steroid induced hyperglycemia.  #Diabetes Mellitus Type 2     Diagnosis- 7-8  years ago during work up of transplant  Current regimen:  PRED 40mg daily = Lantus 52 pre bed and 42 humalog TID pre meals   *Discharge plan was (pt not currently following this) Pred 60mg Lantus 52, HL 26  Pred 40mg Lantus 43, HL 24 Pred 20mg Lantus 38, HL 22  Signs/symptoms:  Last HgbA1c: 12.1% (April 2024) Home blood sugars:  Fasting 120-140 Pre lunch 160 Pre bed 180 Hypoglycemia: No  FH of diabetes: no History of CAD: no History of CVA: no Pancreatitis/UTI/thyroid cancer: no Deranged LFTs   Diet:  Breakfast: smoothie fruit  Lunch: turkey sandwich Dinner: chicken, rice and veges  Snacks: diabetic cookies  Drinks: no  Met with diabetes educator? no  Exercise: minimal   eGFR: 40    Alb/creat:  Follow with nephrology? no  Last eye exam: due  Evidence of retinopathy? no  Last foot exam: no Any issues? no  Social History:  Alcohol no Tobacco no Work: retired (sales)  Meds Amlodipine 10mg daily  Aspirin 81mg daily  Viatmin D 2000 IU daily  Cyclosporin Mycophenolate  Metoprolol 100mg BID  Risperidone 1mg daily  Pantoprazole  Nystatin  Bactrim  Valcyte  Ozempic

## 2024-05-21 NOTE — PHYSICAL EXAM
[de-identified] : General: Patient appears well nourished without any distress  Cardio: RRR, no murmurs appreciated  Pulm: CTA b/l, no wheezes, no edema  Skin: No significant rashes or bruises noted Neuro: No focal deficits noted. No tremors GI: No pain with palpation

## 2024-05-21 NOTE — ADDENDUM
[FreeTextEntry1] : Patient came in with Josefina 3 sensor. Patient educated on josefina sensor and reader. Importance of checking Blood Glucose, frequent monitoring, keeping reader nearby for safety, and alarms reviewed with patient. Process of placing and replacement of a sensor reviewed with patient. All questions answered, and encouraged patient to call office if she has any further questions/concerns, verbalized understanding

## 2024-05-21 NOTE — ASSESSMENT
[FreeTextEntry1] : 64 year old gentleman with PMH of T2DM, CKD, HTN, alcoholic liver disease and cirrhosis s/p liver transplant 2019, recent discharge from hospital with CBD obstruction + portal inflammation. Commenced on high dose steroids. Pt presents today for steroid induced hyperglycemia.  #Poorly controlled T2DM #Steroid induced hyperglycemia  -HbA1c 12.1% April 2024 during admission  -Pt still on pred 40mg daily -> will see the liver specialist tomorrow to see if pred dose can be weaned  -Start CGM - placed today  -Continue current doses of insulin: PRED 40mg daily = Lantus 52 pre bed and 42 humalog TID pre meals  -If Pred gets reduced to 20mg pt aware to start Pred 20mg Lantus 38, humalog 22 TID pre meals  -Start ozempic 0.25mg weekly - liver tx team happy for this and put in prescription already -Aim to cut back on insulin and increase ozempic over time  -STOP januvia   Patient counseled extensively about the complications of diabetes including but not limited to nephropathy, neuropathy, and retinopathy. We discussed the importance of annual foot and optho exams. Explained that ideally blood sugars in the morning prior to breakfast should be between 80 and 130. Blood sugars should be checked 2 hours after eating and should be <180. If blood sugar is <70, patient should treat the blood sugar FIRST and then contact provider. Advised patient to let us know if BG persistently <70 or >200  #HTN -BP mildly elevated today 142/66 -Manage as per transplant team  #HLD  April 2024 Recommend statin once LFTs improve   Review with NP in 3 months and with me in 6 months

## 2024-05-22 LAB
CYCLOSPORINE SER-MCNC: 190 NG/ML
HCT VFR BLD CALC: 37.6 %
HGB BLD-MCNC: 12 G/DL
MCHC RBC-ENTMCNC: 29.8 PG
MCHC RBC-ENTMCNC: 31.9 GM/DL
MCV RBC AUTO: 93.3 FL
PLATELET # BLD AUTO: 320 K/UL
RBC # BLD: 4.03 M/UL
RBC # FLD: 13.8 %
WBC # FLD AUTO: 10 K/UL

## 2024-05-28 ENCOUNTER — APPOINTMENT (OUTPATIENT)
Dept: HEPATOLOGY | Facility: CLINIC | Age: 64
End: 2024-05-28
Payer: MEDICARE

## 2024-05-28 VITALS
DIASTOLIC BLOOD PRESSURE: 75 MMHG | SYSTOLIC BLOOD PRESSURE: 135 MMHG | TEMPERATURE: 98.3 F | BODY MASS INDEX: 29.82 KG/M2 | HEIGHT: 73 IN | OXYGEN SATURATION: 97 % | HEART RATE: 59 BPM | WEIGHT: 225 LBS | RESPIRATION RATE: 16 BRPM

## 2024-05-28 LAB
ALBUMIN SERPL ELPH-MCNC: 4.1 G/DL
ALP BLD-CCNC: 303 U/L
ALT SERPL-CCNC: 257 U/L
ANION GAP SERPL CALC-SCNC: 13 MMOL/L
AST SERPL-CCNC: 105 U/L
BILIRUB SERPL-MCNC: 0.6 MG/DL
BUN SERPL-MCNC: 42 MG/DL
CALCIUM SERPL-MCNC: 9 MG/DL
CHLORIDE SERPL-SCNC: 97 MMOL/L
CO2 SERPL-SCNC: 25 MMOL/L
CREAT SERPL-MCNC: 1.84 MG/DL
CYCLOSPORINE SER-MCNC: 30 NG/ML
EGFR: 40 ML/MIN/1.73M2
GLUCOSE SERPL-MCNC: 221 MG/DL
HCT VFR BLD CALC: 39.8 %
HGB BLD-MCNC: 12.5 G/DL
MAGNESIUM SERPL-MCNC: 2.3 MG/DL
MCHC RBC-ENTMCNC: 29.2 PG
MCHC RBC-ENTMCNC: 31.4 GM/DL
MCV RBC AUTO: 93 FL
PLATELET # BLD AUTO: 266 K/UL
POTASSIUM SERPL-SCNC: 5.2 MMOL/L
PROT SERPL-MCNC: 6.7 G/DL
RBC # BLD: 4.28 M/UL
RBC # FLD: 13.3 %
SODIUM SERPL-SCNC: 135 MMOL/L
WBC # FLD AUTO: 8.4 K/UL

## 2024-05-28 PROCEDURE — 99215 OFFICE O/P EST HI 40 MIN: CPT

## 2024-05-30 NOTE — HISTORY OF PRESENT ILLNESS
[Alcoholic Liver Disease] : Alcoholic Liver Disease [Liver] : Liver [None] : None [Basiliximab] : Basiliximab [FreeTextEntry1] : 64M hx of OLT 4/22/2019 at [facility] for ACLF ETOH Cirrhosis complicated by HRS - with chronic CKD at this time Donor 55 - DBD, duct to duct without stent CMV R-/D+ He had a history of altered mental status after transplant and was converted to Cyclosporine He had low level CMV Viremia in the past  He had a hx of b/l LE DVT (R Gaastrocnemius and L CFV through SFJ/ poplitial and post tibial s/p 3M A/C with resolution) Colonoscopy 3/24/21 - 1 TA repeat in 5-7Y  PMH: DM ETOH USE, HLD, HTN, Cirrhosis, Anxiety PSX: OLT No Smoking, drug use - ETOH use as above  He presents to transfer his care from [facility] to [facility] since he lives in [place] and I am his primary Hepatologist He lives with his son  Sister is a nurse that lives in the city He has had issues with financial / insurance issues in the past but has since resolved   admitted to [facility] (4/28-5/8/2024) for elevated liver tests - had anastamotic stricutre with CBD stone s/p ERCP and stent placement - persistent elevation in liver tests - s/p liver biopsy - Focal areas of inflammation with lymphocytes not equivalent for ACR with pericellular fibrosis and steatosis - Patient treated with PO Steroid pulse - restarting IS - D/C with follow up  Meds: Amlodipine 10mg ASA 81mg Vit D3 2000 Cyclo 75mg BID Metoprolol 100mg BID Magnesium 400mg TID Risperidone 1mg Daily MTV Pantoprazole  Started Pred 20 - -> increasing to 40 now down to 30 Nystatin Ozempic Bactrim  Valcyte MMF 500mg BID - will restart   Interval Hx LOV 5/14/2024  - Patient present to the visit today overall feeling well with no acute complaints.  - Current IS cyclo 75 BID.  BID pred tapered from 40mg to 30mg started 5/27/2024.  - DM control is better with increased insulin dose short acting 20 IU TID, long acting 40IU nighty and starting Ozempic per Endocrine.  - Trying to reduce rice intake and cut out on snacks.  Denies fever, chills, nausea, vomiting, jaundice, melena, maroon stool, abd pain, abdominal distention, confusion, asterixis, or unintentional weight loss.

## 2024-05-30 NOTE — ASSESSMENT
[FreeTextEntry1] : 64M s/p OLT with post transplant CKD otherwise doing well but with persistently elv  #OLT - Elevated liver tests noted on 4/26  - CBD obstruction with portal inflammatation noted on liver bx.  - labs continue to be stable but elevated- tapered steroids to 30mg PO for one week. schedule depending on labs done today.  - Continue cyclo 75 BID and MMF 500mg BID - Continue PPX: Valcyte / Nystatin / Bactrim - hx of CBD stricture - C/W Ursodiol - Consider trial Rezdiffra for hepatic steatosis down the road.  May require repeat liver bx if labs dont improve   #DM- cont f/u Endo. continue insulin therapy and Ozempic.   #HCM - DERM UTD  - DEXA 2022 - will update next year  - Colon 2026 hx of TA in 2021 - Vaccines up to date  Labs today and repeat again next week

## 2024-05-30 NOTE — PHYSICAL EXAM
[Well Nourished] : well nourished [Healthy Appearing] : healthy appearing [No Acute Distress] : no acute distress [EOMI] : extra ocular movement intact [Normal Hearing] : normal hearing [Normal Oropharynx] : normal oropharynx [No Neck Mass] : no neck mass [Supple] : the neck was supple [No JVD] : no jugular venous distention [No Respiratory Distress] : no respiratory distress [No Accessory Muscle Use] : no accessory muscle use [Clear to Auscultation] : lungs were clear to auscultation bilaterally [Normal S1, S2] : normal S1 and S2 [No Murmurs] : no murmurs heard [Normal Rate/Rhythm] : normal rate/rhythm [Normal Bowel Sounds] : normal bowel sounds [Non Tender] : non-tender [Soft] : soft [No CVA Tenderness] : no CVA tenderness [No Spinal Tenderness] : no spinal tenderness [Normal Gait] : normal gait [Normal Strength/Tone] : normal strength/tone [No Focal Deficits] : no focal deficits [Normal Reflexes] : normal reflexes [No Motor Deficits] : no motor deficits [Normal Affect] : normal affect [Remote Memory Intact] : remote memory intact [Normal Insight/Judgement] : normal insight/judgement [Scleral Icterus] : no scleral icterus [Hepatojugular Reflux] : no hepatojugular reflux [Abdominal Bruit] : no abdominal bruit [Ascites Fluid Wave] : no ascites fluid wave [Splenomegaly] : no splenomegaly [Spider Angioma] : no spider angioma [Jaundice] : no jaundice [Palmar Erythema] : no palmar erythema [Asterixis] : no asterixis [de-identified] : Ventral hernia [de-identified] : dried skin, nodule on nose and on cheek - non tender

## 2024-06-03 LAB
ALBUMIN SERPL ELPH-MCNC: 4 G/DL
ALP BLD-CCNC: 228 U/L
ALT SERPL-CCNC: 182 U/L
ANION GAP SERPL CALC-SCNC: 12 MMOL/L
AST SERPL-CCNC: 60 U/L
BILIRUB SERPL-MCNC: 0.7 MG/DL
BUN SERPL-MCNC: 49 MG/DL
CALCIUM SERPL-MCNC: 9.3 MG/DL
CHLORIDE SERPL-SCNC: 96 MMOL/L
CO2 SERPL-SCNC: 29 MMOL/L
CREAT SERPL-MCNC: 1.99 MG/DL
EGFR: 36 ML/MIN/1.73M2
GGT SERPL-CCNC: 958 U/L
GLUCOSE SERPL-MCNC: 150 MG/DL
HCT VFR BLD CALC: 39.9 %
HGB BLD-MCNC: 12.5 G/DL
MCHC RBC-ENTMCNC: 29.6 PG
MCHC RBC-ENTMCNC: 31.3 GM/DL
MCV RBC AUTO: 94.3 FL
PLATELET # BLD AUTO: 263 K/UL
POTASSIUM SERPL-SCNC: 4.7 MMOL/L
PROT SERPL-MCNC: 6.7 G/DL
RBC # BLD: 4.23 M/UL
RBC # FLD: 13.6 %
SODIUM SERPL-SCNC: 137 MMOL/L
WBC # FLD AUTO: 8.59 K/UL

## 2024-06-06 DIAGNOSIS — Z79.899 OTHER LONG TERM (CURRENT) DRUG THERAPY: ICD-10-CM

## 2024-06-06 LAB
ALBUMIN SERPL ELPH-MCNC: 4.1 G/DL
ALP BLD-CCNC: 190 U/L
ALT SERPL-CCNC: 122 U/L
ANION GAP SERPL CALC-SCNC: 14 MMOL/L
AST SERPL-CCNC: 54 U/L
BILIRUB SERPL-MCNC: 0.5 MG/DL
BUN SERPL-MCNC: 39 MG/DL
CALCIUM SERPL-MCNC: 9.2 MG/DL
CHLORIDE SERPL-SCNC: 100 MMOL/L
CMV DNA SPEC QL NAA+PROBE: NOT DETECTED IU/ML
CMVPCR LOG: NOT DETECTED LOG10IU/ML
CO2 SERPL-SCNC: 26 MMOL/L
CREAT SERPL-MCNC: 1.87 MG/DL
CYCLOSPORINE SER-MCNC: 77 NG/ML
EGFR: 39 ML/MIN/1.73M2
GGT SERPL-CCNC: 850 U/L
GLUCOSE SERPL-MCNC: 76 MG/DL
HCT VFR BLD CALC: 39.2 %
HGB BLD-MCNC: 12.1 G/DL
MAGNESIUM SERPL-MCNC: 1.9 MG/DL
MCHC RBC-ENTMCNC: 29.3 PG
MCHC RBC-ENTMCNC: 30.9 GM/DL
MCV RBC AUTO: 94.9 FL
PHOSPHATE SERPL-MCNC: 3.6 MG/DL
PLATELET # BLD AUTO: 251 K/UL
POTASSIUM SERPL-SCNC: 4 MMOL/L
PROT SERPL-MCNC: 6.7 G/DL
RBC # BLD: 4.13 M/UL
RBC # FLD: 13.6 %
SODIUM SERPL-SCNC: 140 MMOL/L
WBC # FLD AUTO: 10.22 K/UL

## 2024-06-06 RX ORDER — PREDNISONE 5 MG/1
5 TABLET ORAL DAILY
Qty: 30 | Refills: 3 | Status: ACTIVE | COMMUNITY
Start: 2024-06-06 | End: 1900-01-01

## 2024-06-15 RX ORDER — PREDNISONE 5 MG/1
5 TABLET ORAL DAILY
Qty: 60 | Refills: 0 | Status: ACTIVE | COMMUNITY
Start: 2024-06-15 | End: 1900-01-01

## 2024-06-15 RX ORDER — PREDNISONE 10 MG/1
10 TABLET ORAL DAILY
Qty: 180 | Refills: 0 | Status: DISCONTINUED | COMMUNITY
Start: 2024-05-14 | End: 2024-06-15

## 2024-06-18 RX ORDER — SULFAMETHOXAZOLE AND TRIMETHOPRIM 400; 80 MG/1; MG/1
400-80 TABLET ORAL DAILY
Qty: 30 | Refills: 0 | Status: ACTIVE | COMMUNITY
Start: 2024-05-14 | End: 1900-01-01

## 2024-06-18 RX ORDER — VALGANCICLOVIR HYDROCHLORIDE 450 MG/1
450 TABLET ORAL
Qty: 30 | Refills: 0 | Status: ACTIVE | COMMUNITY
Start: 2024-05-14 | End: 1900-01-01

## 2024-06-20 ENCOUNTER — NON-APPOINTMENT (OUTPATIENT)
Age: 64
End: 2024-06-20

## 2024-06-20 DIAGNOSIS — Z94.4 LIVER TRANSPLANT STATUS: ICD-10-CM

## 2024-06-20 DIAGNOSIS — N18.30 CHRONIC KIDNEY DISEASE, STAGE 3 UNSPECIFIED: ICD-10-CM

## 2024-06-20 DIAGNOSIS — R79.89 OTHER SPECIFIED ABNORMAL FINDINGS OF BLOOD CHEMISTRY: ICD-10-CM

## 2024-06-20 LAB
ALBUMIN SERPL ELPH-MCNC: 3.9 G/DL
ALP BLD-CCNC: 191 U/L
ALT SERPL-CCNC: 76 U/L
ANION GAP SERPL CALC-SCNC: 17 MMOL/L
AST SERPL-CCNC: 43 U/L
BILIRUB SERPL-MCNC: 0.8 MG/DL
BUN SERPL-MCNC: 35 MG/DL
CALCIUM SERPL-MCNC: 9.6 MG/DL
CHLORIDE SERPL-SCNC: 97 MMOL/L
CO2 SERPL-SCNC: 22 MMOL/L
CREAT SERPL-MCNC: 1.9 MG/DL
CYCLOSPORINE SER-MCNC: 40 NG/ML
EGFR: 38 ML/MIN/1.73M2
GGT SERPL-CCNC: 743 U/L
GLUCOSE SERPL-MCNC: 146 MG/DL
HCT VFR BLD CALC: 42 %
HGB BLD-MCNC: 13.5 G/DL
MCHC RBC-ENTMCNC: 29.7 PG
MCHC RBC-ENTMCNC: 32.1 GM/DL
MCV RBC AUTO: 92.3 FL
PLATELET # BLD AUTO: 341 K/UL
POTASSIUM SERPL-SCNC: 4.4 MMOL/L
PROT SERPL-MCNC: 6.5 G/DL
RBC # BLD: 4.55 M/UL
RBC # FLD: 13.6 %
SODIUM SERPL-SCNC: 136 MMOL/L
WBC # FLD AUTO: 10.2 K/UL

## 2024-06-27 ENCOUNTER — RX RENEWAL (OUTPATIENT)
Age: 64
End: 2024-06-27

## 2024-07-05 DIAGNOSIS — E11.9 TYPE 2 DIABETES MELLITUS W/OUT COMPLICATIONS: ICD-10-CM

## 2024-07-05 DIAGNOSIS — Z79.4 TYPE 2 DIABETES MELLITUS W/OUT COMPLICATIONS: ICD-10-CM

## 2024-07-24 ENCOUNTER — NON-APPOINTMENT (OUTPATIENT)
Age: 64
End: 2024-07-24

## 2024-07-25 DIAGNOSIS — Z79.899 OTHER LONG TERM (CURRENT) DRUG THERAPY: ICD-10-CM

## 2024-07-25 DIAGNOSIS — R79.89 OTHER SPECIFIED ABNORMAL FINDINGS OF BLOOD CHEMISTRY: ICD-10-CM

## 2024-08-20 ENCOUNTER — APPOINTMENT (OUTPATIENT)
Dept: ENDOCRINOLOGY | Facility: CLINIC | Age: 64
End: 2024-08-20

## 2024-09-19 ENCOUNTER — NON-APPOINTMENT (OUTPATIENT)
Age: 64
End: 2024-09-19

## 2024-09-19 ENCOUNTER — APPOINTMENT (OUTPATIENT)
Dept: GASTROENTEROLOGY | Facility: CLINIC | Age: 64
End: 2024-09-19
Payer: MEDICARE

## 2024-09-19 DIAGNOSIS — Z46.89 ENCOUNTER FOR FITTING AND ADJUSTMENT OF OTHER SPECIFIED DEVICES: ICD-10-CM

## 2024-09-19 DIAGNOSIS — Z98.890 OTHER SPECIFIED POSTPROCEDURAL STATES: ICD-10-CM

## 2024-09-19 DIAGNOSIS — K80.50 CALCULUS OF BILE DUCT W/OUT CHOLANGITIS OR CHOLECYSTITIS W/OUT OBSTRUCTION: ICD-10-CM

## 2024-09-19 PROCEDURE — 99442: CPT | Mod: 93

## 2024-09-25 ENCOUNTER — OUTPATIENT (OUTPATIENT)
Dept: OUTPATIENT SERVICES | Facility: HOSPITAL | Age: 64
LOS: 1 days | End: 2024-09-25
Payer: MEDICARE

## 2024-09-25 VITALS
TEMPERATURE: 98 F | HEART RATE: 76 BPM | WEIGHT: 233.91 LBS | HEIGHT: 73 IN | DIASTOLIC BLOOD PRESSURE: 86 MMHG | RESPIRATION RATE: 16 BRPM | OXYGEN SATURATION: 98 % | SYSTOLIC BLOOD PRESSURE: 130 MMHG

## 2024-09-25 DIAGNOSIS — Z94.4 LIVER TRANSPLANT STATUS: Chronic | ICD-10-CM

## 2024-09-25 DIAGNOSIS — K83.1 OBSTRUCTION OF BILE DUCT: ICD-10-CM

## 2024-09-25 DIAGNOSIS — Z01.818 ENCOUNTER FOR OTHER PREPROCEDURAL EXAMINATION: ICD-10-CM

## 2024-09-25 DIAGNOSIS — G47.33 OBSTRUCTIVE SLEEP APNEA (ADULT) (PEDIATRIC): ICD-10-CM

## 2024-09-25 DIAGNOSIS — E11.9 TYPE 2 DIABETES MELLITUS WITHOUT COMPLICATIONS: ICD-10-CM

## 2024-09-25 DIAGNOSIS — I10 ESSENTIAL (PRIMARY) HYPERTENSION: ICD-10-CM

## 2024-09-25 DIAGNOSIS — Z46.89 ENCOUNTER FOR FITTING AND ADJUSTMENT OF OTHER SPECIFIED DEVICES: ICD-10-CM

## 2024-09-25 DIAGNOSIS — Z98.890 OTHER SPECIFIED POSTPROCEDURAL STATES: Chronic | ICD-10-CM

## 2024-09-25 LAB
A1C WITH ESTIMATED AVERAGE GLUCOSE RESULT: 7.8 % — HIGH (ref 4–5.6)
ALBUMIN SERPL ELPH-MCNC: 3.8 G/DL — SIGNIFICANT CHANGE UP (ref 3.3–5)
ALP SERPL-CCNC: 348 U/L — HIGH (ref 40–120)
ALT FLD-CCNC: 66 U/L — HIGH (ref 10–45)
ANION GAP SERPL CALC-SCNC: 14 MMOL/L — SIGNIFICANT CHANGE UP (ref 5–17)
AST SERPL-CCNC: 61 U/L — HIGH (ref 10–40)
BILIRUB SERPL-MCNC: 0.8 MG/DL — SIGNIFICANT CHANGE UP (ref 0.2–1.2)
BUN SERPL-MCNC: 24 MG/DL — HIGH (ref 7–23)
CALCIUM SERPL-MCNC: 9 MG/DL — SIGNIFICANT CHANGE UP (ref 8.4–10.5)
CHLORIDE SERPL-SCNC: 100 MMOL/L — SIGNIFICANT CHANGE UP (ref 96–108)
CO2 SERPL-SCNC: 24 MMOL/L — SIGNIFICANT CHANGE UP (ref 22–31)
CREAT SERPL-MCNC: 1.52 MG/DL — HIGH (ref 0.5–1.3)
EGFR: 50 ML/MIN/1.73M2 — LOW
ESTIMATED AVERAGE GLUCOSE: 177 MG/DL — HIGH (ref 68–114)
GLUCOSE SERPL-MCNC: 182 MG/DL — HIGH (ref 70–99)
HCT VFR BLD CALC: 36.9 % — LOW (ref 39–50)
HGB BLD-MCNC: 11.7 G/DL — LOW (ref 13–17)
MCHC RBC-ENTMCNC: 27.1 PG — SIGNIFICANT CHANGE UP (ref 27–34)
MCHC RBC-ENTMCNC: 31.7 GM/DL — LOW (ref 32–36)
MCV RBC AUTO: 85.4 FL — SIGNIFICANT CHANGE UP (ref 80–100)
NRBC # BLD: 0 /100 WBCS — SIGNIFICANT CHANGE UP (ref 0–0)
PLATELET # BLD AUTO: 293 K/UL — SIGNIFICANT CHANGE UP (ref 150–400)
POTASSIUM SERPL-MCNC: 3.7 MMOL/L — SIGNIFICANT CHANGE UP (ref 3.5–5.3)
POTASSIUM SERPL-SCNC: 3.7 MMOL/L — SIGNIFICANT CHANGE UP (ref 3.5–5.3)
PROT SERPL-MCNC: 7.3 G/DL — SIGNIFICANT CHANGE UP (ref 6–8.3)
RBC # BLD: 4.32 M/UL — SIGNIFICANT CHANGE UP (ref 4.2–5.8)
RBC # FLD: 12.9 % — SIGNIFICANT CHANGE UP (ref 10.3–14.5)
SODIUM SERPL-SCNC: 138 MMOL/L — SIGNIFICANT CHANGE UP (ref 135–145)
WBC # BLD: 6.43 K/UL — SIGNIFICANT CHANGE UP (ref 3.8–10.5)
WBC # FLD AUTO: 6.43 K/UL — SIGNIFICANT CHANGE UP (ref 3.8–10.5)

## 2024-09-25 PROCEDURE — 83036 HEMOGLOBIN GLYCOSYLATED A1C: CPT

## 2024-09-25 PROCEDURE — G0463: CPT

## 2024-09-25 PROCEDURE — 85027 COMPLETE CBC AUTOMATED: CPT

## 2024-09-25 PROCEDURE — 80053 COMPREHEN METABOLIC PANEL: CPT

## 2024-09-25 NOTE — H&P PST ADULT - PROBLEM SELECTOR PLAN 1
ERCP Biliary Stent Removal on 9/27/24  continue on Asprin until day of surgery   Pre-op education provided - all questions answered. Pt verbalized understanding

## 2024-09-25 NOTE — H&P PST ADULT - PROBLEM SELECTOR PLAN 2
Finger stick on day of surgery   Hold Humalog day of surgery   Lantus 8 units night prior to surgery and morning of surgery

## 2024-09-25 NOTE — H&P PST ADULT - HISTORY OF PRESENT ILLNESS
62 y/o M  with h/o Alcoholic cirrhosis complicated by HRS (chronic CKD at this time) s/p OLT (CMV -/+) on 4/2019 at [facility] and b/l LE DVT (R Gastrocnemius and L CFV through SFJ/ poplitial and post tibial s/p 3M A/C with resolution now on bASA), T2DM .Pt was seen in clinic on 4/22 and was found to have new onset of elevated LFTs and admitted to [facility] s/p MRCP which showed small 8 mm CBD stone upstream of the anastomosis site s/p ERCP 4/30/24 with stent placement . Repeat MRCP was conducted on 5/7 which yielded Resolution of the previously seen choledocholithiasis and he was d/c home on 5/8/24. Today he present to PST for scheduled ERCP Biliary Stent Removal on 9/27/24. Denies any palpitations, SOB, N/V, fever or chills.

## 2024-09-27 ENCOUNTER — APPOINTMENT (OUTPATIENT)
Dept: GASTROENTEROLOGY | Facility: HOSPITAL | Age: 64
End: 2024-09-27

## 2024-09-27 ENCOUNTER — TRANSCRIPTION ENCOUNTER (OUTPATIENT)
Age: 64
End: 2024-09-27

## 2024-09-27 ENCOUNTER — OUTPATIENT (OUTPATIENT)
Dept: OUTPATIENT SERVICES | Facility: HOSPITAL | Age: 64
LOS: 1 days | End: 2024-09-27
Payer: MEDICARE

## 2024-09-27 VITALS
RESPIRATION RATE: 13 BRPM | WEIGHT: 235.01 LBS | TEMPERATURE: 98 F | DIASTOLIC BLOOD PRESSURE: 69 MMHG | OXYGEN SATURATION: 96 % | SYSTOLIC BLOOD PRESSURE: 130 MMHG | HEART RATE: 66 BPM | HEIGHT: 73 IN

## 2024-09-27 VITALS
RESPIRATION RATE: 16 BRPM | HEART RATE: 65 BPM | OXYGEN SATURATION: 100 % | DIASTOLIC BLOOD PRESSURE: 78 MMHG | SYSTOLIC BLOOD PRESSURE: 124 MMHG

## 2024-09-27 DIAGNOSIS — Z94.4 LIVER TRANSPLANT STATUS: Chronic | ICD-10-CM

## 2024-09-27 DIAGNOSIS — Z98.890 OTHER SPECIFIED POSTPROCEDURAL STATES: Chronic | ICD-10-CM

## 2024-09-27 DIAGNOSIS — Z46.89 ENCOUNTER FOR FITTING AND ADJUSTMENT OF OTHER SPECIFIED DEVICES: ICD-10-CM

## 2024-09-27 LAB — GLUCOSE BLDC GLUCOMTR-MCNC: 174 MG/DL — HIGH (ref 70–99)

## 2024-09-27 PROCEDURE — 74330 X-RAY BILE/PANC ENDOSCOPY: CPT

## 2024-09-27 PROCEDURE — 43264 ERCP REMOVE DUCT CALCULI: CPT

## 2024-09-27 PROCEDURE — 43264 ERCP REMOVE DUCT CALCULI: CPT | Mod: 59

## 2024-09-27 PROCEDURE — 74328 X-RAY BILE DUCT ENDOSCOPY: CPT | Mod: 26,59

## 2024-09-27 PROCEDURE — 43275 ERCP REMOVE FORGN BODY DUCT: CPT

## 2024-09-27 PROCEDURE — C1769: CPT

## 2024-09-27 PROCEDURE — 82962 GLUCOSE BLOOD TEST: CPT

## 2024-09-27 DEVICE — HYDRATOME 44: Type: IMPLANTABLE DEVICE | Status: FUNCTIONAL

## 2024-09-27 DEVICE — AUTOTOME CANNULATING SPHINCTEROTOME RX 44 20MM: Type: IMPLANTABLE DEVICE | Status: FUNCTIONAL

## 2024-09-27 DEVICE — BLLN EXTRACT FUSION QUATRO 8.5 10 12 15MM: Type: IMPLANTABLE DEVICE | Status: FUNCTIONAL

## 2024-09-27 DEVICE — CATH BLLN EXTRACT DIST GUIDE WIRE 15MM 3LUM: Type: IMPLANTABLE DEVICE | Status: FUNCTIONAL

## 2024-09-27 RX ORDER — INSULIN GLARGINE 300 U/ML
10 INJECTION, SOLUTION SUBCUTANEOUS
Refills: 0 | DISCHARGE

## 2024-09-27 RX ORDER — MYCOPHENOLATE MOFETIL 500 MG/1
1 TABLET ORAL
Refills: 0 | DISCHARGE

## 2024-09-27 RX ORDER — INSULIN LISPRO 100/ML
0 VIAL (ML) SUBCUTANEOUS
Refills: 0 | DISCHARGE

## 2024-09-27 RX ORDER — SODIUM CHLORIDE IRRIG SOLUTION 0.9 %
500 SOLUTION, IRRIGATION IRRIGATION
Refills: 0 | Status: COMPLETED | OUTPATIENT
Start: 2024-09-27 | End: 2024-09-27

## 2024-09-27 RX ADMIN — Medication 30 MILLILITER(S): at 13:55

## 2024-09-27 NOTE — PRE PROCEDURE NOTE - PRE PROCEDURE EVALUATION
Attending Physician:   Guy                         Procedure:  ERCP     Indication for Procedure:  anastomotic biliary stricture  ________________________________________________________  PAST MEDICAL & SURGICAL HISTORY:  Hypertension      Diabetes mellitus      Alcoholic cirrhosis      Common bile duct (CBD) obstruction      Liver transplanted      History of ERCP        ALLERGIES:  No Known Allergies    HOME MEDICATIONS:  cycloSPORINE modified 25 mg oral capsule: 3 cap(s) orally 2 times a day  HumaLOG Michael KwikPen 100 units/mL injectable solution: Before meals  Lantus 100 units/mL subcutaneous solution: 10 unit(s) subcutaneous 2 times a day  mycophenolate mofetil 500 mg oral tablet: 1 tab(s) orally 2 times a day  risperiDONE 1 mg oral tablet: 1 tab(s) orally once a day (at bedtime)    AICD/PPM: [ ] yes   [ x] no    PERTINENT LAB DATA:                        11.7   6.43  )-----------( 293      ( 25 Sep 2024 17:21 )             36.9     09-25    138  |  100  |  24[H]  ----------------------------<  182[H]  3.7   |  24  |  1.52[H]    Ca    9.0      25 Sep 2024 17:21    TPro  7.3  /  Alb  3.8  /  TBili  0.8  /  DBili  x   /  AST  61[H]  /  ALT  66[H]  /  AlkPhos  348[H]  09-25                PHYSICAL EXAMINATION:    Vital signs stable    Constitutional: NAD  HEENT: anicteric   Neck:  No JVD  Respiratory: CTAB/L  Cardiovascular: S1 and S2  Gastrointestinal: BS+, soft, NT/ND  Extremities: No peripheral edema  Neurological: No confusion  Psychiatric: Normal mood, normal affect  Skin: No jaundice    ASA Class: I [ ]  II [ ]  III [x ]  IV [ ]    COMMENTS:    The patient is a suitable candidate for the planned procedure unless box checked [ ]  No, explain:

## 2024-09-27 NOTE — ASU PATIENT PROFILE, ADULT - FALL HARM RISK - UNIVERSAL INTERVENTIONS
Bed in lowest position, wheels locked, appropriate side rails in place/Call bell, personal items and telephone in reach/Instruct patient to call for assistance before getting out of bed or chair/Non-slip footwear when patient is out of bed/[place] to call system/Physically safe environment - no spills, clutter or unnecessary equipment/Purposeful Proactive Rounding/Room/bathroom lighting operational, light cord in reach

## 2024-09-27 NOTE — PRE-ANESTHESIA EVALUATION ADULT - NSPROPOSEDPROCEDFT_GEN_ALL_CORE
Patient reports left sided to sternal chest pain since 0100. Denies any complicating factors, other sx or history. Denies ASA use. States pain is sharp and comes and goes.     
EGD/ERCP

## 2024-09-27 NOTE — ASU PATIENT PROFILE, ADULT - NSICDXPASTMEDICALHX_GEN_ALL_CORE_FT
PAST MEDICAL HISTORY:  Alcoholic cirrhosis     Common bile duct (CBD) obstruction     Diabetes mellitus     Hypertension

## 2024-09-27 NOTE — ASU DISCHARGE PLAN (ADULT/PEDIATRIC) - NS MD DC FALL RISK RISK
For information on Fall & Injury Prevention, visit: [email or web address] OR  [email or web address] OR  https://www.cdc.gov/steadi/patient.html

## 2024-09-30 ENCOUNTER — APPOINTMENT (OUTPATIENT)
Dept: HEPATOLOGY | Facility: CLINIC | Age: 64
End: 2024-09-30
Payer: MEDICARE

## 2024-09-30 VITALS
DIASTOLIC BLOOD PRESSURE: 78 MMHG | HEIGHT: 73 IN | OXYGEN SATURATION: 98 % | BODY MASS INDEX: 31.14 KG/M2 | WEIGHT: 235 LBS | HEART RATE: 88 BPM | SYSTOLIC BLOOD PRESSURE: 156 MMHG | TEMPERATURE: 98.6 F | RESPIRATION RATE: 16 BRPM

## 2024-09-30 DIAGNOSIS — E11.9 TYPE 2 DIABETES MELLITUS W/OUT COMPLICATIONS: ICD-10-CM

## 2024-09-30 DIAGNOSIS — K80.50 CALCULUS OF BILE DUCT W/OUT CHOLANGITIS OR CHOLECYSTITIS W/OUT OBSTRUCTION: ICD-10-CM

## 2024-09-30 DIAGNOSIS — Z98.890 OTHER SPECIFIED POSTPROCEDURAL STATES: ICD-10-CM

## 2024-09-30 DIAGNOSIS — Z79.4 TYPE 2 DIABETES MELLITUS W/OUT COMPLICATIONS: ICD-10-CM

## 2024-09-30 PROBLEM — K83.1 OBSTRUCTION OF BILE DUCT: Chronic | Status: ACTIVE | Noted: 2024-09-25

## 2024-09-30 PROCEDURE — 99214 OFFICE O/P EST MOD 30 MIN: CPT

## 2024-09-30 RX ORDER — SEMAGLUTIDE 1.34 MG/ML
4 INJECTION, SOLUTION SUBCUTANEOUS
Qty: 4 | Refills: 3 | Status: ACTIVE | COMMUNITY
Start: 2024-09-30 | End: 1900-01-01

## 2024-10-01 DIAGNOSIS — K91.89 OTHER POSTPROCEDURAL COMPLICATIONS AND DISORDERS OF DIGESTIVE SYSTEM: ICD-10-CM

## 2024-10-01 DIAGNOSIS — K83.1 OTHER POSTPROCEDURAL COMPLICATIONS AND DISORDERS OF DIGESTIVE SYSTEM: ICD-10-CM

## 2024-10-01 DIAGNOSIS — Z94.4 LIVER TRANSPLANT STATUS: ICD-10-CM

## 2024-10-01 DIAGNOSIS — K31.9 DISEASE OF STOMACH AND DUODENUM, UNSPECIFIED: ICD-10-CM

## 2024-10-02 NOTE — PHYSICAL EXAM
[Well Nourished] : well nourished [Healthy Appearing] : healthy appearing [No Acute Distress] : no acute distress [EOMI] : extra ocular movement intact [Normal Hearing] : normal hearing [Normal Oropharynx] : normal oropharynx [No Neck Mass] : no neck mass [Supple] : the neck was supple [No JVD] : no jugular venous distention [No Respiratory Distress] : no respiratory distress [No Accessory Muscle Use] : no accessory muscle use [Clear to Auscultation] : lungs were clear to auscultation bilaterally [Normal S1, S2] : normal S1 and S2 [No Murmurs] : no murmurs heard [Normal Rate/Rhythm] : normal rate/rhythm [Normal Bowel Sounds] : normal bowel sounds [Non Tender] : non-tender [Soft] : soft [No CVA Tenderness] : no CVA tenderness [No Spinal Tenderness] : no spinal tenderness [Normal Gait] : normal gait [Normal Strength/Tone] : normal strength/tone [No Focal Deficits] : no focal deficits [Normal Reflexes] : normal reflexes [No Motor Deficits] : no motor deficits [Normal Affect] : normal affect [Remote Memory Intact] : remote memory intact [Normal Insight/Judgement] : normal insight/judgement [Scleral Icterus] : no scleral icterus [Hepatojugular Reflux] : no hepatojugular reflux [Abdominal Bruit] : no abdominal bruit [Ascites Fluid Wave] : no ascites fluid wave [Splenomegaly] : no splenomegaly [Spider Angioma] : no spider angioma [Jaundice] : no jaundice [Palmar Erythema] : no palmar erythema [Asterixis] : no asterixis [de-identified] : Ventral hernia [de-identified] : dried skin, nodule on nose and on cheek - non tender

## 2024-10-02 NOTE — ASSESSMENT
[FreeTextEntry1] : 64M s/p OLT with post transplant CKD otherwise doing well.  Recent episode of elevated liver tests noted on 4/26  - CBD obstruction with portal inflammation noted on liver bx.  Labs improved with biliary stent placement.   #OLT Slightly elevated LFTs from PST labs most likely related to biliary obstruction s/p ERCP on 9/28/2024.  Cyclo level has been difficult to trend given lab timing. Goal  - Continue cyclo 75 BID and MMF 500mg BID off pred - PPX: completed Valcyte / Nystatin / Bactrim. currently on ASA and PPI.  - repeat labs today May require repeat liver bx if labs don't improve   # hx of CBD stricture-- last ERCP done in 9/28/2024 with biliary stent removed and CBD stone removed. A mild biliary stricture was found in the post-transplant anastomosis, improved from prior. - Repeat upper endoscopy with duodenoscope in 3 months or sooner (due in 12/28/2024) for major papilla edema with Dr. Armand Tovar. - C/W Ursodiol 300 BID  #DM- A1C improving with GLP-1. continue insulin therapy and Ozempic.will increase Ozempic to 1mg today. cont f/u Endo.  #HCM - DERM UTD  - DEXA 2022 - ordered today.  - Colon 2026 hx of TA in 2021 - Vaccines: recommended to have COVID and flu shot done at local pharmacy.   RTC in 2 months

## 2024-10-02 NOTE — HISTORY OF PRESENT ILLNESS
[Alcoholic Liver Disease] : Alcoholic Liver Disease [Liver] : Liver [None] : None [Basiliximab] : Basiliximab [FreeTextEntry1] : 64M hx of OLT 4/22/2019 at [facility] for ACLF ETOH Cirrhosis complicated by HRS - with chronic CKD at this time Donor 55 - DBD, duct to duct without stent CMV R-/D+ He had a history of altered mental status after transplant and was converted to Cyclosporine He had low level CMV Viremia in the past  He had a hx of b/l LE DVT (R Gaastrocnemius and L CFV through SFJ/ poplitial and post tibial s/p 3M A/C with resolution) Colonoscopy 3/24/21 - 1 TA repeat in 5-7Y  PMH: DM ETOH USE, HLD, HTN, Cirrhosis, Anxiety PSX: OLT No Smoking, drug use - ETOH use as above  He presents to transfer his care from [facility] to [facility] since he lives in [place] and I am his primary Hepatologist He lives with his son  Sister is a nurse that lives in the city He has had issues with financial / insurance issues in the past but has since resolved   admitted to [facility] (4/28-5/8/2024) for elevated liver tests - had anastamotic stricutre with CBD stone s/p ERCP and stent placement - persistent elevation in liver tests - s/p liver biopsy - Focal areas of inflammation with lymphocytes not equivalent for ACR with pericellular fibrosis and steatosis - Patient treated with PO Steroid pulse - restarting IS - D/C with follow up  Interval Hx LOV 5/28/2024  - Patient present to the visit today overall feeling well with no acute complaints.  - oral steroid was tapered off since 7/5/2024.  - Had ERCP done in 9/28/2024 with biliary stent removed and CBD stone removed. A mild biliary stricture was found in the post-transplant anastomosis, improved from prior will repeat upper endoscopy with duodenoscope in 3 months for major papilla edema with Dr. Armand Tovar or sooner.  Continue on Ursodial. - started on Ozempic 0.5mg weekly since July doing well with no GI side effects. No weight loss but A1C greatly improved to 7.8. continue f/u with endocrine.  - Current IS cyclo 75 BID.  BID. off pred.  - PPhx: ASA, PPI.  Denies fever, chills, nausea, vomiting, jaundice, melena, maroon stool, abd pain, abdominal distention, confusion, asterixis, or unintentional weight loss. Per patient G6PD runs in his family wondering if he got that tested.

## 2024-10-02 NOTE — PHYSICAL EXAM
[Well Nourished] : well nourished [Healthy Appearing] : healthy appearing [No Acute Distress] : no acute distress [EOMI] : extra ocular movement intact [Normal Hearing] : normal hearing [Normal Oropharynx] : normal oropharynx [No Neck Mass] : no neck mass [Supple] : the neck was supple [No JVD] : no jugular venous distention [No Respiratory Distress] : no respiratory distress [No Accessory Muscle Use] : no accessory muscle use [Clear to Auscultation] : lungs were clear to auscultation bilaterally [Normal S1, S2] : normal S1 and S2 [No Murmurs] : no murmurs heard [Normal Rate/Rhythm] : normal rate/rhythm [Normal Bowel Sounds] : normal bowel sounds [Non Tender] : non-tender [Soft] : soft [No CVA Tenderness] : no CVA tenderness [No Spinal Tenderness] : no spinal tenderness [Normal Gait] : normal gait [Normal Strength/Tone] : normal strength/tone [No Focal Deficits] : no focal deficits [Normal Reflexes] : normal reflexes [No Motor Deficits] : no motor deficits [Normal Affect] : normal affect [Remote Memory Intact] : remote memory intact [Normal Insight/Judgement] : normal insight/judgement [Scleral Icterus] : no scleral icterus [Hepatojugular Reflux] : no hepatojugular reflux [Abdominal Bruit] : no abdominal bruit [Ascites Fluid Wave] : no ascites fluid wave [Splenomegaly] : no splenomegaly [Spider Angioma] : no spider angioma [Jaundice] : no jaundice [Palmar Erythema] : no palmar erythema [Asterixis] : no asterixis [de-identified] : Ventral hernia [de-identified] : dried skin, nodule on nose and on cheek - non tender

## 2024-10-02 NOTE — HISTORY OF PRESENT ILLNESS
[Alcoholic Liver Disease] : Alcoholic Liver Disease [Liver] : Liver [None] : None [Basiliximab] : Basiliximab [FreeTextEntry1] : 64M hx of OLT 4/22/2019 at [facility] for ACLF ETOH Cirrhosis complicated by HRS - with chronic CKD at this time Donor 55 - DBD, duct to duct without stent CMV R-/D+ He had a history of altered mental status after transplant and was converted to Cyclosporine He had low level CMV Viremia in the past  He had a hx of b/l LE DVT (R Gaastrocnemius and L CFV through SFJ/ poplitial and post tibial s/p 3M A/C with resolution) Colonoscopy 3/24/21 - 1 TA repeat in 5-7Y  PMH: DM ETOH USE, HLD, HTN, Cirrhosis, Anxiety PSX: OLT No Smoking, drug use - ETOH use as above  He presents to transfer his care from [facility] to [facility] since he lives in [place] and I am his primary Hepatologist He lives with his son  Sister is a nurse that lives in the city He has had issues with financial / insurance issues in the past but has since resolved   admitted to [facility] (4/28-5/8/2024) for elevated liver tests - had anastamotic stricutre with CBD stone s/p ERCP and stent placement - persistent elevation in liver tests - s/p liver biopsy - Focal areas of inflammation with lymphocytes not equivalent for ACR with pericellular fibrosis and steatosis - Patient treated with PO Steroid pulse - restarting IS - D/C with follow up  Interval Hx LOV 5/28/2024  - Patient present to the visit today overall feeling well with no acute complaints.  - oral steroid was tapered off since 7/5/2024.  - Had ERCP done in 9/28/2024 with biliary stent removed and CBD stone removed. A mild biliary stricture was found in the post-transplant anastomosis, improved from prior will repeat upper endoscopy with duodenoscope in 3 months for major papilla edema with Dr. Armand Tovar or sooner.  Continue on Ursodial. - started on Ozempic 0.5mg weekly since July doing well with no GI side effects. No weight loss but A1C greatly improved to 7.8. continue f/u with endocrine.  - Current IS cyclo 75 BID.  BID. off pred.  - PPhx: ASA, PPI.  Denies fever, chills, nausea, vomiting, jaundice, melena, maroon stool, abd pain, abdominal distention, confusion, asterixis, or unintentional weight loss. Per patient G6PD runs in his family wondering if he got that tested.

## 2024-10-02 NOTE — PHYSICAL EXAM
[Well Nourished] : well nourished [Healthy Appearing] : healthy appearing [No Acute Distress] : no acute distress [EOMI] : extra ocular movement intact [Normal Hearing] : normal hearing [Normal Oropharynx] : normal oropharynx [No Neck Mass] : no neck mass [Supple] : the neck was supple [No JVD] : no jugular venous distention [No Respiratory Distress] : no respiratory distress [No Accessory Muscle Use] : no accessory muscle use [Clear to Auscultation] : lungs were clear to auscultation bilaterally [Normal S1, S2] : normal S1 and S2 [No Murmurs] : no murmurs heard [Normal Rate/Rhythm] : normal rate/rhythm [Normal Bowel Sounds] : normal bowel sounds [Non Tender] : non-tender [Soft] : soft [No CVA Tenderness] : no CVA tenderness [No Spinal Tenderness] : no spinal tenderness [Normal Gait] : normal gait [Normal Strength/Tone] : normal strength/tone [No Focal Deficits] : no focal deficits [Normal Reflexes] : normal reflexes [No Motor Deficits] : no motor deficits [Normal Affect] : normal affect [Remote Memory Intact] : remote memory intact [Normal Insight/Judgement] : normal insight/judgement [Scleral Icterus] : no scleral icterus [Hepatojugular Reflux] : no hepatojugular reflux [Abdominal Bruit] : no abdominal bruit [Ascites Fluid Wave] : no ascites fluid wave [Splenomegaly] : no splenomegaly [Spider Angioma] : no spider angioma [Jaundice] : no jaundice [Palmar Erythema] : no palmar erythema [Asterixis] : no asterixis [de-identified] : Ventral hernia [de-identified] : dried skin, nodule on nose and on cheek - non tender

## 2024-10-02 NOTE — HISTORY OF PRESENT ILLNESS
[Alcoholic Liver Disease] : Alcoholic Liver Disease [Liver] : Liver [None] : None [Basiliximab] : Basiliximab [FreeTextEntry1] : 64M hx of OLT 4/22/2019 at 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 to [facility] since he lives in [place] and I am his primary Hepatologist He lives with his son  Sister is a nurse that lives in the city He has had issues with financial / insurance issues in the past but has since resolved   admitted to [facility] (4/28-5/8/2024) for elevated liver tests - had anastamotic stricutre with CBD stone s/p ERCP and stent placement - persistent elevation in liver tests - s/p liver biopsy - Focal areas of inflammation with lymphocytes not equivalent for ACR with pericellular fibrosis and steatosis - Patient treated with PO Steroid pulse - restarting IS - D/C with follow up  Interval Hx LOV 5/28/2024  - Patient present to the visit today overall feeling well with no acute complaints.  - oral steroid was tapered off since 7/5/2024.  - Had ERCP done in 9/28/2024 with biliary stent removed and CBD stone removed. A mild biliary stricture was found in the post-transplant anastomosis, improved from prior will repeat upper endoscopy with duodenoscope in 3 months for major papilla edema with Dr. Armand Tovar or sooner.  Continue on Ursodial. - started on Ozempic 0.5mg weekly since July doing well with no GI side effects. No weight loss but A1C greatly improved to 7.8. continue f/u with endocrine.  - Current IS cyclo 75 BID.  BID. off pred.  - PPhx: ASA, PPI.  Denies fever, chills, nausea, vomiting, jaundice, melena, maroon stool, abd pain, abdominal distention, confusion, asterixis, or unintentional weight loss. Per patient G6PD runs in his family wondering if he got that tested.

## 2024-11-04 ENCOUNTER — APPOINTMENT (OUTPATIENT)
Dept: ENDOCRINOLOGY | Facility: CLINIC | Age: 64
End: 2024-11-04

## 2024-11-26 ENCOUNTER — APPOINTMENT (OUTPATIENT)
Dept: GASTROENTEROLOGY | Facility: HOSPITAL | Age: 64
End: 2024-11-26

## 2024-12-09 ENCOUNTER — APPOINTMENT (OUTPATIENT)
Dept: GASTROENTEROLOGY | Facility: CLINIC | Age: 64
End: 2024-12-09
Payer: MEDICARE

## 2024-12-09 DIAGNOSIS — R19.8 OTHER SPECIFIED SYMPTOMS AND SIGNS INVOLVING THE DIGESTIVE SYSTEM AND ABDOMEN: ICD-10-CM

## 2024-12-09 PROCEDURE — 99441: CPT

## 2024-12-12 ENCOUNTER — APPOINTMENT (OUTPATIENT)
Dept: HEPATOLOGY | Facility: CLINIC | Age: 64
End: 2024-12-12
Payer: MEDICARE

## 2024-12-12 VITALS
SYSTOLIC BLOOD PRESSURE: 123 MMHG | HEART RATE: 78 BPM | TEMPERATURE: 98.3 F | HEIGHT: 73 IN | WEIGHT: 225 LBS | OXYGEN SATURATION: 97 % | BODY MASS INDEX: 29.82 KG/M2 | RESPIRATION RATE: 16 BRPM | DIASTOLIC BLOOD PRESSURE: 72 MMHG

## 2024-12-12 DIAGNOSIS — Z94.4 LIVER TRANSPLANT STATUS: ICD-10-CM

## 2024-12-12 DIAGNOSIS — Z79.4 TYPE 2 DIABETES MELLITUS W/OUT COMPLICATIONS: ICD-10-CM

## 2024-12-12 DIAGNOSIS — R79.89 OTHER SPECIFIED ABNORMAL FINDINGS OF BLOOD CHEMISTRY: ICD-10-CM

## 2024-12-12 DIAGNOSIS — N18.4 CHRONIC KIDNEY DISEASE, STAGE 4 (SEVERE): ICD-10-CM

## 2024-12-12 DIAGNOSIS — Z79.899 OTHER LONG TERM (CURRENT) DRUG THERAPY: ICD-10-CM

## 2024-12-12 DIAGNOSIS — E11.9 TYPE 2 DIABETES MELLITUS W/OUT COMPLICATIONS: ICD-10-CM

## 2024-12-12 PROCEDURE — 99215 OFFICE O/P EST HI 40 MIN: CPT

## 2024-12-13 ENCOUNTER — RX RENEWAL (OUTPATIENT)
Age: 64
End: 2024-12-13

## 2024-12-13 LAB
ALBUMIN SERPL ELPH-MCNC: 3.7 G/DL
ALP BLD-CCNC: 378 U/L
ALT SERPL-CCNC: 49 U/L
ANION GAP SERPL CALC-SCNC: 11 MMOL/L
AST SERPL-CCNC: 57 U/L
BILIRUB SERPL-MCNC: 0.6 MG/DL
BUN SERPL-MCNC: 26 MG/DL
CALCIUM SERPL-MCNC: 8.9 MG/DL
CHLORIDE SERPL-SCNC: 94 MMOL/L
CMV DNA SPEC QL NAA+PROBE: NOT DETECTED IU/ML
CMVPCR LOG: NOT DETECTED LOG10IU/ML
CO2 SERPL-SCNC: 25 MMOL/L
CREAT SERPL-MCNC: 1.91 MG/DL
CREAT SPEC-SCNC: 151 MG/DL
CREAT/PROT UR: 0.2 RATIO
CYCLOSPORINE SER-MCNC: 103 NG/ML
EGFR: 38 ML/MIN/1.73M2
ESTIMATED AVERAGE GLUCOSE: 194 MG/DL
GGT SERPL-CCNC: 671 U/L
GLUCOSE SERPL-MCNC: 360 MG/DL
HBA1C MFR BLD HPLC: 8.4 %
HCT VFR BLD CALC: 35.8 %
HGB BLD-MCNC: 11.4 G/DL
MAGNESIUM SERPL-MCNC: 1.7 MG/DL
MCHC RBC-ENTMCNC: 26.3 PG
MCHC RBC-ENTMCNC: 31.8 G/DL
MCV RBC AUTO: 82.5 FL
PHOSPHATE SERPL-MCNC: 2.5 MG/DL
PLATELET # BLD AUTO: 378 K/UL
POTASSIUM SERPL-SCNC: 4.2 MMOL/L
PROT SERPL-MCNC: 6.7 G/DL
PROT UR-MCNC: 32 MG/DL
RBC # BLD: 4.34 M/UL
RBC # FLD: 13.9 %
SODIUM SERPL-SCNC: 130 MMOL/L
WBC # FLD AUTO: 9.15 K/UL

## 2024-12-13 RX ORDER — SEMAGLUTIDE 2.68 MG/ML
8 INJECTION, SOLUTION SUBCUTANEOUS
Qty: 12 | Refills: 1 | Status: ACTIVE | COMMUNITY
Start: 2024-12-13 | End: 1900-01-01

## 2024-12-30 ENCOUNTER — RX RENEWAL (OUTPATIENT)
Age: 64
End: 2024-12-30

## 2024-12-30 ENCOUNTER — NON-APPOINTMENT (OUTPATIENT)
Age: 64
End: 2024-12-30

## 2025-01-03 ENCOUNTER — LABORATORY RESULT (OUTPATIENT)
Age: 65
End: 2025-01-03

## 2025-01-08 DIAGNOSIS — Z79.60 LONG TERM (CURRENT) USE OF UNSPECIFIED IMMUNOMODULATORS AND IMMUNOSUPPRESSANTS: ICD-10-CM

## 2025-01-08 DIAGNOSIS — K80.50 CALCULUS OF BILE DUCT W/OUT CHOLANGITIS OR CHOLECYSTITIS W/OUT OBSTRUCTION: ICD-10-CM

## 2025-01-08 DIAGNOSIS — R79.89 OTHER SPECIFIED ABNORMAL FINDINGS OF BLOOD CHEMISTRY: ICD-10-CM

## 2025-01-13 DIAGNOSIS — N18.30 CHRONIC KIDNEY DISEASE, STAGE 3 UNSPECIFIED: ICD-10-CM

## 2025-01-13 DIAGNOSIS — E11.9 TYPE 2 DIABETES MELLITUS W/OUT COMPLICATIONS: ICD-10-CM

## 2025-01-13 DIAGNOSIS — Z79.4 TYPE 2 DIABETES MELLITUS W/OUT COMPLICATIONS: ICD-10-CM

## 2025-01-13 DIAGNOSIS — Z94.4 LIVER TRANSPLANT STATUS: ICD-10-CM

## 2025-01-14 ENCOUNTER — OUTPATIENT (OUTPATIENT)
Dept: OUTPATIENT SERVICES | Facility: HOSPITAL | Age: 65
LOS: 1 days | End: 2025-01-14
Payer: MEDICARE

## 2025-01-14 VITALS
TEMPERATURE: 99 F | RESPIRATION RATE: 18 BRPM | HEART RATE: 87 BPM | SYSTOLIC BLOOD PRESSURE: 135 MMHG | OXYGEN SATURATION: 96 % | DIASTOLIC BLOOD PRESSURE: 83 MMHG | HEIGHT: 73 IN | WEIGHT: 231.04 LBS

## 2025-01-14 DIAGNOSIS — Z98.890 OTHER SPECIFIED POSTPROCEDURAL STATES: Chronic | ICD-10-CM

## 2025-01-14 DIAGNOSIS — Z01.818 ENCOUNTER FOR OTHER PREPROCEDURAL EXAMINATION: ICD-10-CM

## 2025-01-14 DIAGNOSIS — R19.8 OTHER SPECIFIED SYMPTOMS AND SIGNS INVOLVING THE DIGESTIVE SYSTEM AND ABDOMEN: ICD-10-CM

## 2025-01-14 DIAGNOSIS — Z94.4 LIVER TRANSPLANT STATUS: Chronic | ICD-10-CM

## 2025-01-14 PROCEDURE — 83036 HEMOGLOBIN GLYCOSYLATED A1C: CPT

## 2025-01-14 PROCEDURE — G0463: CPT

## 2025-01-14 NOTE — H&P PST ADULT - PSYCHIATRIC
normal/normal affect/alert and oriented x3/normal behavior details… normal/normal affect/alert and oriented x3/flat affect

## 2025-01-14 NOTE — H&P PST ADULT - PROBLEM SELECTOR PLAN 2
Finger stick on day of surgery   Hold Humalog day of surgery   Lantus 8 units night prior to surgery and morning of surgery  last dose of Ozempic 1/10/25

## 2025-01-14 NOTE — H&P PST ADULT - PROBLEM SELECTOR PLAN 1
EGD  side view scope w with Anesthesia on 1/21/25.  continue on Asprin until day of surgery   Pre-op education provided - all questions answered. Pt verbalized understanding EGD  side view scope w with Anesthesia on 1/21/25.  continue on Asprin   Pre-op education provided - all questions answered. Pt verbalized understanding  continue all meds

## 2025-01-14 NOTE — H&P PST ADULT - HISTORY OF PRESENT ILLNESS
65 y/o M  with h/o Alcoholic cirrhosis complicated by HRS (chronic CKD at this time) s/p OLT (CMV -/+) on 4/2019 at [facility] and b/l LE DVT treated with AC,  T2DM .Pt was seen in clinic on 4/22 and was found to have new onset of elevated LFTs and admitted to [facility] s/p MRCP which showed small 8 mm CBD stone upstream of the anastomosis site s/p ERCP 4/30/24 with stent placement and  Biliary Stent Removal on 9/27/24.   Pt followed by clinic noted  elevated  liver enzyme  planned for  EGD with Anesthesia on 1/21/25. Denies any palpitations, SOB, N/V, fever or chills.    63 y/o  y/o M  with h/o Alcoholic cirrhosis complicated by HRS (chronic CKD at this time) s/p OLT (CMV -/+) on 4/2019 at [facility] and b/l LE DVT treated with AC,  T2DM . Pt was  admitted to [facility] (4/28-5/8/2024) for elevated liver tests - had anastamotic stricutre with CBD stone s/p ERCP and stent placement, with biliary stent removed and CBD stone removed in 9/28/24.  persistent elevation in liver tests - s/p liver biopsy . Presents to PST for scheduled follow up EGD on 1/21/25.         65 y/o  y/o M  with h/o Alcoholic cirrhosis complicated by HRS (chronic CKD at this time) s/p OLT (CMV -/+) on 4/2019 at [facility] and b/l LE DVT treated with AC,  T2DM . Pt was  admitted to [facility] (4/28-5/8/2024) for elevated liver tests - had anastomotic stricture with CBD stone s/p ERCP and stent placement, with biliary stent removed and CBD stone removed in 9/28/24.  persistent elevation in liver tests - s/p liver biopsy . Presents to [facility] for scheduled follow up EGD on 1/21/25.         63 y/o  y/o M  with h/o Alcoholic cirrhosis complicated by HRS (chronic CKD at this time) s/p OLT (CMV -/+) on 4/2019 at [facility] and b/l LE DVT treated with AC, T2DM . Pt was admitted to [facility] (4/28-5/8/2024) for elevated liver tests - had anastomotic stricture with CBD stone s/p ERCP and stent placement, with biliary stent removed and CBD stone removed in 9/28/24.  persistent elevation in liver tests - s/p liver biopsy . Presents to [facility] for scheduled follow up EGD on 1/21/25.         63 y/o  y/o M  with h/o Alcoholic cirrhosis complicated by HRS (chronic CKD at this time) s/p OLT (CMV -/+) on 4/2019 at [facility] and b/l LE DVT treated with AC, T2DM . Pt was admitted to [facility] (4/28-5/8/2024) for elevated liver tests - had anastomotic stricture with CBD stone s/p ERCP and stent placement, with biliary stent removed and CBD stone removed in 9/28/24. persistent elevation in liver tests - s/p liver biopsy . Presents to [facility] for scheduled follow up EGD on 1/21/25.         65 y/o  y/o M  with h/o Alcoholic cirrhosis complicated by HRS (chronic CKD at this time) s/p OLT (CMV -/+) on 4/2019 at [facility] and b/l LE DVT treated with AC, T2DM. Pt was admitted to [facility] (4/28-5/8/2024) for elevated liver tests - had anastomotic stricture with CBD stone s/p ERCP and stent placement, with biliary stent removed and CBD stone removed in 9/28/24. persistent elevation in liver tests - s/p liver biopsy . Presents to [facility] for scheduled follow up EGD on 1/21/25.         63 y/o  y/o M  with h/o Alcoholic cirrhosis complicated by HRS (chronic CKD at this time) s/p OLT (CMV -/+) on 4/2019 at [facility] and b/l LE DVT treated with AC, T2DM. Pt was admitted to [facility] (4/28-5/8/2024) for elevated liver tests - had anastomotic stricture with CBD stone s/p ERCP and stent placement, with biliary stent removed and CBD stone removed in 9/28/24.  persistent elevation in liver tests - s/p liver biopsy . Presents to [facility] for scheduled follow up EGD on 1/21/25.

## 2025-01-14 NOTE — H&P PST ADULT - NSICDXPASTMEDICALHX_GEN_ALL_CORE_FT
PAST MEDICAL HISTORY:  Alcoholic cirrhosis     Common bile duct (CBD) obstruction     Diabetes mellitus     Hypertension      PAST MEDICAL HISTORY:  Alcoholic cirrhosis     Anxiety     Common bile duct (CBD) obstruction     Diabetes mellitus     Hypertension

## 2025-01-15 ENCOUNTER — APPOINTMENT (OUTPATIENT)
Dept: MRI IMAGING | Facility: CLINIC | Age: 65
End: 2025-01-15
Payer: MEDICARE

## 2025-01-15 LAB
A1C WITH ESTIMATED AVERAGE GLUCOSE RESULT: 8.9 % — HIGH (ref 4–5.6)
ESTIMATED AVERAGE GLUCOSE: 209 MG/DL — HIGH (ref 68–114)

## 2025-01-15 PROCEDURE — A9585: CPT | Mod: JZ

## 2025-01-15 PROCEDURE — 74183 MRI ABD W/O CNTR FLWD CNTR: CPT

## 2025-01-21 ENCOUNTER — APPOINTMENT (OUTPATIENT)
Dept: GASTROENTEROLOGY | Facility: HOSPITAL | Age: 65
End: 2025-01-21

## 2025-01-21 PROBLEM — F41.9 ANXIETY DISORDER, UNSPECIFIED: Chronic | Status: ACTIVE | Noted: 2025-01-14

## 2025-01-30 LAB
ALBUMIN SERPL ELPH-MCNC: 4.1 G/DL
ALP BLD-CCNC: 703 U/L
ALT SERPL-CCNC: 135 U/L
ANION GAP SERPL CALC-SCNC: 12 MMOL/L
AST SERPL-CCNC: 129 U/L
BILIRUB SERPL-MCNC: 1 MG/DL
BUN SERPL-MCNC: 19 MG/DL
CALCIUM SERPL-MCNC: 9.7 MG/DL
CHLORIDE SERPL-SCNC: 94 MMOL/L
CO2 SERPL-SCNC: 28 MMOL/L
CREAT SERPL-MCNC: 1.79 MG/DL
CYCLOSPORINE SER-MCNC: 116 NG/ML
EGFR: 41 ML/MIN/1.73M2
GGT SERPL-CCNC: 1128 U/L
GLUCOSE SERPL-MCNC: 226 MG/DL
HCT VFR BLD CALC: 36.5 %
HGB BLD-MCNC: 11.2 G/DL
INR PPP: 0.95 RATIO
MCHC RBC-ENTMCNC: 25.9 PG
MCHC RBC-ENTMCNC: 30.7 G/DL
MCV RBC AUTO: 84.3 FL
PLATELET # BLD AUTO: 368 K/UL
POTASSIUM SERPL-SCNC: 4.6 MMOL/L
PROT SERPL-MCNC: 7.1 G/DL
PT BLD: 11.2 SEC
RBC # BLD: 4.33 M/UL
RBC # FLD: 14.7 %
SODIUM SERPL-SCNC: 134 MMOL/L
WBC # FLD AUTO: 7.96 K/UL

## 2025-01-31 ENCOUNTER — OUTPATIENT (OUTPATIENT)
Dept: OUTPATIENT SERVICES | Facility: HOSPITAL | Age: 65
LOS: 1 days | End: 2025-01-31

## 2025-01-31 VITALS
SYSTOLIC BLOOD PRESSURE: 127 MMHG | WEIGHT: 229.94 LBS | OXYGEN SATURATION: 97 % | RESPIRATION RATE: 16 BRPM | TEMPERATURE: 97 F | HEART RATE: 70 BPM | DIASTOLIC BLOOD PRESSURE: 70 MMHG | HEIGHT: 71 IN

## 2025-01-31 DIAGNOSIS — Z91.89 OTHER SPECIFIED PERSONAL RISK FACTORS, NOT ELSEWHERE CLASSIFIED: ICD-10-CM

## 2025-01-31 DIAGNOSIS — E11.9 TYPE 2 DIABETES MELLITUS WITHOUT COMPLICATIONS: ICD-10-CM

## 2025-01-31 DIAGNOSIS — I10 ESSENTIAL (PRIMARY) HYPERTENSION: ICD-10-CM

## 2025-01-31 DIAGNOSIS — K80.50 CALCULUS OF BILE DUCT WITHOUT CHOLANGITIS OR CHOLECYSTITIS WITHOUT OBSTRUCTION: ICD-10-CM

## 2025-01-31 DIAGNOSIS — Z98.890 OTHER SPECIFIED POSTPROCEDURAL STATES: Chronic | ICD-10-CM

## 2025-01-31 DIAGNOSIS — Z94.4 LIVER TRANSPLANT STATUS: Chronic | ICD-10-CM

## 2025-01-31 NOTE — H&P PST ADULT - NEGATIVE ENMT SYMPTOMS
Denies dentures. Denies loose teeth./no hearing difficulty/no ear pain/no tinnitus/no vertigo/no sinus symptoms/no nasal congestion/no nasal discharge/no nasal obstruction/no post-nasal discharge/no abnormal taste sensation/no throat pain/no dysphagia

## 2025-01-31 NOTE — H&P PST ADULT - PROBLEM SELECTOR PLAN 1
Patient tentatively scheduled for ERCP on 2/7/25.  Pre-op instructions provided. Pt given verbal and written instructions with teach back on pepcid. Pt verbalized understanding.  Patient has labs CBC, CMP, HgbA1c in HIE.

## 2025-01-31 NOTE — H&P PST ADULT - NSICDXPASTMEDICALHX_GEN_ALL_CORE_FT
PAST MEDICAL HISTORY:  Alcoholic cirrhosis     Anxiety     Calculus of bile duct without cholangitis without obstruction     Chronic kidney disease (CKD)     Common bile duct (CBD) obstruction     Diabetes mellitus     Hypertension

## 2025-01-31 NOTE — H&P PST ADULT - MUSCULOSKELETAL
normal/ROM intact/no joint swelling/no joint warmth/no calf tenderness/normal gait/strength 5/5 bilateral upper extremities/strength 5/5 bilateral lower extremities negative

## 2025-01-31 NOTE — H&P PST ADULT - HISTORY OF PRESENT ILLNESS
65 y/o  y/o M  with h/o Alcoholic cirrhosis complicated by HRS (chronic CKD at this time) s/p OLT (CMV -/+) on 4/2019 at [facility] and b/l LE DVT treated with AC, T2DM. Pt was admitted to [facility] (4/28-5/8/2024) for elevated liver tests - had anastomotic stricture with CBD stone s/p ERCP and stent placement, with biliary stent removed and CBD stone removed in 9/28/24.  MRCP from 1/17/25 showed 7 mm distal CBD stone with development of 12 mm CBD dilatation and mild intrahepatic dilatation. Patient presents today for pre-op evaluation for diagnosis of Calculus bile duct without cholangitis/cholecystecystitis without obstruction. Denies abdominal pain, n/v/d/c, fevers, cp, sob, buck, dizziness, palpitations.

## 2025-01-31 NOTE — H&P PST ADULT - PROBLEM SELECTOR PLAN 2
Patient instructed to hold Humalog morning of procedure.  Patient instructed to hold Semaglutide 1 week before procedure. Last dose 1/24/25.

## 2025-02-03 DIAGNOSIS — Z79.4 TYPE 2 DIABETES MELLITUS W/OUT COMPLICATIONS: ICD-10-CM

## 2025-02-03 DIAGNOSIS — E11.9 TYPE 2 DIABETES MELLITUS W/OUT COMPLICATIONS: ICD-10-CM

## 2025-02-05 ENCOUNTER — NON-APPOINTMENT (OUTPATIENT)
Age: 65
End: 2025-02-05

## 2025-02-05 DIAGNOSIS — R79.89 OTHER SPECIFIED ABNORMAL FINDINGS OF BLOOD CHEMISTRY: ICD-10-CM

## 2025-02-05 DIAGNOSIS — Z94.4 LIVER TRANSPLANT STATUS: ICD-10-CM

## 2025-02-05 DIAGNOSIS — K80.50 CALCULUS OF BILE DUCT W/OUT CHOLANGITIS OR CHOLECYSTITIS W/OUT OBSTRUCTION: ICD-10-CM

## 2025-02-05 LAB
ALBUMIN SERPL ELPH-MCNC: 3.9 G/DL
ALP BLD-CCNC: 1011 U/L
ALT SERPL-CCNC: 232 U/L
ANION GAP SERPL CALC-SCNC: 15 MMOL/L
AST SERPL-CCNC: 252 U/L
BASOPHILS # BLD AUTO: 0.04 K/UL
BASOPHILS NFR BLD AUTO: 0.6 %
BILIRUB SERPL-MCNC: 1.3 MG/DL
BUN SERPL-MCNC: 20 MG/DL
CALCIUM SERPL-MCNC: 9.4 MG/DL
CHLORIDE SERPL-SCNC: 98 MMOL/L
CO2 SERPL-SCNC: 25 MMOL/L
CREAT SERPL-MCNC: 1.64 MG/DL
EGFR: 46 ML/MIN/1.73M2
EOSINOPHIL # BLD AUTO: 0.39 K/UL
EOSINOPHIL NFR BLD AUTO: 5.5 %
GGT SERPL-CCNC: 1555 U/L
GLUCOSE SERPL-MCNC: 238 MG/DL
HCT VFR BLD CALC: 38.6 %
HGB BLD-MCNC: 11.7 G/DL
IMM GRANULOCYTES NFR BLD AUTO: 0.4 %
LYMPHOCYTES # BLD AUTO: 2.55 K/UL
LYMPHOCYTES NFR BLD AUTO: 35.9 %
MAN DIFF?: NORMAL
MCHC RBC-ENTMCNC: 25.5 PG
MCHC RBC-ENTMCNC: 30.3 G/DL
MCV RBC AUTO: 84.1 FL
MONOCYTES # BLD AUTO: 0.53 K/UL
MONOCYTES NFR BLD AUTO: 7.5 %
NEUTROPHILS # BLD AUTO: 3.57 K/UL
NEUTROPHILS NFR BLD AUTO: 50.1 %
PLATELET # BLD AUTO: 366 K/UL
POTASSIUM SERPL-SCNC: 4.1 MMOL/L
PROT SERPL-MCNC: 7.1 G/DL
RBC # BLD: 4.59 M/UL
RBC # FLD: 14.6 %
SODIUM SERPL-SCNC: 138 MMOL/L
WBC # FLD AUTO: 7.11 K/UL

## 2025-02-06 ENCOUNTER — NON-APPOINTMENT (OUTPATIENT)
Age: 65
End: 2025-02-06

## 2025-02-06 LAB — CYCLOSPORINE SER-MCNC: 687 NG/ML

## 2025-02-06 NOTE — ASU PATIENT PROFILE, ADULT - ACCEPTABLE
"The patient is Stable - Low risk of patient condition declining or worsening    Shift Goals  Clinical Goals: Patient will remain pain free throughout shift. Patient will verbalize understanding of POC.  Patient Goals: \"Get biopsy results soon.\"  Family Goals: LOPEZ    Progress made toward(s) clinical / shift goals:  Provided patient with a verbal discussion related to POC. Patient verbalized understanding and had no questions or concerns. Proper hand hygiene before and after patient care to ensure patient will remain free from infection.      Patient is not progressing towards the following goals: N/A      " 2

## 2025-02-07 ENCOUNTER — TRANSCRIPTION ENCOUNTER (OUTPATIENT)
Age: 65
End: 2025-02-07

## 2025-02-07 ENCOUNTER — APPOINTMENT (OUTPATIENT)
Dept: GASTROENTEROLOGY | Facility: HOSPITAL | Age: 65
End: 2025-02-07

## 2025-02-07 ENCOUNTER — OUTPATIENT (OUTPATIENT)
Dept: OUTPATIENT SERVICES | Facility: HOSPITAL | Age: 65
LOS: 1 days | Discharge: HOME | End: 2025-02-07
Payer: MEDICARE

## 2025-02-07 VITALS
HEART RATE: 65 BPM | DIASTOLIC BLOOD PRESSURE: 79 MMHG | RESPIRATION RATE: 22 BRPM | HEIGHT: 73 IN | TEMPERATURE: 97 F | OXYGEN SATURATION: 98 % | WEIGHT: 229.94 LBS | SYSTOLIC BLOOD PRESSURE: 163 MMHG

## 2025-02-07 VITALS
SYSTOLIC BLOOD PRESSURE: 133 MMHG | OXYGEN SATURATION: 95 % | RESPIRATION RATE: 16 BRPM | DIASTOLIC BLOOD PRESSURE: 68 MMHG | HEART RATE: 64 BPM

## 2025-02-07 DIAGNOSIS — K80.50 CALCULUS OF BILE DUCT WITHOUT CHOLANGITIS OR CHOLECYSTITIS WITHOUT OBSTRUCTION: ICD-10-CM

## 2025-02-07 DIAGNOSIS — Z98.890 OTHER SPECIFIED POSTPROCEDURAL STATES: Chronic | ICD-10-CM

## 2025-02-07 DIAGNOSIS — Z94.4 LIVER TRANSPLANT STATUS: Chronic | ICD-10-CM

## 2025-02-07 LAB
GLUCOSE BLDC GLUCOMTR-MCNC: 258 MG/DL — HIGH (ref 70–99)
GLUCOSE BLDC GLUCOMTR-MCNC: 268 MG/DL — HIGH (ref 70–99)

## 2025-02-07 PROCEDURE — 43264 ERCP REMOVE DUCT CALCULI: CPT

## 2025-02-07 DEVICE — BLLN EXTRCTR PRO RX-S 9-12MM: Type: IMPLANTABLE DEVICE | Status: FUNCTIONAL

## 2025-02-07 DEVICE — GWIRE JAG REVOLUTION 260CM: Type: IMPLANTABLE DEVICE | Status: FUNCTIONAL

## 2025-02-07 RX ORDER — ORAL SEMAGLUTIDE 3 MG/1
1 TABLET ORAL
Refills: 0 | DISCHARGE

## 2025-02-07 NOTE — ASU DISCHARGE PLAN (ADULT/PEDIATRIC) - FINANCIAL ASSISTANCE
[facility] provides services at a reduced cost to those who are determined to be eligible through [facility]’s financial assistance program. Information regarding [facility]’s financial assistance program can be found by going to [email or web address] or by calling [phone].

## 2025-02-07 NOTE — PRE PROCEDURE NOTE - PRE PROCEDURE EVALUATION
Pre Endoscopy History and Physicial    Attending Physician:  Dr. Ayon  Procedure: ERCP  Indication for Procedure: Choledocholithiasis post OLT anastomotic stricture   ASA Class: 3  ________________________________________________________  PAST MEDICAL & SURGICAL HISTORY:  Hypertension      Diabetes mellitus      Alcoholic cirrhosis      Common bile duct (CBD) obstruction      Anxiety      Chronic kidney disease (CKD)      Calculus of bile duct without cholangitis without obstruction      Liver transplanted      History of ERCP        ALLERGIES:  No Known Allergies    HOME MEDICATIONS:  cycloSPORINE modified 25 mg oral capsule: 3 cap(s) orally 2 times a day  HumaLOG Michael KwikPen 100 units/mL injectable solution: Before meals  Lantus 100 units/mL subcutaneous solution: 10 unit(s) subcutaneous once a day (at bedtime)  mycophenolate mofetil 500 mg oral tablet: 1 tab(s) orally 2 times a day  risperiDONE 1 mg oral tablet: 1 tab(s) orally once a day (at bedtime)  semaglutide 1 mg/0.5 mL (1 mg dose) subcutaneous solution: 1 milligram(s) subcutaneously once a week FRIDAY    AICD/PPM: [ ] yes   [ ] no    PERTINENT LAB DATA:                      PHYSICAL EXAMINATION:    Height (cm): 185.4  Weight (kg): 104.3  BMI (kg/m2): 30.3  BSA (m2): 2.28T(C): 36  HR: 65  BP: 163/79  RR: 22  SpO2: 98%    Constitutional: NAD  HEENT: PERRLA, EOMI,    Neck:  No JVD  Respiratory: No visible respiratory issues  Cardiovascular: S1 and S2 on telemetri  Gastrointestinal: BS+, soft, NT/ND  Extremities: No peripheral edema  Neurological: A/O x 3, no focal deficits  Psychiatric: Normal mood, normal affect  Skin: No rashes    COMMENTS:  The patient is a suitable candidate for the planned procedure unless box checked [ ]  No, explain:  Risks and alternatives to the procedure discussed in detail. All questions answered.

## 2025-02-07 NOTE — ASU DISCHARGE PLAN (ADULT/PEDIATRIC) - NS MD DC FALL RISK RISK
For information on Fall & Injury Prevention, visit: [email or web address] OR  [email or web address] OR  https://www.cdc.gov/steadi/patient.html

## 2025-02-11 ENCOUNTER — INPATIENT (INPATIENT)
Facility: HOSPITAL | Age: 65
LOS: 9 days | Discharge: HOME | DRG: 948 | End: 2025-02-21
Payer: MEDICARE

## 2025-02-11 VITALS
HEART RATE: 68 BPM | RESPIRATION RATE: 18 BRPM | WEIGHT: 229.5 LBS | HEIGHT: 73 IN | OXYGEN SATURATION: 96 % | SYSTOLIC BLOOD PRESSURE: 163 MMHG | DIASTOLIC BLOOD PRESSURE: 77 MMHG | TEMPERATURE: 98 F

## 2025-02-11 DIAGNOSIS — R79.89 OTHER SPECIFIED ABNORMAL FINDINGS OF BLOOD CHEMISTRY: ICD-10-CM

## 2025-02-11 DIAGNOSIS — Z98.890 OTHER SPECIFIED POSTPROCEDURAL STATES: Chronic | ICD-10-CM

## 2025-02-11 DIAGNOSIS — Z94.4 LIVER TRANSPLANT STATUS: Chronic | ICD-10-CM

## 2025-02-11 PROBLEM — K80.50 CALCULUS OF BILE DUCT WITHOUT CHOLANGITIS OR CHOLECYSTITIS WITHOUT OBSTRUCTION: Chronic | Status: ACTIVE | Noted: 2025-01-31

## 2025-02-11 LAB
ALBUMIN SERPL ELPH-MCNC: 3.5 G/DL
ALBUMIN SERPL ELPH-MCNC: 3.5 G/DL — SIGNIFICANT CHANGE UP (ref 3.3–5)
ALP BLD-CCNC: 938 U/L
ALP SERPL-CCNC: 962 U/L — HIGH (ref 40–120)
ALT FLD-CCNC: 370 U/L — HIGH (ref 10–45)
ALT SERPL-CCNC: 344 U/L
ANION GAP SERPL CALC-SCNC: 12 MMOL/L
ANION GAP SERPL CALC-SCNC: 14 MMOL/L — SIGNIFICANT CHANGE UP (ref 5–17)
APPEARANCE UR: CLEAR — SIGNIFICANT CHANGE UP
APTT BLD: 29.2 SEC — SIGNIFICANT CHANGE UP (ref 24.5–35.6)
AST SERPL-CCNC: 423 U/L — HIGH (ref 10–40)
AST SERPL-CCNC: 456 U/L
BACTERIA # UR AUTO: NEGATIVE /HPF — SIGNIFICANT CHANGE UP
BASOPHILS # BLD AUTO: 0.02 K/UL — SIGNIFICANT CHANGE UP (ref 0–0.2)
BASOPHILS # BLD AUTO: 0.05 K/UL
BASOPHILS NFR BLD AUTO: 0.3 % — SIGNIFICANT CHANGE UP (ref 0–2)
BASOPHILS NFR BLD AUTO: 0.7 %
BILIRUB SERPL-MCNC: 4.5 MG/DL
BILIRUB SERPL-MCNC: 5.2 MG/DL — HIGH (ref 0.2–1.2)
BILIRUB UR-MCNC: ABNORMAL
BLD GP AB SCN SERPL QL: NEGATIVE — SIGNIFICANT CHANGE UP
BUN SERPL-MCNC: 20 MG/DL
BUN SERPL-MCNC: 22 MG/DL — SIGNIFICANT CHANGE UP (ref 7–23)
CALCIUM SERPL-MCNC: 8.8 MG/DL — SIGNIFICANT CHANGE UP (ref 8.4–10.5)
CALCIUM SERPL-MCNC: 8.9 MG/DL
CAST: 2 /LPF — SIGNIFICANT CHANGE UP (ref 0–4)
CHLORIDE SERPL-SCNC: 97 MMOL/L — SIGNIFICANT CHANGE UP (ref 96–108)
CHLORIDE SERPL-SCNC: 98 MMOL/L
CMV DNA SPEC QL NAA+PROBE: NOT DETECTED IU/ML
CMVPCR LOG: NOT DETECTED LOG10IU/ML
CO2 SERPL-SCNC: 20 MMOL/L — LOW (ref 22–31)
CO2 SERPL-SCNC: 24 MMOL/L
COLOR SPEC: SIGNIFICANT CHANGE UP
CREAT SERPL-MCNC: 1.61 MG/DL — HIGH (ref 0.5–1.3)
CREAT SERPL-MCNC: 1.62 MG/DL
CYCLOSPORINE SER-MCNC: 77 NG/ML
DIFF PNL FLD: NEGATIVE — SIGNIFICANT CHANGE UP
EGFR: 47 ML/MIN/1.73M2
EGFR: 47 ML/MIN/1.73M2 — LOW
EOSINOPHIL # BLD AUTO: 0.17 K/UL — SIGNIFICANT CHANGE UP (ref 0–0.5)
EOSINOPHIL # BLD AUTO: 0.3 K/UL
EOSINOPHIL NFR BLD AUTO: 2.7 % — SIGNIFICANT CHANGE UP (ref 0–6)
EOSINOPHIL NFR BLD AUTO: 4.2 %
GGT SERPL-CCNC: 1434 U/L
GLUCOSE BLDC GLUCOMTR-MCNC: 204 MG/DL — HIGH (ref 70–99)
GLUCOSE BLDC GLUCOMTR-MCNC: 232 MG/DL — HIGH (ref 70–99)
GLUCOSE BLDC GLUCOMTR-MCNC: 248 MG/DL — HIGH (ref 70–99)
GLUCOSE SERPL-MCNC: 193 MG/DL
GLUCOSE SERPL-MCNC: 230 MG/DL — HIGH (ref 70–99)
GLUCOSE UR QL: 500 MG/DL
HCT VFR BLD CALC: 31.6 % — LOW (ref 39–50)
HCT VFR BLD CALC: 33.9 %
HGB BLD-MCNC: 10 G/DL — LOW (ref 13–17)
HGB BLD-MCNC: 10.7 G/DL
IMM GRANULOCYTES NFR BLD AUTO: 0.3 % — SIGNIFICANT CHANGE UP (ref 0–0.9)
IMM GRANULOCYTES NFR BLD AUTO: 0.4 %
INR BLD: 0.94 RATIO — SIGNIFICANT CHANGE UP (ref 0.85–1.16)
KETONES UR-MCNC: ABNORMAL MG/DL
LEUKOCYTE ESTERASE UR-ACNC: NEGATIVE — SIGNIFICANT CHANGE UP
LYMPHOCYTES # BLD AUTO: 2.05 K/UL — SIGNIFICANT CHANGE UP (ref 1–3.3)
LYMPHOCYTES # BLD AUTO: 2.58 K/UL
LYMPHOCYTES # BLD AUTO: 32 % — SIGNIFICANT CHANGE UP (ref 13–44)
LYMPHOCYTES NFR BLD AUTO: 35.8 %
MAGNESIUM SERPL-MCNC: 1.7 MG/DL — SIGNIFICANT CHANGE UP (ref 1.6–2.6)
MAN DIFF?: NORMAL
MCHC RBC-ENTMCNC: 25.1 PG — LOW (ref 27–34)
MCHC RBC-ENTMCNC: 26.1 PG
MCHC RBC-ENTMCNC: 31.6 G/DL
MCHC RBC-ENTMCNC: 31.6 G/DL — LOW (ref 32–36)
MCV RBC AUTO: 79.4 FL — LOW (ref 80–100)
MCV RBC AUTO: 82.7 FL
MONOCYTES # BLD AUTO: 0.55 K/UL — SIGNIFICANT CHANGE UP (ref 0–0.9)
MONOCYTES # BLD AUTO: 0.62 K/UL
MONOCYTES NFR BLD AUTO: 8.6 %
MONOCYTES NFR BLD AUTO: 8.6 % — SIGNIFICANT CHANGE UP (ref 2–14)
NEUTROPHILS # BLD AUTO: 3.59 K/UL — SIGNIFICANT CHANGE UP (ref 1.8–7.4)
NEUTROPHILS # BLD AUTO: 3.63 K/UL
NEUTROPHILS NFR BLD AUTO: 50.3 %
NEUTROPHILS NFR BLD AUTO: 56.1 % — SIGNIFICANT CHANGE UP (ref 43–77)
NITRITE UR-MCNC: NEGATIVE — SIGNIFICANT CHANGE UP
NRBC BLD AUTO-RTO: 0 /100 WBCS — SIGNIFICANT CHANGE UP (ref 0–0)
PH UR: 6.5 — SIGNIFICANT CHANGE UP (ref 5–8)
PHOSPHATE SERPL-MCNC: 1.8 MG/DL — LOW (ref 2.5–4.5)
PLATELET # BLD AUTO: 302 K/UL — SIGNIFICANT CHANGE UP (ref 150–400)
PLATELET # BLD AUTO: 329 K/UL
POTASSIUM SERPL-MCNC: 3.4 MMOL/L — LOW (ref 3.5–5.3)
POTASSIUM SERPL-SCNC: 3.4 MMOL/L — LOW (ref 3.5–5.3)
POTASSIUM SERPL-SCNC: 3.7 MMOL/L
PROT SERPL-MCNC: 6.7 G/DL
PROT SERPL-MCNC: 7.4 G/DL — SIGNIFICANT CHANGE UP (ref 6–8.3)
PROT UR-MCNC: 30 MG/DL
PROTHROM AB SERPL-ACNC: 10.8 SEC — SIGNIFICANT CHANGE UP (ref 9.9–13.4)
RBC # BLD: 3.98 M/UL — LOW (ref 4.2–5.8)
RBC # BLD: 4.1 M/UL
RBC # FLD: 15.9 %
RBC # FLD: 16.2 % — HIGH (ref 10.3–14.5)
RBC CASTS # UR COMP ASSIST: 2 /HPF — SIGNIFICANT CHANGE UP (ref 0–4)
RH IG SCN BLD-IMP: POSITIVE — SIGNIFICANT CHANGE UP
SODIUM SERPL-SCNC: 131 MMOL/L — LOW (ref 135–145)
SODIUM SERPL-SCNC: 135 MMOL/L
SP GR SPEC: 1.02 — SIGNIFICANT CHANGE UP (ref 1–1.03)
SQUAMOUS # UR AUTO: 6 /HPF — HIGH (ref 0–5)
UROBILINOGEN FLD QL: 1 MG/DL — SIGNIFICANT CHANGE UP (ref 0.2–1)
WBC # BLD: 6.4 K/UL — SIGNIFICANT CHANGE UP (ref 3.8–10.5)
WBC # FLD AUTO: 6.4 K/UL — SIGNIFICANT CHANGE UP (ref 3.8–10.5)
WBC # FLD AUTO: 7.21 K/UL
WBC UR QL: 0 /HPF — SIGNIFICANT CHANGE UP (ref 0–5)

## 2025-02-11 PROCEDURE — 93975 VASCULAR STUDY: CPT | Mod: 26

## 2025-02-11 PROCEDURE — ZZZZZ: CPT | Mod: GC

## 2025-02-11 RX ORDER — DEXTROSE 50 % IN WATER 50 %
15 SYRINGE (ML) INTRAVENOUS ONCE
Refills: 0 | Status: DISCONTINUED | OUTPATIENT
Start: 2025-02-11 | End: 2025-02-21

## 2025-02-11 RX ORDER — INSULIN LISPRO 100 U/ML
INJECTION, SOLUTION INTRAVENOUS; SUBCUTANEOUS
Refills: 0 | Status: DISCONTINUED | OUTPATIENT
Start: 2025-02-11 | End: 2025-02-14

## 2025-02-11 RX ORDER — SODIUM CHLORIDE 9 G/1000ML
1000 INJECTION, SOLUTION INTRAVENOUS
Refills: 0 | Status: DISCONTINUED | OUTPATIENT
Start: 2025-02-11 | End: 2025-02-21

## 2025-02-11 RX ORDER — DEXTROSE 50 % IN WATER 50 %
25 SYRINGE (ML) INTRAVENOUS ONCE
Refills: 0 | Status: DISCONTINUED | OUTPATIENT
Start: 2025-02-11 | End: 2025-02-21

## 2025-02-11 RX ORDER — URSODIOL 300 MG/1
300 CAPSULE ORAL
Refills: 0 | Status: DISCONTINUED | OUTPATIENT
Start: 2025-02-11 | End: 2025-02-11

## 2025-02-11 RX ORDER — GLUCAGON 3 MG/1
1 POWDER NASAL ONCE
Refills: 0 | Status: DISCONTINUED | OUTPATIENT
Start: 2025-02-11 | End: 2025-02-21

## 2025-02-11 RX ORDER — SODIUM PHOSPHATE,DIBASIC DIHYD
15 POWDER (GRAM) MISCELLANEOUS ONCE
Refills: 0 | Status: COMPLETED | OUTPATIENT
Start: 2025-02-11 | End: 2025-02-11

## 2025-02-11 RX ORDER — INSULIN GLARGINE-YFGN 100 [IU]/ML
10 INJECTION, SOLUTION SUBCUTANEOUS AT BEDTIME
Refills: 0 | Status: DISCONTINUED | OUTPATIENT
Start: 2025-02-11 | End: 2025-02-13

## 2025-02-11 RX ORDER — INSULIN LISPRO 100 U/ML
INJECTION, SOLUTION INTRAVENOUS; SUBCUTANEOUS AT BEDTIME
Refills: 0 | Status: DISCONTINUED | OUTPATIENT
Start: 2025-02-11 | End: 2025-02-14

## 2025-02-11 RX ORDER — DEXTROSE 50 % IN WATER 50 %
12.5 SYRINGE (ML) INTRAVENOUS ONCE
Refills: 0 | Status: DISCONTINUED | OUTPATIENT
Start: 2025-02-11 | End: 2025-02-21

## 2025-02-11 RX ORDER — METOPROLOL SUCCINATE 50 MG/1
75 TABLET, EXTENDED RELEASE ORAL DAILY
Refills: 0 | Status: DISCONTINUED | OUTPATIENT
Start: 2025-02-11 | End: 2025-02-12

## 2025-02-11 RX ORDER — INSULIN GLARGINE-YFGN 100 [IU]/ML
5 INJECTION, SOLUTION SUBCUTANEOUS AT BEDTIME
Refills: 0 | Status: DISCONTINUED | OUTPATIENT
Start: 2025-02-11 | End: 2025-02-11

## 2025-02-11 RX ORDER — URSODIOL 300 MG/1
300 CAPSULE ORAL
Refills: 0 | Status: DISCONTINUED | OUTPATIENT
Start: 2025-02-11 | End: 2025-02-21

## 2025-02-11 RX ORDER — MAGNESIUM OXIDE 400 MG
400 TABLET ORAL
Refills: 0 | Status: DISCONTINUED | OUTPATIENT
Start: 2025-02-11 | End: 2025-02-19

## 2025-02-11 RX ORDER — MYCOPHENOLATE MOFETIL 500 MG/1
500 TABLET, FILM COATED ORAL
Refills: 0 | Status: DISCONTINUED | OUTPATIENT
Start: 2025-02-11 | End: 2025-02-13

## 2025-02-11 RX ORDER — AMLODIPINE BESYLATE 10 MG/1
10 TABLET ORAL DAILY
Refills: 0 | Status: DISCONTINUED | OUTPATIENT
Start: 2025-02-11 | End: 2025-02-14

## 2025-02-11 RX ORDER — MAGNESIUM SULFATE 500 MG/ML
1 SYRINGE (ML) INJECTION ONCE
Refills: 0 | Status: COMPLETED | OUTPATIENT
Start: 2025-02-11 | End: 2025-02-11

## 2025-02-11 RX ORDER — CYCLOSPORINE 100 MG/1
75 CAPSULE, LIQUID FILLED ORAL
Refills: 0 | Status: DISCONTINUED | OUTPATIENT
Start: 2025-02-11 | End: 2025-02-13

## 2025-02-11 RX ORDER — RISPERIDONE 4 MG
1 TABLET ORAL AT BEDTIME
Refills: 0 | Status: DISCONTINUED | OUTPATIENT
Start: 2025-02-11 | End: 2025-02-21

## 2025-02-11 RX ADMIN — Medication 1 MILLIGRAM(S): at 21:28

## 2025-02-11 RX ADMIN — URSODIOL 300 MILLIGRAM(S): 300 CAPSULE ORAL at 17:16

## 2025-02-11 RX ADMIN — Medication 63.75 MILLIMOLE(S): at 18:30

## 2025-02-11 RX ADMIN — INSULIN LISPRO 4: 100 INJECTION, SOLUTION INTRAVENOUS; SUBCUTANEOUS at 17:35

## 2025-02-11 RX ADMIN — INSULIN GLARGINE-YFGN 10 UNIT(S): 100 INJECTION, SOLUTION SUBCUTANEOUS at 21:29

## 2025-02-11 RX ADMIN — CYCLOSPORINE 75 MILLIGRAM(S): 100 CAPSULE, LIQUID FILLED ORAL at 21:29

## 2025-02-11 RX ADMIN — Medication 40 MILLIEQUIVALENT(S): at 17:16

## 2025-02-11 RX ADMIN — MYCOPHENOLATE MOFETIL 500 MILLIGRAM(S): 500 TABLET, FILM COATED ORAL at 19:41

## 2025-02-11 RX ADMIN — Medication 100 GRAM(S): at 17:29

## 2025-02-11 RX ADMIN — Medication 400 MILLIGRAM(S): at 17:16

## 2025-02-11 NOTE — PATIENT PROFILE ADULT - FALL HARM RISK - UNIVERSAL INTERVENTIONS
Bed in lowest position, wheels locked, appropriate side rails in place/Call bell, personal items and telephone in reach/Instruct patient to call for assistance before getting out of bed or chair/Non-slip footwear when patient is out of bed/[place] to call system/Physically safe environment - no spills, clutter or unnecessary equipment/Purposeful Proactive Rounding/Room/bathroom lighting operational, light cord in reach no

## 2025-02-11 NOTE — H&P ADULT - NSHPPHYSICALEXAM_GEN_ALL_CORE
PHYSICAL EXAM:  GENERAL: NAD, lying in bed comfortably  HEAD:  Atraumatic, Normocephalic  EYES: EOMI, PERRLA, conjunctiva and sclera clear  ENT: Moist mucous membranes  NECK: Supple, No JVD  CHEST/LUNG: Clear to auscultation bilaterally; No rales, rhonchi, wheezing, or rubs. Unlabored respirations  HEART: Regular rate and rhythm; No murmurs, rubs, or gallops  ABDOMEN: Bowel sounds present; Soft, Nontender, Nondistended.    EXTREMITIES:  2+ Peripheral Pulses, brisk capillary refill. No clubbing, cyanosis, or edema  NERVOUS SYSTEM:  Alert & Oriented X3, speech clear. No deficits   MSK: FROM all 4 extremities, full and equal strength  SKIN:+ jaundice

## 2025-02-11 NOTE — PATIENT PROFILE ADULT - FUNCTIONAL ASSESSMENT - BASIC MOBILITY 5.
4 = No assist / stand by assistance Skin Substitute Text: The defect edges were debeveled with a #15 scalpel blade. Given the location of the defect, shape of the defect and the proximity to free margins a skin substitute graft was deemed most appropriate.  The graft material was trimmed to fit the size of the defect. The graft was then placed in the primary defect and oriented appropriately.

## 2025-02-11 NOTE — CONSULT NOTE ADULT - SUBJECTIVE AND OBJECTIVE BOX
Interventional Radiology    Evaluate for Procedure: Transplant liver bx    HPI: 64 year old male with PMH of of OLT 2019 at [facility] for 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 IR consulted for transplant live biopsy.      Allergies: No Known Allergies    Medications (Abx/Cardiac/Anticoagulation/Blood Products)      Data:    104.1  T(C): 36.6  HR: 68  BP: 163/77  RR: 18  SpO2: 96%    -WBC 6.40 / HgB 10.0 / Hct 31.6 / Plt 302  -Na 131 / Cl 97 / BUN 22 / Glucose 230  -K 3.4 / CO2 20 / Cr 1.61  - / Alk Phos 962 / T.Bili 5.2  -INR 0.94 / PTT 29.2    Radiology:     Assessment/Plan: 64 year old male with PMH of of OLT 2019 at [facility] for 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 IR consulted for transplant live biopsy.      -Will plan for Transplant liver biopsy tomorrow   -Please place IR procedure order under MERLE Harris  -Keep patient NPO after midnight tonight  -STAT am labs  -Patient on home asa. per d/w transplant team, given benefits of proceeding with biopsy on asa outweigh bleeding risk.   -Maintain active T&S  -Above d/w primary team      Any questions or concerns regarding above please reach out to IR:   -Available on [employer] teams  -During working hours (7a-5p): call [phone]  -Emergent issues after 5pm: page: [phone]  -Non-emergent consults: Please place a [place] order "IR Consult" with an appropriate callback number  -Scheduling questions: [phone]  -Clinic/Outpatient [phone]

## 2025-02-11 NOTE — PATIENT PROFILE ADULT - FALL HARM RISK - FALL HARM RISK
Alert and oriented x 3, normal mood and affect, no apparent risk to self or others.
No indicators present

## 2025-02-11 NOTE — H&P ADULT - ASSESSMENT
# Elevated liver enzymes s/p OLT 2019 for alcoholic cirrhosis  # H/O anastomotic stricture with choledocholithiasis s/p multiple ERCP most recently 2/10: no stricture appreciated and CBD was cleared   # steatohepatitis on liver bx 5/01/2024    LFTs trend:  1.0/703/129/1235  1.3/1011/252/232 on 2/5  4.5/938/456/344 on 2/10    CMV PCR negative 2/10  Cyclosporine level 77 on 2/10, target 100-150    - will need liver biopsy, hold ASA  - cyclosporine 75  mg BID- Cellcept 500 mg BID  - ursodiol 300 mg BID for history of choledocholithiasis      # CKD 3:   - avoid nephrotoxins   - trend BMP    # Diabetes Mellitus  - Ozempic 1 mg weekly at home at home and lanatus 10 units daily  - basal bolus insulin regimen while hospitalized      # HTN   -amlodipine 10  mg daily.   -metoprolol 75 mg BID.    # Elevated liver enzymes s/p OLT 2019 for alcoholic cirrhosis  # H/O anastomotic stricture with choledocholithiasis s/p multiple ERCP most recently 2/7: no stricture appreciated and CBD was cleared   # steatohepatitis on liver bx 5/01/2024    LFTs trend:  1.0/703/129/1235  1.3/1011/252/232 on 2/5  4.5/938/456/344 on 2/10    CMV PCR negative 2/10  Cyclosporine level 77 on 2/10, target 100-150    recommendations:  - get donor specific antibodies   - check acute hepatitis panel, HBV PCR, HCV PCR, EBV PCR, CMV PCR, LILLIAN, ASMA  - will need liver biopsy, hold ASA   - cyclosporine 75  mg BID- Cellcept 500 mg BID  - ursodiol 300 mg BID for history of choledocholithiasis      # CKD 3:  - stable around baseline   - avoid nephrotoxins   - trend BMP    # Diabetes Mellitus  - A1c 8.9 1/2025  - Ozempic 1 mg weekly at home at home and lanatus 10 units daily  - hold Ozempic   - basal bolus insulin regimen while hospitalized      # HTN   -amlodipine 10  mg daily.   -metoprolol 75 mg BID.    # Elevated liver enzymes s/p OLT 2019 for alcoholic cirrhosis  # H/O anastomotic stricture with choledocholithiasis s/p multiple ERCP most recently 2/7: no stricture appreciated and CBD was cleared   # steatohepatitis on liver bx 5/01/2024    LFTs trend:  1.0/703/129/1235  1.3/1011/252/232 on 2/5  4.5/938/456/344 on 2/10    CMV PCR negative 2/10  Cyclosporine level 77 on 2/10, target 100-150    recommendations:  - get donor specific antibodies   - check acute hepatitis panel, HBV PCR, HCV PCR, EBV PCR, CMV PCR, LILLIAN, ASMA  - will need liver biopsy, hold ASA   - cyclosporine 75  mg BID- Cellcept 500 mg BID  - ursodiol 300 mg BID for history of choledocholithiasis      # CKD 3:  - stable around baseline   - avoid nephrotoxins   - trend BMP    # Diabetes Mellitus  - A1c 8.9 1/2025  - Ozempic 1 mg weekly at home at home and lanatus 10 units daily  - hold Ozempic   - basal bolus insulin regimen while hospitalized      # HTN   -amlodipine 10  mg daily.   -metoprolol 75 mg BID.     Fe Dawkins MD  Chief gastroenterology fellow  [facility] # Elevated liver enzymes s/p OLT 2019 for alcoholic cirrhosis  # H/O anastomotic stricture with choledocholithiasis s/p multiple ERCP most recently 2/7: no stricture appreciated and CBD was cleared   # steatohepatitis on liver bx 5/01/2024    LFTs trend:  1.0/703/129/1235  1.3/1011/252/232 on 2/5  4.5/938/456/344 on 2/10    CMV PCR negative 2/10  Cyclosporine level 77 on 2/10, target 100-150    recommendations:  - get donor specific antibodies   - check acute hepatitis panel, HBV PCR, HCV PCR, EBV PCR, CMV PCR, LILLIAN, ASMA  - check US with doppler of hepatic vessels   - will need liver biopsy, hold ASA   - cyclosporine 75  mg BID- Cellcept 500 mg BID  - ursodiol 300 mg BID for history of choledocholithiasis      # CKD 3:  - stable around baseline   - avoid nephrotoxins   - trend BMP    # Diabetes Mellitus  - A1c 8.9 1/2025  - Ozempic 1 mg weekly at home at home and lanatus 10 units daily  - hold Ozempic   - basal bolus insulin regimen while hospitalized      # HTN   -amlodipine 10  mg daily.   -metoprolol 75 mg BID.     Fe Dawkins MD  Chief gastroenterology fellow  [facility]

## 2025-02-11 NOTE — H&P ADULT - HISTORY OF PRESENT ILLNESS
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     has a history of prior admission to [facility] (4/28-5/8/2024) for elevated liver tests - had anastamotic stricutre with CBD stone s/p ERCP and stent placement- persistent elevation in liver tests - s/p liver biopsy - Focal areas of inflammation with lymphocytes not equivalent for ACR with pericellular fibrosis and steatosis- Patient treated with PO Steroid pulse     Had ERCP done in 9/28/2024 with biliary stent removed and CBD stone removed. A mild biliary stricture was found in the post-transplant anastomosis, improved from prior      on blood work, he was noted with significantly elevated LFTs again since 1/29/2025. Patient was informed to come to the hospital to be evaluated for ACR but patient refused. Patient remained asymptomatic.     He Had outpatient ERCP done in in 2/7/2025  - Choledocholithiasis was found. Complete removal was accomplished by balloon extraction.  - At the duct to duct anastamosis was widely patent measuring 8mm in diameter.     Repeat labs noted with continue rising LFTs post ERCP. so he was advised to present to [facility] for admission to rule out ACR. admitted under transplant hepatology.   currently denies nausea, vomiting, abdominal pain, dysphagia, heart burn, melena, hematochezia, wt loss, change in appetite, fever, rash, diarrhea.    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     has a history of prior admission to [facility] (4/28-5/8/2024) for elevated liver tests - had anastomotic stricture with CBD stone s/p ERCP and stent placement- persistent elevation in liver tests - s/p liver biopsy - Focal areas of inflammation with lymphocytes not equivalent for ACR with pericellular fibrosis and steatosis- Patient treated with PO Steroid pulse     Had ERCP done in 9/28/2024 with biliary stent removed and CBD stone removed. A mild biliary stricture was found in the post-transplant anastomosis, improved from prior      on blood work, he was noted with significantly elevated LFTs again since 1/29/2025. Patient was informed to come to the hospital to be evaluated for ACR but patient refused. Patient remained asymptomatic.     He Had outpatient ERCP done in in 2/7/2025  - Choledocholithiasis was found. Complete removal was accomplished by balloon extraction.  - At the duct to duct anastamosis was widely patent measuring 8mm in diameter.     Repeat labs noted with continue rising LFTs post ERCP. so he was advised to present to [facility] for admission to rule out ACR. admitted under transplant hepatology.   currently denies nausea, vomiting, abdominal pain, dysphagia, heart burn, melena, hematochezia, wt loss, change in appetite, fever, rash, diarrhea.

## 2025-02-12 ENCOUNTER — RESULT REVIEW (OUTPATIENT)
Age: 65
End: 2025-02-12

## 2025-02-12 LAB
ALBUMIN SERPL ELPH-MCNC: 3.6 G/DL — SIGNIFICANT CHANGE UP (ref 3.3–5)
ALP SERPL-CCNC: 965 U/L — HIGH (ref 40–120)
ALT FLD-CCNC: 362 U/L — HIGH (ref 10–45)
ANION GAP SERPL CALC-SCNC: 13 MMOL/L — SIGNIFICANT CHANGE UP (ref 5–17)
APTT BLD: 29.3 SEC — SIGNIFICANT CHANGE UP (ref 24.5–35.6)
AST SERPL-CCNC: 374 U/L — HIGH (ref 10–40)
BASOPHILS # BLD AUTO: 0.02 K/UL — SIGNIFICANT CHANGE UP (ref 0–0.2)
BASOPHILS NFR BLD AUTO: 0.3 % — SIGNIFICANT CHANGE UP (ref 0–2)
BILIRUB SERPL-MCNC: 5 MG/DL — HIGH (ref 0.2–1.2)
BUN SERPL-MCNC: 19 MG/DL — SIGNIFICANT CHANGE UP (ref 7–23)
CALCIUM SERPL-MCNC: 8.6 MG/DL — SIGNIFICANT CHANGE UP (ref 8.4–10.5)
CHLORIDE SERPL-SCNC: 99 MMOL/L — SIGNIFICANT CHANGE UP (ref 96–108)
CMV DNA CSF QL NAA+PROBE: SIGNIFICANT CHANGE UP IU/ML
CMV DNA SPEC NAA+PROBE-LOG#: SIGNIFICANT CHANGE UP LOG10IU/ML
CO2 SERPL-SCNC: 21 MMOL/L — LOW (ref 22–31)
CREAT SERPL-MCNC: 1.65 MG/DL — HIGH (ref 0.5–1.3)
CULTURE RESULTS: NO GROWTH — SIGNIFICANT CHANGE UP
CYCLOSPORINE SER-MCNC: 83 NG/ML — LOW (ref 150–400)
EBV DNA SERPL NAA+PROBE-ACNC: SIGNIFICANT CHANGE UP IU/ML
EBVPCR LOG: SIGNIFICANT CHANGE UP LOG10IU/ML
EGFR: 46 ML/MIN/1.73M2 — LOW
EOSINOPHIL # BLD AUTO: 0.22 K/UL — SIGNIFICANT CHANGE UP (ref 0–0.5)
EOSINOPHIL NFR BLD AUTO: 3.6 % — SIGNIFICANT CHANGE UP (ref 0–6)
GLUCOSE BLDC GLUCOMTR-MCNC: 192 MG/DL — HIGH (ref 70–99)
GLUCOSE BLDC GLUCOMTR-MCNC: 251 MG/DL — HIGH (ref 70–99)
GLUCOSE BLDC GLUCOMTR-MCNC: 284 MG/DL — HIGH (ref 70–99)
GLUCOSE BLDC GLUCOMTR-MCNC: 286 MG/DL — HIGH (ref 70–99)
GLUCOSE BLDC GLUCOMTR-MCNC: 360 MG/DL — HIGH (ref 70–99)
GLUCOSE SERPL-MCNC: 291 MG/DL — HIGH (ref 70–99)
HAV IGM SER-ACNC: SIGNIFICANT CHANGE UP
HBV CORE IGM SER-ACNC: SIGNIFICANT CHANGE UP
HBV DNA # SERPL NAA+PROBE: SIGNIFICANT CHANGE UP
HBV DNA SERPL NAA+PROBE-LOG#: SIGNIFICANT CHANGE UP LOGIU/ML
HBV SURFACE AG SER-ACNC: SIGNIFICANT CHANGE UP
HCT VFR BLD CALC: 30.3 % — LOW (ref 39–50)
HCV AB S/CO SERPL IA: 0.07 S/CO — SIGNIFICANT CHANGE UP (ref 0–0.99)
HCV AB SERPL-IMP: SIGNIFICANT CHANGE UP
HCV RNA SPEC NAA+PROBE-LOG IU: SIGNIFICANT CHANGE UP
HCV RNA SPEC NAA+PROBE-LOG IU: SIGNIFICANT CHANGE UP LOGIU/ML
HGB BLD-MCNC: 10.1 G/DL — LOW (ref 13–17)
IMM GRANULOCYTES NFR BLD AUTO: 0.3 % — SIGNIFICANT CHANGE UP (ref 0–0.9)
INR BLD: 0.94 RATIO — SIGNIFICANT CHANGE UP (ref 0.85–1.16)
LYMPHOCYTES # BLD AUTO: 1.81 K/UL — SIGNIFICANT CHANGE UP (ref 1–3.3)
LYMPHOCYTES # BLD AUTO: 29.6 % — SIGNIFICANT CHANGE UP (ref 13–44)
MAGNESIUM SERPL-MCNC: 1.9 MG/DL — SIGNIFICANT CHANGE UP (ref 1.6–2.6)
MCHC RBC-ENTMCNC: 26.6 PG — LOW (ref 27–34)
MCHC RBC-ENTMCNC: 33.3 G/DL — SIGNIFICANT CHANGE UP (ref 32–36)
MCV RBC AUTO: 79.9 FL — LOW (ref 80–100)
MONOCYTES # BLD AUTO: 0.5 K/UL — SIGNIFICANT CHANGE UP (ref 0–0.9)
MONOCYTES NFR BLD AUTO: 8.2 % — SIGNIFICANT CHANGE UP (ref 2–14)
NEUTROPHILS # BLD AUTO: 3.54 K/UL — SIGNIFICANT CHANGE UP (ref 1.8–7.4)
NEUTROPHILS NFR BLD AUTO: 58 % — SIGNIFICANT CHANGE UP (ref 43–77)
NRBC BLD AUTO-RTO: 0 /100 WBCS — SIGNIFICANT CHANGE UP (ref 0–0)
PHOSPHATE SERPL-MCNC: 2.4 MG/DL — LOW (ref 2.5–4.5)
PLATELET # BLD AUTO: 296 K/UL — SIGNIFICANT CHANGE UP (ref 150–400)
POTASSIUM SERPL-MCNC: 3.5 MMOL/L — SIGNIFICANT CHANGE UP (ref 3.5–5.3)
POTASSIUM SERPL-SCNC: 3.5 MMOL/L — SIGNIFICANT CHANGE UP (ref 3.5–5.3)
PROT SERPL-MCNC: 7.2 G/DL — SIGNIFICANT CHANGE UP (ref 6–8.3)
PROTHROM AB SERPL-ACNC: 10.8 SEC — SIGNIFICANT CHANGE UP (ref 9.9–13.4)
RBC # BLD: 3.79 M/UL — LOW (ref 4.2–5.8)
RBC # FLD: 16.8 % — HIGH (ref 10.3–14.5)
SODIUM SERPL-SCNC: 133 MMOL/L — LOW (ref 135–145)
SPECIMEN SOURCE: SIGNIFICANT CHANGE UP
WBC # BLD: 6.11 K/UL — SIGNIFICANT CHANGE UP (ref 3.8–10.5)
WBC # FLD AUTO: 6.11 K/UL — SIGNIFICANT CHANGE UP (ref 3.8–10.5)

## 2025-02-12 PROCEDURE — 99222 1ST HOSP IP/OBS MODERATE 55: CPT | Mod: GC

## 2025-02-12 PROCEDURE — 88307 TISSUE EXAM BY PATHOLOGIST: CPT | Mod: 26

## 2025-02-12 PROCEDURE — 76942 ECHO GUIDE FOR BIOPSY: CPT | Mod: 26

## 2025-02-12 PROCEDURE — 99232 SBSQ HOSP IP/OBS MODERATE 35: CPT | Mod: GC

## 2025-02-12 PROCEDURE — 88312 SPECIAL STAINS GROUP 1: CPT | Mod: 26

## 2025-02-12 PROCEDURE — 88342 IMHCHEM/IMCYTCHM 1ST ANTB: CPT | Mod: 26

## 2025-02-12 PROCEDURE — 88365 INSITU HYBRIDIZATION (FISH): CPT | Mod: 26

## 2025-02-12 PROCEDURE — 47000 NEEDLE BIOPSY OF LIVER PERQ: CPT

## 2025-02-12 PROCEDURE — 88341 IMHCHEM/IMCYTCHM EA ADD ANTB: CPT | Mod: 26

## 2025-02-12 PROCEDURE — 88364 INSITU HYBRIDIZATION (FISH): CPT | Mod: 26

## 2025-02-12 PROCEDURE — 74183 MRI ABD W/O CNTR FLWD CNTR: CPT | Mod: 26

## 2025-02-12 PROCEDURE — 88313 SPECIAL STAINS GROUP 2: CPT | Mod: 26

## 2025-02-12 RX ORDER — SODIUM PHOSPHATE,DIBASIC DIHYD
15 POWDER (GRAM) MISCELLANEOUS ONCE
Refills: 0 | Status: COMPLETED | OUTPATIENT
Start: 2025-02-12 | End: 2025-02-12

## 2025-02-12 RX ORDER — METOPROLOL SUCCINATE 50 MG/1
50 TABLET, EXTENDED RELEASE ORAL EVERY 12 HOURS
Refills: 0 | Status: DISCONTINUED | OUTPATIENT
Start: 2025-02-12 | End: 2025-02-21

## 2025-02-12 RX ORDER — INSULIN LISPRO 100 U/ML
6 INJECTION, SOLUTION INTRAVENOUS; SUBCUTANEOUS ONCE
Refills: 0 | Status: COMPLETED | OUTPATIENT
Start: 2025-02-12 | End: 2025-02-12

## 2025-02-12 RX ADMIN — URSODIOL 300 MILLIGRAM(S): 300 CAPSULE ORAL at 17:06

## 2025-02-12 RX ADMIN — MYCOPHENOLATE MOFETIL 500 MILLIGRAM(S): 500 TABLET, FILM COATED ORAL at 12:39

## 2025-02-12 RX ADMIN — Medication 400 MILLIGRAM(S): at 17:06

## 2025-02-12 RX ADMIN — URSODIOL 300 MILLIGRAM(S): 300 CAPSULE ORAL at 05:35

## 2025-02-12 RX ADMIN — INSULIN GLARGINE-YFGN 10 UNIT(S): 100 INJECTION, SOLUTION SUBCUTANEOUS at 21:37

## 2025-02-12 RX ADMIN — Medication 63.75 MILLIMOLE(S): at 13:00

## 2025-02-12 RX ADMIN — METOPROLOL SUCCINATE 50 MILLIGRAM(S): 50 TABLET, EXTENDED RELEASE ORAL at 05:35

## 2025-02-12 RX ADMIN — CYCLOSPORINE 75 MILLIGRAM(S): 100 CAPSULE, LIQUID FILLED ORAL at 20:29

## 2025-02-12 RX ADMIN — Medication 400 MILLIGRAM(S): at 12:39

## 2025-02-12 RX ADMIN — INSULIN LISPRO 10: 100 INJECTION, SOLUTION INTRAVENOUS; SUBCUTANEOUS at 13:04

## 2025-02-12 RX ADMIN — MYCOPHENOLATE MOFETIL 500 MILLIGRAM(S): 500 TABLET, FILM COATED ORAL at 20:29

## 2025-02-12 RX ADMIN — INSULIN LISPRO 6 UNIT(S): 100 INJECTION, SOLUTION INTRAVENOUS; SUBCUTANEOUS at 13:21

## 2025-02-12 RX ADMIN — METOPROLOL SUCCINATE 50 MILLIGRAM(S): 50 TABLET, EXTENDED RELEASE ORAL at 17:06

## 2025-02-12 RX ADMIN — Medication 1 MILLIGRAM(S): at 21:38

## 2025-02-12 RX ADMIN — INSULIN LISPRO 2: 100 INJECTION, SOLUTION INTRAVENOUS; SUBCUTANEOUS at 17:07

## 2025-02-12 RX ADMIN — INSULIN LISPRO 2: 100 INJECTION, SOLUTION INTRAVENOUS; SUBCUTANEOUS at 21:36

## 2025-02-12 RX ADMIN — CYCLOSPORINE 75 MILLIGRAM(S): 100 CAPSULE, LIQUID FILLED ORAL at 12:59

## 2025-02-12 RX ADMIN — Medication 40 MILLIEQUIVALENT(S): at 12:40

## 2025-02-12 RX ADMIN — AMLODIPINE BESYLATE 10 MILLIGRAM(S): 10 TABLET ORAL at 05:35

## 2025-02-12 NOTE — CONSULT NOTE ADULT - ASSESSMENT
___________________________  #Favor seborrheic dermatitis    - Morphologically favor above, less likely scalp psoriasis given lack of well defined scaly plaques   - Offered patient ketoconazole shampoo here, however patient not bothered, can continue using at home  - Can start hydrocortisone 2.5% ointment for seb derm on forehead if patient becomes bothered by it, do not need to treat if patient not symptomatic    #favor onychogryphosis, b/l toenails  - benign   - do not favor fungal infection at this time  - can follow up with podiatry outpatient for further management     Dermatology will sign off.    The patient's chart was reviewed in addition to being seen and examined at bedside with the dermatology attending Dr. Zamudio .  Recommendations were communicated with the primary team.  Please page [phone] with a 10-digit call-back number for further related questions.    Thank you for allowing us to participate in the care of this patient.  Please alert Dermatology for any new or worsening developments.    Sally Wood MD  Resident Physician, PGY-2  [facility] Dermatology  Pager: [phone]  Office: [phone]     ___________________________  Seborrheic dermatitis    - Morphologically favor above, less likely scalp psoriasis given lack of well defined scaly plaques   - Offered patient ketoconazole shampoo here, however patient not bothered, can continue using at home  - Can start hydrocortisone 2.5% ointment for seb derm on forehead if patient becomes bothered by it, do not need to treat if patient not symptomatic    #Onychogryphosis, b/l toenails  - benign   - do not favor fungal infection at this time  - can follow up with podiatry outpatient for further management     Dermatology will sign off.    The patient's chart was reviewed in addition to being seen and examined at bedside with the dermatology attending Dr. Zamudio .  Recommendations were communicated with the primary team.  Please page [phone] with a 10-digit call-back number for further related questions.    Thank you for allowing us to participate in the care of this patient.  Please alert Dermatology for any new or worsening developments.    Sally Wood MD  Resident Physician, PGY-2  [facility] Dermatology  Pager: [phone]  Office: [phone]

## 2025-02-12 NOTE — PRE-ANESTHESIA EVALUATION ADULT - NSATTENDATTESTRD_GEN_ALL_CORE
The patient has been re-examined and I agree with the above assessment or I updated with my findings.
Left UE/Right UE

## 2025-02-12 NOTE — PROGRESS NOTE ADULT - ASSESSMENT
# Elevated liver enzymes s/p OLT 2019 for alcoholic cirrhosis  # H/O anastomotic stricture with choledocholithiasis s/p multiple ERCP most recently 2/7: no stricture appreciated and CBD was cleared   # steatohepatitis on liver bx 5/01/2024    Liver panel trend:  TBili 5.0   /      /      /   AlkP 965   /   Tptn 7.2   /   Alb 3.6    /   DBili --      02-12  TBili 5.2   /      /      /   AlkP 962   /   Tptn 7.4   /   Alb 3.5    /   DBili --      02-11  CMV PCR negative 2/10  Cyclosporine level 77 on 2/10, target 100-150  acute hepatitis panel is negative, Hep B and C PCR are negative   US with patent hepatic vessels, CBD 11 mm     recommendations:  - get donor specific antibodies   - follow up EBV PCR, CMV PCR, LILLIAN, ASMA  - s/p Liver biopsy today , follow up pathology results   - will get MRCP for evaluation of biliary tree    - cyclosporine 75  mg BID- Cellcept 500 mg BID  - ursodiol 300 mg BID for history of choledocholithiasis      # CKD 3:  - stable around baseline   - avoid nephrotoxins   - trend BMP    # Diabetes Mellitus  - A1c 8.9 1/2025  - Ozempic 1 mg weekly at home at home and lanatus 10 units daily  - hold Ozempic   - basal bolus insulin regimen while hospitalized      # HTN   -amlodipine 10  mg daily.   -metoprolol 75 mg BID.     # Hypokalemia  # Hypomagnesemia  # Hypophosphatemia  - replete and trend     # Anxiety: c/w home risperidone

## 2025-02-12 NOTE — PROCEDURE NOTE - PROCEDURE FINDINGS AND DETAILS
Successful retrieval of IVC filter via left IJ approach. No thrombus seen on IVC venogram. Successful transplant liver biopsy.

## 2025-02-12 NOTE — PROGRESS NOTE ADULT - SUBJECTIVE AND OBJECTIVE BOX
Gastroenterology progress note:     Patient is a 64y old  Male who presents with a chief complaint of abnormal liver tests     Admitted on: 02-11-25    We are following the patient for: abnormal liver tests         Interval History:    No acute events overnight.   has no complains       PAST MEDICAL & SURGICAL HISTORY:  Hypertension      Diabetes mellitus      Alcoholic cirrhosis      Common bile duct (CBD) obstruction      Anxiety      Chronic kidney disease (CKD)      Calculus of bile duct without cholangitis without obstruction      Liver transplanted      History of ERCP          MEDICATIONS  (STANDING):  amLODIPine   Tablet 10 milliGRAM(s) Oral daily  cycloSPORINE  (SandIMMUNE) 75 milliGRAM(s) Oral <User Schedule>  dextrose 5%. 1000 milliLiter(s) (50 mL/Hr) IV Continuous <Continuous>  dextrose 5%. 1000 milliLiter(s) (100 mL/Hr) IV Continuous <Continuous>  dextrose 50% Injectable 25 Gram(s) IV Push once  dextrose 50% Injectable 12.5 Gram(s) IV Push once  dextrose 50% Injectable 25 Gram(s) IV Push once  glucagon  Injectable 1 milliGRAM(s) IntraMuscular once  insulin glargine Injectable (LANTUS) 10 Unit(s) SubCutaneous at bedtime  insulin lispro (ADMELOG) corrective regimen sliding scale   SubCutaneous three times a day before meals  insulin lispro (ADMELOG) corrective regimen sliding scale   SubCutaneous at bedtime  magnesium oxide 400 milliGRAM(s) Oral two times a day with meals  metoprolol tartrate 50 milliGRAM(s) Oral every 12 hours  mycophenolate mofetil 500 milliGRAM(s) Oral <User Schedule>  potassium chloride    Tablet ER 40 milliEquivalent(s) Oral once  risperiDONE   Tablet 1 milliGRAM(s) Oral at bedtime  sodium phosphate 15 milliMole(s)/250 mL IVPB 15 milliMole(s) IV Intermittent once  ursodiol Capsule 300 milliGRAM(s) Oral two times a day    MEDICATIONS  (PRN):  dextrose Oral Gel 15 Gram(s) Oral once PRN Blood Glucose LESS THAN 70 milliGRAM(s)/deciliter      Allergies  No Known Allergies      Review of Systems:   Cardiovascular:  No Chest Pain, No Palpitations  Respiratory:  No Cough, No Dyspnea  Gastrointestinal:  As described in HPI  Skin:  No Skin Lesions, No Jaundice  Neuro:  No Syncope, No Dizziness    Physical Examination:  T(C): 37.2 (02-12-25 @ 10:05), Max: 37.2 (02-12-25 @ 00:41)  HR: 68 (02-12-25 @ 11:30) (64 - 78)  BP: 139/65 (02-12-25 @ 11:30) (139/65 - 170/67)  RR: 14 (02-12-25 @ 11:30) (14 - 23)  SpO2: 93% (02-12-25 @ 11:30) (91% - 98%)  Weight (kg): 104.1 (02-12-25 @ 10:05)    02-11-25 @ 07:01  -  02-12-25 @ 07:00  --------------------------------------------------------  IN: 500 mL / OUT: 1350 mL / NET: -850 mL        GENERAL: AAOx3, no acute distress.  HEAD:  Atraumatic, Normocephalic  EYES: conjunctiva and sclera clear  NECK: Supple, no JVD or thyromegaly  CHEST/LUNG: Clear to auscultation bilaterally; No wheeze, rhonchi, or rales  HEART: Regular rate and rhythm; normal S1, S2, No murmurs.  ABDOMEN: Soft, nontender, nondistended; Bowel sounds present  NEUROLOGY: No asterixis or tremor.   SKIN: Intact, + jaundice     Data:                        10.1   6.11  )-----------( 296      ( 12 Feb 2025 06:49 )             30.3     Hgb trend:  10.1  02-12-25 @ 06:49  10.0  02-11-25 @ 15:57        02-12    133[L]  |  99  |  19  ----------------------------<  291[H]  3.5   |  21[L]  |  1.65[H]    Ca    8.6      12 Feb 2025 06:48  Phos  2.4     02-12  Mg     1.9     02-12    TPro  7.2  /  Alb  3.6  /  TBili  5.0[H]  /  DBili  x   /  AST  374[H]  /  ALT  362[H]  /  AlkPhos  965[H]  02-12    Liver panel trend:  TBili 5.0   /      /      /   AlkP 965   /   Tptn 7.2   /   Alb 3.6    /   DBili --      02-12  TBili 5.2   /      /      /   AlkP 962   /   Tptn 7.4   /   Alb 3.5    /   DBili --      02-11      PT/INR - ( 12 Feb 2025 06:50 )   PT: 10.8 sec;   INR: 0.94 ratio         PTT - ( 12 Feb 2025 06:50 )  PTT:29.3 sec       < from: US Trans Liver W/ Doppler (02.11.25 @ 17:13) >    IMPRESSION:    Patent hepatic arterial and venous vasculature.    Mild steatosis. Common bile duct dilated up to 11 mm, likelyin the   setting of recent ERCP.      < end of copied text >

## 2025-02-12 NOTE — CONSULT NOTE ADULT - SUBJECTIVE AND OBJECTIVE BOX
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     has a history of prior admission to [facility] (4/28-5/8/2024) for elevated liver tests - had anastomotic stricture with CBD stone s/p ERCP and stent placement- persistent elevation in liver tests - s/p liver biopsy - Focal areas of inflammation with lymphocytes not equivalent for ACR with pericellular fibrosis and steatosis- Patient treated with PO Steroid pulse     Had ERCP done in 9/28/2024 with biliary stent removed and CBD stone removed. A mild biliary stricture was found in the post-transplant anastomosis, improved from prior      on blood work, he was noted with significantly elevated LFTs again since 1/29/2025. Patient was informed to come to the hospital to be evaluated for ACR but patient refused. Patient remained asymptomatic.     He Had outpatient ERCP done in in 2/7/2025  - Choledocholithiasis was found. Complete removal was accomplished by balloon extraction.  - At the duct to duct anastamosis was widely patent measuring 8mm in diameter.     Repeat labs noted with continue rising LFTs post ERCP. so he was advised to present to [facility] for admission to rule out ACR. admitted under transplant hepatology.   currently denies nausea, vomiting, abdominal pain, dysphagia, heart burn, melena, hematochezia, wt loss, change in appetite, fever, rash, diarrhea.    (11 Feb 2025 14:17)    DERM HPI: Dermatology consulted for "rash" on scalp. Pt states that he has a hx of scalp psoriasis and is treated with ketoconazole shampoo for it home. Not bothersome to pt, not finding it itchy.     PAST MEDICAL & SURGICAL HISTORY:  Hypertension      Diabetes mellitus      Alcoholic cirrhosis      Common bile duct (CBD) obstruction      Anxiety      Chronic kidney disease (CKD)      Calculus of bile duct without cholangitis without obstruction      Liver transplanted      History of ERCP        ROS:  As above    MEDICATIONS  (STANDING):  amLODIPine   Tablet 10 milliGRAM(s) Oral daily  cycloSPORINE  (SandIMMUNE) 75 milliGRAM(s) Oral <User Schedule>  dextrose 5%. 1000 milliLiter(s) (50 mL/Hr) IV Continuous <Continuous>  dextrose 5%. 1000 milliLiter(s) (100 mL/Hr) IV Continuous <Continuous>  dextrose 50% Injectable 25 Gram(s) IV Push once  dextrose 50% Injectable 12.5 Gram(s) IV Push once  dextrose 50% Injectable 25 Gram(s) IV Push once  glucagon  Injectable 1 milliGRAM(s) IntraMuscular once  insulin glargine Injectable (LANTUS) 10 Unit(s) SubCutaneous at bedtime  insulin lispro (ADMELOG) corrective regimen sliding scale   SubCutaneous three times a day before meals  insulin lispro (ADMELOG) corrective regimen sliding scale   SubCutaneous at bedtime  magnesium oxide 400 milliGRAM(s) Oral two times a day with meals  metoprolol tartrate 50 milliGRAM(s) Oral every 12 hours  mycophenolate mofetil 500 milliGRAM(s) Oral <User Schedule>  risperiDONE   Tablet 1 milliGRAM(s) Oral at bedtime  ursodiol Capsule 300 milliGRAM(s) Oral two times a day    MEDICATIONS  (PRN):  dextrose Oral Gel 15 Gram(s) Oral once PRN Blood Glucose LESS THAN 70 milliGRAM(s)/deciliter      Allergies    No Known Allergies    Intolerances        SOCIAL HISTORY:  Dem    FAMILY HISTORY:  FH: hypertension        Vital Signs Last 24 Hrs  T(C): 37.4 (12 Feb 2025 21:00), Max: 37.4 (12 Feb 2025 21:00)  T(F): 99.4 (12 Feb 2025 21:00), Max: 99.4 (12 Feb 2025 21:00)  HR: 66 (12 Feb 2025 21:00) (65 - 78)  BP: 146/75 (12 Feb 2025 21:00) (139/65 - 172/86)  BP(mean): 94 (12 Feb 2025 11:30) (94 - 109)  RR: 18 (12 Feb 2025 21:00) (14 - 23)  SpO2: 95% (12 Feb 2025 21:00) (91% - 98%)    Parameters below as of 12 Feb 2025 21:00  Patient On (Oxygen Delivery Method): room air      PHYSICAL EXAM:   The patient was alert and in no apparent distress.  There was no visible lymphadenopathy.  Conjunctiva were non injected  There was no clubbing or edema of extremities.    Of note on skin exam:   Scalp with diffuse fine scale. Forehead with  greasy scaly thin plaques.   B/l toes with thickened, yellow, large curved toenails.     LABS:                        10.1   6.11  )-----------( 296      ( 12 Feb 2025 06:49 )             30.3     02-12    133[L]  |  99  |  19  ----------------------------<  291[H]  3.5   |  21[L]  |  1.65[H]    Ca    8.6      12 Feb 2025 06:48  Phos  2.4     02-12  Mg     1.9     02-12    TPro  7.2  /  Alb  3.6  /  TBili  5.0[H]  /  DBili  x   /  AST  374[H]  /  ALT  362[H]  /  AlkPhos  965[H]  02-12    PT/INR - ( 12 Feb 2025 06:50 )   PT: 10.8 sec;   INR: 0.94 ratio         PTT - ( 12 Feb 2025 06:50 )  PTT:29.3 sec  Urinalysis Basic - ( 12 Feb 2025 06:48 )    Color: x / Appearance: x / SG: x / pH: x  Gluc: 291 mg/dL / Ketone: x  / Bili: x / Urobili: x   Blood: x / Protein: x / Nitrite: x   Leuk Esterase: x / RBC: x / WBC x   Sq Epi: x / Non Sq Epi: x / Bacteria: x        RADIOLOGY & ADDITIONAL STUDIES: HPI:  64 year old male with PMH of of OLT 4/22/2019 at [facility] for 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     has a history of prior admission to [facility] (4/28-5/8/2024) for elevated liver tests - had anastomotic stricture with CBD stone s/p ERCP and stent placement- persistent elevation in liver tests - s/p liver biopsy - Focal areas of inflammation with lymphocytes not equivalent for ACR with pericellular fibrosis and steatosis- Patient treated with PO Steroid pulse     Had ERCP done in 9/28/2024 with biliary stent removed and CBD stone removed. A mild biliary stricture was found in the post-transplant anastomosis, improved from prior      on blood work, he was noted with significantly elevated LFTs again since 1/29/2025. Patient was informed to come to the hospital to be evaluated for ACR but patient refused. Patient remained asymptomatic.     He Had outpatient ERCP done in in 2/7/2025  - Choledocholithiasis was found. Complete removal was accomplished by balloon extraction.  - At the duct to duct anastamosis was widely patent measuring 8mm in diameter.     Repeat labs noted with continue rising LFTs post ERCP. so he was advised to present to [facility] for admission to rule out ACR. admitted under transplant hepatology.   currently denies nausea, vomiting, abdominal pain, dysphagia, heart burn, melena, hematochezia, wt loss, change in appetite, fever, rash, diarrhea.    (11 Feb 2025 14:17)    DERM HPI: Dermatology consulted for "rash" on scalp. Pt states that he has a hx of scalp psoriasis and is treated with ketoconazole shampoo for it home. Not bothersome to pt, not finding it itchy.     PAST MEDICAL & SURGICAL HISTORY:  Hypertension      Diabetes mellitus      Alcoholic cirrhosis      Common bile duct (CBD) obstruction      Anxiety      Chronic kidney disease (CKD)      Calculus of bile duct without cholangitis without obstruction      Liver transplanted      History of ERCP        ROS:  As above    MEDICATIONS  (STANDING):  amLODIPine   Tablet 10 milliGRAM(s) Oral daily  cycloSPORINE  (SandIMMUNE) 75 milliGRAM(s) Oral <User Schedule>  dextrose 5%. 1000 milliLiter(s) (50 mL/Hr) IV Continuous <Continuous>  dextrose 5%. 1000 milliLiter(s) (100 mL/Hr) IV Continuous <Continuous>  dextrose 50% Injectable 25 Gram(s) IV Push once  dextrose 50% Injectable 12.5 Gram(s) IV Push once  dextrose 50% Injectable 25 Gram(s) IV Push once  glucagon  Injectable 1 milliGRAM(s) IntraMuscular once  insulin glargine Injectable (LANTUS) 10 Unit(s) SubCutaneous at bedtime  insulin lispro (ADMELOG) corrective regimen sliding scale   SubCutaneous three times a day before meals  insulin lispro (ADMELOG) corrective regimen sliding scale   SubCutaneous at bedtime  magnesium oxide 400 milliGRAM(s) Oral two times a day with meals  metoprolol tartrate 50 milliGRAM(s) Oral every 12 hours  mycophenolate mofetil 500 milliGRAM(s) Oral <User Schedule>  risperiDONE   Tablet 1 milliGRAM(s) Oral at bedtime  ursodiol Capsule 300 milliGRAM(s) Oral two times a day    MEDICATIONS  (PRN):  dextrose Oral Gel 15 Gram(s) Oral once PRN Blood Glucose LESS THAN 70 milliGRAM(s)/deciliter      Allergies    No Known Allergies    Intolerances        SOCIAL HISTORY:  Denies drug use     FAMILY HISTORY:  FH: hypertension        Vital Signs Last 24 Hrs  T(C): 37.4 (12 Feb 2025 21:00), Max: 37.4 (12 Feb 2025 21:00)  T(F): 99.4 (12 Feb 2025 21:00), Max: 99.4 (12 Feb 2025 21:00)  HR: 66 (12 Feb 2025 21:00) (65 - 78)  BP: 146/75 (12 Feb 2025 21:00) (139/65 - 172/86)  BP(mean): 94 (12 Feb 2025 11:30) (94 - 109)  RR: 18 (12 Feb 2025 21:00) (14 - 23)  SpO2: 95% (12 Feb 2025 21:00) (91% - 98%)    Parameters below as of 12 Feb 2025 21:00  Patient On (Oxygen Delivery Method): room air      PHYSICAL EXAM:   The patient was alert and in no apparent distress.  There was no visible lymphadenopathy.  Conjunctiva were non injected  There was no clubbing or edema of extremities.    Of note on skin exam:   Scalp with diffuse fine scale. Forehead with  greasy scaly thin plaques.   B/l toes with thickened, yellow, large curved toenails.     LABS:                        10.1   6.11  )-----------( 296      ( 12 Feb 2025 06:49 )             30.3     02-12    133[L]  |  99  |  19  ----------------------------<  291[H]  3.5   |  21[L]  |  1.65[H]    Ca    8.6      12 Feb 2025 06:48  Phos  2.4     02-12  Mg     1.9     02-12    TPro  7.2  /  Alb  3.6  /  TBili  5.0[H]  /  DBili  x   /  AST  374[H]  /  ALT  362[H]  /  AlkPhos  965[H]  02-12    PT/INR - ( 12 Feb 2025 06:50 )   PT: 10.8 sec;   INR: 0.94 ratio         PTT - ( 12 Feb 2025 06:50 )  PTT:29.3 sec  Urinalysis Basic - ( 12 Feb 2025 06:48 )    Color: x / Appearance: x / SG: x / pH: x  Gluc: 291 mg/dL / Ketone: x  / Bili: x / Urobili: x   Blood: x / Protein: x / Nitrite: x   Leuk Esterase: x / RBC: x / WBC x   Sq Epi: x / Non Sq Epi: x / Bacteria: x        RADIOLOGY & ADDITIONAL STUDIES: no additional relevant studies

## 2025-02-13 ENCOUNTER — APPOINTMENT (OUTPATIENT)
Dept: NEPHROLOGY | Facility: CLINIC | Age: 65
End: 2025-02-13

## 2025-02-13 LAB
ALBUMIN SERPL ELPH-MCNC: 3.1 G/DL — LOW (ref 3.3–5)
ALP SERPL-CCNC: 906 U/L — HIGH (ref 40–120)
ALT FLD-CCNC: 292 U/L — HIGH (ref 10–45)
ANION GAP SERPL CALC-SCNC: 15 MMOL/L — SIGNIFICANT CHANGE UP (ref 5–17)
APTT BLD: 29.2 SEC — SIGNIFICANT CHANGE UP (ref 24.5–35.6)
AST SERPL-CCNC: 244 U/L — HIGH (ref 10–40)
BASOPHILS # BLD AUTO: 0.03 K/UL — SIGNIFICANT CHANGE UP (ref 0–0.2)
BASOPHILS NFR BLD AUTO: 0.4 % — SIGNIFICANT CHANGE UP (ref 0–2)
BILIRUB SERPL-MCNC: 4.6 MG/DL — HIGH (ref 0.2–1.2)
BUN SERPL-MCNC: 19 MG/DL — SIGNIFICANT CHANGE UP (ref 7–23)
CALCIUM SERPL-MCNC: 8.8 MG/DL — SIGNIFICANT CHANGE UP (ref 8.4–10.5)
CHLORIDE SERPL-SCNC: 101 MMOL/L — SIGNIFICANT CHANGE UP (ref 96–108)
CO2 SERPL-SCNC: 18 MMOL/L — LOW (ref 22–31)
CREAT SERPL-MCNC: 1.57 MG/DL — HIGH (ref 0.5–1.3)
CYCLOSPORINE SER-MCNC: 84 NG/ML — LOW (ref 150–400)
EGFR: 48 ML/MIN/1.73M2 — LOW
EOSINOPHIL # BLD AUTO: 0.22 K/UL — SIGNIFICANT CHANGE UP (ref 0–0.5)
EOSINOPHIL NFR BLD AUTO: 3.1 % — SIGNIFICANT CHANGE UP (ref 0–6)
GLUCOSE BLDC GLUCOMTR-MCNC: 233 MG/DL — HIGH (ref 70–99)
GLUCOSE BLDC GLUCOMTR-MCNC: 237 MG/DL — HIGH (ref 70–99)
GLUCOSE BLDC GLUCOMTR-MCNC: 240 MG/DL — HIGH (ref 70–99)
GLUCOSE BLDC GLUCOMTR-MCNC: 285 MG/DL — HIGH (ref 70–99)
GLUCOSE SERPL-MCNC: 257 MG/DL — HIGH (ref 70–99)
HCT VFR BLD CALC: 29.6 % — LOW (ref 39–50)
HGB BLD-MCNC: 9.5 G/DL — LOW (ref 13–17)
IMM GRANULOCYTES NFR BLD AUTO: 0.4 % — SIGNIFICANT CHANGE UP (ref 0–0.9)
INR BLD: 0.94 RATIO — SIGNIFICANT CHANGE UP (ref 0.85–1.16)
LYMPHOCYTES # BLD AUTO: 2.38 K/UL — SIGNIFICANT CHANGE UP (ref 1–3.3)
LYMPHOCYTES # BLD AUTO: 34 % — SIGNIFICANT CHANGE UP (ref 13–44)
MAGNESIUM SERPL-MCNC: 1.7 MG/DL — SIGNIFICANT CHANGE UP (ref 1.6–2.6)
MCHC RBC-ENTMCNC: 25.5 PG — LOW (ref 27–34)
MCHC RBC-ENTMCNC: 32.1 G/DL — SIGNIFICANT CHANGE UP (ref 32–36)
MCV RBC AUTO: 79.6 FL — LOW (ref 80–100)
MONOCYTES # BLD AUTO: 0.52 K/UL — SIGNIFICANT CHANGE UP (ref 0–0.9)
MONOCYTES NFR BLD AUTO: 7.4 % — SIGNIFICANT CHANGE UP (ref 2–14)
NEUTROPHILS # BLD AUTO: 3.81 K/UL — SIGNIFICANT CHANGE UP (ref 1.8–7.4)
NEUTROPHILS NFR BLD AUTO: 54.7 % — SIGNIFICANT CHANGE UP (ref 43–77)
NRBC BLD AUTO-RTO: 0 /100 WBCS — SIGNIFICANT CHANGE UP (ref 0–0)
PHOSPHATE SERPL-MCNC: 2.5 MG/DL — SIGNIFICANT CHANGE UP (ref 2.5–4.5)
PLATELET # BLD AUTO: 282 K/UL — SIGNIFICANT CHANGE UP (ref 150–400)
POTASSIUM SERPL-MCNC: 3.8 MMOL/L — SIGNIFICANT CHANGE UP (ref 3.5–5.3)
POTASSIUM SERPL-SCNC: 3.8 MMOL/L — SIGNIFICANT CHANGE UP (ref 3.5–5.3)
PROT SERPL-MCNC: 6.9 G/DL — SIGNIFICANT CHANGE UP (ref 6–8.3)
PROTHROM AB SERPL-ACNC: 10.7 SEC — SIGNIFICANT CHANGE UP (ref 9.9–13.4)
RBC # BLD: 3.72 M/UL — LOW (ref 4.2–5.8)
RBC # FLD: 17 % — HIGH (ref 10.3–14.5)
SMOOTH MUSCLE AB SER-ACNC: SIGNIFICANT CHANGE UP
SODIUM SERPL-SCNC: 134 MMOL/L — LOW (ref 135–145)
WBC # BLD: 6.99 K/UL — SIGNIFICANT CHANGE UP (ref 3.8–10.5)
WBC # FLD AUTO: 6.99 K/UL — SIGNIFICANT CHANGE UP (ref 3.8–10.5)

## 2025-02-13 PROCEDURE — 99232 SBSQ HOSP IP/OBS MODERATE 35: CPT | Mod: GC

## 2025-02-13 RX ORDER — VALGANCICLOVIR 450 MG/1
450 TABLET, FILM COATED ORAL DAILY
Refills: 0 | Status: DISCONTINUED | OUTPATIENT
Start: 2025-02-13 | End: 2025-02-14

## 2025-02-13 RX ORDER — SULFAMETHOXAZOLE/TRIMETHOPRIM 800-160 MG
1 TABLET ORAL DAILY
Refills: 0 | Status: DISCONTINUED | OUTPATIENT
Start: 2025-02-13 | End: 2025-02-21

## 2025-02-13 RX ORDER — INSULIN LISPRO 100 U/ML
5 INJECTION, SOLUTION INTRAVENOUS; SUBCUTANEOUS
Refills: 0 | Status: DISCONTINUED | OUTPATIENT
Start: 2025-02-13 | End: 2025-02-14

## 2025-02-13 RX ORDER — INSULIN GLARGINE-YFGN 100 [IU]/ML
15 INJECTION, SOLUTION SUBCUTANEOUS AT BEDTIME
Refills: 0 | Status: DISCONTINUED | OUTPATIENT
Start: 2025-02-13 | End: 2025-02-14

## 2025-02-13 RX ORDER — MYCOPHENOLATE MOFETIL 500 MG/1
1000 TABLET, FILM COATED ORAL
Refills: 0 | Status: DISCONTINUED | OUTPATIENT
Start: 2025-02-13 | End: 2025-02-21

## 2025-02-13 RX ORDER — CYCLOSPORINE 100 MG/1
125 CAPSULE, LIQUID FILLED ORAL
Refills: 0 | Status: DISCONTINUED | OUTPATIENT
Start: 2025-02-14 | End: 2025-02-15

## 2025-02-13 RX ORDER — METHYLPREDNISOLONE ACETATE 80 MG/ML
500 INJECTION, SUSPENSION INTRA-ARTICULAR; INTRALESIONAL; INTRAMUSCULAR; SOFT TISSUE ONCE
Refills: 0 | Status: COMPLETED | OUTPATIENT
Start: 2025-02-13 | End: 2025-02-13

## 2025-02-13 RX ORDER — MAGNESIUM SULFATE 500 MG/ML
2 SYRINGE (ML) INJECTION ONCE
Refills: 0 | Status: COMPLETED | OUTPATIENT
Start: 2025-02-13 | End: 2025-02-13

## 2025-02-13 RX ORDER — CYCLOSPORINE 100 MG/1
125 CAPSULE, LIQUID FILLED ORAL
Refills: 0 | Status: DISCONTINUED | OUTPATIENT
Start: 2025-02-13 | End: 2025-02-13

## 2025-02-13 RX ADMIN — MYCOPHENOLATE MOFETIL 1000 MILLIGRAM(S): 500 TABLET, FILM COATED ORAL at 19:34

## 2025-02-13 RX ADMIN — INSULIN LISPRO 4: 100 INJECTION, SOLUTION INTRAVENOUS; SUBCUTANEOUS at 12:27

## 2025-02-13 RX ADMIN — METHYLPREDNISOLONE ACETATE 50 MILLIGRAM(S): 80 INJECTION, SUSPENSION INTRA-ARTICULAR; INTRALESIONAL; INTRAMUSCULAR; SOFT TISSUE at 18:33

## 2025-02-13 RX ADMIN — URSODIOL 300 MILLIGRAM(S): 300 CAPSULE ORAL at 17:41

## 2025-02-13 RX ADMIN — Medication 400 MILLIGRAM(S): at 17:41

## 2025-02-13 RX ADMIN — Medication 500000 UNIT(S): at 23:19

## 2025-02-13 RX ADMIN — METOPROLOL SUCCINATE 50 MILLIGRAM(S): 50 TABLET, EXTENDED RELEASE ORAL at 06:06

## 2025-02-13 RX ADMIN — Medication 400 MILLIGRAM(S): at 09:00

## 2025-02-13 RX ADMIN — Medication 25 GRAM(S): at 09:20

## 2025-02-13 RX ADMIN — INSULIN LISPRO 5 UNIT(S): 100 INJECTION, SOLUTION INTRAVENOUS; SUBCUTANEOUS at 17:41

## 2025-02-13 RX ADMIN — MYCOPHENOLATE MOFETIL 500 MILLIGRAM(S): 500 TABLET, FILM COATED ORAL at 08:44

## 2025-02-13 RX ADMIN — METOPROLOL SUCCINATE 50 MILLIGRAM(S): 50 TABLET, EXTENDED RELEASE ORAL at 17:41

## 2025-02-13 RX ADMIN — CYCLOSPORINE 125 MILLIGRAM(S): 100 CAPSULE, LIQUID FILLED ORAL at 19:34

## 2025-02-13 RX ADMIN — INSULIN LISPRO 4: 100 INJECTION, SOLUTION INTRAVENOUS; SUBCUTANEOUS at 08:44

## 2025-02-13 RX ADMIN — INSULIN GLARGINE-YFGN 15 UNIT(S): 100 INJECTION, SOLUTION SUBCUTANEOUS at 21:33

## 2025-02-13 RX ADMIN — INSULIN LISPRO 4: 100 INJECTION, SOLUTION INTRAVENOUS; SUBCUTANEOUS at 17:42

## 2025-02-13 RX ADMIN — INSULIN LISPRO 2: 100 INJECTION, SOLUTION INTRAVENOUS; SUBCUTANEOUS at 21:34

## 2025-02-13 RX ADMIN — Medication 1 MILLIGRAM(S): at 21:33

## 2025-02-13 RX ADMIN — URSODIOL 300 MILLIGRAM(S): 300 CAPSULE ORAL at 06:06

## 2025-02-13 RX ADMIN — CYCLOSPORINE 75 MILLIGRAM(S): 100 CAPSULE, LIQUID FILLED ORAL at 09:25

## 2025-02-13 RX ADMIN — AMLODIPINE BESYLATE 10 MILLIGRAM(S): 10 TABLET ORAL at 06:06

## 2025-02-13 NOTE — PROGRESS NOTE ADULT - SUBJECTIVE AND OBJECTIVE BOX
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 s/p  transplant liver biopsy on  w/ IR Dr. Martinez.       exam:  neuro - awake, aox3, resting comfortable in bed  resp - room air non labored breathing  abd: - dressing c/d/i.   on palpation soft x 4 quadrants , non distended no peritoneal signs    Allergies: No Known Allergies    Medications (Abx/Cardiac/Anticoagulation/Blood Products)    amLODIPine   Tablet: 10 milliGRAM(s) Oral ( @ 06:06)  metoprolol tartrate: 50 milliGRAM(s) Oral ( @ 06:06)    Data:    T(C): 36.9  HR: 79  BP: 168/86  RR: 20  SpO2: 93%    -WBC 6.99 / HgB 9.5 / Hct 29.6 / Plt 282  -Na 134 / Cl 101 / BUN 19 / Glucose 257  -K 3.8 / CO2 18 / Cr 1.57  - / Alk Phos 906 / T.Bili 4.6  -INR 0.94 / PTT 29.2    Radiology: pertinent imaging reviewed     Assessment/Plan: 64 year old male with PMH of of OLT 2019 at [facility] for 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     s/p  transplant liver biopsy on  w/ IR Dr. Martinez.           - post op cbc stable  - vss  - no acute overnight events  - path pending  - cont global management per primary team  - ir will sign off.                  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 s/p  transplant liver biopsy on  w/ IR Dr. Martinez.       exam:  neuro - awake, aox3, resting comfortable in bed  resp - room air non labored breathing  abd: - dressing c/d/i.   on palpation soft x 4 quadrants , non distended no peritoneal signs    Allergies: No Known Allergies    Medications (Abx/Cardiac/Anticoagulation/Blood Products)    amLODIPine   Tablet: 10 milliGRAM(s) Oral ( @ 06:06)  metoprolol tartrate: 50 milliGRAM(s) Oral ( @ 06:06)    Data:    T(C): 36.9  HR: 79  BP: 168/86  RR: 20  SpO2: 93%    -WBC 6.99 / HgB 9.5 / Hct 29.6 / Plt 282  -Na 134 / Cl 101 / BUN 19 / Glucose 257  -K 3.8 / CO2 18 / Cr 1.57  - / Alk Phos 906 / T.Bili 4.6  -INR 0.94 / PTT 29.2    Radiology: pertinent imaging reviewed     Assessment/Plan: 64 year old male with PMH of of OLT 2019 at [facility] for 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     s/p  transplant liver biopsy on  w/ IR Dr. Martinez.           - post op cbc stable  - vss  - no acute overnight events  - path pending  - cont global management per primary team  - ir will sign off.                  Any questions or concerns regarding above please reach out to IR:   -Available on [employer] teams  -During working hours (7a-5p): call [phone]  -Emergent issues after 5pm: page: [phone]  -Non-emergent consults: Please place a [facility] order "IR Consult" with an appropriate callback number  -Scheduling questions: [phone]  -Clinic/Outpatient [phone]

## 2025-02-13 NOTE — PROGRESS NOTE ADULT - SUBJECTIVE AND OBJECTIVE BOX
Gastroenterology progress note:     Patient is a 64y old  Male who presents with a chief complaint of abnormal liver tests (12 Feb 2025 12:23)       Admitted on: 02-11-25    We are following the patient for: abnormal liver tests       Interval History:    No acute events overnight.   no complains today     PAST MEDICAL & SURGICAL HISTORY:  Hypertension      Diabetes mellitus      Alcoholic cirrhosis      Common bile duct (CBD) obstruction      Anxiety      Chronic kidney disease (CKD)      Calculus of bile duct without cholangitis without obstruction      Liver transplanted      History of ERCP          MEDICATIONS  (STANDING):  amLODIPine   Tablet 10 milliGRAM(s) Oral daily  cycloSPORINE  (SandIMMUNE) 75 milliGRAM(s) Oral <User Schedule>  dextrose 5%. 1000 milliLiter(s) (50 mL/Hr) IV Continuous <Continuous>  dextrose 5%. 1000 milliLiter(s) (100 mL/Hr) IV Continuous <Continuous>  dextrose 50% Injectable 25 Gram(s) IV Push once  dextrose 50% Injectable 12.5 Gram(s) IV Push once  dextrose 50% Injectable 25 Gram(s) IV Push once  glucagon  Injectable 1 milliGRAM(s) IntraMuscular once  insulin glargine Injectable (LANTUS) 10 Unit(s) SubCutaneous at bedtime  insulin lispro (ADMELOG) corrective regimen sliding scale   SubCutaneous three times a day before meals  insulin lispro (ADMELOG) corrective regimen sliding scale   SubCutaneous at bedtime  magnesium oxide 400 milliGRAM(s) Oral two times a day with meals  metoprolol tartrate 50 milliGRAM(s) Oral every 12 hours  mycophenolate mofetil 500 milliGRAM(s) Oral <User Schedule>  risperiDONE   Tablet 1 milliGRAM(s) Oral at bedtime  ursodiol Capsule 300 milliGRAM(s) Oral two times a day    MEDICATIONS  (PRN):  dextrose Oral Gel 15 Gram(s) Oral once PRN Blood Glucose LESS THAN 70 milliGRAM(s)/deciliter      Allergies  No Known Allergies      Review of Systems:   Cardiovascular:  No Chest Pain, No Palpitations  Respiratory:  No Cough, No Dyspnea  Gastrointestinal:  As described in HPI  Skin:  No Skin Lesions, No Jaundice  Neuro:  No Syncope, No Dizziness    Physical Examination:  T(C): 36.4 (02-13-25 @ 08:34), Max: 37.4 (02-12-25 @ 21:00)  HR: 66 (02-13-25 @ 08:34) (65 - 78)  BP: 167/82 (02-13-25 @ 08:34) (146/75 - 172/86)  RR: 20 (02-13-25 @ 08:34) (18 - 20)  SpO2: 93% (02-13-25 @ 08:34) (93% - 95%)      02-12-25 @ 07:01  -  02-13-25 @ 07:00  --------------------------------------------------------  IN: 790 mL / OUT: 1200 mL / NET: -410 mL    02-13-25 @ 07:01  -  02-13-25 @ 11:45  --------------------------------------------------------  IN: 240 mL / OUT: 0 mL / NET: 240 mL        GENERAL: AAOx3, no acute distress.  HEAD:  Atraumatic, Normocephalic  EYES: conjunctiva and sclera clear  NECK: Supple, no JVD or thyromegaly  CHEST/LUNG: Clear to auscultation bilaterally; No wheeze, rhonchi, or rales  HEART: Regular rate and rhythm; normal S1, S2, No murmurs.  ABDOMEN: Soft, nontender, nondistended; Bowel sounds present  NEUROLOGY: No asterixis or tremor.   SKIN: Intact, + jaundice     Data:                        9.5    6.99  )-----------( 282      ( 13 Feb 2025 06:22 )             29.6     Hgb trend:  9.5  02-13-25 @ 06:22  10.1  02-12-25 @ 06:49  10.0  02-11-25 @ 15:57        02-13    134[L]  |  101  |  19  ----------------------------<  257[H]  3.8   |  18[L]  |  1.57[H]    Ca    8.8      13 Feb 2025 06:22  Phos  2.5     02-13  Mg     1.7     02-13    TPro  6.9  /  Alb  3.1[L]  /  TBili  4.6[H]  /  DBili  x   /  AST  244[H]  /  ALT  292[H]  /  AlkPhos  906[H]  02-13    Liver panel trend:  TBili 4.6   /      /      /   AlkP 906   /   Tptn 6.9   /   Alb 3.1    /   DBili --      02-13  TBili 5.0   /      /      /   AlkP 965   /   Tptn 7.2   /   Alb 3.6    /   DBili --      02-12  TBili 5.2   /      /      /   AlkP 962   /   Tptn 7.4   /   Alb 3.5    /   DBili --      02-11      PT/INR - ( 13 Feb 2025 06:22 )   PT: 10.7 sec;   INR: 0.94 ratio         PTT - ( 13 Feb 2025 06:22 )  PTT:29.2 sec    Culture - Urine (collected 11 Feb 2025 15:56)  Source: Clean Catch Clean Catch (Midstream)  Final Report (12 Feb 2025 16:47):    No growth    Culture - Blood (collected 11 Feb 2025 15:55)  Source: .Blood BLOOD  Preliminary Report (12 Feb 2025 22:01):    No growth at 24 hours    Culture - Blood (collected 11 Feb 2025 15:40)  Source: .Blood BLOOD  Preliminary Report (12 Feb 2025 22:01):    No growth at 24 hours         Radiology:      MR MRCP w/wo IV Cont:   ACC: [id number] EXAM:  MR MRCP WAW IC   ORDERED BY:  BALAJI JIANG     PROCEDURE DATE:  02/12/2025          INTERPRETATION:  CLINICAL INFORMATION: Status post orthotopic liver   transplant in 2019, history of biliary stricture and choledocholithiasis.    COMPARISON: Abdominal MRI dated 4/25/2024, 5/7/2024 and 1/15/2025. ERCP   dated 9/27/2024.    CONTRAST/COMPLICATIONS:  IV Contrast: Gadavist  10 cc administered   0 cc discarded  Oral Contrast: NONE  .    PROCEDURE:  MRI of the abdomen was performed.  MRCP was performed.    FINDINGS:  LOWER CHEST: Within normal limits.    LIVER: Liver allograft is normal size and signal intensity. No liver   mass. No perihepatic fluid collection.  BILE DUCTS: No intrahepatic biliary duct dilatation. Common bile duct   measures 6 mm proximally and tapers distally. The biliary anastomosis   appears unremarkable. No intraductal stone is seen.  GALLBLADDER: Cholecystectomy.  SPLEEN: 16 cm. Splenomegaly.  PANCREAS: 9 mm bilobed cyst in the pancreatic head, unchanged. No   pancreatic duct dilatation.  ADRENALS: Within normal limits.  KIDNEYS/URETERS: Simple cortical cyst left kidney. No hydronephrosis.    VISUALIZED PORTIONS:  BOWEL: Within normal limits.  PERITONEUM: Trace perihepatic free fluid.  VESSELS: Portal veins, hepatic veins and hepatic arteries are patent.  RETROPERITONEUM/LYMPH NODES: No lymphadenopathy.  ABDOMINAL WALL: Within normal limits.  BONES: Within normal limits.    IMPRESSION:  No MR evidence of choledocholithiasis or other biliary obstruction.    Unremarkable appearance of hepatic transplant and transplant vasculature.    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        --- End of Report ---            REBECA BARRIENTOS MD; Attending Radiologist  This document has been electronically signed. Feb 13 2025  8:28AM (02-12-25 @ 21:59)   No indicators present

## 2025-02-13 NOTE — PROGRESS NOTE ADULT - ASSESSMENT
# Elevated liver enzymes s/p OLT 2019 for alcoholic cirrhosis  # H/O anastomotic stricture with choledocholithiasis s/p multiple ERCP most recently 2/7: no stricture appreciated and CBD was cleared   # steatohepatitis on liver bx 5/01/2024    Liver panel trend:  TBili 4.6   /      /      /   AlkP 906   /   Tptn 6.9   /   Alb 3.1    /   DBili --      02-13  TBili 5.0   /      /      /   AlkP 965   /   Tptn 7.2   /   Alb 3.6    /   DBili --      02-12  TBili 5.2   /      /      /   AlkP 962   /   Tptn 7.4   /   Alb 3.5    /   DBili --      02-11    CMV PCR negative 2/10  Cyclosporine level 77 on 2/10, target 100-150  acute hepatitis panel is negative, Hep B and C PCR, EBV, CMV are negative   infectious work up remains negative   US with patent hepatic vessels, CBD 11 mm   MRI/MRCP with no evidence of biliary obstruction, CBD 8 mm, Stable 9 mm cyst in the pancreatic head likely representing sidebranch IPMN.      recommendations:  - get donor specific antibodies   - follow up LILLIAN, ASMA  - s/p Liver biopsy today , follow up pathology results   - c/w cyclosporine 75  mg BID- Cellcept 500 mg BID  - ursodiol 300 mg BID for history of choledocholithiasis      # CKD 3:  - stable around baseline   - avoid nephrotoxins   - trend BMP    # Diabetes Mellitus  - A1c 8.9 1/2025  - Ozempic 1 mg weekly at home at home and lanatus 10 units daily  - hold Ozempic   - basal bolus insulin regimen while hospitalized      # HTN   -amlodipine 10  mg daily.   -metoprolol 75 mg BID.     # Hypokalemia  # Hypomagnesemia  # Hypophosphatemia  - replete and trend     # Anxiety: c/w home risperidone

## 2025-02-14 DIAGNOSIS — R73.9 HYPERGLYCEMIA, UNSPECIFIED: ICD-10-CM

## 2025-02-14 DIAGNOSIS — E11.65 TYPE 2 DIABETES MELLITUS WITH HYPERGLYCEMIA: ICD-10-CM

## 2025-02-14 DIAGNOSIS — I10 ESSENTIAL (PRIMARY) HYPERTENSION: ICD-10-CM

## 2025-02-14 LAB
ALBUMIN SERPL ELPH-MCNC: 3.5 G/DL — SIGNIFICANT CHANGE UP (ref 3.3–5)
ALBUMIN SERPL ELPH-MCNC: 3.5 G/DL — SIGNIFICANT CHANGE UP (ref 3.3–5)
ALP SERPL-CCNC: 1022 U/L — HIGH (ref 40–120)
ALP SERPL-CCNC: 950 U/L — HIGH (ref 40–120)
ALT FLD-CCNC: 220 U/L — HIGH (ref 10–45)
ALT FLD-CCNC: 258 U/L — HIGH (ref 10–45)
ANA TITR SER: NEGATIVE — SIGNIFICANT CHANGE UP
ANION GAP SERPL CALC-SCNC: 16 MMOL/L — SIGNIFICANT CHANGE UP (ref 5–17)
ANION GAP SERPL CALC-SCNC: 21 MMOL/L — HIGH (ref 5–17)
APTT BLD: 31.1 SEC — SIGNIFICANT CHANGE UP (ref 24.5–35.6)
AST SERPL-CCNC: 105 U/L — HIGH (ref 10–40)
AST SERPL-CCNC: 149 U/L — HIGH (ref 10–40)
B-OH-BUTYR SERPL-SCNC: 0.1 MMOL/L — SIGNIFICANT CHANGE UP
BASOPHILS # BLD AUTO: 0.01 K/UL — SIGNIFICANT CHANGE UP (ref 0–0.2)
BASOPHILS NFR BLD AUTO: 0.2 % — SIGNIFICANT CHANGE UP (ref 0–2)
BILIRUB SERPL-MCNC: 5.3 MG/DL — HIGH (ref 0.2–1.2)
BILIRUB SERPL-MCNC: 5.5 MG/DL — HIGH (ref 0.2–1.2)
BLD GP AB SCN SERPL QL: NEGATIVE — SIGNIFICANT CHANGE UP
BUN SERPL-MCNC: 27 MG/DL — HIGH (ref 7–23)
BUN SERPL-MCNC: 33 MG/DL — HIGH (ref 7–23)
CALCIUM SERPL-MCNC: 9.4 MG/DL — SIGNIFICANT CHANGE UP (ref 8.4–10.5)
CALCIUM SERPL-MCNC: 9.5 MG/DL — SIGNIFICANT CHANGE UP (ref 8.4–10.5)
CHLORIDE SERPL-SCNC: 96 MMOL/L — SIGNIFICANT CHANGE UP (ref 96–108)
CHLORIDE SERPL-SCNC: 96 MMOL/L — SIGNIFICANT CHANGE UP (ref 96–108)
CO2 SERPL-SCNC: 15 MMOL/L — LOW (ref 22–31)
CO2 SERPL-SCNC: 18 MMOL/L — LOW (ref 22–31)
CREAT SERPL-MCNC: 1.66 MG/DL — HIGH (ref 0.5–1.3)
CREAT SERPL-MCNC: 1.71 MG/DL — HIGH (ref 0.5–1.3)
CYCLOSPORINE SER-MCNC: 161 NG/ML — SIGNIFICANT CHANGE UP (ref 150–400)
EGFR: 44 ML/MIN/1.73M2 — LOW
EGFR: 45 ML/MIN/1.73M2 — LOW
EOSINOPHIL # BLD AUTO: 0.01 K/UL — SIGNIFICANT CHANGE UP (ref 0–0.5)
EOSINOPHIL NFR BLD AUTO: 0.2 % — SIGNIFICANT CHANGE UP (ref 0–6)
GAS PNL BLDV: SIGNIFICANT CHANGE UP
GLUCOSE BLDC GLUCOMTR-MCNC: 257 MG/DL — HIGH (ref 70–99)
GLUCOSE BLDC GLUCOMTR-MCNC: 295 MG/DL — HIGH (ref 70–99)
GLUCOSE BLDC GLUCOMTR-MCNC: 313 MG/DL — HIGH (ref 70–99)
GLUCOSE BLDC GLUCOMTR-MCNC: 318 MG/DL — HIGH (ref 70–99)
GLUCOSE BLDC GLUCOMTR-MCNC: 332 MG/DL — HIGH (ref 70–99)
GLUCOSE BLDC GLUCOMTR-MCNC: 335 MG/DL — HIGH (ref 70–99)
GLUCOSE BLDC GLUCOMTR-MCNC: 342 MG/DL — HIGH (ref 70–99)
GLUCOSE BLDC GLUCOMTR-MCNC: 367 MG/DL — HIGH (ref 70–99)
GLUCOSE BLDC GLUCOMTR-MCNC: 393 MG/DL — HIGH (ref 70–99)
GLUCOSE BLDC GLUCOMTR-MCNC: 399 MG/DL — HIGH (ref 70–99)
GLUCOSE BLDC GLUCOMTR-MCNC: 406 MG/DL — HIGH (ref 70–99)
GLUCOSE BLDC GLUCOMTR-MCNC: 417 MG/DL — HIGH (ref 70–99)
GLUCOSE BLDC GLUCOMTR-MCNC: 424 MG/DL — HIGH (ref 70–99)
GLUCOSE BLDC GLUCOMTR-MCNC: 433 MG/DL — HIGH (ref 70–99)
GLUCOSE BLDC GLUCOMTR-MCNC: 436 MG/DL — HIGH (ref 70–99)
GLUCOSE BLDC GLUCOMTR-MCNC: 437 MG/DL — HIGH (ref 70–99)
GLUCOSE BLDC GLUCOMTR-MCNC: 437 MG/DL — HIGH (ref 70–99)
GLUCOSE BLDC GLUCOMTR-MCNC: 449 MG/DL — HIGH (ref 70–99)
GLUCOSE BLDC GLUCOMTR-MCNC: 451 MG/DL — CRITICAL HIGH (ref 70–99)
GLUCOSE SERPL-MCNC: 443 MG/DL — HIGH (ref 70–99)
GLUCOSE SERPL-MCNC: 447 MG/DL — HIGH (ref 70–99)
HCT VFR BLD CALC: 35.4 % — LOW (ref 39–50)
HGB BLD-MCNC: 10.9 G/DL — LOW (ref 13–17)
IMM GRANULOCYTES NFR BLD AUTO: 0.9 % — SIGNIFICANT CHANGE UP (ref 0–0.9)
INR BLD: 0.95 RATIO — SIGNIFICANT CHANGE UP (ref 0.85–1.16)
LYMPHOCYTES # BLD AUTO: 0.86 K/UL — LOW (ref 1–3.3)
LYMPHOCYTES # BLD AUTO: 15.3 % — SIGNIFICANT CHANGE UP (ref 13–44)
MAGNESIUM SERPL-MCNC: 2 MG/DL — SIGNIFICANT CHANGE UP (ref 1.6–2.6)
MCHC RBC-ENTMCNC: 25.1 PG — LOW (ref 27–34)
MCHC RBC-ENTMCNC: 30.8 G/DL — LOW (ref 32–36)
MCV RBC AUTO: 81.4 FL — SIGNIFICANT CHANGE UP (ref 80–100)
MONOCYTES # BLD AUTO: 0.04 K/UL — SIGNIFICANT CHANGE UP (ref 0–0.9)
MONOCYTES NFR BLD AUTO: 0.7 % — LOW (ref 2–14)
NEUTROPHILS # BLD AUTO: 4.66 K/UL — SIGNIFICANT CHANGE UP (ref 1.8–7.4)
NEUTROPHILS NFR BLD AUTO: 82.7 % — HIGH (ref 43–77)
NRBC BLD AUTO-RTO: 0 /100 WBCS — SIGNIFICANT CHANGE UP (ref 0–0)
PHOSPHATE SERPL-MCNC: 2.4 MG/DL — LOW (ref 2.5–4.5)
PLATELET # BLD AUTO: 360 K/UL — SIGNIFICANT CHANGE UP (ref 150–400)
POTASSIUM SERPL-MCNC: 4.3 MMOL/L — SIGNIFICANT CHANGE UP (ref 3.5–5.3)
POTASSIUM SERPL-MCNC: 4.5 MMOL/L — SIGNIFICANT CHANGE UP (ref 3.5–5.3)
POTASSIUM SERPL-SCNC: 4.3 MMOL/L — SIGNIFICANT CHANGE UP (ref 3.5–5.3)
POTASSIUM SERPL-SCNC: 4.5 MMOL/L — SIGNIFICANT CHANGE UP (ref 3.5–5.3)
PROT SERPL-MCNC: 7.6 G/DL — SIGNIFICANT CHANGE UP (ref 6–8.3)
PROT SERPL-MCNC: 8 G/DL — SIGNIFICANT CHANGE UP (ref 6–8.3)
PROTHROM AB SERPL-ACNC: 10.9 SEC — SIGNIFICANT CHANGE UP (ref 9.9–13.4)
RBC # BLD: 4.35 M/UL — SIGNIFICANT CHANGE UP (ref 4.2–5.8)
RBC # FLD: 17.3 % — HIGH (ref 10.3–14.5)
RH IG SCN BLD-IMP: POSITIVE — SIGNIFICANT CHANGE UP
SODIUM SERPL-SCNC: 130 MMOL/L — LOW (ref 135–145)
SODIUM SERPL-SCNC: 132 MMOL/L — LOW (ref 135–145)
WBC # BLD: 5.63 K/UL — SIGNIFICANT CHANGE UP (ref 3.8–10.5)
WBC # FLD AUTO: 5.63 K/UL — SIGNIFICANT CHANGE UP (ref 3.8–10.5)

## 2025-02-14 PROCEDURE — 99232 SBSQ HOSP IP/OBS MODERATE 35: CPT | Mod: GC

## 2025-02-14 PROCEDURE — 99223 1ST HOSP IP/OBS HIGH 75: CPT | Mod: GC

## 2025-02-14 RX ORDER — SOD PHOS DI, MONO/K PHOS MONO 250 MG
1 TABLET ORAL ONCE
Refills: 0 | Status: COMPLETED | OUTPATIENT
Start: 2025-02-14 | End: 2025-02-14

## 2025-02-14 RX ORDER — INSULIN LISPRO 100 U/ML
10 INJECTION, SOLUTION INTRAVENOUS; SUBCUTANEOUS
Refills: 0 | Status: DISCONTINUED | OUTPATIENT
Start: 2025-02-14 | End: 2025-02-14

## 2025-02-14 RX ORDER — NIFEDIPINE 30 MG
30 TABLET, EXTENDED RELEASE 24 HR ORAL DAILY
Refills: 0 | Status: DISCONTINUED | OUTPATIENT
Start: 2025-02-14 | End: 2025-02-19

## 2025-02-14 RX ORDER — SODIUM BICARBONATE 1 MEQ/ML
650 SYRINGE (ML) INTRAVENOUS ONCE
Refills: 0 | Status: COMPLETED | OUTPATIENT
Start: 2025-02-14 | End: 2025-02-14

## 2025-02-14 RX ORDER — VALGANCICLOVIR 450 MG/1
900 TABLET, FILM COATED ORAL DAILY
Refills: 0 | Status: DISCONTINUED | OUTPATIENT
Start: 2025-02-14 | End: 2025-02-18

## 2025-02-14 RX ORDER — INSULIN LISPRO 100 U/ML
10 INJECTION, SOLUTION INTRAVENOUS; SUBCUTANEOUS ONCE
Refills: 0 | Status: COMPLETED | OUTPATIENT
Start: 2025-02-14 | End: 2025-02-14

## 2025-02-14 RX ORDER — INSULIN GLARGINE-YFGN 100 [IU]/ML
20 INJECTION, SOLUTION SUBCUTANEOUS AT BEDTIME
Refills: 0 | Status: DISCONTINUED | OUTPATIENT
Start: 2025-02-14 | End: 2025-02-14

## 2025-02-14 RX ORDER — METHYLPREDNISOLONE ACETATE 80 MG/ML
500 INJECTION, SUSPENSION INTRA-ARTICULAR; INTRALESIONAL; INTRAMUSCULAR; SOFT TISSUE ONCE
Refills: 0 | Status: COMPLETED | OUTPATIENT
Start: 2025-02-14 | End: 2025-02-14

## 2025-02-14 RX ADMIN — Medication 500000 UNIT(S): at 17:05

## 2025-02-14 RX ADMIN — INSULIN LISPRO 5 UNIT(S): 100 INJECTION, SOLUTION INTRAVENOUS; SUBCUTANEOUS at 08:06

## 2025-02-14 RX ADMIN — CYCLOSPORINE 125 MILLIGRAM(S): 100 CAPSULE, LIQUID FILLED ORAL at 08:34

## 2025-02-14 RX ADMIN — METOPROLOL SUCCINATE 50 MILLIGRAM(S): 50 TABLET, EXTENDED RELEASE ORAL at 05:58

## 2025-02-14 RX ADMIN — URSODIOL 300 MILLIGRAM(S): 300 CAPSULE ORAL at 17:05

## 2025-02-14 RX ADMIN — URSODIOL 300 MILLIGRAM(S): 300 CAPSULE ORAL at 05:58

## 2025-02-14 RX ADMIN — VALGANCICLOVIR 900 MILLIGRAM(S): 450 TABLET, FILM COATED ORAL at 11:32

## 2025-02-14 RX ADMIN — INSULIN LISPRO 12: 100 INJECTION, SOLUTION INTRAVENOUS; SUBCUTANEOUS at 12:12

## 2025-02-14 RX ADMIN — Medication 1 PACKET(S): at 10:24

## 2025-02-14 RX ADMIN — CYCLOSPORINE 125 MILLIGRAM(S): 100 CAPSULE, LIQUID FILLED ORAL at 19:56

## 2025-02-14 RX ADMIN — MYCOPHENOLATE MOFETIL 1000 MILLIGRAM(S): 500 TABLET, FILM COATED ORAL at 08:13

## 2025-02-14 RX ADMIN — Medication 500 MILLILITER(S): at 13:32

## 2025-02-14 RX ADMIN — AMLODIPINE BESYLATE 10 MILLIGRAM(S): 10 TABLET ORAL at 05:58

## 2025-02-14 RX ADMIN — Medication 500000 UNIT(S): at 05:58

## 2025-02-14 RX ADMIN — Medication 1 MILLIGRAM(S): at 23:24

## 2025-02-14 RX ADMIN — Medication 1 TABLET(S): at 11:31

## 2025-02-14 RX ADMIN — INSULIN LISPRO 12: 100 INJECTION, SOLUTION INTRAVENOUS; SUBCUTANEOUS at 08:06

## 2025-02-14 RX ADMIN — Medication 500000 UNIT(S): at 11:31

## 2025-02-14 RX ADMIN — METOPROLOL SUCCINATE 50 MILLIGRAM(S): 50 TABLET, EXTENDED RELEASE ORAL at 17:05

## 2025-02-14 RX ADMIN — MYCOPHENOLATE MOFETIL 1000 MILLIGRAM(S): 500 TABLET, FILM COATED ORAL at 19:55

## 2025-02-14 RX ADMIN — METHYLPREDNISOLONE ACETATE 100 MILLIGRAM(S): 80 INJECTION, SUSPENSION INTRA-ARTICULAR; INTRALESIONAL; INTRAMUSCULAR; SOFT TISSUE at 18:16

## 2025-02-14 RX ADMIN — INSULIN LISPRO 10 UNIT(S): 100 INJECTION, SOLUTION INTRAVENOUS; SUBCUTANEOUS at 12:12

## 2025-02-14 RX ADMIN — INSULIN LISPRO 10 UNIT(S): 100 INJECTION, SOLUTION INTRAVENOUS; SUBCUTANEOUS at 10:47

## 2025-02-14 RX ADMIN — Medication 650 MILLIGRAM(S): at 10:24

## 2025-02-14 RX ADMIN — Medication 400 MILLIGRAM(S): at 17:05

## 2025-02-14 RX ADMIN — Medication 10 UNIT(S): at 12:13

## 2025-02-14 RX ADMIN — Medication 400 MILLIGRAM(S): at 08:13

## 2025-02-14 RX ADMIN — Medication 500000 UNIT(S): at 23:06

## 2025-02-14 RX ADMIN — Medication 4 UNIT(S)/HR: at 14:50

## 2025-02-14 NOTE — CONSULT NOTE ADULT - ASSESSMENT
64 year old male with PMH of of OLT 4/22/2019 at [facility] for ACLF ETOH Cirrhosis complicated by HRS, CKD, DM2, HTN presenting for evaluation for acute cellular rejection. Has a history of prior admission to [facility] (4/28-5/8/2024) for elevated liver tests - had anastomotic stricture with CBD stone s/p ERCP and stent placement- persistent elevation in liver tests - s/p liver biopsy - Focal areas of inflammation with lymphocytes not equivalent for ACR with pericellular fibrosis and steatosis- Patient treated with PO steroid pulse. Had ERCP done in 9/28/2024 with biliary stent removed and CBD stone removed. A mild biliary stricture was found in the post-transplant anastomosis, improved from prior. Had outpatient ERCP done in in 2/7/2025 - choledocholithiasis was found. Repeat labs noted with continue rising LFTs post ERCP. Patient was informed to come to the hospital to be evaluated for ACR. At [facility], started on IV methylprednisolone 500 mg 3-day course, now with steroid-induced hyperglycemia. Endocrine consulted for further management.     #Steroid-induced hyperglycemia  #T2DM  - A1c 8.9%  - Home meds: Lantus 10 units qhs, Humalog 10 units ac  - Follows with Dr. Kady Peguero  - s/p methylprednisoone 500 mg x2, planned for rapid taper to 80 mg to 20 mg starting tomorrow 2/15 then long-term 20 mg  - Started on basal-bolus - transitioned to insulin gtt    PLAN  While inpatient:  - Keep NPO and c/w insulin gtt for now, until BG <300   - Once BG <300, please resume Lantus 20 units QHS and Admelog 10 units TIDAC (HOLD if NPO) and start moderate dose Admelog scale with meals and moderate dose Admelog scale at bedtime   - Will dose adjust pending BGs while on taper  - Check FS before meals and at bedtime - if NPO, check q6 hours  - Goal -180  - Please obtain nutrition consult  - Please obtain lipid panel in AM    Discharge Recommendations:  - Will likely c/w basal-bolus regimen, dose TBD  - Please send:  -- Dexcom G7 sensor and reader  -- Glucometer (ACCU_CHECK yvette Connect, Ascensia Contour Next EZ or One, Freestyle [place] LITE or OneTouch Verio IQ)  -- Glucometer test strips and lancets  (make sure compatible with glucometer), dispense #100 (or #200) use as directed, alcohol pads  -- BD jose antonio 4mm pen needles  -- Glucose tabs, Baqsimi nasal spray or glucagon emergency kit for hypoglycemia risk   - If patient is not covered for CGM, patient should check FSBG premeals and bedtime. Patient should call their doctor when FSBG <70 or above >400 and or consistently above 200s as changes in the regimen will have to be made.   - Patient previously following with Dr. Peguero, who is no longer at [facility]. Patient can follow up at the [facility] ([phone]) at [address] [facility], [place] [address]   - Routine follow-up with Ophthalmology and Podiatry     #HTN  - Goal BP <130/80  - Management per primary team  - UACR as outpatient    Gris Vaughan MD  Endocrinology Fellow  Can be reached via Microsoft Teams    For follow up questions, discharge recommendations, or new consults, please email [email or web address] ([facility]) or [email or web address] ([facility]) or call answering service at [phone] (weekdays); [phone] (nights/weekends).  For emergencies please page fellow on call.     **************************RECOMMENDATIONS NOT FINAL UNTIL SIGNED BY ATTENDING**************************  64 year old male with PMH of of OLT 4/22/2019 at [facility] for ACLF ETOH Cirrhosis complicated by HRS, CKD, DM2, HTN presenting for evaluation for acute cellular rejection. Has a history of prior admission to [facility] (4/28-5/8/2024) for elevated liver tests - had anastomotic stricture with CBD stone s/p ERCP and stent placement- persistent elevation in liver tests - s/p liver biopsy - Focal areas of inflammation with lymphocytes not equivalent for ACR with pericellular fibrosis and steatosis- Patient treated with PO steroid pulse. Had ERCP done in 9/28/2024 with biliary stent removed and CBD stone removed. A mild biliary stricture was found in the post-transplant anastomosis, improved from prior. Had outpatient ERCP done in in 2/7/2025 - choledocholithiasis was found. Repeat labs noted with continue rising LFTs post ERCP. Patient was informed to come to the hospital to be evaluated for ACR. At [facility], started on IV methylprednisolone 500 mg 3-day course, now with steroid-induced hyperglycemia. Endocrine consulted for further management.     #Steroid-induced hyperglycemia  #T2DM  - A1c 8.9%  - Home meds: Lantus 10 units qhs, Humalog 10 units ac  - Follows with Dr. Kady Peguero  - s/p methylprednisoone 500 mg x2, planned for rapid taper to 80 mg to 20 mg starting tomorrow 2/15 then long-term 20 mg  - Started on basal-bolus - transitioned to insulin gtt    PLAN  While inpatient:  - Keep NPO and c/w insulin gtt for now and overnight given hyperglycemia and high-dose steroids on board and plan for repeat 500 mg IV solumedrol 2/15  - If not unable to keep pt on gtt, can resume basal-bolus with Lantus 33 units QHS and Admelog 17 units TIDAC (HOLD if NPO) and start moderate dose Admelog scale with meals and moderate dose Admelog scale at bedtime   - Will dose adjust pending BGs while on taper  - Check FS before meals and at bedtime - if NPO, check q6 hours  - Goal -180  - Please obtain nutrition consult  - Please obtain lipid panel in AM    Discharge Recommendations:  - Will likely c/w basal-bolus regimen, dose TBD  - Please send:  -- Dexcom G7 sensor and reader  -- Glucometer (ACCU_CHECK yvette Connect, Ascensia Contour Next EZ or One, Freestyle [place] LITE or OneTouch Verio IQ)  -- Glucometer test strips and lancets  (make sure compatible with glucometer), dispense #100 (or #200) use as directed, alcohol pads  -- BD jose antonio 4mm pen needles  -- Glucose tabs, Baqsimi nasal spray or glucagon emergency kit for hypoglycemia risk   - If patient is not covered for CGM, patient should check FSBG premeals and bedtime. Patient should call their doctor when FSBG <70 or above >400 and or consistently above 200s as changes in the regimen will have to be made.   - Patient previously following with Dr. Peguero, who is no longer at [facility]. Patient can follow up at the [facility] ([phone]) at [address] [place] [address] [address] [place] [address]   - Routine follow-up with Ophthalmology and Podiatry     #HTN  - Goal BP <130/80  - Management per primary team  - UACR as outpatient    Gris Vaughan MD  Endocrinology Fellow  Can be reached via Microsoft Teams    For follow up questions, discharge recommendations, or new consults, please email [email or web address] ([email or web address]) or [email or web address] ([facility]) or call answering service at [phone] (weekdays); [phone] (nights/weekends).  For emergencies please page fellow on call.    64 year old male with PMH of of OLT 4/22/2019 at [facility] for ACLF ETOH Cirrhosis complicated by HRS, CKD, DM2, HTN presenting for evaluation for acute cellular rejection. Has a history of prior admission to [facility] (4/28-5/8/2024) for elevated liver tests - had anastomotic stricture with CBD stone s/p ERCP and stent placement- persistent elevation in liver tests - s/p liver biopsy - Focal areas of inflammation with lymphocytes not equivalent for ACR with pericellular fibrosis and steatosis- Patient treated with PO steroid pulse. Had ERCP done in 9/28/2024 with biliary stent removed and CBD stone removed. A mild biliary stricture was found in the post-transplant anastomosis, improved from prior. Had outpatient ERCP done in in 2/7/2025 - choledocholithiasis was found. Repeat labs noted with continue rising LFTs post ERCP. Patient was informed to come to the hospital to be evaluated for ACR. At [facility], started on IV methylprednisolone 500 mg 3-day course, now with steroid-induced hyperglycemia. Endocrine consulted for further management.     #Steroid-induced hyperglycemia  #T2DM  - A1c 8.9%  - Home meds: Lantus 10 units qhs, Humalog 10 units ac  - Follows with Dr. Kady Peguero  - s/p methylprednisoone 500 mg x2, planned for rapid taper to 80 mg to 20 mg starting tomorrow 2/15 then long-term 20 mg  - Started on basal-bolus - transitioned to insulin gtt    PLAN  While inpatient:  - Keep NPO and c/w insulin gtt for now and overnight given severe hyperglycemia and high-dose steroids on board, and plan for repeat 500 mg IV solumedrol 2/15  - If not unable to keep pt on gtt, can resume basal-bolus with Lantus 33 units QHS and Admelog 17 units TIDAC (HOLD if NPO) and start moderate dose Admelog scale with meals and moderate dose Admelog scale at bedtime   - Will dose adjust pending BGs while on taper  - Check FS before meals and at bedtime - if NPO, check q6 hours  - Goal -180  - Please obtain nutrition consult  - Please obtain lipid panel in AM    Discharge Recommendations:  - Will likely c/w basal-bolus regimen, dose TBD  - Please send:  -- Dexcom G7 sensor and reader  -- Glucometer (ACCU_CHECK yvette Connect, Ascensia Contour Next EZ or One, Freestyle [place] LITE or OneTouch Verio IQ)  -- Glucometer test strips and lancets  (make sure compatible with glucometer), dispense #100 (or #200) use as directed, alcohol pads  -- BD jose antonio 4mm pen needles  -- Glucose tabs, Baqsimi nasal spray or glucagon emergency kit for hypoglycemia risk   - If patient is not covered for CGM, patient should check FSBG premeals and bedtime. Patient should call their doctor when FSBG <70 or above >400 and or consistently above 200s as changes in the regimen will have to be made.   - Patient previously following with Dr. Peguero, who is no longer at [facility]. Patient can follow up at the [facility] ([phone]) at [address] [place] [address] [address] [place] [address]   - Routine follow-up with Ophthalmology and Podiatry     #HTN  - Goal BP <130/80  - Management per primary team  - UACR as outpatient    Gris Vaughan MD  Endocrinology Fellow  Can be reached via Microsoft Teams    For follow up questions, discharge recommendations, or new consults, please email [email or web address] ([email or web address]) or [email or web address] ([facility]) or call answering service at [phone] (weekdays); [phone] (nights/weekends).  For emergencies please page fellow on call.    64 year old male with PMH of of OLT 4/22/2019 at [facility] for ACLF ETOH Cirrhosis complicated by HRS, CKD, DM2, HTN presenting for evaluation for acute cellular rejection. Has a history of prior admission to [facility] (4/28-5/8/2024) for elevated liver tests - had anastomotic stricture with CBD stone s/p ERCP and stent placement- persistent elevation in liver tests - s/p liver biopsy - Focal areas of inflammation with lymphocytes not equivalent for ACR with pericellular fibrosis and steatosis- Patient treated with PO steroid pulse. Had ERCP done in 9/28/2024 with biliary stent removed and CBD stone removed. A mild biliary stricture was found in the post-transplant anastomosis, improved from prior. Had outpatient ERCP done in in 2/7/2025 - choledocholithiasis was found. Repeat labs noted with continue rising LFTs post ERCP. Patient was informed to come to the hospital to be evaluated for ACR. At [facility], started on IV methylprednisolone 500 mg 3-day course, now with steroid-induced hyperglycemia. Endocrine consulted for further management.     #Steroid-induced hyperglycemia  #T2DM  - A1c 8.9%  - Home meds: Lantus 10 units qhs, Humalog 10 units ac  - Follows with Dr. Kady Peguero  - s/p methylprednisoone 500 mg x2, planned for rapid taper to 80 mg to 20 mg starting tomorrow 2/15 then long-term 20 mg  - Started on basal-bolus - transitioned to insulin gtt    PLAN  While inpatient:  - Keep NPO and c/w insulin gtt for now and overnight given severe hyperglycemia and high-dose steroids on board, and plan for repeat 500 mg IV solumedrol 2/15  - If not unable to keep pt on gtt, can resume basal-bolus with Lantus 33 units QHS and Admelog 17 units TIDAC (HOLD if NPO) and start moderate dose Admelog scale with meals and moderate dose Admelog scale at bedtime   - Will dose adjust pending BGs while on taper  - q1 FS while on insulin gtt  - Goal -180  - Please obtain nutrition consult  - Please obtain lipid panel in AM    Discharge Recommendations:  - Will likely c/w basal-bolus regimen, dose TBD  - Please send:  -- Dexcom G7 sensor and reader  -- Glucometer (ACCU_CHECK yvette Connect, Ascensia Contour Next EZ or One, Freestyle [place] LITE or OneTouch Verio IQ)  -- Glucometer test strips and lancets  (make sure compatible with glucometer), dispense #100 (or #200) use as directed, alcohol pads  -- BD jose antonio 4mm pen needles  -- Glucose tabs, Baqsimi nasal spray or glucagon emergency kit for hypoglycemia risk   - If patient is not covered for CGM, patient should check FSBG premeals and bedtime. Patient should call their doctor when FSBG <70 or above >400 and or consistently above 200s as changes in the regimen will have to be made.   - Patient previously following with Dr. Peguero, who is no longer at [facility]. Patient can follow up at the [facility] ([phone]) at [address] [place] [address] [address] [facility], [place] [address]   - Routine follow-up with Ophthalmology and Podiatry     #HTN  - Goal BP <130/80  - Management per primary team  - UACR as outpatient    Gris Vaughan MD  Endocrinology Fellow  Can be reached via Microsoft Teams    For follow up questions, discharge recommendations, or new consults, please email [email or web address] ([email or web address]) or [email or web address] ([facility]) or call answering service at [phone] (weekdays); [phone] (nights/weekends).  For emergencies please page fellow on call.    64 year old male with PMH of of OLT 4/22/2019 at [facility] for ACLF ETOH Cirrhosis complicated by HRS, CKD, DM2, HTN presenting for evaluation for acute cellular rejection. Has a history of prior admission to [facility] (4/28-5/8/2024) for elevated liver tests - had anastomotic stricture with CBD stone s/p ERCP and stent placement- persistent elevation in liver tests - s/p liver biopsy - Focal areas of inflammation with lymphocytes not equivalent for ACR with pericellular fibrosis and steatosis- Patient treated with PO steroid pulse. Had ERCP done in 9/28/2024 with biliary stent removed and CBD stone removed. A mild biliary stricture was found in the post-transplant anastomosis, improved from prior. Had outpatient ERCP done in in 2/7/2025 - choledocholithiasis was found. Repeat labs noted with continue rising LFTs post ERCP. Patient was informed to come to the hospital to be evaluated for ACR. At [facility], started on IV methylprednisolone 500 mg 3-day course, now with steroid-induced hyperglycemia. Endocrine consulted for further management (high risk patient with severe hyperglycemia with glucose values > 400's at high risk of CAD and CVA with high medical complexity and high level decision-making).     #Steroid-induced hyperglycemia  #T2DM  - A1c 8.9%  - Home meds: Lantus 10 units qhs, Humalog 10 units ac  - Follows with Dr. Kady Peguero  - s/p methylprednisoone 500 mg x2, planned for rapid taper to 80 mg to 20 mg starting tomorrow 2/15 then long-term 20 mg  - Started on basal-bolus - transitioned to insulin gtt    PLAN  While inpatient:  - Keep NPO and c/w insulin gtt for now and overnight given severe hyperglycemia and high-dose steroids on board, and plan for repeat 500 mg IV solumedrol 2/15  - If not unable to keep pt on gtt, can resume basal-bolus with Lantus 33 units QHS and Admelog 17 units TIDAC (HOLD if NPO) and start moderate dose Admelog scale with meals and moderate dose Admelog scale at bedtime   - Will dose adjust pending BGs while on taper  - q1 FS while on insulin gtt  - Goal -180  - Please obtain nutrition consult  - Please obtain lipid panel in AM    Discharge Recommendations:  - Will likely c/w basal-bolus regimen, dose TBD  - Please send:  -- Dexcom G7 sensor and reader  -- Glucometer (ACCU_CHECK yvette Connect, Ascensia Contour Next EZ or One, Freestyle [place] LITE or OneTouch Verio IQ)  -- Glucometer test strips and lancets  (make sure compatible with glucometer), dispense #100 (or #200) use as directed, alcohol pads  -- BD jose antonio 4mm pen needles  -- Glucose tabs, Baqsimi nasal spray or glucagon emergency kit for hypoglycemia risk   - If patient is not covered for CGM, patient should check FSBG premeals and bedtime. Patient should call their doctor when FSBG <70 or above >400 and or consistently above 200s as changes in the regimen will have to be made.   - Patient previously following with Dr. Peguero, who is no longer at [facility]. Patient can follow up at the [facility] ([phone]) at [address] [place] [address]   - Routine follow-up with Ophthalmology and Podiatry     #HTN  - Goal BP <130/80  - Management per primary team  - UACR as outpatient    Gris Vaughan MD  Endocrinology Fellow  Can be reached via Microsoft Teams    For follow up questions, discharge recommendations, or new consults, please email [email or web address] ([email or web address]) or [email or web address] ([facility]) or call answering service at [phone] (weekdays); [phone] (nights/weekends).  For emergencies please page fellow on call.

## 2025-02-14 NOTE — CONSULT NOTE ADULT - ATTENDING COMMENTS
Rash on the head/neck is consistent with seborrheic dermatitis. Some forms of psoriasis may look similar (which the pt notes he has a history of), though there are no distinctive plaques that would help support this diagnosis today. May use ketoconazole shampoo as he is as home if interested while inpatient, though the patient notes he's not interested in treating at this time. He also has onychogryphosis of the toenails. This may congenital or may be related to inflammatory skin conditions, such as psoriasis. No treatment is required at this time. May consider outpatient podiatry follow-up for additional treatment.    Juan Zamudio MD, PharmD, MPH  Co-Director, Inpatient Dermatology Consultation Service, [facility]
Agree with assessment and plan as above by Dr. Vaughan. Reviewed all pertinent labs, glucose values, and imaging studies. Modifications made as indicated above. Pt. with hx of uncontrolled Type 2 DM now with severe hyperglycemia exacerbated by high dose steroids. Insulin requirements are likely to be very high over the next 24 hours. For optimal glycemic control would recommend continue with insulin gtt if possible to prevent severe hyperglycemia and reassess insulin requirements once on lower doses of steroids for transition to basal bolus.     Zachary Reynaga D.O  [phone]

## 2025-02-14 NOTE — CONSULT NOTE ADULT - SUBJECTIVE AND OBJECTIVE BOX
Gris Vaughan MD   |   PGY-4  Endocrinology Fellow  Available on Microsoft Teams    HPI:  64 year old male with PMH of of OLT 4/22/2019 at [facility] for ACLF ETOH Cirrhosis complicated by HRS, CKD, DM2, HTN presenting for evaluation for acute cellular rejection. Has a history of prior admission to [facility] (4/28-5/8/2024) for elevated liver tests - had anastomotic stricture with CBD stone s/p ERCP and stent placement- persistent elevation in liver tests - s/p liver biopsy - Focal areas of inflammation with lymphocytes not equivalent for ACR with pericellular fibrosis and steatosis- Patient treated with PO steroid pulse.  Had ERCP done in 9/28/2024 with biliary stent removed and CBD stone removed. A mild biliary stricture was found in the post-transplant anastomosis, improved from prior. Had outpatient ERCP done in in 2/7/2025 - choledocholithiasis was found. Repeat labs noted with continue rising LFTs post ERCP. Patient was informed to come to the hospital to be evaluated for ACR. At [facility], started on IV methylprednisolone 500 mg 3-day course, now with steroid-induced hyperglycemia. Endocrine consulted for further management.     Endocrine History:    DM2 dx 2017  Endocrinologist: Dr. Peguero  Last A1c 8.9%  Current GFR 44 ml/min/1.73m2  BMI: 30.3  Weight: 104.1 kg  Current Meds: Lantus 10 units qhs, Huamlog 10 units with meals  Compliance: Occasionally misses doses  Side effects to medications: Patient denies diarrhea, decreased appetite, nausea/vomiting, weight gain/loss, lower leg swelling, UTIs, pancreatitis  Medications tried in past: Metformin  Support system: Son  Glucose monitoring: Checks FS premeal, ranges 140-180  Hypoglycemic episodes? Denies  Any hx of DKA: Denies  Microvascular complications: +CKD, Denies neuropathy or retinopathy  Macrovascular complications: Denies CVA or MI  Outpatient diet: hummus, chicken, veggies, frozen fruit smoothies, crackers. Tries otherwise to avoid carbs  Appetite:  Good  On steroids inpatient? high-dose Solu-Medrol followed by eventual taper to 20 mg daily      PAST MEDICAL & SURGICAL HISTORY:  Hypertension      Diabetes mellitus      Alcoholic cirrhosis      Common bile duct (CBD) obstruction      Anxiety      Chronic kidney disease (CKD)      Calculus of bile duct without cholangitis without obstruction      Liver transplanted      History of ERCP          FAMILY HISTORY:  No DM2  No MTC/MEN2    SOCIAL HISTORY:  Lives with son      MEDICATIONS  (STANDING):  cycloSPORINE  (SandIMMUNE) 125 milliGRAM(s) Oral <User Schedule>  dextrose 5%. 1000 milliLiter(s) (50 mL/Hr) IV Continuous <Continuous>  dextrose 5%. 1000 milliLiter(s) (100 mL/Hr) IV Continuous <Continuous>  dextrose 50% Injectable 25 Gram(s) IV Push once  dextrose 50% Injectable 12.5 Gram(s) IV Push once  dextrose 50% Injectable 25 Gram(s) IV Push once  glucagon  Injectable 1 milliGRAM(s) IntraMuscular once  insulin regular Infusion 4 Unit(s)/Hr (4 mL/Hr) IV Continuous <Continuous>  magnesium oxide 400 milliGRAM(s) Oral two times a day with meals  metoprolol tartrate 50 milliGRAM(s) Oral every 12 hours  mycophenolate mofetil 1000 milliGRAM(s) Oral <User Schedule>  NIFEdipine XL 30 milliGRAM(s) Oral daily  nystatin    Suspension 203906 Unit(s) Swish and Swallow four times a day  risperiDONE   Tablet 1 milliGRAM(s) Oral at bedtime  trimethoprim   80 mG/sulfamethoxazole 400 mG 1 Tablet(s) Oral daily  ursodiol Capsule 300 milliGRAM(s) Oral two times a day  valGANciclovir 900 milliGRAM(s) Oral daily    MEDICATIONS  (PRN):  dextrose Oral Gel 15 Gram(s) Oral once PRN Blood Glucose LESS THAN 70 milliGRAM(s)/deciliter      Allergies    No Known Allergies    Intolerances      Review of Systems:  Constitutional: No fever  Eyes: No blurry vision  Neuro: No tremors, numbness/tingling  HEENT: No pain, dysphagia, dysphonia  Cardiovascular: No chest pain, palpitations  Respiratory: No SOB  GI: No nausea, vomiting, abdominal pain  : No dysuria  Skin: no rash  Psych: no depression  Endocrine: no polyuria, polydipsia  Hem/lymph: no swelling  Osteoporosis: no fractures    ===================PHYSICAL EXAM=======================  VITALS: T(C): 36.5 (02-14-25 @ 12:41)  T(F): 97.7 (02-14-25 @ 12:41), Max: 99.2 (02-13-25 @ 21:00)  HR: 73 (02-14-25 @ 12:41) (67 - 97)  BP: 164/85 (02-14-25 @ 12:41) (149/51 - 170/70)  RR:  (18 - 20)  SpO2:  (93% - 97%)  Wt(kg): --  GENERAL: NAD  EYES: No proptosis, no lid lag, anicteric  THYROID: Normal size, no palpable nodules  RESPIRATORY: Clear to auscultation bilaterally  CARDIOVASCULAR: Regular rate and rhythm  GI: Soft, nontender, non distended  EXT: b/l feet without wounds, 2+ pulses, no edema  PSYCH: Alert and oriented x 3, reactive mood  ======================================================  POCT Blood Glucose.: 342 mg/dL (02-14-25 @ 16:44)  POCT Blood Glucose.: 367 mg/dL (02-14-25 @ 15:57)  POCT Blood Glucose.: 399 mg/dL (02-14-25 @ 14:45)  POCT Blood Glucose.: 417 mg/dL (02-14-25 @ 13:18)  POCT Blood Glucose.: 437 mg/dL (02-14-25 @ 13:17)  POCT Blood Glucose.: 436 mg/dL (02-14-25 @ 11:52)  POCT Blood Glucose.: 437 mg/dL (02-14-25 @ 11:51)  POCT Blood Glucose.: 451 mg/dL (02-14-25 @ 10:29)  POCT Blood Glucose.: 449 mg/dL (02-14-25 @ 10:28)  POCT Blood Glucose.: 393 mg/dL (02-14-25 @ 08:28)  POCT Blood Glucose.: 424 mg/dL (02-14-25 @ 08:26)  POCT Blood Glucose.: 433 mg/dL (02-14-25 @ 07:54)  POCT Blood Glucose.: 406 mg/dL (02-14-25 @ 07:53)  POCT Blood Glucose.: 285 mg/dL (02-13-25 @ 21:18)  POCT Blood Glucose.: 237 mg/dL (02-13-25 @ 17:19)  POCT Blood Glucose.: 240 mg/dL (02-13-25 @ 12:02)  POCT Blood Glucose.: 233 mg/dL (02-13-25 @ 08:05)  POCT Blood Glucose.: 251 mg/dL (02-12-25 @ 21:34)  POCT Blood Glucose.: 192 mg/dL (02-12-25 @ 17:06)  POCT Blood Glucose.: 360 mg/dL (02-12-25 @ 12:45)  POCT Blood Glucose.: 284 mg/dL (02-12-25 @ 08:15)  POCT Blood Glucose.: 286 mg/dL (02-12-25 @ 05:46)  POCT Blood Glucose.: 248 mg/dL (02-11-25 @ 21:10)  POCT Blood Glucose.: 204 mg/dL (02-11-25 @ 17:31)                            10.9   5.63  )-----------( 360      ( 14 Feb 2025 07:18 )             35.4       02-14    130[L]  |  96  |  33[H]  ----------------------------<  443[H]  4.3   |  18[L]  |  1.71[H]    eGFR: 44[L]    Ca    9.4      02-14  Mg     2.0     02-14  Phos  2.4     02-14    TPro  7.6  /  Alb  3.5  /  TBili  5.3[H]  /  DBili  x   /  AST  105[H]  /  ALT  220[H]  /  AlkPhos  950[H]  02-14      Thyroid Function Tests:      A1C with Estimated Average Glucose Result: 8.9 % (01-14-25 @ 11:55)  A1C with Estimated Average Glucose Result: 7.8 % (09-25-24 @ 17:21)  A1C with Estimated Average Glucose Result: 12.2 % (04-29-24 @ 06:49)  A1C with Estimated Average Glucose Result: 11.8 % (04-29-24 @ 06:49)          Radiology:

## 2025-02-14 NOTE — PROGRESS NOTE ADULT - ASSESSMENT
The patient is a 63 y/o M w/ PMH obesity, AUD, CKD, HTN, DM, b/l DVTs (w/ resolution), EtOH cirrhosis s/p DDLT (4/22/2019 at [facility] CMV D+,R-) w/ transplant course c/b CNI neurotox w/ conversion from tacro to cyclosporine, and biliary anastomotic stricture and choledocholithiasis (s/p multiple ERCPs, most recently on 2/7/25 w/ removal of CBD stone and no residual stricture), is currently presenting in the setting of worsening liver enzymes, w/ pathology concerning for acute cellular rejection w/ possible evolving chronic rejection.    #Elevated liver enzymes 2/2 ACR w/ evolving chronic rejection  #s/p OLT 2019 for alcoholic cirrhosis  # H/O anastomotic stricture with choledocholithiasis s/p multiple ERCP most recently 2/7: no stricture appreciated and CBD was cleared   # steatohepatitis on liver bx 5/01/2024  Liver biopsy from 2/12 demonstrating moderate-late ACR (TAPIA 5/9) w/ evolving chronic rejection (w/ cytokeratin stains pending) as well as scattered histiocyte collections.   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    - Solumedrol 500 mg for 3 days (2/13-15)  - MMF increased to 1000 mg BID  - Cyclosporine 125 mg BID for increased trough level 200-250 ng/mL   - Continue ursodiol  - PPx w/ nystatin, bactrim and valcyte     #Steroid induced hyperglycemia  - Follow up Endocrine recommendations for insulin adjustment.     Note incomplete until finalized by attending signature/attestation.    Ike Lozada  GI/Hepatology Fellow, PGY-4    MONDAY-FRIDAY 8AM-5PM:  Please message via Picode[employer] or email [email or web address]     On Weekends/Holidays (All day) and Weekdays after 5 PM to 8 AM  For nonurgent consults please email:  Please email [email or web address]  For urgent consults:  Please contact on call GI team. See Amion schedule ([facility]), WeBRANDing system ([facility]), or call hospital  ([facility])

## 2025-02-14 NOTE — PROGRESS NOTE ADULT - SUBJECTIVE AND OBJECTIVE BOX
Patient is a 64y old  Male who presents with a chief complaint of abnormal liver tests (2025 11:41)      Interval Events:   Significant hyperglycemia to 300s overnight and 450 this AM. Patient w/o complaints this AM.     ROS:   A 12-point ROS was performed and negative except as noted in HPI.    Hospital Medications:  cycloSPORINE  (SandIMMUNE) 125 milliGRAM(s) Oral <User Schedule>  dextrose 5%. 1000 milliLiter(s) IV Continuous <Continuous>  dextrose 5%. 1000 milliLiter(s) IV Continuous <Continuous>  dextrose 50% Injectable 25 Gram(s) IV Push once  dextrose 50% Injectable 12.5 Gram(s) IV Push once  dextrose 50% Injectable 25 Gram(s) IV Push once  dextrose Oral Gel 15 Gram(s) Oral once PRN  glucagon  Injectable 1 milliGRAM(s) IntraMuscular once  insulin glargine Injectable (LANTUS) 20 Unit(s) SubCutaneous at bedtime  insulin lispro (ADMELOG) corrective regimen sliding scale   SubCutaneous three times a day before meals  insulin lispro (ADMELOG) corrective regimen sliding scale   SubCutaneous at bedtime  insulin lispro Injectable (ADMELOG) 10 Unit(s) SubCutaneous three times a day before meals  magnesium oxide 400 milliGRAM(s) Oral two times a day with meals  metoprolol tartrate 50 milliGRAM(s) Oral every 12 hours  mycophenolate mofetil 1000 milliGRAM(s) Oral <User Schedule>  NIFEdipine XL 30 milliGRAM(s) Oral daily  nystatin    Suspension 704685 Unit(s) Swish and Swallow four times a day  risperiDONE   Tablet 1 milliGRAM(s) Oral at bedtime  trimethoprim   80 mG/sulfamethoxazole 400 mG 1 Tablet(s) Oral daily  ursodiol Capsule 300 milliGRAM(s) Oral two times a day  valGANciclovir 900 milliGRAM(s) Oral daily      PHYSICAL EXAM:   Vital Signs:  Vital Signs Last 24 Hrs  T(C): 36.4 (2025 08:45), Max: 37.3 (2025 17:00)  T(F): 97.6 (2025 08:45), Max: 99.2 (2025 21:00)  HR: 71 (2025 08:45) (67 - 97)  BP: 149/51 (2025 10:00) (149/51 - 170/70)  BP(mean): --  RR: 20 (2025 08:45) (18 - 20)  SpO2: 93% (2025 08:45) (93% - 97%)    Parameters below as of 2025 08:45  Patient On (Oxygen Delivery Method): room air      Daily     Daily Weight in k.1 (2025 05:00)    GENERAL: no acute distress  NEURO: alert  HEENT: anicteric sclera, no conjunctival pallor appreciated  CHEST: no respiratory distress, no accessory muscle use  CARDIAC: regular rate  ABDOMEN: soft, nondistended, nontender, no rebound or guarding  EXTREMITIES: warm, well perfused, no edema  SKIN: no lesions noted    LABS: reviewed                        10.9   5.63  )-----------( 360      ( 2025 07:18 )             35.4     02-14    132[L]  |  96  |  27[H]  ----------------------------<  447[H]  4.5   |  15[L]  |  1.66[H]    Ca    9.5      2025 07:26  Phos  2.4     02-14  Mg     2.0     -14    TPro  8.0  /  Alb  3.5  /  TBili  5.5[H]  /  DBili  x   /  AST  149[H]  /  ALT  258[H]  /  AlkPhos  1022[H]  02-14    LIVER FUNCTIONS - ( 2025 07:26 )  Alb: 3.5 g/dL / Pro: 8.0 g/dL / ALK PHOS: 1022 U/L / ALT: 258 U/L / AST: 149 U/L / GGT: x             Interval Diagnostic Studies: see sunrise for full report

## 2025-02-15 LAB
ALBUMIN SERPL ELPH-MCNC: 3.4 G/DL — SIGNIFICANT CHANGE UP (ref 3.3–5)
ALP SERPL-CCNC: 871 U/L — HIGH (ref 40–120)
ALT FLD-CCNC: 191 U/L — HIGH (ref 10–45)
ANION GAP SERPL CALC-SCNC: 16 MMOL/L — SIGNIFICANT CHANGE UP (ref 5–17)
APTT BLD: 27.1 SEC — SIGNIFICANT CHANGE UP (ref 24.5–35.6)
AST SERPL-CCNC: 101 U/L — HIGH (ref 10–40)
BASOPHILS # BLD AUTO: 0.01 K/UL — SIGNIFICANT CHANGE UP (ref 0–0.2)
BASOPHILS NFR BLD AUTO: 0.1 % — SIGNIFICANT CHANGE UP (ref 0–2)
BILIRUB SERPL-MCNC: 3.6 MG/DL — HIGH (ref 0.2–1.2)
BUN SERPL-MCNC: 38 MG/DL — HIGH (ref 7–23)
CALCIUM SERPL-MCNC: 9.4 MG/DL — SIGNIFICANT CHANGE UP (ref 8.4–10.5)
CHLORIDE SERPL-SCNC: 102 MMOL/L — SIGNIFICANT CHANGE UP (ref 96–108)
CO2 SERPL-SCNC: 18 MMOL/L — LOW (ref 22–31)
CREAT SERPL-MCNC: 1.79 MG/DL — HIGH (ref 0.5–1.3)
CYCLOSPORINE SER-MCNC: 227 NG/ML — SIGNIFICANT CHANGE UP (ref 150–400)
EGFR: 41 ML/MIN/1.73M2 — LOW
EOSINOPHIL # BLD AUTO: 0 K/UL — SIGNIFICANT CHANGE UP (ref 0–0.5)
EOSINOPHIL NFR BLD AUTO: 0 % — SIGNIFICANT CHANGE UP (ref 0–6)
GLUCOSE BLDC GLUCOMTR-MCNC: 113 MG/DL — HIGH (ref 70–99)
GLUCOSE BLDC GLUCOMTR-MCNC: 122 MG/DL — HIGH (ref 70–99)
GLUCOSE BLDC GLUCOMTR-MCNC: 128 MG/DL — HIGH (ref 70–99)
GLUCOSE BLDC GLUCOMTR-MCNC: 128 MG/DL — HIGH (ref 70–99)
GLUCOSE BLDC GLUCOMTR-MCNC: 129 MG/DL — HIGH (ref 70–99)
GLUCOSE BLDC GLUCOMTR-MCNC: 129 MG/DL — HIGH (ref 70–99)
GLUCOSE BLDC GLUCOMTR-MCNC: 144 MG/DL — HIGH (ref 70–99)
GLUCOSE BLDC GLUCOMTR-MCNC: 151 MG/DL — HIGH (ref 70–99)
GLUCOSE BLDC GLUCOMTR-MCNC: 153 MG/DL — HIGH (ref 70–99)
GLUCOSE BLDC GLUCOMTR-MCNC: 161 MG/DL — HIGH (ref 70–99)
GLUCOSE BLDC GLUCOMTR-MCNC: 164 MG/DL — HIGH (ref 70–99)
GLUCOSE BLDC GLUCOMTR-MCNC: 173 MG/DL — HIGH (ref 70–99)
GLUCOSE BLDC GLUCOMTR-MCNC: 190 MG/DL — HIGH (ref 70–99)
GLUCOSE BLDC GLUCOMTR-MCNC: 191 MG/DL — HIGH (ref 70–99)
GLUCOSE BLDC GLUCOMTR-MCNC: 197 MG/DL — HIGH (ref 70–99)
GLUCOSE BLDC GLUCOMTR-MCNC: 203 MG/DL — HIGH (ref 70–99)
GLUCOSE BLDC GLUCOMTR-MCNC: 205 MG/DL — HIGH (ref 70–99)
GLUCOSE BLDC GLUCOMTR-MCNC: 206 MG/DL — HIGH (ref 70–99)
GLUCOSE BLDC GLUCOMTR-MCNC: 213 MG/DL — HIGH (ref 70–99)
GLUCOSE BLDC GLUCOMTR-MCNC: 216 MG/DL — HIGH (ref 70–99)
GLUCOSE BLDC GLUCOMTR-MCNC: 245 MG/DL — HIGH (ref 70–99)
GLUCOSE BLDC GLUCOMTR-MCNC: 249 MG/DL — HIGH (ref 70–99)
GLUCOSE SERPL-MCNC: 161 MG/DL — HIGH (ref 70–99)
HCT VFR BLD CALC: 30.1 % — LOW (ref 39–50)
HGB BLD-MCNC: 9.4 G/DL — LOW (ref 13–17)
IMM GRANULOCYTES NFR BLD AUTO: 1.1 % — HIGH (ref 0–0.9)
INR BLD: 0.93 RATIO — SIGNIFICANT CHANGE UP (ref 0.85–1.16)
LYMPHOCYTES # BLD AUTO: 0.97 K/UL — LOW (ref 1–3.3)
LYMPHOCYTES # BLD AUTO: 8.7 % — LOW (ref 13–44)
MAGNESIUM SERPL-MCNC: 2.2 MG/DL — SIGNIFICANT CHANGE UP (ref 1.6–2.6)
MCHC RBC-ENTMCNC: 25.1 PG — LOW (ref 27–34)
MCHC RBC-ENTMCNC: 31.2 G/DL — LOW (ref 32–36)
MCV RBC AUTO: 80.3 FL — SIGNIFICANT CHANGE UP (ref 80–100)
MONOCYTES # BLD AUTO: 0.18 K/UL — SIGNIFICANT CHANGE UP (ref 0–0.9)
MONOCYTES NFR BLD AUTO: 1.6 % — LOW (ref 2–14)
NEUTROPHILS # BLD AUTO: 9.91 K/UL — HIGH (ref 1.8–7.4)
NEUTROPHILS NFR BLD AUTO: 88.5 % — HIGH (ref 43–77)
NRBC BLD AUTO-RTO: 0 /100 WBCS — SIGNIFICANT CHANGE UP (ref 0–0)
PHOSPHATE SERPL-MCNC: 2.5 MG/DL — SIGNIFICANT CHANGE UP (ref 2.5–4.5)
PLATELET # BLD AUTO: 353 K/UL — SIGNIFICANT CHANGE UP (ref 150–400)
POTASSIUM SERPL-MCNC: 3.9 MMOL/L — SIGNIFICANT CHANGE UP (ref 3.5–5.3)
POTASSIUM SERPL-SCNC: 3.9 MMOL/L — SIGNIFICANT CHANGE UP (ref 3.5–5.3)
PROT SERPL-MCNC: 7.4 G/DL — SIGNIFICANT CHANGE UP (ref 6–8.3)
PROTHROM AB SERPL-ACNC: 10.6 SEC — SIGNIFICANT CHANGE UP (ref 9.9–13.4)
RBC # BLD: 3.75 M/UL — LOW (ref 4.2–5.8)
RBC # FLD: 17.5 % — HIGH (ref 10.3–14.5)
SODIUM SERPL-SCNC: 136 MMOL/L — SIGNIFICANT CHANGE UP (ref 135–145)
WBC # BLD: 11.19 K/UL — HIGH (ref 3.8–10.5)
WBC # FLD AUTO: 11.19 K/UL — HIGH (ref 3.8–10.5)

## 2025-02-15 PROCEDURE — 99232 SBSQ HOSP IP/OBS MODERATE 35: CPT | Mod: GC

## 2025-02-15 RX ORDER — INSULIN LISPRO 100 U/ML
20 INJECTION, SOLUTION INTRAVENOUS; SUBCUTANEOUS
Refills: 0 | Status: DISCONTINUED | OUTPATIENT
Start: 2025-02-16 | End: 2025-02-16

## 2025-02-15 RX ORDER — INSULIN LISPRO 100 U/ML
INJECTION, SOLUTION INTRAVENOUS; SUBCUTANEOUS
Refills: 0 | Status: DISCONTINUED | OUTPATIENT
Start: 2025-02-15 | End: 2025-02-18

## 2025-02-15 RX ORDER — INSULIN GLARGINE-YFGN 100 [IU]/ML
40 INJECTION, SOLUTION SUBCUTANEOUS AT BEDTIME
Refills: 0 | Status: DISCONTINUED | OUTPATIENT
Start: 2025-02-15 | End: 2025-02-16

## 2025-02-15 RX ORDER — METHYLPREDNISOLONE ACETATE 80 MG/ML
500 INJECTION, SUSPENSION INTRA-ARTICULAR; INTRALESIONAL; INTRAMUSCULAR; SOFT TISSUE ONCE
Refills: 0 | Status: COMPLETED | OUTPATIENT
Start: 2025-02-15 | End: 2025-02-15

## 2025-02-15 RX ORDER — INSULIN GLARGINE-YFGN 100 [IU]/ML
40 INJECTION, SOLUTION SUBCUTANEOUS AT BEDTIME
Refills: 0 | Status: DISCONTINUED | OUTPATIENT
Start: 2025-02-15 | End: 2025-02-15

## 2025-02-15 RX ORDER — INSULIN LISPRO 100 U/ML
4 INJECTION, SOLUTION INTRAVENOUS; SUBCUTANEOUS ONCE
Refills: 0 | Status: COMPLETED | OUTPATIENT
Start: 2025-02-15 | End: 2025-02-15

## 2025-02-15 RX ORDER — CYCLOSPORINE 100 MG/1
125 CAPSULE, LIQUID FILLED ORAL
Refills: 0 | Status: DISCONTINUED | OUTPATIENT
Start: 2025-02-15 | End: 2025-02-18

## 2025-02-15 RX ADMIN — CYCLOSPORINE 125 MILLIGRAM(S): 100 CAPSULE, LIQUID FILLED ORAL at 20:13

## 2025-02-15 RX ADMIN — Medication 500000 UNIT(S): at 05:16

## 2025-02-15 RX ADMIN — Medication 1 MILLIGRAM(S): at 22:05

## 2025-02-15 RX ADMIN — Medication 4 UNIT(S)/HR: at 11:59

## 2025-02-15 RX ADMIN — Medication 4 UNIT(S)/HR: at 08:02

## 2025-02-15 RX ADMIN — Medication 4 UNIT(S)/HR: at 20:16

## 2025-02-15 RX ADMIN — URSODIOL 300 MILLIGRAM(S): 300 CAPSULE ORAL at 17:08

## 2025-02-15 RX ADMIN — Medication 400 MILLIGRAM(S): at 08:03

## 2025-02-15 RX ADMIN — METOPROLOL SUCCINATE 50 MILLIGRAM(S): 50 TABLET, EXTENDED RELEASE ORAL at 05:17

## 2025-02-15 RX ADMIN — CYCLOSPORINE 125 MILLIGRAM(S): 100 CAPSULE, LIQUID FILLED ORAL at 08:03

## 2025-02-15 RX ADMIN — Medication 30 MILLIGRAM(S): at 05:17

## 2025-02-15 RX ADMIN — MYCOPHENOLATE MOFETIL 1000 MILLIGRAM(S): 500 TABLET, FILM COATED ORAL at 20:13

## 2025-02-15 RX ADMIN — Medication 4 UNIT(S)/HR: at 17:25

## 2025-02-15 RX ADMIN — INSULIN LISPRO 4 UNIT(S): 100 INJECTION, SOLUTION INTRAVENOUS; SUBCUTANEOUS at 18:50

## 2025-02-15 RX ADMIN — URSODIOL 300 MILLIGRAM(S): 300 CAPSULE ORAL at 05:16

## 2025-02-15 RX ADMIN — METOPROLOL SUCCINATE 50 MILLIGRAM(S): 50 TABLET, EXTENDED RELEASE ORAL at 17:08

## 2025-02-15 RX ADMIN — Medication 4 UNIT(S)/HR: at 00:04

## 2025-02-15 RX ADMIN — Medication 500000 UNIT(S): at 17:09

## 2025-02-15 RX ADMIN — Medication 500000 UNIT(S): at 11:59

## 2025-02-15 RX ADMIN — INSULIN GLARGINE-YFGN 40 UNIT(S): 100 INJECTION, SOLUTION SUBCUTANEOUS at 22:04

## 2025-02-15 RX ADMIN — VALGANCICLOVIR 900 MILLIGRAM(S): 450 TABLET, FILM COATED ORAL at 11:58

## 2025-02-15 RX ADMIN — METHYLPREDNISOLONE ACETATE 100 MILLIGRAM(S): 80 INJECTION, SUSPENSION INTRA-ARTICULAR; INTRALESIONAL; INTRAMUSCULAR; SOFT TISSUE at 17:25

## 2025-02-15 RX ADMIN — Medication 400 MILLIGRAM(S): at 17:08

## 2025-02-15 RX ADMIN — Medication 1 TABLET(S): at 11:58

## 2025-02-15 RX ADMIN — MYCOPHENOLATE MOFETIL 1000 MILLIGRAM(S): 500 TABLET, FILM COATED ORAL at 08:03

## 2025-02-15 NOTE — PROVIDER CONTACT NOTE (OTHER) - BACKGROUND
Patient s/p OLT 4/22/2019. Outpatient labs noted rising LFTs and admitted to rule out ACR
64M PMH of of OLT 4/22/2019 at [facility] for ACLF ETOH Cirrhosis, DM, HTN presenting for evaluation for acute cellular rejection. Pt with steroid-induced hypergycemia currently on insulin gtt.
Pt admitted for possible liver transplant rejection s/p high dose IV steroid infusions
64M PMH of of OLT 4/22/2019 at [facility] for ACLF ETOH Cirrhosis, DM, HTN presenting for evaluation for acute cellular rejection. Pt with steroid-induced hypergycemia currently on insulin gtt.

## 2025-02-15 NOTE — PROVIDER CONTACT NOTE (OTHER) - ACTION/TREATMENT ORDERED:
MERLE Underwood made aware. Pt ordered to be NPO & insulin gtt ordered & initiated.
MD to call Endo for possible bolus, intervention TBD.
As per provider continue to monitor ot no new orders at this time. Patient due to be reassessed with sliding scale
MERLE Fraser made aware. Action: continue at 30units/hr, no other invention given at this time.

## 2025-02-15 NOTE — PROVIDER CONTACT NOTE (OTHER) - SITUATION
Pt unable to receive insulin dose due to alaris pump programming. Pt currently receiving 30units/hr.
Patient w/ hyperglycemia throughou
Pt unable to receive New insulin dose due to alaris pump programming. Pt currently receiving 30units/hr.
Patient NPO for liver bx and hyperglycemic while on Q6 FS

## 2025-02-15 NOTE — PROVIDER CONTACT NOTE (OTHER) - REASON
Patient Hyperglycemic
Pt unable to receive new insulin dose due to alaris pump programming
Patient w/ hyperglycemia throughout shift
Pt unable to receive New insulin dose due to alaris pump programming

## 2025-02-15 NOTE — PROGRESS NOTE ADULT - SUBJECTIVE AND OBJECTIVE BOX
Interval Events:   no acute events  patient feeling well, requesting a diet, no other complaints  VSS, exam benign  glycemic control is improved to 100s with insulin gtt   liver chemistry is overall down-trending    Hospital Medications:  cycloSPORINE  (SandIMMUNE) 125 milliGRAM(s) Oral <User Schedule>  dextrose 5%. 1000 milliLiter(s) IV Continuous <Continuous>  dextrose 5%. 1000 milliLiter(s) IV Continuous <Continuous>  dextrose 50% Injectable 25 Gram(s) IV Push once  dextrose 50% Injectable 12.5 Gram(s) IV Push once  dextrose 50% Injectable 25 Gram(s) IV Push once  dextrose Oral Gel 15 Gram(s) Oral once PRN  glucagon  Injectable 1 milliGRAM(s) IntraMuscular once  insulin regular Infusion 4 Unit(s)/Hr IV Continuous <Continuous>  magnesium oxide 400 milliGRAM(s) Oral two times a day with meals  metoprolol tartrate 50 milliGRAM(s) Oral every 12 hours  mycophenolate mofetil 1000 milliGRAM(s) Oral <User Schedule>  NIFEdipine XL 30 milliGRAM(s) Oral daily  nystatin    Suspension [id number] Unit(s) Swish and Swallow four times a day  risperiDONE   Tablet 1 milliGRAM(s) Oral at bedtime  trimethoprim   80 mG/sulfamethoxazole 400 mG 1 Tablet(s) Oral daily  ursodiol Capsule 300 milliGRAM(s) Oral two times a day  valGANciclovir 900 milliGRAM(s) Oral daily      ROS: See above.    PHYSICAL EXAM:   Vital Signs:  Vital Signs Last 24 Hrs  T(C): 36.4 (15 Feb 2025 13:00), Max: 36.8 (14 Feb 2025 16:58)  T(F): 97.6 (15 Feb 2025 13:00), Max: 98.3 (14 Feb 2025 21:26)  HR: 73 (15 Feb 2025 13:00) (60 - 75)  BP: 157/77 (15 Feb 2025 13:00) (148/74 - 159/78)  BP(mean): --  RR: 18 (15 Feb 2025 13:00) (18 - 20)  SpO2: 94% (15 Feb 2025 13:00) (93% - 98%)    Parameters below as of 15 Feb 2025 13:00  Patient On (Oxygen Delivery Method): room air        GENERAL:  NAD, resting comfortably in bed  HEENT:  sclera anicteric  RESPIRATORY:  Normal Effort  CARDIAC:  HDS  ABDOMEN:  Soft, non-tender, non-distended  SKIN:  Warm & Dry. No jaundice  NEURO:  Alert, conversant, no focal deficit    LABS:                        9.4    11.19 )-----------( 353      ( 15 Feb 2025 07:02 )             30.1     Mean Cell Volume: 80.3 fl (02-15-25 @ 07:02)    02-15    136  |  102  |  38[H]  ----------------------------<  161[H]  3.9   |  18[L]  |  1.79[H]    Ca    9.4      15 Feb 2025 07:02  Phos  2.5     02-15  Mg     2.2     02-15    TPro  7.4  /  Alb  3.4  /  TBili  3.6[H]  /  DBili  x   /  AST  101[H]  /  ALT  191[H]  /  AlkPhos  871[H]  02-15    LIVER FUNCTIONS - ( 15 Feb 2025 07:02 )  Alb: 3.4 g/dL / Pro: 7.4 g/dL / ALK PHOS: 871 U/L / ALT: 191 U/L / AST: 101 U/L / GGT: x           PT/INR - ( 15 Feb 2025 07:02 )   PT: 10.6 sec;   INR: 0.93 ratio         PTT - ( 15 Feb 2025 07:02 )  PTT:27.1 sec

## 2025-02-15 NOTE — PROVIDER CONTACT NOTE (OTHER) - ASSESSMENT
Vitals as charted. Pt has no c/o SOB or chest pain. No s/s of hyperglycemia
Patient is A&Ox4 no signs/ symptoms of distress noted
Vitals charted
Pt A&Ox4. Pt w/ elevated FS from start of shift, MERLE Underwood aware of each occurrence. Sliding scale insulin was administered as well as additional STAT 1x orders of admelog & NPH administered as per EMAR. Pt was eating all meals and chocolate

## 2025-02-15 NOTE — PROGRESS NOTE ADULT - ASSESSMENT
The patient is a 65 y/o M w/ PMH obesity, AUD, CKD, HTN, DM, b/l DVTs (w/ resolution), EtOH cirrhosis s/p DDLT (4/22/2019 at [facility] CMV D+,R-) w/ transplant course c/b CNI neurotox w/ conversion from tacro to cyclosporine, and biliary anastomotic stricture and choledocholithiasis (s/p multiple ERCPs, most recently on 2/7/25 w/ removal of CBD stone and no residual stricture), is currently presenting in the setting of worsening liver enzymes, w/ pathology concerning for acute cellular rejection w/ possible evolving chronic rejection.    #Elevated liver enzymes 2/2 ACR w/ evolving chronic rejection  #s/p OLT 2019 for alcoholic cirrhosis  # H/O anastomotic stricture with choledocholithiasis s/p multiple ERCP most recently 2/7: no stricture appreciated and CBD was cleared   # steatohepatitis on liver bx 5/01/2024  Liver biopsy from 2/12 demonstrating moderate-late ACR (TAPIA 5/9) w/ evolving chronic rejection (w/ cytokeratin stains pending) as well as scattered histiocyte collections.   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    - Solumedrol 500 mg for 3 days (2/13-15) followed by oral prednisone taper   - MMF 1000 mg BID  - Cyclosporine 125 mg BID for increased trough level 200-250 ng/mL   - Continue ursodiol  - PPx w/ nystatin, bactrim and valcyte     #Steroid induced hyperglycemia  - On insulin gtt   - Glucose improved to 100s   - Follow up Endocrine recommendations for insulin adjustment.   - Will require insulin regimen for home-going    Dispo: planning for discharge Sunday with steroid taper, insulin regimen & close outpatient follow-up

## 2025-02-15 NOTE — CHART NOTE - NSCHARTNOTEFT_GEN_A_CORE
Pt requiring high doses on insulin gtt 8-11 units/hr today. Discharge planning for steroid taper. last solumedrol dose of 500mg   Recommend the following:    c/w insulin gtt for today>can transition off w/Lantus 40 units tonight, can stop gtt 2 hours after giving Lantus  -test BG AC/HS and 2AM tonight  -Give Lantus 40 units QHS tonight  -Start Admelog 20 units AC meals 2/16  -Start Admelog moderate correction scale AC and mod HS/2AM scale on 2/16.   -Prednisone 60mg to start tomorrow>will f/u on 2/16 for full taper  -plan to dc on basal/bolus. Final recs TBD    Any inquiries please email [email or web address]   office:  [phone] (M-F 9a-5pm)               [phone] (nights/weekends)   Can access [employer] coverage via sunrise/tools

## 2025-02-16 LAB
ALBUMIN SERPL ELPH-MCNC: 3.1 G/DL — LOW (ref 3.3–5)
ALP SERPL-CCNC: 748 U/L — HIGH (ref 40–120)
ALT FLD-CCNC: 168 U/L — HIGH (ref 10–45)
ANION GAP SERPL CALC-SCNC: 16 MMOL/L — SIGNIFICANT CHANGE UP (ref 5–17)
APTT BLD: 25.9 SEC — SIGNIFICANT CHANGE UP (ref 24.5–35.6)
AST SERPL-CCNC: 85 U/L — HIGH (ref 10–40)
BASOPHILS # BLD AUTO: 0.01 K/UL — SIGNIFICANT CHANGE UP (ref 0–0.2)
BASOPHILS NFR BLD AUTO: 0.1 % — SIGNIFICANT CHANGE UP (ref 0–2)
BILIRUB SERPL-MCNC: 2.6 MG/DL — HIGH (ref 0.2–1.2)
BUN SERPL-MCNC: 53 MG/DL — HIGH (ref 7–23)
CALCIUM SERPL-MCNC: 8.7 MG/DL — SIGNIFICANT CHANGE UP (ref 8.4–10.5)
CHLORIDE SERPL-SCNC: 100 MMOL/L — SIGNIFICANT CHANGE UP (ref 96–108)
CO2 SERPL-SCNC: 16 MMOL/L — LOW (ref 22–31)
CREAT SERPL-MCNC: 2.1 MG/DL — HIGH (ref 0.5–1.3)
CULTURE RESULTS: SIGNIFICANT CHANGE UP
CULTURE RESULTS: SIGNIFICANT CHANGE UP
CYCLOSPORINE SER-MCNC: 134 NG/ML — LOW (ref 150–400)
EGFR: 34 ML/MIN/1.73M2 — LOW
EOSINOPHIL # BLD AUTO: 0 K/UL — SIGNIFICANT CHANGE UP (ref 0–0.5)
EOSINOPHIL NFR BLD AUTO: 0 % — SIGNIFICANT CHANGE UP (ref 0–6)
GLUCOSE BLDC GLUCOMTR-MCNC: 184 MG/DL — HIGH (ref 70–99)
GLUCOSE BLDC GLUCOMTR-MCNC: 282 MG/DL — HIGH (ref 70–99)
GLUCOSE BLDC GLUCOMTR-MCNC: 322 MG/DL — HIGH (ref 70–99)
GLUCOSE BLDC GLUCOMTR-MCNC: 337 MG/DL — HIGH (ref 70–99)
GLUCOSE BLDC GLUCOMTR-MCNC: 358 MG/DL — HIGH (ref 70–99)
GLUCOSE BLDC GLUCOMTR-MCNC: 359 MG/DL — HIGH (ref 70–99)
GLUCOSE BLDC GLUCOMTR-MCNC: 365 MG/DL — HIGH (ref 70–99)
GLUCOSE BLDC GLUCOMTR-MCNC: 390 MG/DL — HIGH (ref 70–99)
GLUCOSE SERPL-MCNC: 343 MG/DL — HIGH (ref 70–99)
HCT VFR BLD CALC: 29.5 % — LOW (ref 39–50)
HGB BLD-MCNC: 9.4 G/DL — LOW (ref 13–17)
IMM GRANULOCYTES NFR BLD AUTO: 1.5 % — HIGH (ref 0–0.9)
INR BLD: 0.93 RATIO — SIGNIFICANT CHANGE UP (ref 0.85–1.16)
LYMPHOCYTES # BLD AUTO: 0.66 K/UL — LOW (ref 1–3.3)
LYMPHOCYTES # BLD AUTO: 6.1 % — LOW (ref 13–44)
MAGNESIUM SERPL-MCNC: 2 MG/DL — SIGNIFICANT CHANGE UP (ref 1.6–2.6)
MCHC RBC-ENTMCNC: 26 PG — LOW (ref 27–34)
MCHC RBC-ENTMCNC: 31.9 G/DL — LOW (ref 32–36)
MCV RBC AUTO: 81.7 FL — SIGNIFICANT CHANGE UP (ref 80–100)
MONOCYTES # BLD AUTO: 0.09 K/UL — SIGNIFICANT CHANGE UP (ref 0–0.9)
MONOCYTES NFR BLD AUTO: 0.8 % — LOW (ref 2–14)
NEUTROPHILS # BLD AUTO: 9.97 K/UL — HIGH (ref 1.8–7.4)
NEUTROPHILS NFR BLD AUTO: 91.5 % — HIGH (ref 43–77)
NRBC BLD AUTO-RTO: 0 /100 WBCS — SIGNIFICANT CHANGE UP (ref 0–0)
PHOSPHATE SERPL-MCNC: 4.4 MG/DL — SIGNIFICANT CHANGE UP (ref 2.5–4.5)
PLATELET # BLD AUTO: 374 K/UL — SIGNIFICANT CHANGE UP (ref 150–400)
POTASSIUM SERPL-MCNC: 4.3 MMOL/L — SIGNIFICANT CHANGE UP (ref 3.5–5.3)
POTASSIUM SERPL-SCNC: 4.3 MMOL/L — SIGNIFICANT CHANGE UP (ref 3.5–5.3)
PROT SERPL-MCNC: 6.8 G/DL — SIGNIFICANT CHANGE UP (ref 6–8.3)
PROTHROM AB SERPL-ACNC: 10.6 SEC — SIGNIFICANT CHANGE UP (ref 9.9–13.4)
RBC # BLD: 3.61 M/UL — LOW (ref 4.2–5.8)
RBC # FLD: 18.6 % — HIGH (ref 10.3–14.5)
SODIUM SERPL-SCNC: 132 MMOL/L — LOW (ref 135–145)
SPECIMEN SOURCE: SIGNIFICANT CHANGE UP
SPECIMEN SOURCE: SIGNIFICANT CHANGE UP
WBC # BLD: 10.89 K/UL — HIGH (ref 3.8–10.5)
WBC # FLD AUTO: 10.89 K/UL — HIGH (ref 3.8–10.5)

## 2025-02-16 PROCEDURE — 99232 SBSQ HOSP IP/OBS MODERATE 35: CPT

## 2025-02-16 PROCEDURE — 99232 SBSQ HOSP IP/OBS MODERATE 35: CPT | Mod: GC

## 2025-02-16 RX ORDER — PREDNISONE 20 MG/1
60 TABLET ORAL ONCE
Refills: 0 | Status: COMPLETED | OUTPATIENT
Start: 2025-02-16 | End: 2025-02-16

## 2025-02-16 RX ORDER — INSULIN LISPRO 100 U/ML
INJECTION, SOLUTION INTRAVENOUS; SUBCUTANEOUS ONCE
Refills: 0 | Status: COMPLETED | OUTPATIENT
Start: 2025-02-16 | End: 2025-02-16

## 2025-02-16 RX ORDER — INSULIN LISPRO 100 U/ML
6 INJECTION, SOLUTION INTRAVENOUS; SUBCUTANEOUS ONCE
Refills: 0 | Status: COMPLETED | OUTPATIENT
Start: 2025-02-16 | End: 2025-02-16

## 2025-02-16 RX ORDER — INSULIN LISPRO 100 U/ML
26 INJECTION, SOLUTION INTRAVENOUS; SUBCUTANEOUS ONCE
Refills: 0 | Status: COMPLETED | OUTPATIENT
Start: 2025-02-16 | End: 2025-02-16

## 2025-02-16 RX ORDER — SODIUM CHLORIDE 9 G/1000ML
1000 INJECTION, SOLUTION INTRAVENOUS ONCE
Refills: 0 | Status: COMPLETED | OUTPATIENT
Start: 2025-02-16 | End: 2025-02-16

## 2025-02-16 RX ORDER — INSULIN LISPRO 100 U/ML
26 INJECTION, SOLUTION INTRAVENOUS; SUBCUTANEOUS
Refills: 0 | Status: DISCONTINUED | OUTPATIENT
Start: 2025-02-16 | End: 2025-02-17

## 2025-02-16 RX ORDER — INSULIN GLARGINE-YFGN 100 [IU]/ML
46 INJECTION, SOLUTION SUBCUTANEOUS AT BEDTIME
Refills: 0 | Status: DISCONTINUED | OUTPATIENT
Start: 2025-02-16 | End: 2025-02-17

## 2025-02-16 RX ORDER — INSULIN LISPRO 100 U/ML
15 INJECTION, SOLUTION INTRAVENOUS; SUBCUTANEOUS ONCE
Refills: 0 | Status: COMPLETED | OUTPATIENT
Start: 2025-02-16 | End: 2025-02-16

## 2025-02-16 RX ADMIN — INSULIN LISPRO 15 UNIT(S): 100 INJECTION, SOLUTION INTRAVENOUS; SUBCUTANEOUS at 06:15

## 2025-02-16 RX ADMIN — CYCLOSPORINE 125 MILLIGRAM(S): 100 CAPSULE, LIQUID FILLED ORAL at 19:57

## 2025-02-16 RX ADMIN — MYCOPHENOLATE MOFETIL 1000 MILLIGRAM(S): 500 TABLET, FILM COATED ORAL at 07:38

## 2025-02-16 RX ADMIN — INSULIN LISPRO 26 UNIT(S): 100 INJECTION, SOLUTION INTRAVENOUS; SUBCUTANEOUS at 12:29

## 2025-02-16 RX ADMIN — PREDNISONE 60 MILLIGRAM(S): 20 TABLET ORAL at 13:03

## 2025-02-16 RX ADMIN — INSULIN LISPRO 8: 100 INJECTION, SOLUTION INTRAVENOUS; SUBCUTANEOUS at 07:40

## 2025-02-16 RX ADMIN — INSULIN LISPRO 6: 100 INJECTION, SOLUTION INTRAVENOUS; SUBCUTANEOUS at 02:12

## 2025-02-16 RX ADMIN — INSULIN LISPRO 10: 100 INJECTION, SOLUTION INTRAVENOUS; SUBCUTANEOUS at 12:29

## 2025-02-16 RX ADMIN — INSULIN LISPRO 20 UNIT(S): 100 INJECTION, SOLUTION INTRAVENOUS; SUBCUTANEOUS at 07:41

## 2025-02-16 RX ADMIN — Medication 400 MILLIGRAM(S): at 07:38

## 2025-02-16 RX ADMIN — METOPROLOL SUCCINATE 50 MILLIGRAM(S): 50 TABLET, EXTENDED RELEASE ORAL at 05:47

## 2025-02-16 RX ADMIN — SODIUM CHLORIDE 1000 MILLILITER(S): 9 INJECTION, SOLUTION INTRAVENOUS at 15:13

## 2025-02-16 RX ADMIN — URSODIOL 300 MILLIGRAM(S): 300 CAPSULE ORAL at 17:25

## 2025-02-16 RX ADMIN — URSODIOL 300 MILLIGRAM(S): 300 CAPSULE ORAL at 05:47

## 2025-02-16 RX ADMIN — INSULIN LISPRO 26 UNIT(S): 100 INJECTION, SOLUTION INTRAVENOUS; SUBCUTANEOUS at 17:23

## 2025-02-16 RX ADMIN — Medication 500000 UNIT(S): at 12:35

## 2025-02-16 RX ADMIN — Medication 500000 UNIT(S): at 17:28

## 2025-02-16 RX ADMIN — INSULIN LISPRO 6: 100 INJECTION, SOLUTION INTRAVENOUS; SUBCUTANEOUS at 21:55

## 2025-02-16 RX ADMIN — INSULIN GLARGINE-YFGN 46 UNIT(S): 100 INJECTION, SOLUTION SUBCUTANEOUS at 21:55

## 2025-02-16 RX ADMIN — Medication 500000 UNIT(S): at 00:12

## 2025-02-16 RX ADMIN — Medication 1 MILLIGRAM(S): at 21:55

## 2025-02-16 RX ADMIN — CYCLOSPORINE 125 MILLIGRAM(S): 100 CAPSULE, LIQUID FILLED ORAL at 07:38

## 2025-02-16 RX ADMIN — Medication 30 MILLIGRAM(S): at 05:47

## 2025-02-16 RX ADMIN — Medication 500000 UNIT(S): at 05:48

## 2025-02-16 RX ADMIN — Medication 500000 UNIT(S): at 22:03

## 2025-02-16 RX ADMIN — MYCOPHENOLATE MOFETIL 1000 MILLIGRAM(S): 500 TABLET, FILM COATED ORAL at 19:57

## 2025-02-16 RX ADMIN — Medication 400 MILLIGRAM(S): at 17:28

## 2025-02-16 RX ADMIN — Medication 1 TABLET(S): at 12:35

## 2025-02-16 RX ADMIN — METOPROLOL SUCCINATE 50 MILLIGRAM(S): 50 TABLET, EXTENDED RELEASE ORAL at 17:25

## 2025-02-16 RX ADMIN — INSULIN LISPRO 6 UNIT(S): 100 INJECTION, SOLUTION INTRAVENOUS; SUBCUTANEOUS at 22:16

## 2025-02-16 RX ADMIN — VALGANCICLOVIR 900 MILLIGRAM(S): 450 TABLET, FILM COATED ORAL at 12:35

## 2025-02-16 RX ADMIN — INSULIN LISPRO 8: 100 INJECTION, SOLUTION INTRAVENOUS; SUBCUTANEOUS at 17:23

## 2025-02-16 NOTE — PROGRESS NOTE ADULT - ASSESSMENT
The patient is a 63 y/o M w/ PMH obesity, AUD, CKD, HTN, DM, b/l DVTs (w/ resolution), EtOH cirrhosis s/p DDLT (4/22/2019 at [facility] CMV D+,R-) w/ transplant course c/b CNI neurotox w/ conversion from tacro to cyclosporine, and biliary anastomotic stricture and choledocholithiasis (s/p multiple ERCPs, most recently on 2/7/25 w/ removal of CBD stone and no residual stricture), is currently presenting in the setting of worsening liver enzymes, w/ pathology concerning for acute cellular rejection w/ possible evolving chronic rejection.    #Elevated liver enzymes 2/2 ACR w/ evolving chronic rejection  #s/p OLT 2019 for alcoholic cirrhosis  # H/O anastomotic stricture with choledocholithiasis s/p multiple ERCP most recently 2/7: no stricture appreciated and CBD was cleared   # steatohepatitis on liver bx 5/01/2024  Liver biopsy from 2/12 demonstrating moderate-late ACR (TAPIA 5/9) w/ evolving chronic rejection (w/ cytokeratin stains pending) as well as scattered histiocyte collections.   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    - Solumedrol 500 mg IV (2/13-2/15)  - Oral pred 60 today, taper to 40 Monday  - MMF 1000 mg BID  - Cyclosporine 125 mg BID for increased trough level 200-250 ng/mL   - Continue ursodiol  - PPx w/ nystatin, bactrim and valcyte     #Steroid induced hyperglycemia  - Glucose 500s related to high-dose steroids, required insulin gtt, transitioned to basal/bolus insulin on 2/15 PM   - Glucose persistently elevated to 300s  - Appreciate Endocrine assistance for insulin management  - Will require insulin regimen for home-going    #KAYLA  sCr 2.1 from baseline creat ~1.6 on admission. BUN/Cr elevated > 20. Suspect volume-mediated KAYLA related to severe hyperglycemia and urinary losses  - LR 1L bolus, UA,  - Encouraged adequate oral hydration  - trend UOP, sCr     Dispo: planning for discharge Sunday with steroid taper, insulin regimen & close outpatient follow-up The patient is a 63 y/o M w/ PMH obesity, AUD, CKD, HTN, DM, b/l DVTs (w/ resolution), EtOH cirrhosis s/p DDLT (4/22/2019 at [facility] CMV D+,R-) w/ transplant course c/b CNI neurotox w/ conversion from tacro to cyclosporine, and biliary anastomotic stricture and choledocholithiasis (s/p multiple ERCPs, most recently on 2/7/25 w/ removal of CBD stone and no residual stricture), is currently presenting in the setting of worsening liver enzymes, w/ pathology concerning for acute cellular rejection w/ possible evolving chronic rejection.    #Elevated liver enzymes 2/2 ACR w/ evolving chronic rejection  #s/p OLT 2019 for alcoholic cirrhosis  # H/O anastomotic stricture with choledocholithiasis s/p multiple ERCP most recently 2/7: no stricture appreciated and CBD was cleared   # steatohepatitis on liver bx 5/01/2024  Liver biopsy from 2/12 demonstrating moderate-late ACR (TAPIA 5/9) w/ evolving chronic rejection (w/ cytokeratin stains pending) as well as scattered histiocyte collections.   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  Liver chemistry is improving following treatment with steroids    - Solumedrol 500 mg IV (2/13-2/15)  - Oral pred 60 today, taper to 40 Monday  - MMF 1000 mg BID  - Cyclosporine 125 mg BID for increased trough level 200-250 ng/mL   - Continue ursodiol  - PPx w/ nystatin, bactrim and valcyte     #Steroid induced hyperglycemia  - Glucose 500s related to high-dose steroids, required insulin gtt, transitioned to basal/bolus insulin on 2/15 PM   - Glucose persistently elevated to 300s  - Appreciate Endocrine assistance for insulin management  - Will require insulin regimen for home-going    #KAYLA  sCr 2.1 from baseline creat ~1.6 on admission. BUN/Cr elevated > 20. Suspect volume-mediated KAYLA related to severe hyperglycemia and urinary losses  - LR 1L bolus, UA,  - Encouraged adequate oral hydration  - trend UOP, sCr     Dispo: pending improvement in hyperglycemia, KAYLA

## 2025-02-16 NOTE — PROGRESS NOTE ADULT - SUBJECTIVE AND OBJECTIVE BOX
Chief Complaint: Diabetes Mellitus follow up    INTERVAL HX:  Patient seen at bedside. He was transitioned off of the insulin drip overnight however BG remain elevated to the 300s while on steroids.    Reports eating well, finishes 100% of food on each tray. BG over the last 24 hrs have been variable, at  goal range 100-180mg/dl while on insulin gtt, In 300s today   No hypoglycemia.     Review of Systems:  General: As above  GI: No nausea, vomiting  Endocrine: no  S&Sx of hypoglycemia    Allergies    No Known Allergies    Intolerances      MEDICATIONS  (STANDING):  cycloSPORINE  , modified (GENGRAF) 125 milliGRAM(s) Oral <User Schedule>  dextrose 5%. 1000 milliLiter(s) (50 mL/Hr) IV Continuous <Continuous>  dextrose 5%. 1000 milliLiter(s) (100 mL/Hr) IV Continuous <Continuous>  dextrose 50% Injectable 25 Gram(s) IV Push once  dextrose 50% Injectable 12.5 Gram(s) IV Push once  dextrose 50% Injectable 25 Gram(s) IV Push once  glucagon  Injectable 1 milliGRAM(s) IntraMuscular once  insulin glargine Injectable (LANTUS) 46 Unit(s) SubCutaneous at bedtime  insulin lispro (ADMELOG) corrective regimen sliding scale   SubCutaneous three times a day before meals  insulin lispro (ADMELOG) corrective regimen sliding scale   SubCutaneous <User Schedule>  insulin lispro Injectable (ADMELOG) 26 Unit(s) SubCutaneous three times a day before meals  lactated ringers Bolus 1000 milliLiter(s) IV Bolus once  magnesium oxide 400 milliGRAM(s) Oral two times a day with meals  metoprolol tartrate 50 milliGRAM(s) Oral every 12 hours  mycophenolate mofetil 1000 milliGRAM(s) Oral <User Schedule>  NIFEdipine XL 30 milliGRAM(s) Oral daily  nystatin    Suspension 197822 Unit(s) Swish and Swallow four times a day  risperiDONE   Tablet 1 milliGRAM(s) Oral at bedtime  trimethoprim   80 mG/sulfamethoxazole 400 mG 1 Tablet(s) Oral daily  ursodiol Capsule 300 milliGRAM(s) Oral two times a day  valGANciclovir 900 milliGRAM(s) Oral daily        insulin glargine Injectable (LANTUS)   40 Unit(s) SubCutaneous (02-15-25 @ 22:04)    insulin lispro (ADMELOG) corrective regimen sliding scale   10 Unit(s) SubCutaneous (02-16-25 @ 12:29)   8 Unit(s) SubCutaneous (02-16-25 @ 07:40)    insulin lispro (ADMELOG) corrective regimen sliding scale   6 Unit(s) SubCutaneous (02-16-25 @ 02:12)    insulin lispro Injectable (ADMELOG)   15 Unit(s) SubCutaneous (02-16-25 @ 06:15)    insulin lispro Injectable (ADMELOG)   20 Unit(s) SubCutaneous (02-16-25 @ 07:41)    insulin lispro Injectable (ADMELOG).   4 Unit(s) SubCutaneous (02-15-25 @ 18:50)    insulin lispro Injectable (ADMELOG).   26 Unit(s) SubCutaneous (02-16-25 @ 12:29)    insulin regular Infusion   4 mL/Hr IV Continuous (02-15-25 @ 17:26)    methylPREDNISolone sodium succinate IVPB   100 mL/Hr IV Intermittent (02-15-25 @ 17:25)    predniSONE   Tablet   60 milliGRAM(s) Oral (02-16-25 @ 13:03)        PHYSICAL EXAM:  VITALS: T(C): 36.4 (02-16-25 @ 13:00)  T(F): 97.5 (02-16-25 @ 13:00), Max: 98.3 (02-15-25 @ 21:00)  HR: 75 (02-16-25 @ 13:00) (63 - 82)  BP: 146/71 (02-16-25 @ 13:00) (143/67 - 161/80)  RR:  (18 - 18)  SpO2:  (92% - 97%)  Wt(kg): --  GENERAL: NAD  Respiratory: Respirations unlabored   Extremities: Warm, no edema  NEURO: Alert , appropriate     LABS:  POCT Blood Glucose.: 358 mg/dL (02-16-25 @ 12:28)  POCT Blood Glucose.: 365 mg/dL (02-16-25 @ 11:05)  POCT Blood Glucose.: 337 mg/dL (02-16-25 @ 07:37)  POCT Blood Glucose.: 359 mg/dL (02-16-25 @ 05:51)  POCT Blood Glucose.: 282 mg/dL (02-16-25 @ 01:59)  POCT Blood Glucose.: 184 mg/dL (02-16-25 @ 00:09)  POCT Blood Glucose.: 113 mg/dL (02-15-25 @ 21:57)  POCT Blood Glucose.: 129 mg/dL (02-15-25 @ 20:58)  POCT Blood Glucose.: 164 mg/dL (02-15-25 @ 19:57)  POCT Blood Glucose.: 197 mg/dL (02-15-25 @ 18:59)  POCT Blood Glucose.: 205 mg/dL (02-15-25 @ 17:59)  POCT Blood Glucose.: 203 mg/dL (02-15-25 @ 17:00)  POCT Blood Glucose.: 206 mg/dL (02-15-25 @ 15:59)  POCT Blood Glucose.: 213 mg/dL (02-15-25 @ 15:00)  POCT Blood Glucose.: 190 mg/dL (02-15-25 @ 13:58)  POCT Blood Glucose.: 144 mg/dL (02-15-25 @ 13:00)  POCT Blood Glucose.: 173 mg/dL (02-15-25 @ 11:58)  POCT Blood Glucose.: 161 mg/dL (02-15-25 @ 10:59)  POCT Blood Glucose.: 129 mg/dL (02-15-25 @ 10:07)  POCT Blood Glucose.: 122 mg/dL (02-15-25 @ 08:59)  POCT Blood Glucose.: 128 mg/dL (02-15-25 @ 07:57)  POCT Blood Glucose.: 128 mg/dL (02-15-25 @ 06:57)  POCT Blood Glucose.: 151 mg/dL (02-15-25 @ 05:56)  POCT Blood Glucose.: 153 mg/dL (02-15-25 @ 04:57)  POCT Blood Glucose.: 191 mg/dL (02-15-25 @ 03:58)  POCT Blood Glucose.: 216 mg/dL (02-15-25 @ 02:57)  POCT Blood Glucose.: 245 mg/dL (02-15-25 @ 01:51)  POCT Blood Glucose.: 249 mg/dL (02-15-25 @ 00:55)  POCT Blood Glucose.: 257 mg/dL (02-14-25 @ 23:55)  POCT Blood Glucose.: 295 mg/dL (02-14-25 @ 22:52)  POCT Blood Glucose.: 318 mg/dL (02-14-25 @ 21:55)  POCT Blood Glucose.: 313 mg/dL (02-14-25 @ 21:25)  POCT Blood Glucose.: 335 mg/dL (02-14-25 @ 20:52)  POCT Blood Glucose.: 332 mg/dL (02-14-25 @ 19:58)  POCT Blood Glucose.: 342 mg/dL (02-14-25 @ 18:52)  POCT Blood Glucose.: 342 mg/dL (02-14-25 @ 17:52)  POCT Blood Glucose.: 342 mg/dL (02-14-25 @ 16:44)  POCT Blood Glucose.: 367 mg/dL (02-14-25 @ 15:57)  POCT Blood Glucose.: 399 mg/dL (02-14-25 @ 14:45)  POCT Blood Glucose.: 417 mg/dL (02-14-25 @ 13:18)  POCT Blood Glucose.: 437 mg/dL (02-14-25 @ 13:17)  POCT Blood Glucose.: 436 mg/dL (02-14-25 @ 11:52)  POCT Blood Glucose.: 437 mg/dL (02-14-25 @ 11:51)  POCT Blood Glucose.: 451 mg/dL (02-14-25 @ 10:29)  POCT Blood Glucose.: 449 mg/dL (02-14-25 @ 10:28)  POCT Blood Glucose.: 393 mg/dL (02-14-25 @ 08:28)  POCT Blood Glucose.: 424 mg/dL (02-14-25 @ 08:26)  POCT Blood Glucose.: 433 mg/dL (02-14-25 @ 07:54)  POCT Blood Glucose.: 406 mg/dL (02-14-25 @ 07:53)  POCT Blood Glucose.: 285 mg/dL (02-13-25 @ 21:18)  POCT Blood Glucose.: 237 mg/dL (02-13-25 @ 17:19)                          9.4    10.89 )-----------( 374      ( 16 Feb 2025 07:03 )             29.5     02-16    132[L]  |  100  |  53[H]  ----------------------------<  343[H]  4.3   |  16[L]  |  2.10[H]    Ca    8.7      16 Feb 2025 07:03  Phos  4.4     02-16  Mg     2.0     02-16    TPro  6.8  /  Alb  3.1[L]  /  TBili  2.6[H]  /  DBili  x   /  AST  85[H]  /  ALT  168[H]  /  AlkPhos  748[H]  02-16    eGFR: 34 mL/min/1.73m2 (16 Feb 2025 07:03)      Thyroid Function Tests:      A1C with Estimated Average Glucose Result: 8.9 % (01-14-25 @ 11:55)    Estimated Average Glucose: 209 mg/dL (01-14-25 @ 11:55)        Diet, Consistent Carbohydrate/No Snacks (02-15-25 @ 08:47) [Active]

## 2025-02-16 NOTE — PROGRESS NOTE ADULT - ASSESSMENT
64 year old male with PMH of of OLT 4/22/2019 at [facility] for ACLF ETOH Cirrhosis complicated by HRS, CKD, DM2, HTN presenting for evaluation for acute cellular rejection. Has a history of prior admission to [facility] (4/28-5/8/2024) for elevated liver tests - had anastomotic stricture with CBD stone s/p ERCP and stent placement- persistent elevation in liver tests - s/p liver biopsy - Focal areas of inflammation with lymphocytes not equivalent for ACR with pericellular fibrosis and steatosis- Patient treated with PO steroid pulse. Had ERCP done in 9/28/2024 with biliary stent removed and CBD stone removed. A mild biliary stricture was found in the post-transplant anastomosis, improved from prior. Had outpatient ERCP done in in 2/7/2025 - choledocholithiasis was found. Repeat labs noted with continue rising LFTs post ERCP. Patient was informed to come to the hospital to be evaluated for ACR. At [facility], started on IV methylprednisolone 500 mg 3-day course, now with steroid-induced hyperglycemia.     Endocrine consulted for further management (high risk patient with severe hyperglycemia with glucose values > 400's at high risk of CAD and CVA with high medical complexity and high level decision-making).   Patient was on insulin drip and was transitioned off last night with Lantus 40units. will adjust insulin doses to keepBG at goal of 100-180mg/dl.     #Steroid-induced hyperglycemia  #T2DM  - A1c 8.9%  - Home meds: Lantus 10 units qhs, Humalog 10 units ac  - Follows with Dr. Kady Peguero  - s/p methylprednisoone 500 mg x2, planned for rapid taper to 80 mg to 20 mg starting tomorrow 2/15 then long-term 20 mg  - Started on basal-bolus - transitioned to insulin gtt    PLAN  While inpatient:  - Continue to check BG AC/HS/2am  - Increase Lantus dose to 46 units QHS  - Increase Admelog dose to 26 units with each meal  - Continue on Moderate dose admelog correction scale  - Goal -180  - Please obtain nutrition consult  - Please obtain lipid panel in AM    Discharge Recommendations:  - Will likely c/w basal-bolus regimen, dose TBD  - Please send:  -- Dexcom G7 sensor and reader  -- Glucometer (ACCU_CHECK yvette Connect, Ascensia Contour Next EZ or One, Freestyle [place] LITE or OneTouch Verio IQ)  -- Glucometer test strips and lancets  (make sure compatible with glucometer), dispense #100 (or #200) use as directed, alcohol pads  -- BD jose antonio 4mm pen needles  -- Glucose tabs, Baqsimi nasal spray or glucagon emergency kit for hypoglycemia risk   - If patient is not covered for CGM, patient should check FSBG premeals and bedtime. Patient should call their doctor when FSBG <70 or above >400 and or consistently above 200s as changes in the regimen will have to be made.   - Patient previously following with Dr. Peguero, who is no longer at [facility]. Patient can follow up at the [facility] ([phone]) at [address] [place] [phone]   - Routine follow-up with Ophthalmology and Podiatry     #HTN  - Goal BP <130/80  - Management per primary team  - UACR as outpatient    Contact via Microsoft Teams during business hours  To reach covering provider access AMION via sunrise tools  For Urgent matters/after-hours/weekends/holidays please page endocrine fellow on call   For nonurgent matters please email [email or web address]    Please note that this patient may be followed by different provider tomorrow.  Notify endocrine 24 hours prior to discharge for final recommendations

## 2025-02-16 NOTE — PROGRESS NOTE ADULT - SUBJECTIVE AND OBJECTIVE BOX
Interval Events:   liver chemistry continues to improve  persistent hyperglycemia to 300s, Endocrine input appreciated for insulin management  afebrile, VSS  Creat up to 2.10 (1.66 on 2/14). BUN 53      Hospital Medications:  cycloSPORINE  , modified (GENGRAF) 125 milliGRAM(s) Oral <User Schedule>  dextrose 5%. 1000 milliLiter(s) IV Continuous <Continuous>  dextrose 5%. 1000 milliLiter(s) IV Continuous <Continuous>  dextrose 50% Injectable 25 Gram(s) IV Push once  dextrose 50% Injectable 12.5 Gram(s) IV Push once  dextrose 50% Injectable 25 Gram(s) IV Push once  dextrose Oral Gel 15 Gram(s) Oral once PRN  glucagon  Injectable 1 milliGRAM(s) IntraMuscular once  insulin glargine Injectable (LANTUS) 46 Unit(s) SubCutaneous at bedtime  insulin lispro (ADMELOG) corrective regimen sliding scale   SubCutaneous three times a day before meals  insulin lispro (ADMELOG) corrective regimen sliding scale   SubCutaneous <User Schedule>  insulin lispro Injectable (ADMELOG) 26 Unit(s) SubCutaneous three times a day before meals  magnesium oxide 400 milliGRAM(s) Oral two times a day with meals  metoprolol tartrate 50 milliGRAM(s) Oral every 12 hours  mycophenolate mofetil 1000 milliGRAM(s) Oral <User Schedule>  NIFEdipine XL 30 milliGRAM(s) Oral daily  nystatin    Suspension 905131 Unit(s) Swish and Swallow four times a day  risperiDONE   Tablet 1 milliGRAM(s) Oral at bedtime  trimethoprim   80 mG/sulfamethoxazole 400 mG 1 Tablet(s) Oral daily  ursodiol Capsule 300 milliGRAM(s) Oral two times a day  valGANciclovir 900 milliGRAM(s) Oral daily      ROS: See above.    PHYSICAL EXAM:   Vital Signs:  Vital Signs Last 24 Hrs  T(C): 36.4 (16 Feb 2025 13:00), Max: 36.8 (15 Feb 2025 21:00)  T(F): 97.5 (16 Feb 2025 13:00), Max: 98.3 (15 Feb 2025 21:00)  HR: 75 (16 Feb 2025 13:00) (63 - 82)  BP: 146/71 (16 Feb 2025 13:00) (143/67 - 161/80)  BP(mean): --  RR: 18 (16 Feb 2025 13:00) (18 - 18)  SpO2: 92% (16 Feb 2025 13:00) (92% - 97%)    Parameters below as of 16 Feb 2025 13:00  Patient On (Oxygen Delivery Method): room air        GENERAL:  NAD, resting comfortably in bed  HEENT:  sclera anicteric  RESPIRATORY:  Normal Effort  CARDIAC:  HDS  ABDOMEN:  Soft, non-tender, non-distended  SKIN:  Warm & Dry. No jaundice  NEURO:  Alert, conversant, no focal deficit    LABS:                        9.4    10.89 )-----------( 374      ( 16 Feb 2025 07:03 )             29.5     Mean Cell Volume: 81.7 fl (02-16-25 @ 07:03)    02-16    132[L]  |  100  |  53[H]  ----------------------------<  343[H]  4.3   |  16[L]  |  2.10[H]    Ca    8.7      16 Feb 2025 07:03  Phos  4.4     02-16  Mg     2.0     02-16    TPro  6.8  /  Alb  3.1[L]  /  TBili  2.6[H]  /  DBili  x   /  AST  85[H]  /  ALT  168[H]  /  AlkPhos  748[H]  02-16    LIVER FUNCTIONS - ( 16 Feb 2025 07:03 )  Alb: 3.1 g/dL / Pro: 6.8 g/dL / ALK PHOS: 748 U/L / ALT: 168 U/L / AST: 85 U/L / GGT: x           PT/INR - ( 16 Feb 2025 07:04 )   PT: 10.6 sec;   INR: 0.93 ratio         PTT - ( 16 Feb 2025 07:04 )  PTT:25.9 sec

## 2025-02-17 DIAGNOSIS — E78.5 HYPERLIPIDEMIA, UNSPECIFIED: ICD-10-CM

## 2025-02-17 DIAGNOSIS — Z79.52 LONG TERM (CURRENT) USE OF SYSTEMIC STEROIDS: ICD-10-CM

## 2025-02-17 LAB
ALBUMIN SERPL ELPH-MCNC: 3.4 G/DL — SIGNIFICANT CHANGE UP (ref 3.3–5)
ALP SERPL-CCNC: 688 U/L — HIGH (ref 40–120)
ALT FLD-CCNC: 159 U/L — HIGH (ref 10–45)
ANION GAP SERPL CALC-SCNC: 16 MMOL/L — SIGNIFICANT CHANGE UP (ref 5–17)
APTT BLD: 25.1 SEC — SIGNIFICANT CHANGE UP (ref 24.5–35.6)
AST SERPL-CCNC: 75 U/L — HIGH (ref 10–40)
BASOPHILS # BLD AUTO: 0.01 K/UL — SIGNIFICANT CHANGE UP (ref 0–0.2)
BASOPHILS NFR BLD AUTO: 0.1 % — SIGNIFICANT CHANGE UP (ref 0–2)
BILIRUB SERPL-MCNC: 2.4 MG/DL — HIGH (ref 0.2–1.2)
BUN SERPL-MCNC: 57 MG/DL — HIGH (ref 7–23)
CALCIUM SERPL-MCNC: 8.4 MG/DL — SIGNIFICANT CHANGE UP (ref 8.4–10.5)
CHLORIDE SERPL-SCNC: 102 MMOL/L — SIGNIFICANT CHANGE UP (ref 96–108)
CO2 SERPL-SCNC: 16 MMOL/L — LOW (ref 22–31)
CREAT SERPL-MCNC: 2.01 MG/DL — HIGH (ref 0.5–1.3)
CYCLOSPORINE SER-MCNC: 187 NG/ML — SIGNIFICANT CHANGE UP (ref 150–400)
EGFR: 36 ML/MIN/1.73M2 — LOW
EOSINOPHIL # BLD AUTO: 0 K/UL — SIGNIFICANT CHANGE UP (ref 0–0.5)
EOSINOPHIL NFR BLD AUTO: 0 % — SIGNIFICANT CHANGE UP (ref 0–6)
GLUCOSE BLDC GLUCOMTR-MCNC: 232 MG/DL — HIGH (ref 70–99)
GLUCOSE BLDC GLUCOMTR-MCNC: 267 MG/DL — HIGH (ref 70–99)
GLUCOSE BLDC GLUCOMTR-MCNC: 281 MG/DL — HIGH (ref 70–99)
GLUCOSE BLDC GLUCOMTR-MCNC: 345 MG/DL — HIGH (ref 70–99)
GLUCOSE BLDC GLUCOMTR-MCNC: 362 MG/DL — HIGH (ref 70–99)
GLUCOSE SERPL-MCNC: 303 MG/DL — HIGH (ref 70–99)
HCT VFR BLD CALC: 31.1 % — LOW (ref 39–50)
HGB BLD-MCNC: 9.9 G/DL — LOW (ref 13–17)
IMM GRANULOCYTES NFR BLD AUTO: 2 % — HIGH (ref 0–0.9)
INR BLD: 0.93 RATIO — SIGNIFICANT CHANGE UP (ref 0.85–1.16)
LYMPHOCYTES # BLD AUTO: 0.93 K/UL — LOW (ref 1–3.3)
LYMPHOCYTES # BLD AUTO: 9.6 % — LOW (ref 13–44)
MAGNESIUM SERPL-MCNC: 2.1 MG/DL — SIGNIFICANT CHANGE UP (ref 1.6–2.6)
MCHC RBC-ENTMCNC: 26.1 PG — LOW (ref 27–34)
MCHC RBC-ENTMCNC: 31.8 G/DL — LOW (ref 32–36)
MCV RBC AUTO: 82.1 FL — SIGNIFICANT CHANGE UP (ref 80–100)
MONOCYTES # BLD AUTO: 0.36 K/UL — SIGNIFICANT CHANGE UP (ref 0–0.9)
MONOCYTES NFR BLD AUTO: 3.7 % — SIGNIFICANT CHANGE UP (ref 2–14)
NEUTROPHILS # BLD AUTO: 8.16 K/UL — HIGH (ref 1.8–7.4)
NEUTROPHILS NFR BLD AUTO: 84.6 % — HIGH (ref 43–77)
NRBC BLD AUTO-RTO: 0 /100 WBCS — SIGNIFICANT CHANGE UP (ref 0–0)
PHOSPHATE SERPL-MCNC: 4.1 MG/DL — SIGNIFICANT CHANGE UP (ref 2.5–4.5)
PLATELET # BLD AUTO: 383 K/UL — SIGNIFICANT CHANGE UP (ref 150–400)
POTASSIUM SERPL-MCNC: 4.3 MMOL/L — SIGNIFICANT CHANGE UP (ref 3.5–5.3)
POTASSIUM SERPL-SCNC: 4.3 MMOL/L — SIGNIFICANT CHANGE UP (ref 3.5–5.3)
PROT SERPL-MCNC: 6.8 G/DL — SIGNIFICANT CHANGE UP (ref 6–8.3)
PROTHROM AB SERPL-ACNC: 10.6 SEC — SIGNIFICANT CHANGE UP (ref 9.9–13.4)
RBC # BLD: 3.79 M/UL — LOW (ref 4.2–5.8)
RBC # FLD: 18.6 % — HIGH (ref 10.3–14.5)
SODIUM SERPL-SCNC: 134 MMOL/L — LOW (ref 135–145)
WBC # BLD: 9.65 K/UL — SIGNIFICANT CHANGE UP (ref 3.8–10.5)
WBC # FLD AUTO: 9.65 K/UL — SIGNIFICANT CHANGE UP (ref 3.8–10.5)

## 2025-02-17 PROCEDURE — 99232 SBSQ HOSP IP/OBS MODERATE 35: CPT

## 2025-02-17 PROCEDURE — 99232 SBSQ HOSP IP/OBS MODERATE 35: CPT | Mod: GC

## 2025-02-17 RX ORDER — INSULIN GLARGINE-YFGN 100 [IU]/ML
48 INJECTION, SOLUTION SUBCUTANEOUS AT BEDTIME
Refills: 0 | Status: DISCONTINUED | OUTPATIENT
Start: 2025-02-17 | End: 2025-02-18

## 2025-02-17 RX ORDER — PREDNISONE 20 MG/1
40 TABLET ORAL ONCE
Refills: 0 | Status: COMPLETED | OUTPATIENT
Start: 2025-02-17 | End: 2025-02-17

## 2025-02-17 RX ORDER — INSULIN LISPRO 100 U/ML
36 INJECTION, SOLUTION INTRAVENOUS; SUBCUTANEOUS
Refills: 0 | Status: COMPLETED | OUTPATIENT
Start: 2025-02-17 | End: 2025-02-17

## 2025-02-17 RX ORDER — INSULIN LISPRO 100 U/ML
35 INJECTION, SOLUTION INTRAVENOUS; SUBCUTANEOUS
Refills: 0 | Status: DISCONTINUED | OUTPATIENT
Start: 2025-02-18 | End: 2025-02-18

## 2025-02-17 RX ORDER — PREDNISONE 20 MG/1
20 TABLET ORAL DAILY
Refills: 0 | Status: DISCONTINUED | OUTPATIENT
Start: 2025-02-18 | End: 2025-02-20

## 2025-02-17 RX ADMIN — INSULIN LISPRO 6: 100 INJECTION, SOLUTION INTRAVENOUS; SUBCUTANEOUS at 12:30

## 2025-02-17 RX ADMIN — INSULIN LISPRO 2: 100 INJECTION, SOLUTION INTRAVENOUS; SUBCUTANEOUS at 21:20

## 2025-02-17 RX ADMIN — METOPROLOL SUCCINATE 50 MILLIGRAM(S): 50 TABLET, EXTENDED RELEASE ORAL at 05:38

## 2025-02-17 RX ADMIN — INSULIN LISPRO 4: 100 INJECTION, SOLUTION INTRAVENOUS; SUBCUTANEOUS at 17:26

## 2025-02-17 RX ADMIN — Medication 1 TABLET(S): at 11:20

## 2025-02-17 RX ADMIN — INSULIN LISPRO 36 UNIT(S): 100 INJECTION, SOLUTION INTRAVENOUS; SUBCUTANEOUS at 12:31

## 2025-02-17 RX ADMIN — Medication 500000 UNIT(S): at 21:20

## 2025-02-17 RX ADMIN — VALGANCICLOVIR 900 MILLIGRAM(S): 450 TABLET, FILM COATED ORAL at 11:20

## 2025-02-17 RX ADMIN — INSULIN LISPRO 10: 100 INJECTION, SOLUTION INTRAVENOUS; SUBCUTANEOUS at 09:07

## 2025-02-17 RX ADMIN — CYCLOSPORINE 125 MILLIGRAM(S): 100 CAPSULE, LIQUID FILLED ORAL at 20:02

## 2025-02-17 RX ADMIN — MYCOPHENOLATE MOFETIL 1000 MILLIGRAM(S): 500 TABLET, FILM COATED ORAL at 08:07

## 2025-02-17 RX ADMIN — INSULIN GLARGINE-YFGN 48 UNIT(S): 100 INJECTION, SOLUTION SUBCUTANEOUS at 21:20

## 2025-02-17 RX ADMIN — Medication 500000 UNIT(S): at 11:20

## 2025-02-17 RX ADMIN — Medication 400 MILLIGRAM(S): at 08:07

## 2025-02-17 RX ADMIN — Medication 250 MILLILITER(S): at 11:54

## 2025-02-17 RX ADMIN — Medication 400 MILLIGRAM(S): at 17:27

## 2025-02-17 RX ADMIN — Medication 500000 UNIT(S): at 05:38

## 2025-02-17 RX ADMIN — PREDNISONE 40 MILLIGRAM(S): 20 TABLET ORAL at 11:19

## 2025-02-17 RX ADMIN — METOPROLOL SUCCINATE 50 MILLIGRAM(S): 50 TABLET, EXTENDED RELEASE ORAL at 17:26

## 2025-02-17 RX ADMIN — INSULIN LISPRO 4: 100 INJECTION, SOLUTION INTRAVENOUS; SUBCUTANEOUS at 01:46

## 2025-02-17 RX ADMIN — Medication 1 MILLIGRAM(S): at 21:20

## 2025-02-17 RX ADMIN — MYCOPHENOLATE MOFETIL 1000 MILLIGRAM(S): 500 TABLET, FILM COATED ORAL at 20:02

## 2025-02-17 RX ADMIN — URSODIOL 300 MILLIGRAM(S): 300 CAPSULE ORAL at 17:27

## 2025-02-17 RX ADMIN — INSULIN LISPRO 36 UNIT(S): 100 INJECTION, SOLUTION INTRAVENOUS; SUBCUTANEOUS at 17:26

## 2025-02-17 RX ADMIN — INSULIN LISPRO 26 UNIT(S): 100 INJECTION, SOLUTION INTRAVENOUS; SUBCUTANEOUS at 09:07

## 2025-02-17 RX ADMIN — URSODIOL 300 MILLIGRAM(S): 300 CAPSULE ORAL at 05:38

## 2025-02-17 RX ADMIN — CYCLOSPORINE 125 MILLIGRAM(S): 100 CAPSULE, LIQUID FILLED ORAL at 08:06

## 2025-02-17 RX ADMIN — Medication 500000 UNIT(S): at 17:27

## 2025-02-17 RX ADMIN — Medication 30 MILLIGRAM(S): at 05:38

## 2025-02-17 NOTE — PROGRESS NOTE ADULT - SUBJECTIVE AND OBJECTIVE BOX
Hepatology Progress Note    Interval Events:   -Pred 60, glucose 300s, insulin adj per Endo  -Pt overall feeling well with no ongoing fevers, chills, nausea, vomiting or abd pain with down trending LFTs     Allergies:  No Known Allergies      Hospital Medications:  cycloSPORINE  , modified (GENGRAF) 125 milliGRAM(s) Oral <User Schedule>  dextrose 5%. 1000 milliLiter(s) IV Continuous <Continuous>  dextrose 5%. 1000 milliLiter(s) IV Continuous <Continuous>  dextrose 50% Injectable 25 Gram(s) IV Push once  dextrose 50% Injectable 12.5 Gram(s) IV Push once  dextrose 50% Injectable 25 Gram(s) IV Push once  dextrose Oral Gel 15 Gram(s) Oral once PRN  glucagon  Injectable 1 milliGRAM(s) IntraMuscular once  insulin glargine Injectable (LANTUS) 48 Unit(s) SubCutaneous at bedtime  insulin lispro (ADMELOG) corrective regimen sliding scale   SubCutaneous three times a day before meals  insulin lispro (ADMELOG) corrective regimen sliding scale   SubCutaneous <User Schedule>  insulin lispro Injectable (ADMELOG) 36 Unit(s) SubCutaneous before lunch  insulin lispro Injectable (ADMELOG) 36 Unit(s) SubCutaneous before dinner  magnesium oxide 400 milliGRAM(s) Oral two times a day with meals  metoprolol tartrate 50 milliGRAM(s) Oral every 12 hours  mycophenolate mofetil 1000 milliGRAM(s) Oral <User Schedule>  NIFEdipine XL 30 milliGRAM(s) Oral daily  nystatin    Suspension 135612 Unit(s) Swish and Swallow four times a day  risperiDONE   Tablet 1 milliGRAM(s) Oral at bedtime  trimethoprim   80 mG/sulfamethoxazole 400 mG 1 Tablet(s) Oral daily  ursodiol Capsule 300 milliGRAM(s) Oral two times a day  valGANciclovir 900 milliGRAM(s) Oral daily      ROS: 14 point ROS negative unless otherwise state in subjective    PHYSICAL EXAM:   Vital Signs:  Vital Signs Last 24 Hrs  T(C): 36.4 (2025 09:34), Max: 36.4 (2025 13:00)  T(F): 97.6 (2025 09:34), Max: 97.6 (2025 17:00)  HR: 68 (2025 09:34) (67 - 92)  BP: 158/79 (2025 09:34) (145/70 - 166/77)  BP(mean): --  RR: 18 (2025 09:34) (18 - 18)  SpO2: 98% (2025 09:34) (92% - 98%)    Parameters below as of 2025 09:34  Patient On (Oxygen Delivery Method): room air      Daily     Daily Weight in k (2025 05:30)    GENERAL:  No acute distress  HEENT:  NCAT, no scleral icterus  CHEST: no resp distress  HEART:  RRR  ABDOMEN:  Soft, non-tender, non-distended, normoactive bowel sounds  EXTREMITIES:  No edema  NEURO:  Alert and oriented x 3    LABS:                        9.9    9.65  )-----------( 383      ( 2025 07:09 )             31.1     Mean Cell Volume: 82.1 fl (25 @ 07:09)        134[L]  |  102  |  57[H]  ----------------------------<  303[H]  4.3   |  16[L]  |  2.01[H]    Ca    8.4      2025 07:09  Phos  4.1       Mg     2.1         TPro  6.8  /  Alb  3.4  /  TBili  2.4[H]  /  DBili  x   /  AST  75[H]  /  ALT  159[H]  /  AlkPhos  688[H]      LIVER FUNCTIONS - ( 2025 07:09 )  Alb: 3.4 g/dL / Pro: 6.8 g/dL / ALK PHOS: 688 U/L / ALT: 159 U/L / AST: 75 U/L / GGT: x           PT/INR - ( 2025 07:09 )   PT: 10.6 sec;   INR: 0.93 ratio         PTT - ( 2025 07:09 )  PTT:25.1 sec  Urinalysis Basic - ( 2025 07:09 )    Color: x / Appearance: x / SG: x / pH: x  Gluc: 303 mg/dL / Ketone: x  / Bili: x / Urobili: x   Blood: x / Protein: x / Nitrite: x   Leuk Esterase: x / RBC: x / WBC x   Sq Epi: x / Non Sq Epi: x / Bacteria: x      Imaging:    < from: MR MRCP w/wo IV Cont (25 @ 21:59) >    IMPRESSION:  No MR evidence of choledocholithiasis or other biliary obstruction.    Unremarkable appearance of hepatic transplant and transplant vasculature.    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        < end of copied text >

## 2025-02-17 NOTE — PROGRESS NOTE ADULT - ASSESSMENT
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    #Elevated liver enzymes 2/2 ACR w/ evolving chronic rejection  #s/p OLT 2019 for alcoholic cirrhosis  # H/O anastomotic stricture with choledocholithiasis s/p multiple ERCP most recently 2/7: no stricture appreciated and CBD was cleared   # steatohepatitis on liver bx 5/01/2024  Liver biopsy from 2/12 demonstrating moderate-late ACR (TAPIA 5/9) w/ evolving chronic rejection (w/ cytokeratin stains pending) as well as scattered histiocyte collections.   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  Liver chemistry is improving following treatment with steroids   - Solumedrol 500 mg IV (2/13-2/15)  - Oral pred 60 today, taper to 40 Monday  - MMF 1000 mg BID  - Continue with Cyclosporine 125 mg BID today for goal trough level 200-250 ng/mL   - Continue ursodiol  - PPx w/ nystatin, bactrim and valcyte     #Steroid induced hyperglycemia  - Glucose 500s related to high-dose steroids, required insulin gtt, transitioned to basal/bolus insulin on 2/15 PM   - Glucose persistently elevated to 300s  - Endocrine recs:  - Adjust Admelog to 48u QHS  - Start Admelog 36u with lunch/dinner today, then adjust Admelog to 35u TID AC tmrw 2/18 (HOLD if NPO)  - C/w moderate dose Admelog correctional scales TID AC and HS  - Will require insulin regimen for home-going fwd    #KAYLA  sCr 2.1 from baseline creat ~1.6 on admission. BUN/Cr elevated > 20. Suspect volume-mediated KAYLA related to severe hyperglycemia and urinary losses  - Will give another 1L of LR today   - Encouraged adequate oral hydration  - trend UOP, sCr     Dispo: pending improvement in hyperglycemia and continued improvement in LFts

## 2025-02-17 NOTE — PROGRESS NOTE ADULT - ASSESSMENT
64 year old male with PMH of of OLT 4/22/2019 at [facility] for ACLF ETOH Cirrhosis complicated by HRS, CKD, DM2, HTN presenting for evaluation for acute cellular rejection. Has a history of prior admission to [facility] (4/28-5/8/2024) for elevated liver tests - had anastomotic stricture with CBD stone s/p ERCP and stent placement- persistent elevation in liver tests - s/p liver biopsy - Focal areas of inflammation with lymphocytes not equivalent for ACR with pericellular fibrosis and steatosis- Patient treated with PO steroid pulse. Had ERCP done in 9/28/2024 with biliary stent removed and CBD stone removed. A mild biliary stricture was found in the post-transplant anastomosis, improved from prior. Had outpatient ERCP done in in 2/7/2025 - choledocholithiasis was found. Repeat labs noted with continue rising LFTs post ERCP. Patient was informed to come to the hospital to be evaluated for ACR. At [facility], started on IV methylprednisolone 500 mg 3-day course, now with steroid-induced hyperglycemia.     Endocrine consulted for further management (high risk patient with severe hyperglycemia with glucose values > 400's at high risk of CAD and CVA with high medical complexity and high level decision-making). Patient now continues off insulin drip since 2/14. Patient is eating well, tolerating POs. Patient continues with severe hyperglycemia 2/2 to steroids. Currently to get oral Pred 40mg today then taper to 20mg once daily starting tmrw 2/18 per Liver tx team, which will be new maintenance dose. No hypoglycemia noted. Will adjust insulin regimen for tighter BG control and also for steroid adjustments. Endocrine will closely monitor BG and adjust insulin as needed for BG goal 100-180mg/dL inpatient.     #Steroid-induced hyperglycemia  #T2DM  - A1c 8.9%  - Home meds: Lantus 10 units qhs, Humalog 10 units ac  - Follows with Dr. Kady Peguero

## 2025-02-17 NOTE — PROGRESS NOTE ADULT - PROBLEM SELECTOR PLAN 1
Inpatient Plan:  - Check BG TID AC, HS, and 2AM  - Adjust Admelog to 48u QHS  - Start Admelog 38u with lunch/dinner today, then adjust Admelog to 35u TID AC tmrw 2/18 (HOLD if NPO)  - C/w moderate dose Admelog correctional scales TID AC and HS    Discharge Planning:  - Will likely c/w basal-bolus regimen, dose TBD closer to d/c.   - Please send:  -- Dexcom G7 sensor and reader  -- Glucometer (ACCU_CHECK yvette Connect, Ascensia Contour Next EZ or One, Freestyle [place] LITE or OneTouch Verio IQ)  -- Glucometer test strips and lancets  (make sure compatible with glucometer), dispense #100 (or #200) use as directed, alcohol pads  -- BD jose antonio 4mm pen needles  -- Glucose tabs, Baqsimi nasal spray or glucagon emergency kit for hypoglycemia risk   - If patient is not covered for CGM, patient should check FSBG premeals and bedtime. Patient should call their doctor when FSBG <70 or above >400 and or consistently above 200s as changes in the regimen will have to be made.   - Patient previously following with Dr. Peguero, who is no longer at [facility]. Patient can follow up at the [facility] ([phone]) at [address] [place] [address] [address] [place] [phone]   - Routine follow-up with Ophthalmology and Podiatry Inpatient Plan:  - Check BG TID AC, HS, and 2AM  - Adjust Admelog to 48u QHS  - Start Admelog 36u with lunch/dinner today, then adjust Admelog to 35u TID AC tmrw 2/18 (HOLD if NPO)  - C/w moderate dose Admelog correctional scales TID AC and HS    Discharge Planning:  - Will likely c/w basal-bolus regimen, dose TBD closer to d/c.   - Please send:  -- Dexcom G7 sensor and reader  -- Glucometer (ACCU_CHECK yvette Connect, Ascensia Contour Next EZ or One, Freestyle [place] LITE or OneTouch Verio IQ)  -- Glucometer test strips and lancets  (make sure compatible with glucometer), dispense #100 (or #200) use as directed, alcohol pads  -- BD jose antonio 4mm pen needles  -- Glucose tabs, Baqsimi nasal spray or glucagon emergency kit for hypoglycemia risk   - If patient is not covered for CGM, patient should check FSBG premeals and bedtime. Patient should call their doctor when FSBG <70 or above >400 and or consistently above 200s as changes in the regimen will have to be made.   - Patient previously following with Dr. Peguero, who is no longer at [facility]. Patient can follow up at the [facility] ([phone]) at [address] [place] [address] [address] [place] [phone]   - Routine follow-up with Ophthalmology and Podiatry

## 2025-02-17 NOTE — PROGRESS NOTE ADULT - SUBJECTIVE AND OBJECTIVE BOX
Patient seen today for follow up inpatient Diabetes Mellitus management.    Chief Complaint: Type 2 Diabetes Mellitus, steroid induced hyperglycemia     INTERVAL HX:  Patient seen in [facility] 9TOW 12. Patient is alert and oriented , sitting up in bed. Patient is feeling well, has been eating full meals and tolerating POs. FBG with severe hyperglycemia to 362mg/dL this am. Noted persistent severe postprandial hyperglycemia to 390mg/dL last evening 2/2 to steroid dosing. Patient now to taper to oral Prednisone 40mg once daily today and then to 20mg once daily starting tmrw 2/18 as new daily maintenance dose per liver tx team. Patient denies eating any OSH food or snacks between meals or overnight. No hypoglycemia. Blood glucose levels in the last 24hrs have been 322-390mg/dL.     Review of Systems:  General: As above.  Respiratory: Denies any SOB, LEONARDO, or cough.  Gastrointestinal: Denies any n/v/d or abdominal pain.   Endocrine: Denies any polyuria, polydipsia, polyphagia, visual changes, or numbness in feet.     Allergies  No Known Allergies      Intolerances  None.       MEDICATIONS  (STANDING):  cycloSPORINE  , modified (GENGRAF) 125 milliGRAM(s) Oral <User Schedule>  dextrose 5%. 1000 milliLiter(s) IV Continuous <Continuous>  dextrose 5%. 1000 milliLiter(s) IV Continuous <Continuous>  dextrose 50% Injectable 25 Gram(s) IV Push once  dextrose 50% Injectable 12.5 Gram(s) IV Push once  dextrose 50% Injectable 25 Gram(s) IV Push once  dextrose Oral Gel 15 Gram(s) Oral once PRN  glucagon  Injectable 1 milliGRAM(s) IntraMuscular once  insulin glargine Injectable (LANTUS) 46 Unit(s) SubCutaneous at bedtime  insulin lispro (ADMELOG) corrective regimen sliding scale   SubCutaneous three times a day before meals  insulin lispro (ADMELOG) corrective regimen sliding scale   SubCutaneous <User Schedule>  insulin lispro Injectable (ADMELOG) 26 Unit(s) SubCutaneous three times a day before meals  magnesium oxide 400 milliGRAM(s) Oral two times a day with meals  metoprolol tartrate 50 milliGRAM(s) Oral every 12 hours  mycophenolate mofetil 1000 milliGRAM(s) Oral <User Schedule>  NIFEdipine XL 30 milliGRAM(s) Oral daily  nystatin    Suspension 132031 Unit(s) Swish and Swallow four times a day  risperiDONE   Tablet 1 milliGRAM(s) Oral at bedtime  trimethoprim   80 mG/sulfamethoxazole 400 mG 1 Tablet(s) Oral daily  ursodiol Capsule 300 milliGRAM(s) Oral two times a day  valGANciclovir 900 milliGRAM(s) Oral daily      dextrose 50% Injectable 25 Gram(s) IV Push once  dextrose 50% Injectable 12.5 Gram(s) IV Push once  dextrose 50% Injectable 25 Gram(s) IV Push once  dextrose Oral Gel 15 Gram(s) Oral once PRN  glucagon  Injectable 1 milliGRAM(s) IntraMuscular once  insulin glargine Injectable (LANTUS) 46 Unit(s) SubCutaneous at bedtime  insulin lispro (ADMELOG) corrective regimen sliding scale   SubCutaneous three times a day before meals  insulin lispro (ADMELOG) corrective regimen sliding scale   SubCutaneous <User Schedule>  insulin lispro Injectable (ADMELOG) 26 Unit(s) SubCutaneous three times a day before meals      insulin lispro (ADMELOG) corrective regimen sliding scale   SubCutaneous three times a day before meals  insulin lispro (ADMELOG) corrective regimen sliding scale   SubCutaneous <User Schedule>  insulin lispro Injectable (ADMELOG) 26 Unit(s) SubCutaneous three times a day before meals      PHYSICAL EXAM:  VITALS:   T(C): 36.4 (02-17-25 @ 09:34), Max: 36.4 (02-16-25 @ 13:00)  HR: 68 (02-17-25 @ 09:34) (67 - 92)  BP: 158/79 (02-17-25 @ 09:34) (145/70 - 166/77)  RR: 18 (02-17-25 @ 09:34) (18 - 18)  SpO2: 98% (02-17-25 @ 09:34) (92% - 98%)    GENERAL: In no acute distress  Respiratory: Respirations unlabored  Extremities: Warm and dry, no edema  NEURO: Alert and oriented, appropriate     LABS:  POCT Blood Glucose.: 362 mg/dL (02-17-25 @ 09:02)  POCT Blood Glucose.: 345 mg/dL (02-17-25 @ 01:43)  POCT Blood Glucose.: 390 mg/dL (02-16-25 @ 21:54)  POCT Blood Glucose.: 322 mg/dL (02-16-25 @ 16:59)  POCT Blood Glucose.: 358 mg/dL (02-16-25 @ 12:28)  POCT Blood Glucose.: 365 mg/dL (02-16-25 @ 11:05)  POCT Blood Glucose.: 337 mg/dL (02-16-25 @ 07:37)  POCT Blood Glucose.: 359 mg/dL (02-16-25 @ 05:51)  POCT Blood Glucose.: 282 mg/dL (02-16-25 @ 01:59)  POCT Blood Glucose.: 184 mg/dL (02-16-25 @ 00:09)  POCT Blood Glucose.: 113 mg/dL (02-15-25 @ 21:57)  POCT Blood Glucose.: 129 mg/dL (02-15-25 @ 20:58)  POCT Blood Glucose.: 164 mg/dL (02-15-25 @ 19:57)  POCT Blood Glucose.: 197 mg/dL (02-15-25 @ 18:59)  POCT Blood Glucose.: 205 mg/dL (02-15-25 @ 17:59)  POCT Blood Glucose.: 203 mg/dL (02-15-25 @ 17:00)  POCT Blood Glucose.: 206 mg/dL (02-15-25 @ 15:59)  POCT Blood Glucose.: 213 mg/dL (02-15-25 @ 15:00)  POCT Blood Glucose.: 190 mg/dL (02-15-25 @ 13:58)  POCT Blood Glucose.: 144 mg/dL (02-15-25 @ 13:00)  POCT Blood Glucose.: 173 mg/dL (02-15-25 @ 11:58)  POCT Blood Glucose.: 161 mg/dL (02-15-25 @ 10:59)  POCT Blood Glucose.: 129 mg/dL (02-15-25 @ 10:07)  POCT Blood Glucose.: 122 mg/dL (02-15-25 @ 08:59)  POCT Blood Glucose.: 128 mg/dL (02-15-25 @ 07:57)  POCT Blood Glucose.: 128 mg/dL (02-15-25 @ 06:57)  POCT Blood Glucose.: 151 mg/dL (02-15-25 @ 05:56)  POCT Blood Glucose.: 153 mg/dL (02-15-25 @ 04:57)  POCT Blood Glucose.: 191 mg/dL (02-15-25 @ 03:58)  POCT Blood Glucose.: 216 mg/dL (02-15-25 @ 02:57)  POCT Blood Glucose.: 245 mg/dL (02-15-25 @ 01:51)  POCT Blood Glucose.: 249 mg/dL (02-15-25 @ 00:55)  POCT Blood Glucose.: 257 mg/dL (02-14-25 @ 23:55)  POCT Blood Glucose.: 295 mg/dL (02-14-25 @ 22:52)  POCT Blood Glucose.: 318 mg/dL (02-14-25 @ 21:55)  POCT Blood Glucose.: 313 mg/dL (02-14-25 @ 21:25)  POCT Blood Glucose.: 335 mg/dL (02-14-25 @ 20:52)  POCT Blood Glucose.: 332 mg/dL (02-14-25 @ 19:58)  POCT Blood Glucose.: 342 mg/dL (02-14-25 @ 18:52)  POCT Blood Glucose.: 342 mg/dL (02-14-25 @ 17:52)  POCT Blood Glucose.: 342 mg/dL (02-14-25 @ 16:44)  POCT Blood Glucose.: 367 mg/dL (02-14-25 @ 15:57)  POCT Blood Glucose.: 399 mg/dL (02-14-25 @ 14:45)  POCT Blood Glucose.: 417 mg/dL (02-14-25 @ 13:18)  POCT Blood Glucose.: 437 mg/dL (02-14-25 @ 13:17)                          9.9    9.65  )-----------( 383      ( 17 Feb 2025 07:09 )             31.1     02-17    134[L]  |  102  |  57[H]  ----------------------------<  303[H]  4.3   |  16[L]  |  2.01[H]    Ca    8.4      17 Feb 2025 07:09  Phos  4.1     02-17  Mg     2.1     02-17    TPro  6.8  /  Alb  3.4  /  TBili  2.4[H]  /  DBili  x   /  AST  75[H]  /  ALT  159[H]  /  AlkPhos  688[H]  02-17    LIVER FUNCTIONS - ( 17 Feb 2025 07:09 )  Alb: 3.4 g/dL / Pro: 6.8 g/dL / ALK PHOS: 688 U/L / ALT: 159 U/L / AST: 75 U/L / GGT: x           PT/INR - ( 17 Feb 2025 07:09 )   PT: 10.6 sec;   INR: 0.93 ratio         PTT - ( 17 Feb 2025 07:09 )  PTT:25.1 sec      Urinalysis Basic - ( 17 Feb 2025 07:09 )    Color: x / Appearance: x / SG: x / pH: x  Gluc: 303 mg/dL / Ketone: x  / Bili: x / Urobili: x   Blood: x / Protein: x / Nitrite: x   Leuk Esterase: x / RBC: x / WBC x   Sq Epi: x / Non Sq Epi: x / Bacteria: x        A1C with Estimated Average Glucose Result: A1C with Estimated Average Glucose Result: 8.9 % (01-14-25 @ 11:55)  A1C with Estimated Average Glucose Result: 7.8 % (09-25-24 @ 17:21)

## 2025-02-18 LAB
ALBUMIN SERPL ELPH-MCNC: 3.4 G/DL — SIGNIFICANT CHANGE UP (ref 3.3–5)
ALP SERPL-CCNC: 621 U/L — HIGH (ref 40–120)
ALT FLD-CCNC: 165 U/L — HIGH (ref 10–45)
ANION GAP SERPL CALC-SCNC: 14 MMOL/L — SIGNIFICANT CHANGE UP (ref 5–17)
APTT BLD: 23.8 SEC — LOW (ref 24.5–35.6)
AST SERPL-CCNC: 98 U/L — HIGH (ref 10–40)
BASOPHILS # BLD AUTO: 0.01 K/UL — SIGNIFICANT CHANGE UP (ref 0–0.2)
BASOPHILS NFR BLD AUTO: 0.1 % — SIGNIFICANT CHANGE UP (ref 0–2)
BILIRUB SERPL-MCNC: 2.1 MG/DL — HIGH (ref 0.2–1.2)
BUN SERPL-MCNC: 46 MG/DL — HIGH (ref 7–23)
CALCIUM SERPL-MCNC: 8.3 MG/DL — LOW (ref 8.4–10.5)
CHLORIDE SERPL-SCNC: 105 MMOL/L — SIGNIFICANT CHANGE UP (ref 96–108)
CO2 SERPL-SCNC: 19 MMOL/L — LOW (ref 22–31)
CREAT SERPL-MCNC: 1.57 MG/DL — HIGH (ref 0.5–1.3)
CYCLOSPORINE SER-MCNC: 170 NG/ML — SIGNIFICANT CHANGE UP (ref 150–400)
EGFR: 48 ML/MIN/1.73M2 — LOW
EOSINOPHIL # BLD AUTO: 0 K/UL — SIGNIFICANT CHANGE UP (ref 0–0.5)
EOSINOPHIL NFR BLD AUTO: 0 % — SIGNIFICANT CHANGE UP (ref 0–6)
GLUCOSE BLDC GLUCOMTR-MCNC: 182 MG/DL — HIGH (ref 70–99)
GLUCOSE BLDC GLUCOMTR-MCNC: 205 MG/DL — HIGH (ref 70–99)
GLUCOSE BLDC GLUCOMTR-MCNC: 207 MG/DL — HIGH (ref 70–99)
GLUCOSE BLDC GLUCOMTR-MCNC: 209 MG/DL — HIGH (ref 70–99)
GLUCOSE BLDC GLUCOMTR-MCNC: 222 MG/DL — HIGH (ref 70–99)
GLUCOSE BLDC GLUCOMTR-MCNC: 240 MG/DL — HIGH (ref 70–99)
GLUCOSE SERPL-MCNC: 222 MG/DL — HIGH (ref 70–99)
HCT VFR BLD CALC: 30.2 % — LOW (ref 39–50)
HGB BLD-MCNC: 9.6 G/DL — LOW (ref 13–17)
IMM GRANULOCYTES NFR BLD AUTO: 1.7 % — HIGH (ref 0–0.9)
INR BLD: 0.95 RATIO — SIGNIFICANT CHANGE UP (ref 0.85–1.16)
LYMPHOCYTES # BLD AUTO: 1.89 K/UL — SIGNIFICANT CHANGE UP (ref 1–3.3)
LYMPHOCYTES # BLD AUTO: 19.1 % — SIGNIFICANT CHANGE UP (ref 13–44)
MAGNESIUM SERPL-MCNC: 1.9 MG/DL — SIGNIFICANT CHANGE UP (ref 1.6–2.6)
MCHC RBC-ENTMCNC: 26 PG — LOW (ref 27–34)
MCHC RBC-ENTMCNC: 31.8 G/DL — LOW (ref 32–36)
MCV RBC AUTO: 81.8 FL — SIGNIFICANT CHANGE UP (ref 80–100)
MONOCYTES # BLD AUTO: 0.53 K/UL — SIGNIFICANT CHANGE UP (ref 0–0.9)
MONOCYTES NFR BLD AUTO: 5.4 % — SIGNIFICANT CHANGE UP (ref 2–14)
NEUTROPHILS # BLD AUTO: 7.27 K/UL — SIGNIFICANT CHANGE UP (ref 1.8–7.4)
NEUTROPHILS NFR BLD AUTO: 73.7 % — SIGNIFICANT CHANGE UP (ref 43–77)
NRBC BLD AUTO-RTO: 0 /100 WBCS — SIGNIFICANT CHANGE UP (ref 0–0)
PHOSPHATE SERPL-MCNC: 3.5 MG/DL — SIGNIFICANT CHANGE UP (ref 2.5–4.5)
PLATELET # BLD AUTO: 345 K/UL — SIGNIFICANT CHANGE UP (ref 150–400)
POTASSIUM SERPL-MCNC: 4.3 MMOL/L — SIGNIFICANT CHANGE UP (ref 3.5–5.3)
POTASSIUM SERPL-SCNC: 4.3 MMOL/L — SIGNIFICANT CHANGE UP (ref 3.5–5.3)
PROT SERPL-MCNC: 6.5 G/DL — SIGNIFICANT CHANGE UP (ref 6–8.3)
PROTHROM AB SERPL-ACNC: 10.9 SEC — SIGNIFICANT CHANGE UP (ref 9.9–13.4)
RBC # BLD: 3.69 M/UL — LOW (ref 4.2–5.8)
RBC # FLD: 18.6 % — HIGH (ref 10.3–14.5)
SODIUM SERPL-SCNC: 138 MMOL/L — SIGNIFICANT CHANGE UP (ref 135–145)
WBC # BLD: 9.87 K/UL — SIGNIFICANT CHANGE UP (ref 3.8–10.5)
WBC # FLD AUTO: 9.87 K/UL — SIGNIFICANT CHANGE UP (ref 3.8–10.5)

## 2025-02-18 PROCEDURE — 99232 SBSQ HOSP IP/OBS MODERATE 35: CPT

## 2025-02-18 PROCEDURE — 99232 SBSQ HOSP IP/OBS MODERATE 35: CPT | Mod: GC

## 2025-02-18 RX ORDER — CYCLOSPORINE 100 MG/1
25 CAPSULE, LIQUID FILLED ORAL ONCE
Refills: 0 | Status: COMPLETED | OUTPATIENT
Start: 2025-02-18 | End: 2025-02-18

## 2025-02-18 RX ORDER — CYCLOSPORINE 100 MG/1
150 CAPSULE, LIQUID FILLED ORAL
Refills: 0 | Status: DISCONTINUED | OUTPATIENT
Start: 2025-02-19 | End: 2025-02-19

## 2025-02-18 RX ORDER — INSULIN LISPRO 100 U/ML
INJECTION, SOLUTION INTRAVENOUS; SUBCUTANEOUS
Refills: 0 | Status: DISCONTINUED | OUTPATIENT
Start: 2025-02-18 | End: 2025-02-21

## 2025-02-18 RX ORDER — INSULIN LISPRO 100 U/ML
35 INJECTION, SOLUTION INTRAVENOUS; SUBCUTANEOUS
Refills: 0 | Status: DISCONTINUED | OUTPATIENT
Start: 2025-02-18 | End: 2025-02-21

## 2025-02-18 RX ORDER — VALGANCICLOVIR 450 MG/1
450 TABLET, FILM COATED ORAL DAILY
Refills: 0 | Status: DISCONTINUED | OUTPATIENT
Start: 2025-02-18 | End: 2025-02-21

## 2025-02-18 RX ORDER — INSULIN LISPRO 100 U/ML
INJECTION, SOLUTION INTRAVENOUS; SUBCUTANEOUS AT BEDTIME
Refills: 0 | Status: DISCONTINUED | OUTPATIENT
Start: 2025-02-18 | End: 2025-02-21

## 2025-02-18 RX ORDER — INSULIN LISPRO 100 U/ML
38 INJECTION, SOLUTION INTRAVENOUS; SUBCUTANEOUS
Refills: 0 | Status: DISCONTINUED | OUTPATIENT
Start: 2025-02-18 | End: 2025-02-18

## 2025-02-18 RX ORDER — INSULIN GLARGINE-YFGN 100 [IU]/ML
50 INJECTION, SOLUTION SUBCUTANEOUS AT BEDTIME
Refills: 0 | Status: DISCONTINUED | OUTPATIENT
Start: 2025-02-18 | End: 2025-02-19

## 2025-02-18 RX ORDER — INSULIN LISPRO 100 U/ML
35 INJECTION, SOLUTION INTRAVENOUS; SUBCUTANEOUS ONCE
Refills: 0 | Status: COMPLETED | OUTPATIENT
Start: 2025-02-18 | End: 2025-02-18

## 2025-02-18 RX ORDER — CYCLOSPORINE 100 MG/1
125 CAPSULE, LIQUID FILLED ORAL
Refills: 0 | Status: DISCONTINUED | OUTPATIENT
Start: 2025-02-18 | End: 2025-02-19

## 2025-02-18 RX ADMIN — Medication 400 MILLIGRAM(S): at 07:48

## 2025-02-18 RX ADMIN — INSULIN LISPRO 1: 100 INJECTION, SOLUTION INTRAVENOUS; SUBCUTANEOUS at 17:46

## 2025-02-18 RX ADMIN — Medication 500000 UNIT(S): at 21:21

## 2025-02-18 RX ADMIN — INSULIN LISPRO 35 UNIT(S): 100 INJECTION, SOLUTION INTRAVENOUS; SUBCUTANEOUS at 09:00

## 2025-02-18 RX ADMIN — Medication 400 MILLIGRAM(S): at 17:25

## 2025-02-18 RX ADMIN — URSODIOL 300 MILLIGRAM(S): 300 CAPSULE ORAL at 17:24

## 2025-02-18 RX ADMIN — Medication 500000 UNIT(S): at 11:31

## 2025-02-18 RX ADMIN — INSULIN LISPRO 35 UNIT(S): 100 INJECTION, SOLUTION INTRAVENOUS; SUBCUTANEOUS at 13:03

## 2025-02-18 RX ADMIN — PREDNISONE 20 MILLIGRAM(S): 20 TABLET ORAL at 05:36

## 2025-02-18 RX ADMIN — CYCLOSPORINE 125 MILLIGRAM(S): 100 CAPSULE, LIQUID FILLED ORAL at 20:25

## 2025-02-18 RX ADMIN — Medication 1 TABLET(S): at 11:31

## 2025-02-18 RX ADMIN — VALGANCICLOVIR 450 MILLIGRAM(S): 450 TABLET, FILM COATED ORAL at 11:33

## 2025-02-18 RX ADMIN — INSULIN LISPRO 4: 100 INJECTION, SOLUTION INTRAVENOUS; SUBCUTANEOUS at 13:03

## 2025-02-18 RX ADMIN — URSODIOL 300 MILLIGRAM(S): 300 CAPSULE ORAL at 05:36

## 2025-02-18 RX ADMIN — Medication 30 MILLIGRAM(S): at 05:36

## 2025-02-18 RX ADMIN — Medication 500000 UNIT(S): at 17:25

## 2025-02-18 RX ADMIN — Medication 500000 UNIT(S): at 05:35

## 2025-02-18 RX ADMIN — CYCLOSPORINE 25 MILLIGRAM(S): 100 CAPSULE, LIQUID FILLED ORAL at 14:46

## 2025-02-18 RX ADMIN — INSULIN LISPRO 35 UNIT(S): 100 INJECTION, SOLUTION INTRAVENOUS; SUBCUTANEOUS at 17:45

## 2025-02-18 RX ADMIN — INSULIN GLARGINE-YFGN 50 UNIT(S): 100 INJECTION, SOLUTION SUBCUTANEOUS at 21:22

## 2025-02-18 RX ADMIN — Medication 1 MILLIGRAM(S): at 21:21

## 2025-02-18 RX ADMIN — MYCOPHENOLATE MOFETIL 1000 MILLIGRAM(S): 500 TABLET, FILM COATED ORAL at 07:48

## 2025-02-18 RX ADMIN — METOPROLOL SUCCINATE 50 MILLIGRAM(S): 50 TABLET, EXTENDED RELEASE ORAL at 17:25

## 2025-02-18 RX ADMIN — INSULIN LISPRO 4: 100 INJECTION, SOLUTION INTRAVENOUS; SUBCUTANEOUS at 08:59

## 2025-02-18 RX ADMIN — CYCLOSPORINE 125 MILLIGRAM(S): 100 CAPSULE, LIQUID FILLED ORAL at 07:49

## 2025-02-18 RX ADMIN — MYCOPHENOLATE MOFETIL 1000 MILLIGRAM(S): 500 TABLET, FILM COATED ORAL at 20:25

## 2025-02-18 RX ADMIN — METOPROLOL SUCCINATE 50 MILLIGRAM(S): 50 TABLET, EXTENDED RELEASE ORAL at 05:36

## 2025-02-18 NOTE — PROGRESS NOTE ADULT - NUTRITIONAL ASSESSMENT
Diet, Consistent Carbohydrate/No Snacks (02-15-25 @ 08:47) [Active]
Diet, Consistent Carbohydrate/No Snacks (02-15-25 @ 08:47) [Active]

## 2025-02-18 NOTE — PROGRESS NOTE ADULT - PROBLEM SELECTOR PLAN 2
- Steroids should not be discontinued abruptly as his long-term steroid use likely is causing some degree of HPA axis suppression and he risks adrenal crisis with sudden discontinuation of his steroids  - Discussed osteoporosis screening with patient although patient feels that it is futile as his life expectancy is not long enough for osteoporosis interventions to have benefits.   - Patient to be discharged on oral Prednisone 20mg once daily
- Steroids should not be discontinued abruptly as his long-term steroid use likely is causing some degree of HPA axis suppression and he risks adrenal crisis with sudden discontinuation of his steroids  - Discussed osteoporosis screening with patient although patient feels that it is futile as his life expectancy is not long enough for osteoporosis interventions to have benefits.   - Patient to be discharged on oral Prednisone 20mg once daily

## 2025-02-18 NOTE — PROGRESS NOTE ADULT - ASSESSMENT
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 Has a history of prior admission to [facility] (4/28-5/8/2024) for elevated liver tests - had anastomotic stricture with CBD stone s/p ERCP and stent placement- persistent elevation in liver tests - s/p liver biopsy - Focal areas of inflammation with lymphocytes not equivalent for ACR with pericellular fibrosis and steatosis- Patient treated with PO steroid pulse. Had ERCP done in 9/28/2024 with biliary stent removed and CBD stone removed. A mild biliary stricture was found in the post-transplant anastomosis, improved from prior. Had outpatient ERCP done in in 2/7/2025 - choledocholithiasis was found. Repeat labs noted with continue rising LFTs post ERCP. Patient was informed to come to the hospital to be evaluated for ACR. At [facility], started on IV methylprednisolone 500 mg 3-day course, now with steroid-induced hyperglycemia.     Endocrine consulted for further management (high risk patient with severe hyperglycemia with glucose values > 400's at high risk of CAD and CVA with high medical complexity and high level decision-making). Patient now continues off insulin drip since 2/14. Patient is eating well, tolerating POs. Patient now with improved hyperglycemia and taper of steroids. FBG elevated, will increase Lantus to 50u QHS for tighter BG control. Given persistent hyperglycemia to 200s, will increase mealtime insulin as well. No hypoglycemia. Now on Pred 20mg QD, final d/c dose. Patient is safe for discharge as long as BG is <250mg/dL x3 and okay with transplant team as patient would like to leave. Endocrine will closely monitor BG and adjust insulin as needed for BG goal 100-180mg/dL inpatient.     #Steroid-induced hyperglycemia  #T2DM  - A1c 8.9%  - Home meds: Lantus 10 units qhs, Humalog 10 units ac  - Follows with Dr. Kady Peguero

## 2025-02-18 NOTE — PROGRESS NOTE ADULT - PROBLEM SELECTOR PLAN 5
- LDL goal <70   - C/w statin therapy or recommend statin therapy if not contraindicated   - Check lipid panel as outpatient on a yearly basis  - Please check lipid panel in am
- LDL goal <70   - C/w statin therapy or recommend statin therapy if not contraindicated   - Check lipid panel as outpatient on a yearly basis  - Please check lipid panel in am

## 2025-02-18 NOTE — PROGRESS NOTE ADULT - PROBLEM SELECTOR PLAN 1
Inpatient Plan:  - Check BG TID AC, HS, and 2AM  - Adjust Admelog to 50u QHS  - Adjust Admelog 38u TID AC (HOLD if NPO)  - C/w moderate dose Admelog correctional scales TID AC and HS    Discharge Planning:  - Discharge on Lantus 50u QHS and Admelog 40u TID AC (hold admelog if pre-meal BG <120mg/dL or patient is not  eating meal).  - Patient should check BG TID AC and HS. Please tell patient to contact Endocrinologist if BG <70mg/dL x1 or >200mg/dL consistently or >400mg/dL x1, close f/u with Endo outpatient.   - Order glucagon (Gvoke 1mg prefilled syringe OR Baqsimi 3mg intranasal) to be given in case of severe hypoglycemia and patient is unable to ingest glucose source.  - Patient previously following with Dr. Peguero, who is no longer at [facility]. Patient can follow up at the [facility] ([phone]) at [address] [place] [address] [address] [place] [phone], please provide in d/c paperwork to schedule appt.   - Routine follow-up with Ophthalmology and Podiatry.   - Patient will need RX for basal insulin pen (ie. Basaglar, Lantus, Tresiba, Toujeo) and bolus insulin pen (ie. humalog/novolog/admelog) depending on insurance coverage; please send test scripts to see which is covered. Please send prescriptions for diabetes supplies (glucometer, test strips, lancets, alcohol swabs, insulin pen needles). Inpatient Plan:  - Check BG TID AC, HS, and 2AM  - Adjust Admelog to 50u QHS  - Adjust Admelog 38u TID AC (HOLD if NPO)  - C/w moderate dose Admelog correctional scales TID AC and HS    Discharge Planning:  - Discharge on Lantus 50u QHS and Admelog 38u TID AC (hold admelog if pre-meal BG <120mg/dL or patient is not  eating meal).  - Patient should check BG TID AC and HS. Please tell patient to contact Endocrinologist if BG <70mg/dL x1 or >200mg/dL consistently or >400mg/dL x1, close f/u with Endo outpatient.   - Order glucagon (Gvoke 1mg prefilled syringe OR Baqsimi 3mg intranasal) to be given in case of severe hypoglycemia and patient is unable to ingest glucose source.  - Patient previously following with Dr. Peguero, who is no longer at [facility]. Patient can follow up at the [facility] ([phone]) at [address] [place] [address] [address] [place] [phone], please provide in d/c paperwork to schedule appt.   - Routine follow-up with Ophthalmology and Podiatry.   - Patient will need RX for basal insulin pen (ie. Basaglar, Lantus, Tresiba, Toujeo) and bolus insulin pen (ie. humalog/novolog/admelog) depending on insurance coverage; please send test scripts to see which is covered. Please send prescriptions for diabetes supplies (glucometer, test strips, lancets, alcohol swabs, insulin pen needles). Inpatient Plan:  - Check BG TID AC, HS, and 2AM  - Adjust Admelog to 50u QHS  - Adjust Admelog 38u TID AC (HOLD if NPO)  - Adjust to low dose Admelog correctional scales TID AC and HS give better BG control now with postprandial     Discharge Planning:  - Discharge on Lantus 50u QHS and Admelog 38u TID AC (hold admelog if pre-meal BG <120mg/dL or patient is not  eating meal).  - Patient should check BG TID AC and HS. Please tell patient to contact Endocrinologist if BG <70mg/dL x1 or >200mg/dL consistently or >400mg/dL x1, close f/u with Endo outpatient.   - Order glucagon (Gvoke 1mg prefilled syringe OR Baqsimi 3mg intranasal) to be given in case of severe hypoglycemia and patient is unable to ingest glucose source.  - Patient previously following with Dr. Peguero, who is no longer at [facility]. Patient can follow up at the [facility] ([phone]) at [address] [place] [address] [address] [place] [phone], please provide in d/c paperwork to schedule appt.   - Routine follow-up with Ophthalmology and Podiatry.   - Patient will need RX for basal insulin pen (ie. Basaglar, Lantus, Tresiba, Toujeo) and bolus insulin pen (ie. humalog/novolog/admelog) depending on insurance coverage; please send test scripts to see which is covered. Please send prescriptions for diabetes supplies (glucometer, test strips, lancets, alcohol swabs, insulin pen needles).

## 2025-02-18 NOTE — PROGRESS NOTE ADULT - NSPROGADDITIONALINFOA_GEN_ALL_CORE
Contact via Microsoft Teams during business hours  To reach covering provider access AMION via sunrise tools  For Urgent matters/after-hours/weekends/holidays please page endocrine fellow on call   For nonurgent matters please email [email or web address]    Please note that this patient may be followed by different provider tomorrow.  Notify endocrine 24 hours prior to discharge for final recommendations
Contact via Microsoft Teams during business hours  To reach covering provider access AMION via sunrise tools  For Urgent matters/after-hours/weekends/holidays please page endocrine fellow on call   For nonurgent matters please email [email or web address]    Please note that this patient may be followed by different provider tomorrow.  Notify endocrine 24 hours prior to discharge for final recommendations

## 2025-02-18 NOTE — PROGRESS NOTE ADULT - PROBLEM SELECTOR PLAN 3
Patient is s/p IV methylprednisolone 500 mg x2, planned for rapid taper to 80 mg to 20 mg starting tomorrow 2/15 then long-term 20 mg    - On oral Pred 40mg once today 2/17  - Start tmrw 2/18 on oral Pred 20mg once daily
Patient is s/p IV methylprednisolone 500 mg x2, planned for rapid taper to 80 mg to 20 mg starting tomorrow 2/15 then long-term 20 mg    - On oral Pred 40mg once today 2/17  - Start tmrw 2/18 on oral Pred 20mg once daily

## 2025-02-18 NOTE — PROGRESS NOTE ADULT - SUBJECTIVE AND OBJECTIVE BOX
Patient seen today for follow up inpatient Diabetes Mellitus management.    Chief Complaint: Type 2 Diabetes Mellitus, steroid induced hyperglycemia     INTERVAL HX:  Patient seen in [facility] 9TOW 12. Patient is alert and oriented, resting in bed. Patient is feeling good, eating full meals and tolerating POs. FBG slightly elevated this am to 222mg/dL. No hypoglycemia. Noted persistent postprandial hyperglycemia 2/2 to steroids but improving with increased insulin reg and tapering of steroid, now on oral Prednisone 20mg once daily as new maintenance dose. Patient would like to go home. Blood glucose levels in the last 24hrs have been 209-267mg/dL.     Review of Systems:  General: As above.  Respiratory: Denies any SOB, LEONARDO, or cough.  Gastrointestinal: Denies any n/v/d or abdominal pain.   Endocrine: Denies any polyuria, polydipsia, polyphagia, visual changes, or numbness in feet.     Allergies  No Known Allergies      Intolerances  None.       MEDICATIONS  (STANDING):  cycloSPORINE  , modified (GENGRAF) 25 milliGRAM(s) Oral once  cycloSPORINE  , modified (GENGRAF) 125 milliGRAM(s) Oral <User Schedule>  dextrose 5%. 1000 milliLiter(s) IV Continuous <Continuous>  dextrose 5%. 1000 milliLiter(s) IV Continuous <Continuous>  dextrose 50% Injectable 25 Gram(s) IV Push once  dextrose 50% Injectable 12.5 Gram(s) IV Push once  dextrose 50% Injectable 25 Gram(s) IV Push once  dextrose Oral Gel 15 Gram(s) Oral once PRN  glucagon  Injectable 1 milliGRAM(s) IntraMuscular once  insulin glargine Injectable (LANTUS) 48 Unit(s) SubCutaneous at bedtime  insulin lispro (ADMELOG) corrective regimen sliding scale   SubCutaneous three times a day before meals  insulin lispro (ADMELOG) corrective regimen sliding scale   SubCutaneous <User Schedule>  insulin lispro Injectable (ADMELOG) 38 Unit(s) SubCutaneous three times a day before meals  magnesium oxide 400 milliGRAM(s) Oral two times a day with meals  metoprolol tartrate 50 milliGRAM(s) Oral every 12 hours  mycophenolate mofetil 1000 milliGRAM(s) Oral <User Schedule>  NIFEdipine XL 30 milliGRAM(s) Oral daily  nystatin    Suspension 940583 Unit(s) Swish and Swallow four times a day  predniSONE   Tablet 20 milliGRAM(s) Oral daily  risperiDONE   Tablet 1 milliGRAM(s) Oral at bedtime  trimethoprim   80 mG/sulfamethoxazole 400 mG 1 Tablet(s) Oral daily  ursodiol Capsule 300 milliGRAM(s) Oral two times a day  valGANciclovir 450 milliGRAM(s) Oral daily      dextrose 50% Injectable 25 Gram(s) IV Push once  dextrose 50% Injectable 12.5 Gram(s) IV Push once  dextrose 50% Injectable 25 Gram(s) IV Push once  dextrose Oral Gel 15 Gram(s) Oral once PRN  glucagon  Injectable 1 milliGRAM(s) IntraMuscular once  insulin glargine Injectable (LANTUS) 48 Unit(s) SubCutaneous at bedtime  insulin lispro (ADMELOG) corrective regimen sliding scale   SubCutaneous three times a day before meals  insulin lispro (ADMELOG) corrective regimen sliding scale   SubCutaneous <User Schedule>  insulin lispro Injectable (ADMELOG) 38 Unit(s) SubCutaneous three times a day before meals  predniSONE   Tablet 20 milliGRAM(s) Oral daily      insulin lispro (ADMELOG) corrective regimen sliding scale   SubCutaneous three times a day before meals  insulin lispro (ADMELOG) corrective regimen sliding scale   SubCutaneous <User Schedule>  insulin lispro Injectable (ADMELOG) 38 Unit(s) SubCutaneous three times a day before meals      PHYSICAL EXAM:  VITALS:   T(C): 36.8 (02-18-25 @ 13:00), Max: 36.9 (02-17-25 @ 17:22)  HR: 64 (02-18-25 @ 13:00) (64 - 84)  BP: 157/82 (02-18-25 @ 13:00) (153/76 - 184/82)  RR: 18 (02-18-25 @ 13:00) (18 - 20)  SpO2: 96% (02-18-25 @ 13:00) (95% - 96%)    GENERAL: In no acute distress  Respiratory: Respirations unlabored  Extremities: Warm and dry, no edema  NEURO: Alert and oriented, appropriate     LABS:  POCT Blood Glucose.: 240 mg/dL (02-18-25 @ 12:31)  POCT Blood Glucose.: 222 mg/dL (02-18-25 @ 08:47)  POCT Blood Glucose.: 209 mg/dL (02-18-25 @ 05:48)  POCT Blood Glucose.: 207 mg/dL (02-18-25 @ 01:43)  POCT Blood Glucose.: 267 mg/dL (02-17-25 @ 21:17)  POCT Blood Glucose.: 232 mg/dL (02-17-25 @ 17:20)  POCT Blood Glucose.: 281 mg/dL (02-17-25 @ 12:16)  POCT Blood Glucose.: 362 mg/dL (02-17-25 @ 09:02)  POCT Blood Glucose.: 345 mg/dL (02-17-25 @ 01:43)  POCT Blood Glucose.: 390 mg/dL (02-16-25 @ 21:54)  POCT Blood Glucose.: 322 mg/dL (02-16-25 @ 16:59)  POCT Blood Glucose.: 358 mg/dL (02-16-25 @ 12:28)  POCT Blood Glucose.: 365 mg/dL (02-16-25 @ 11:05)  POCT Blood Glucose.: 337 mg/dL (02-16-25 @ 07:37)  POCT Blood Glucose.: 359 mg/dL (02-16-25 @ 05:51)  POCT Blood Glucose.: 282 mg/dL (02-16-25 @ 01:59)  POCT Blood Glucose.: 184 mg/dL (02-16-25 @ 00:09)  POCT Blood Glucose.: 113 mg/dL (02-15-25 @ 21:57)  POCT Blood Glucose.: 129 mg/dL (02-15-25 @ 20:58)  POCT Blood Glucose.: 164 mg/dL (02-15-25 @ 19:57)  POCT Blood Glucose.: 197 mg/dL (02-15-25 @ 18:59)  POCT Blood Glucose.: 205 mg/dL (02-15-25 @ 17:59)  POCT Blood Glucose.: 203 mg/dL (02-15-25 @ 17:00)  POCT Blood Glucose.: 206 mg/dL (02-15-25 @ 15:59)  POCT Blood Glucose.: 213 mg/dL (02-15-25 @ 15:00)  POCT Blood Glucose.: 190 mg/dL (02-15-25 @ 13:58)                          9.6    9.87  )-----------( 345      ( 18 Feb 2025 06:39 )             30.2     02-18    138  |  105  |  46[H]  ----------------------------<  222[H]  4.3   |  19[L]  |  1.57[H]    Ca    8.3[L]      18 Feb 2025 06:39  Phos  3.5     02-18  Mg     1.9     02-18    TPro  6.5  /  Alb  3.4  /  TBili  2.1[H]  /  DBili  x   /  AST  98[H]  /  ALT  165[H]  /  AlkPhos  621[H]  02-18    LIVER FUNCTIONS - ( 18 Feb 2025 06:39 )  Alb: 3.4 g/dL / Pro: 6.5 g/dL / ALK PHOS: 621 U/L / ALT: 165 U/L / AST: 98 U/L / GGT: x           PT/INR - ( 18 Feb 2025 06:39 )   PT: 10.9 sec;   INR: 0.95 ratio         PTT - ( 18 Feb 2025 06:39 )  PTT:23.8 sec      Urinalysis Basic - ( 18 Feb 2025 06:39 )    Color: x / Appearance: x / SG: x / pH: x  Gluc: 222 mg/dL / Ketone: x  / Bili: x / Urobili: x   Blood: x / Protein: x / Nitrite: x   Leuk Esterase: x / RBC: x / WBC x   Sq Epi: x / Non Sq Epi: x / Bacteria: x        A1C with Estimated Average Glucose Result: A1C with Estimated Average Glucose Result: 8.9 % (01-14-25 @ 11:55)  A1C with Estimated Average Glucose Result: 7.8 % (09-25-24 @ 17:21)

## 2025-02-18 NOTE — PROGRESS NOTE ADULT - SUBJECTIVE AND OBJECTIVE BOX
Gastroenterology progress note:     Patient is a 64y old  Male who presents with a chief complaint of Acute cellular rejection, hx of OLT (17 Feb 2025 12:03)       Admitted on: 02-11-25       Interval History:    No acute events overnight.   no complains this morning      PAST MEDICAL & SURGICAL HISTORY:  Hypertension      Diabetes mellitus      Alcoholic cirrhosis      Common bile duct (CBD) obstruction      Anxiety      Chronic kidney disease (CKD)      Calculus of bile duct without cholangitis without obstruction      Liver transplanted      History of ERCP          MEDICATIONS  (STANDING):  cycloSPORINE  , modified (GENGRAF) 125 milliGRAM(s) Oral <User Schedule>  dextrose 5%. 1000 milliLiter(s) (50 mL/Hr) IV Continuous <Continuous>  dextrose 5%. 1000 milliLiter(s) (100 mL/Hr) IV Continuous <Continuous>  dextrose 50% Injectable 25 Gram(s) IV Push once  dextrose 50% Injectable 12.5 Gram(s) IV Push once  dextrose 50% Injectable 25 Gram(s) IV Push once  glucagon  Injectable 1 milliGRAM(s) IntraMuscular once  insulin glargine Injectable (LANTUS) 48 Unit(s) SubCutaneous at bedtime  insulin lispro (ADMELOG) corrective regimen sliding scale   SubCutaneous three times a day before meals  insulin lispro (ADMELOG) corrective regimen sliding scale   SubCutaneous <User Schedule>  insulin lispro Injectable (ADMELOG) 35 Unit(s) SubCutaneous three times a day before meals  magnesium oxide 400 milliGRAM(s) Oral two times a day with meals  metoprolol tartrate 50 milliGRAM(s) Oral every 12 hours  mycophenolate mofetil 1000 milliGRAM(s) Oral <User Schedule>  NIFEdipine XL 30 milliGRAM(s) Oral daily  nystatin    Suspension 260350 Unit(s) Swish and Swallow four times a day  predniSONE   Tablet 20 milliGRAM(s) Oral daily  risperiDONE   Tablet 1 milliGRAM(s) Oral at bedtime  trimethoprim   80 mG/sulfamethoxazole 400 mG 1 Tablet(s) Oral daily  ursodiol Capsule 300 milliGRAM(s) Oral two times a day  valGANciclovir 450 milliGRAM(s) Oral daily    MEDICATIONS  (PRN):  dextrose Oral Gel 15 Gram(s) Oral once PRN Blood Glucose LESS THAN 70 milliGRAM(s)/deciliter      Allergies  No Known Allergies      Review of Systems:   Cardiovascular:  No Chest Pain, No Palpitations  Respiratory:  No Cough, No Dyspnea  Gastrointestinal:  As described in HPI  Skin:  No Skin Lesions, No Jaundice  Neuro:  No Syncope, No Dizziness    Physical Examination:  T(C): 36.3 (02-18-25 @ 09:00), Max: 36.9 (02-17-25 @ 17:22)  HR: 65 (02-18-25 @ 09:00) (65 - 84)  BP: 162/80 (02-18-25 @ 09:00) (153/76 - 184/82)  RR: 18 (02-18-25 @ 09:00) (18 - 20)  SpO2: 96% (02-18-25 @ 09:00) (95% - 96%)      02-17-25 @ 07:01  -  02-18-25 @ 07:00  --------------------------------------------------------  IN: 960 mL / OUT: 3800 mL / NET: -2840 mL    02-18-25 @ 07:01  -  02-18-25 @ 12:09  --------------------------------------------------------  IN: 240 mL / OUT: 450 mL / NET: -210 mL        GENERAL: AAOx3, no acute distress.  HEAD:  Atraumatic, Normocephalic  EYES: conjunctiva and sclera clear  NECK: Supple, no JVD or thyromegaly  CHEST/LUNG: Clear to auscultation bilaterally; No wheeze, rhonchi, or rales  HEART: Regular rate and rhythm; normal S1, S2, No murmurs.  ABDOMEN: Soft, nontender, nondistended; Bowel sounds present  NEUROLOGY: No asterixis or tremor.   SKIN: Intact, no jaundice     Data:                        9.6    9.87  )-----------( 345      ( 18 Feb 2025 06:39 )             30.2     Hgb trend:  9.6  02-18-25 @ 06:39  9.9  02-17-25 @ 07:09  9.4  02-16-25 @ 07:03        02-18    138  |  105  |  46[H]  ----------------------------<  222[H]  4.3   |  19[L]  |  1.57[H]    Ca    8.3[L]      18 Feb 2025 06:39  Phos  3.5     02-18  Mg     1.9     02-18    TPro  6.5  /  Alb  3.4  /  TBili  2.1[H]  /  DBili  x   /  AST  98[H]  /  ALT  165[H]  /  AlkPhos  621[H]  02-18    Liver panel trend:  TBili 2.1   /   AST 98   /      /   AlkP 621   /   Tptn 6.5   /   Alb 3.4    /   DBili --      02-18  TBili 2.4   /   AST 75   /      /   AlkP 688   /   Tptn 6.8   /   Alb 3.4    /   DBili --      02-17  TBili 2.6   /   AST 85   /      /   AlkP 748   /   Tptn 6.8   /   Alb 3.1    /   DBili --      02-16  TBili 3.6   /      /      /   AlkP 871   /   Tptn 7.4   /   Alb 3.4    /   DBili --      02-15  TBili 5.3   /      /      /   AlkP 950   /   Tptn 7.6   /   Alb 3.5    /   DBili --      02-14  TBili 5.5   /      /      /   AlkP 1022   /   Tptn 8.0   /   Alb 3.5    /   DBili --      02-14  TBili 4.6   /      /      /   AlkP 906   /   Tptn 6.9   /   Alb 3.1    /   DBili --      02-13  TBili 5.0   /      /      /   AlkP 965   /   Tptn 7.2   /   Alb 3.6    /   DBili --      02-12  TBili 5.2   /      /      /   AlkP 962   /   Tptn 7.4   /   Alb 3.5    /   DBili --      02-11      PT/INR - ( 18 Feb 2025 06:39 )   PT: 10.9 sec;   INR: 0.95 ratio         PTT - ( 18 Feb 2025 06:39 )  PTT:23.8 sec       Radiology:

## 2025-02-18 NOTE — PROGRESS NOTE ADULT - PROBLEM SELECTOR PLAN 4
- Goal BP <130/80  - Management as per primary team  - check urine microalbumin level as outpatient
- Goal BP <130/80  - Management as per primary team  - check urine microalbumin level as outpatient

## 2025-02-18 NOTE — PROGRESS NOTE ADULT - ASSESSMENT
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    #Elevated liver enzymes 2/2 ACR w/ evolving chronic rejection  #s/p OLT 2019 for alcoholic cirrhosis  # H/O anastomotic stricture with choledocholithiasis s/p multiple ERCP most recently 2/7: no stricture appreciated and CBD was cleared   # steatohepatitis on liver bx 5/01/2024  Liver biopsy from 2/12 demonstrating moderate-late ACR (TAPIA 5/9) w/ evolving chronic rejection (w/ cytokeratin stains pending) as well as scattered histiocyte collections.   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  Liver chemistry is improving following treatment with steroids   - Solumedrol 500 mg IV (2/13-2/15)  - Oral pred 20 today   - MMF 1000 mg BID  - will adjust to Cyclosporine 125 mg + Cyclosporine 150 mg daily for goal trough level 200-250 ng/mL   - Continue ursodiol  - PPx w/ nystatin, bactrim and valcyte     #Steroid induced hyperglycemia  - Glucose 500s related to high-dose steroids, required insulin gtt, transitioned to basal/bolus insulin on 2/15 PM   - Glucose persistently elevated to 300s  - Endocrine recs:  - Adjust Admelog to 48u QHS  - c/w Admelog to 35u TID AC  - C/w moderate dose Admelog correctional scales TID AC and HS  - Will require insulin regimen for home-going fwd    #KAYLA  sCr 2.1 from baseline creat ~1.6 on admission. BUN/Cr elevated > 20. Suspect volume-mediated KAYLA related to severe hyperglycemia and urinary losses  - s/p IVF, Cr trending down today to 1.57  - Encouraged adequate oral hydration  - trend UOP, sCr     Dispo: possible discharge home today with close follow up with Dr. Mejia

## 2025-02-18 NOTE — PROGRESS NOTE ADULT - PROBLEM SELECTOR PROBLEM 2
Current chronic use of systemic steroids
Uncontrolled type 2 diabetes mellitus with hyperglycemia, with long-term current use of insulin
Current chronic use of systemic steroids

## 2025-02-19 ENCOUNTER — TRANSCRIPTION ENCOUNTER (OUTPATIENT)
Age: 65
End: 2025-02-19

## 2025-02-19 LAB
ALBUMIN SERPL ELPH-MCNC: 3.1 G/DL — LOW (ref 3.3–5)
ALP SERPL-CCNC: 552 U/L — HIGH (ref 40–120)
ALT FLD-CCNC: 193 U/L — HIGH (ref 10–45)
ANION GAP SERPL CALC-SCNC: 17 MMOL/L — SIGNIFICANT CHANGE UP (ref 5–17)
APTT BLD: 24.4 SEC — LOW (ref 24.5–35.6)
AST SERPL-CCNC: 121 U/L — HIGH (ref 10–40)
BASOPHILS # BLD AUTO: 0.02 K/UL — SIGNIFICANT CHANGE UP (ref 0–0.2)
BASOPHILS NFR BLD AUTO: 0.2 % — SIGNIFICANT CHANGE UP (ref 0–2)
BILIRUB SERPL-MCNC: 1.8 MG/DL — HIGH (ref 0.2–1.2)
BUN SERPL-MCNC: 40 MG/DL — HIGH (ref 7–23)
CALCIUM SERPL-MCNC: 8.2 MG/DL — LOW (ref 8.4–10.5)
CHLORIDE SERPL-SCNC: 102 MMOL/L — SIGNIFICANT CHANGE UP (ref 96–108)
CO2 SERPL-SCNC: 18 MMOL/L — LOW (ref 22–31)
CREAT SERPL-MCNC: 1.53 MG/DL — HIGH (ref 0.5–1.3)
CYCLOSPORINE SER-MCNC: 183 NG/ML — SIGNIFICANT CHANGE UP (ref 150–400)
EGFR: 50 ML/MIN/1.73M2 — LOW
EOSINOPHIL # BLD AUTO: 0.1 K/UL — SIGNIFICANT CHANGE UP (ref 0–0.5)
EOSINOPHIL NFR BLD AUTO: 0.9 % — SIGNIFICANT CHANGE UP (ref 0–6)
GLUCOSE BLDC GLUCOMTR-MCNC: 105 MG/DL — HIGH (ref 70–99)
GLUCOSE BLDC GLUCOMTR-MCNC: 144 MG/DL — HIGH (ref 70–99)
GLUCOSE BLDC GLUCOMTR-MCNC: 166 MG/DL — HIGH (ref 70–99)
GLUCOSE BLDC GLUCOMTR-MCNC: 208 MG/DL — HIGH (ref 70–99)
GLUCOSE BLDC GLUCOMTR-MCNC: 92 MG/DL — SIGNIFICANT CHANGE UP (ref 70–99)
GLUCOSE SERPL-MCNC: 203 MG/DL — HIGH (ref 70–99)
HCT VFR BLD CALC: 31.3 % — LOW (ref 39–50)
HGB BLD-MCNC: 9.7 G/DL — LOW (ref 13–17)
IMM GRANULOCYTES NFR BLD AUTO: 1.3 % — HIGH (ref 0–0.9)
INR BLD: 0.92 RATIO — SIGNIFICANT CHANGE UP (ref 0.85–1.16)
LYMPHOCYTES # BLD AUTO: 2.97 K/UL — SIGNIFICANT CHANGE UP (ref 1–3.3)
LYMPHOCYTES # BLD AUTO: 27.6 % — SIGNIFICANT CHANGE UP (ref 13–44)
MAGNESIUM SERPL-MCNC: 1.8 MG/DL — SIGNIFICANT CHANGE UP (ref 1.6–2.6)
MCHC RBC-ENTMCNC: 25.6 PG — LOW (ref 27–34)
MCHC RBC-ENTMCNC: 31 G/DL — LOW (ref 32–36)
MCV RBC AUTO: 82.6 FL — SIGNIFICANT CHANGE UP (ref 80–100)
MONOCYTES # BLD AUTO: 0.56 K/UL — SIGNIFICANT CHANGE UP (ref 0–0.9)
MONOCYTES NFR BLD AUTO: 5.2 % — SIGNIFICANT CHANGE UP (ref 2–14)
NEUTROPHILS # BLD AUTO: 6.98 K/UL — SIGNIFICANT CHANGE UP (ref 1.8–7.4)
NEUTROPHILS NFR BLD AUTO: 64.8 % — SIGNIFICANT CHANGE UP (ref 43–77)
NRBC BLD AUTO-RTO: 0 /100 WBCS — SIGNIFICANT CHANGE UP (ref 0–0)
PHOSPHATE SERPL-MCNC: 3.5 MG/DL — SIGNIFICANT CHANGE UP (ref 2.5–4.5)
PLATELET # BLD AUTO: 363 K/UL — SIGNIFICANT CHANGE UP (ref 150–400)
POTASSIUM SERPL-MCNC: 4.3 MMOL/L — SIGNIFICANT CHANGE UP (ref 3.5–5.3)
POTASSIUM SERPL-SCNC: 4.3 MMOL/L — SIGNIFICANT CHANGE UP (ref 3.5–5.3)
PROT SERPL-MCNC: 6.1 G/DL — SIGNIFICANT CHANGE UP (ref 6–8.3)
PROTHROM AB SERPL-ACNC: 10.5 SEC — SIGNIFICANT CHANGE UP (ref 9.9–13.4)
RBC # BLD: 3.79 M/UL — LOW (ref 4.2–5.8)
RBC # FLD: 18.3 % — HIGH (ref 10.3–14.5)
SODIUM SERPL-SCNC: 137 MMOL/L — SIGNIFICANT CHANGE UP (ref 135–145)
WBC # BLD: 10.77 K/UL — HIGH (ref 3.8–10.5)
WBC # FLD AUTO: 10.77 K/UL — HIGH (ref 3.8–10.5)

## 2025-02-19 PROCEDURE — 99232 SBSQ HOSP IP/OBS MODERATE 35: CPT

## 2025-02-19 PROCEDURE — 99232 SBSQ HOSP IP/OBS MODERATE 35: CPT | Mod: GC

## 2025-02-19 RX ORDER — NIFEDIPINE 30 MG
60 TABLET, EXTENDED RELEASE 24 HR ORAL DAILY
Refills: 0 | Status: DISCONTINUED | OUTPATIENT
Start: 2025-02-20 | End: 2025-02-21

## 2025-02-19 RX ORDER — CYCLOSPORINE 100 MG/1
150 CAPSULE, LIQUID FILLED ORAL
Refills: 0 | Status: DISCONTINUED | OUTPATIENT
Start: 2025-02-20 | End: 2025-02-21

## 2025-02-19 RX ORDER — MAGNESIUM OXIDE 400 MG
400 TABLET ORAL
Refills: 0 | Status: DISCONTINUED | OUTPATIENT
Start: 2025-02-19 | End: 2025-02-21

## 2025-02-19 RX ORDER — CYCLOSPORINE 100 MG/1
150 CAPSULE, LIQUID FILLED ORAL ONCE
Refills: 0 | Status: COMPLETED | OUTPATIENT
Start: 2025-02-19 | End: 2025-02-19

## 2025-02-19 RX ORDER — NIFEDIPINE 30 MG
30 TABLET, EXTENDED RELEASE 24 HR ORAL ONCE
Refills: 0 | Status: COMPLETED | OUTPATIENT
Start: 2025-02-19 | End: 2025-02-19

## 2025-02-19 RX ORDER — INSULIN GLARGINE-YFGN 100 [IU]/ML
40 INJECTION, SOLUTION SUBCUTANEOUS AT BEDTIME
Refills: 0 | Status: COMPLETED | OUTPATIENT
Start: 2025-02-19 | End: 2025-02-19

## 2025-02-19 RX ORDER — INSULIN GLARGINE-YFGN 100 [IU]/ML
40 INJECTION, SOLUTION SUBCUTANEOUS AT BEDTIME
Refills: 0 | Status: DISCONTINUED | OUTPATIENT
Start: 2025-02-19 | End: 2025-02-21

## 2025-02-19 RX ADMIN — Medication 30 MILLIGRAM(S): at 05:29

## 2025-02-19 RX ADMIN — CYCLOSPORINE 150 MILLIGRAM(S): 100 CAPSULE, LIQUID FILLED ORAL at 21:03

## 2025-02-19 RX ADMIN — Medication 30 MILLIGRAM(S): at 09:29

## 2025-02-19 RX ADMIN — Medication 400 MILLIGRAM(S): at 17:01

## 2025-02-19 RX ADMIN — Medication 500000 UNIT(S): at 05:28

## 2025-02-19 RX ADMIN — INSULIN GLARGINE-YFGN 40 UNIT(S): 100 INJECTION, SOLUTION SUBCUTANEOUS at 22:39

## 2025-02-19 RX ADMIN — Medication 500000 UNIT(S): at 12:22

## 2025-02-19 RX ADMIN — Medication 1 TABLET(S): at 12:21

## 2025-02-19 RX ADMIN — INSULIN LISPRO 35 UNIT(S): 100 INJECTION, SOLUTION INTRAVENOUS; SUBCUTANEOUS at 17:01

## 2025-02-19 RX ADMIN — METOPROLOL SUCCINATE 50 MILLIGRAM(S): 50 TABLET, EXTENDED RELEASE ORAL at 05:29

## 2025-02-19 RX ADMIN — INSULIN LISPRO 35 UNIT(S): 100 INJECTION, SOLUTION INTRAVENOUS; SUBCUTANEOUS at 08:40

## 2025-02-19 RX ADMIN — Medication 500000 UNIT(S): at 17:01

## 2025-02-19 RX ADMIN — Medication 1 MILLIGRAM(S): at 21:04

## 2025-02-19 RX ADMIN — URSODIOL 300 MILLIGRAM(S): 300 CAPSULE ORAL at 05:28

## 2025-02-19 RX ADMIN — INSULIN LISPRO 2: 100 INJECTION, SOLUTION INTRAVENOUS; SUBCUTANEOUS at 08:40

## 2025-02-19 RX ADMIN — MYCOPHENOLATE MOFETIL 1000 MILLIGRAM(S): 500 TABLET, FILM COATED ORAL at 08:40

## 2025-02-19 RX ADMIN — MYCOPHENOLATE MOFETIL 1000 MILLIGRAM(S): 500 TABLET, FILM COATED ORAL at 21:04

## 2025-02-19 RX ADMIN — CYCLOSPORINE 150 MILLIGRAM(S): 100 CAPSULE, LIQUID FILLED ORAL at 09:29

## 2025-02-19 RX ADMIN — PREDNISONE 20 MILLIGRAM(S): 20 TABLET ORAL at 05:29

## 2025-02-19 RX ADMIN — INSULIN LISPRO 35 UNIT(S): 100 INJECTION, SOLUTION INTRAVENOUS; SUBCUTANEOUS at 13:08

## 2025-02-19 RX ADMIN — Medication 400 MILLIGRAM(S): at 08:39

## 2025-02-19 RX ADMIN — METOPROLOL SUCCINATE 50 MILLIGRAM(S): 50 TABLET, EXTENDED RELEASE ORAL at 17:01

## 2025-02-19 RX ADMIN — URSODIOL 300 MILLIGRAM(S): 300 CAPSULE ORAL at 17:01

## 2025-02-19 RX ADMIN — VALGANCICLOVIR 450 MILLIGRAM(S): 450 TABLET, FILM COATED ORAL at 12:21

## 2025-02-19 RX ADMIN — INSULIN LISPRO 1: 100 INJECTION, SOLUTION INTRAVENOUS; SUBCUTANEOUS at 13:08

## 2025-02-19 NOTE — DISCHARGE NOTE PROVIDER - CARE PROVIDER_API CALL
Gage Mejia  Transplant Hepatology  [address] [place] [id number]  Phone: [phone]  Fax: [phone]  Follow Up Time:

## 2025-02-19 NOTE — PROGRESS NOTE ADULT - SUBJECTIVE AND OBJECTIVE BOX
Chief Complaint:     History:  Pt adm for liver transplant rejection, given high dose solumedrol, now reduced to prednisone 20 mg/d. Developed hyperglycemia, now on increased basal bolus: glargine 50 units qhs, premeal lispro 35 units. BS much better today; daytimes ~ 140-160.  Pt feels well, eating normally, no hypos.  MEDICATIONS  (STANDING):  cycloSPORINE  , modified (GENGRAF) 150 milliGRAM(s) Oral once  dextrose 5%. 1000 milliLiter(s) (50 mL/Hr) IV Continuous <Continuous>  dextrose 5%. 1000 milliLiter(s) (100 mL/Hr) IV Continuous <Continuous>  dextrose 50% Injectable 25 Gram(s) IV Push once  dextrose 50% Injectable 12.5 Gram(s) IV Push once  dextrose 50% Injectable 25 Gram(s) IV Push once  glucagon  Injectable 1 milliGRAM(s) IntraMuscular once  insulin glargine Injectable (LANTUS) 50 Unit(s) SubCutaneous at bedtime  insulin lispro (ADMELOG) corrective regimen sliding scale   SubCutaneous three times a day before meals  insulin lispro (ADMELOG) corrective regimen sliding scale   SubCutaneous at bedtime  insulin lispro Injectable (ADMELOG) 35 Unit(s) SubCutaneous three times a day before meals  magnesium oxide 400 milliGRAM(s) Oral two times a day with meals  metoprolol tartrate 50 milliGRAM(s) Oral every 12 hours  mycophenolate mofetil 1000 milliGRAM(s) Oral <User Schedule>  nystatin    Suspension 761042 Unit(s) Swish and Swallow four times a day  predniSONE   Tablet 20 milliGRAM(s) Oral daily  risperiDONE   Tablet 1 milliGRAM(s) Oral at bedtime  trimethoprim   80 mG/sulfamethoxazole 400 mG 1 Tablet(s) Oral daily  ursodiol Capsule 300 milliGRAM(s) Oral two times a day  valGANciclovir 450 milliGRAM(s) Oral daily    MEDICATIONS  (PRN):  dextrose Oral Gel 15 Gram(s) Oral once PRN Blood Glucose LESS THAN 70 milliGRAM(s)/deciliter      Allergies    No Known Allergies    Intolerances       PHYSICAL EXAM: marked central obesity  VITALS: T(C): 37.1 (02-19-25 @ 16:30)  T(F): 98.8 (02-19-25 @ 16:30), Max: 98.8 (02-19-25 @ 16:30)  HR: 78 (02-19-25 @ 16:30) (63 - 82)  BP: 170/91 (02-19-25 @ 16:30) (149/74 - 170/91)  RR:  (16 - 18)  SpO2:  (95% - 96%)  Wt(kg): --104 kg  GENERAL: NAD, well-groomed, well-developed      THYROID: Normal size, no palpable nodules  RESPIRATORY: Clear to auscultation bilaterally; No rales, rhonchi, wheezing, or rubs  CARDIOVASCULAR: Regular rate and rhythm; No murmurs; no peripheral edema  GI: Soft, nontender, non distended, normal bowel sounds obese  SKIN: mild periph edema  PSYCH: Alert and oriented x 3, normal affect, normal mood  CUSHING'S SIGNS: no striae    POCT Blood Glucose.: 144 mg/dL (02-19-25 @ 16:28)  POCT Blood Glucose.: 166 mg/dL (02-19-25 @ 12:57)  POCT Blood Glucose.: 208 mg/dL (02-19-25 @ 08:33)  POCT Blood Glucose.: 205 mg/dL (02-18-25 @ 21:09)  POCT Blood Glucose.: 182 mg/dL (02-18-25 @ 17:21)  POCT Blood Glucose.: 240 mg/dL (02-18-25 @ 12:31)  POCT Blood Glucose.: 222 mg/dL (02-18-25 @ 08:47)  POCT Blood Glucose.: 209 mg/dL (02-18-25 @ 05:48)  POCT Blood Glucose.: 207 mg/dL (02-18-25 @ 01:43)  POCT Blood Glucose.: 267 mg/dL (02-17-25 @ 21:17)  POCT Blood Glucose.: 232 mg/dL (02-17-25 @ 17:20)  POCT Blood Glucose.: 281 mg/dL (02-17-25 @ 12:16)  POCT Blood Glucose.: 362 mg/dL (02-17-25 @ 09:02)  POCT Blood Glucose.: 345 mg/dL (02-17-25 @ 01:43)  POCT Blood Glucose.: 390 mg/dL (02-16-25 @ 21:54)  POCT Blood Glucose.: 322 mg/dL (02-16-25 @ 16:59)      02-19    137  |  102  |  40[H]  ----------------------------<  203[H]  4.3   |  18[L]  |  1.53[H]    eGFR: 50[L]    Ca    8.2[L]      02-19  Mg     1.8     02-19  Phos  3.5     02-19    TPro  6.1  /  Alb  3.1[L]  /  TBili  1.8[H]  /  DBili  x   /  AST  121[H]  /  ALT  193[H]  /  AlkPhos  552[H]  02-19          Thyroid Function Tests:

## 2025-02-19 NOTE — DISCHARGE NOTE PROVIDER - NSDCMRMEDTOKEN_GEN_ALL_CORE_FT
amLODIPine 10 mg oral tablet: 1 tab(s) orally once a day  aspirin 81 mg oral delayed release tablet: 1 tab(s) orally once a day  cycloSPORINE modified 25 mg oral capsule: 3 cap(s) orally 2 times a day  HumaLOG Michael KwikPen 100 units/mL injectable solution: Before meals  Lantus 100 units/mL subcutaneous solution: 10 unit(s) subcutaneous once a day (at bedtime)  metoprolol tartrate 75 mg oral tablet: 1 tab(s) orally 2 times a day  mycophenolate mofetil 500 mg oral tablet: 1 tab(s) orally 2 times a day  risperiDONE 1 mg oral tablet: 1 tab(s) orally once a day (at bedtime)  semaglutide 1 mg/0.5 mL (1 mg dose) subcutaneous solution: 1 milligram(s) subcutaneously once a week FRIDAY  ursodiol 500 mg oral tablet: 1 tab(s) orally every 12 hours   amLODIPine 10 mg oral tablet: 1 tab(s) orally once a day  aspirin 81 mg oral delayed release tablet: 1 tab(s) orally once a day  cycloSPORINE modified 25 mg oral capsule: 3 cap(s) orally 2 times a day  HumaLOG Michael KwikPen 100 units/mL injectable solution: Before meals  Lantus 100 units/mL subcutaneous solution: 10 unit(s) subcutaneous once a day (at bedtime)  metoprolol tartrate 75 mg oral tablet: 1 tab(s) orally 2 times a day  mycophenolate mofetil 500 mg oral tablet: 1 tab(s) orally 2 times a day  predniSONE 10 mg oral tablet: 1 tab(s) orally once a day take 4 tablets x 4 days, 3 tablets x 5 days, then continue 2 tablets daily (20mg QD)  risperiDONE 1 mg oral tablet: 1 tab(s) orally once a day (at bedtime)  semaglutide 1 mg/0.5 mL (1 mg dose) subcutaneous solution: 1 milligram(s) subcutaneously once a week FRIDAY  ursodiol 500 mg oral tablet: 1 tab(s) orally every 12 hours

## 2025-02-19 NOTE — DISCHARGE NOTE PROVIDER - NSDCCPCAREPLAN_GEN_ALL_CORE_FT
PRINCIPAL DISCHARGE DIAGNOSIS  Diagnosis: Graft rejection  Assessment and Plan of Treatment: - Your immune system protects your body from "foreign invaders" like infections. The immune system can recognize and attack anything that is foreign or "not  your own".  -Unfortunately, your immune system can treat transplant organ as a foreign body and attack the new organ's cells. this is called rejection.   -Rejection usually occurs more often in the first six moth post transplant but can occur at any time after transplant  -It is very important to take transplant/anti-rejection medications that supress (lower) your immune to protect your transplant organ from rejection.   -Do NOTstop taking these medications unless advised by your transplant doctor.        PRINCIPAL DISCHARGE DIAGNOSIS  Diagnosis: Graft rejection  Assessment and Plan of Treatment: - Your immune system protects your body from "foreign invaders" like infections. The immune system can recognize and attack anything that is foreign or "not  your own".  -Unfortunately, your immune system can treat transplant organ as a foreign body and attack the new organ's cells. this is called rejection.   -Rejection usually occurs more often in the first six moth post transplant but can occur at any time after transplant  -It is very important to take transplant/anti-rejection medications that supress (lower) your immune to protect your transplant organ from rejection.   -Do NOTstop taking these medications unless advised by your transplant doctor.         SECONDARY DISCHARGE DIAGNOSES  Diagnosis: HTN (hypertension)  Assessment and Plan of Treatment: Low salt diet  Activity as tolerated.  Take all medication as prescribed.  Follow up with your medical doctor for routine blood pressure monitoring at your next visit.  Notify your doctor if you have any of the following symptoms:   Dizziness, Lightheadedness, Blurry vision, Headache, Chest pain, Shortness of breath    Diagnosis: Uncontrolled type 2 diabetes mellitus with hyperglycemia, with long-term current use of insulin  Assessment and Plan of Treatment: HgA1C this admission.  Make sure you get your HgA1c checked every three months.  If you take oral diabetes medications, check your blood glucose two times a day.  If you take insulin, check your blood glucose before meals and at bedtime.  It's important not to skip any meals.  Keep a log of your blood glucose results and always take it with you to your doctor appointments.  Keep a list of your current medications including injectables and over the counter medications and bring this medication list with you to all your doctor appointments.  If you have not seen your ophthalmologist this year call for appointment.  Check your feet daily for redness, sores, or openings. Do not self treat. If no improvement in two days call your primary care physician for an appointment.  Low blood sugar (hypoglycemia) is a blood sugar below 70mg/dl. Check your blood sugar if you feel signs/symptoms of hypoglycemia. If your blood sugar is below 70 take 15 grams of carbohydrates (ex 4 oz of apple juice, 3-4 glucose tablets, or 4-6 oz of regular soda) wait 15 minutes and repeat blood sugar to make sure it comes up above 70.  If your blood sugar is above 70 and you are due for a meal, have a meal.  If you are not due for a meal have a snack.  This snack helps keeps your blood sugar at a safe range.

## 2025-02-19 NOTE — PROGRESS NOTE ADULT - ASSESSMENT
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    #Elevated liver enzymes 2/2 ACR w/ evolving chronic rejection  #s/p OLT 2019 for alcoholic cirrhosis  # H/O anastomotic stricture with choledocholithiasis s/p multiple ERCP most recently 2/7: no stricture appreciated and CBD was cleared   # steatohepatitis on liver bx 5/01/2024  Liver biopsy from 2/12 demonstrating moderate-late ACR (TAPIA 5/9) w/ evolving chronic rejection (w/ cytokeratin stains pending) as well as scattered histiocyte collections.   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  Liver chemistry is improving following treatment with steroids   - Solumedrol 500 mg IV (2/13-2/15)  - Oral pred 20 today   - MMF 1000 mg BID  - will adjust to Cyclosporine 125 mg + Cyclosporine 150 mg daily for goal trough level 200-250 ng/mL   - Continue ursodiol  - PPx w/ nystatin, bactrim and valcyte   - 2/19: AST and ALT trending up, T bili and ALP trending down, will follow up cyclosporine level and monitor for 24 hours and adjust meds as needed     #Steroid induced hyperglycemia  - Glucose 500s related to high-dose steroids, required insulin gtt, transitioned to basal/bolus insulin on 2/15 PM   - Glucose persistently elevated to 300s  - Endocrine recs:  - Adjust Admelog to 48u QHS  - c/w Admelog to 35u TID AC  - C/w moderate dose Admelog correctional scales TID AC and HS  - Will require insulin regimen for home-going fwd, follow up with endocrine for final recs     #KAYLA  sCr 2.1 from baseline creat ~1.6 on admission. BUN/Cr elevated > 20. Suspect volume-mediated KAYLA related to severe hyperglycemia and urinary losses  - s/p IVF, Cr trending down today to 1.53 around baseline   - Encouraged adequate oral hydration  - trend UOP, sCr     # HTN:   - increase nifedipine from 30 to 60 mg po daily  - a/w metoprolol 50 mg BID     Dispo: anticipate d/c home tomorrow with close follow up with Dr. Mejia

## 2025-02-19 NOTE — DISCHARGE NOTE PROVIDER - NSDCFUADDAPPT_GEN_ALL_CORE_FT
Follow up with Dr. Mejia in 2 weeks  Follow up with endocrinology in 2 weeks Follow up with Dr. Mejia and Endocrinology in 2 weeks  Patient has an appt with MONSE Michael at  [facility] ([phone]) at [address] [place] [address] [address] [place] [phone]  Routine follow-up with Ophthalmology and Podiatry.

## 2025-02-19 NOTE — DISCHARGE NOTE PROVIDER - NSDCFUSCHEDAPPT_GEN_ALL_CORE_FT
[facility]  HEPATOLOGY [address] [place] [address]   Scheduled Appointment: 03/25/2025     Gage Mejia  [facility]  HEPATOLOGY [address] [facility]   Scheduled Appointment: 03/25/2025    Mel Michael  [address]  Scheduled Appointment: 04/29/2025

## 2025-02-19 NOTE — PROGRESS NOTE ADULT - ASSESSMENT
Steroid induced hyperglycemia  in pt with rejection of liver transplant,  given high dose solumedrol now on Pred 20 mg. BS appear improved.     Discharge Planning:  - Discharge on Lantus 50u QHS and Admelog 35 u TID AC (hold admelog if pre-meal BG <120mg/dL or patient is not  eating meal).  - Patient should check BG TID AC and HS. Please tell patient to contact Endocrinologist if BG <70mg/dL x1 or >200mg/dL consistently or >400mg/dL x1, close f/u with Endo outpatient.   - Order glucagon (Gvoke 1mg prefilled syringe OR Baqsimi 3mg intranasal) to be given in case of severe hypoglycemia and patient is unable to ingest glucose source.  - Patient previously following with Dr. Peguero, who is no longer at [facility]. Patient can follow up at the [facility] ([phone]) at [address] [place] [address] [address] [place] [phone], please provide in d/c paperwork to schedule appt.   - Routine follow-up with Ophthalmology and Podiatry.   - Patient will need RX for basal insulin pen (ie. Basaglar, Lantus, Tresiba, Toujeo) and bolus insulin pen (ie. humalog/novolog/admelog) depending on insurance coverage; please send test scripts to see which is covered. Please send prescriptions for diabetes supplies (glucometer, test strips, lancets, alcohol swabs, insulin pen needles).

## 2025-02-19 NOTE — DISCHARGE NOTE PROVIDER - HOSPITAL COURSE
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    []Rejection  - Liver biopsy 2/12 with moderate-late ACR (TAPIA 5/9) w/ evolving chronic rejection (w/ cytokeratin stains pending) as well as scattered histiocyte collections.   - DSA 2/11: 2-9k DSAs against several class II HLA's  - Solu 500 (2/13-2/15)  - LFT's trending down  - Immuno: Cyclo__, MMF 1/1, pred 20    []DM  - Endocrine following  - s/p insulin gtt  - lantus 50, premeal 35    []KAYLA in the setting of dehydration and hyperglycemia  - Improving    Patient deemed stable for discharge by the transplant surgery team.  Follow up with Dr. 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    Patient deemed stable for discharge by the transplant surgery team.    []Rejection  - Liver biopsy 2/12 with moderate-late ACR (TAPIA 5/9) w/ evolving chronic rejection (w/ cytokeratin stains pending) as well as scattered histiocyte collections.   - DSA 2/11: 2-9k DSAs against several class II HLA's  - Solu 500 (2/13-2/15)    - Immuno: Cyclosporine 150mg BID, MMF 1g BID, pred taper  - PPX: Nystatin, bactrim, valcyte, PPI  - Ursodiol 300mg BID    []DM  - lantus 35units @HS, premeal insulin 30units  - TID AC (hold admelog if pre-meal BG <120mg/dL or patient is not  eating meal).  - Please tell patient to contact Endocrinologist if BG <70mg/dL x1 or >200mg/dL consistently or >400mg/dL x1, close f/u with Endo outpatient.     []HTN  - Metoprolol 50mg BID, Nifedipine 60mg QD    Follow up with Dr. Mejia and Endocrinology in 2 weeks  Patient has an appt with MONSE Michael at  [facility] ([phone]) at [address] [place] [address] [address] [place] [id number],   Routine follow-up with Ophthalmology and Podiatry. 63 y/o M w/ PMH obesity, AUD, CKD, HTN, DM, b/l DVTs (w/ resolution), EtOH cirrhosis s/p DDLT (4/22/2019 at 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    Patient deemed stable for discharge by the transplant surgery team.    []Rejection  - Liver biopsy 2/12 with moderate-late ACR (TAPIA 5/9) w/ evolving chronic rejection (w/ cytokeratin stains pending) as well as scattered histiocyte collections.   - DSA 2/11: 2-9k DSAs against several class II HLA's  - Solu 500 (2/13-2/15)    - Immuno: Cyclosporine 150mg BID, MMF 1g BID, pred taper  - PPX: Nystatin, bactrim, valcyte, PPI  - Ursodiol 300mg BID    []DM  - Followed by Endo - patient will be discharged on prednisone taper 40mg x 5 days, 30mg x5 days and 20mg daily thereafter  Patient can restart home Ozempic at home dose    - While on prednisone 40mg 2/21-2/25   -Take Lantus 40units QHS and Admelog 35units TID AC (hold admelog if pre-meal BG <120mg/dL or patient is not  eating meal).  While on prednisone 30mg 2/26-3/2  -Take Lantus 30 units QHS and Admelog 25 units with each meal, hold if not eating or BG less than 120mg/dl.  While on prednisone 20mg.   Take Lantus 20 units QHS and Admelog 20 units with each meal hold if not eating or BG less than 120mg/dl.  Should follow closely with his endocrinologist NP Michael as doses may need to be adjusted while at home.     - Routine follow-up with Ophthalmology and Podiatry.     []HTN  - Metoprolol 50mg BID, Nifedipine 60mg QD    Follow up with Dr. Mejia in 1-2 weeks  F/u with Endocrinology in 2 weeks  Patient has an appt with MONSE Michael at  [facility] ([phone]) at [address] [place] [address] Suite [facility], [place] [address],   Routine follow-up with Ophthalmology and Podiatry.

## 2025-02-19 NOTE — PROGRESS NOTE ADULT - SUBJECTIVE AND OBJECTIVE BOX
Gastroenterology progress note:     Patient is a 64y old  Male who presents with a chief complaint of Acute cellular rejection, hx of OLT (18 Feb 2025 13:40)       Admitted on: 02-11-25       Interval History:    No acute events overnight.   has no complains      PAST MEDICAL & SURGICAL HISTORY:  Hypertension      Diabetes mellitus      Alcoholic cirrhosis      Common bile duct (CBD) obstruction      Anxiety      Chronic kidney disease (CKD)      Calculus of bile duct without cholangitis without obstruction      Liver transplanted      History of ERCP          MEDICATIONS  (STANDING):  cycloSPORINE  , modified (GENGRAF) 150 milliGRAM(s) Oral <User Schedule>  cycloSPORINE  , modified (GENGRAF) 125 milliGRAM(s) Oral <User Schedule>  dextrose 5%. 1000 milliLiter(s) (50 mL/Hr) IV Continuous <Continuous>  dextrose 5%. 1000 milliLiter(s) (100 mL/Hr) IV Continuous <Continuous>  dextrose 50% Injectable 25 Gram(s) IV Push once  dextrose 50% Injectable 12.5 Gram(s) IV Push once  dextrose 50% Injectable 25 Gram(s) IV Push once  glucagon  Injectable 1 milliGRAM(s) IntraMuscular once  insulin glargine Injectable (LANTUS) 50 Unit(s) SubCutaneous at bedtime  insulin lispro (ADMELOG) corrective regimen sliding scale   SubCutaneous three times a day before meals  insulin lispro (ADMELOG) corrective regimen sliding scale   SubCutaneous at bedtime  insulin lispro Injectable (ADMELOG) 35 Unit(s) SubCutaneous three times a day before meals  magnesium oxide 400 milliGRAM(s) Oral two times a day with meals  metoprolol tartrate 50 milliGRAM(s) Oral every 12 hours  mycophenolate mofetil 1000 milliGRAM(s) Oral <User Schedule>  nystatin    Suspension [id number] Unit(s) Swish and Swallow four times a day  predniSONE   Tablet 20 milliGRAM(s) Oral daily  risperiDONE   Tablet 1 milliGRAM(s) Oral at bedtime  trimethoprim   80 mG/sulfamethoxazole 400 mG 1 Tablet(s) Oral daily  ursodiol Capsule 300 milliGRAM(s) Oral two times a day  valGANciclovir 450 milliGRAM(s) Oral daily    MEDICATIONS  (PRN):  dextrose Oral Gel 15 Gram(s) Oral once PRN Blood Glucose LESS THAN 70 milliGRAM(s)/deciliter      Allergies  No Known Allergies      Review of Systems:   Cardiovascular:  No Chest Pain, No Palpitations  Respiratory:  No Cough, No Dyspnea  Gastrointestinal:  As described in HPI  Skin:  No Skin Lesions, No Jaundice  Neuro:  No Syncope, No Dizziness    Physical Examination:  T(C): 36.6 (02-19-25 @ 09:00), Max: 36.9 (02-18-25 @ 21:00)  HR: 65 (02-19-25 @ 09:00) (63 - 82)  BP: 160/75 (02-19-25 @ 09:00) (149/74 - 168/88)  RR: 18 (02-19-25 @ 09:00) (18 - 18)  SpO2: 95% (02-19-25 @ 09:00) (95% - 96%)      02-18-25 @ 07:01  -  02-19-25 @ 07:00  --------------------------------------------------------  IN: 960 mL / OUT: 2895 mL / NET: -1935 mL        GENERAL: AAOx3, no acute distress.  HEAD:  Atraumatic, Normocephalic  EYES: conjunctiva and sclera clear  NECK: Supple, no JVD or thyromegaly  CHEST/LUNG: Clear to auscultation bilaterally; No wheeze, rhonchi, or rales  HEART: Regular rate and rhythm; normal S1, S2, No murmurs.  ABDOMEN: Soft, nontender, nondistended; Bowel sounds present  NEUROLOGY: No asterixis or tremor.   SKIN: Intact, no jaundice     Data:                        9.7    10.77 )-----------( 363      ( 19 Feb 2025 06:57 )             31.3     Hgb trend:  9.7  02-19-25 @ 06:57  9.6  02-18-25 @ 06:39  9.9  02-17-25 @ 07:09        02-19    137  |  102  |  40[H]  ----------------------------<  203[H]  4.3   |  18[L]  |  1.53[H]    Ca    8.2[L]      19 Feb 2025 06:57  Phos  3.5     02-19  Mg     1.8     02-19    TPro  6.1  /  Alb  3.1[L]  /  TBili  1.8[H]  /  DBili  x   /  AST  121[H]  /  ALT  193[H]  /  AlkPhos  552[H]  02-19    Liver panel trend:  TBili 1.8   /      /      /   AlkP 552   /   Tptn 6.1   /   Alb 3.1    /   DBili --      02-19  TBili 2.1   /   AST 98   /      /   AlkP 621   /   Tptn 6.5   /   Alb 3.4    /   DBili --      02-18  TBili 2.4   /   AST 75   /      /   AlkP 688   /   Tptn 6.8   /   Alb 3.4    /   DBili --      02-17  TBili 2.6   /   AST 85   /      /   AlkP 748   /   Tptn 6.8   /   Alb 3.1    /   DBili --      02-16  TBili 3.6   /      /      /   AlkP 871   /   Tptn 7.4   /   Alb 3.4    /   DBili --      02-15  TBili 5.3   /      /      /   AlkP 950   /   Tptn 7.6   /   Alb 3.5    /   DBili --      02-14  TBili 5.5   /      /      /   AlkP 1022   /   Tptn 8.0   /   Alb 3.5    /   DBili --      02-14  TBili 4.6   /      /      /   AlkP 906   /   Tptn 6.9   /   Alb 3.1    /   DBili --      02-13  TBili 5.0   /      /      /   AlkP 965   /   Tptn 7.2   /   Alb 3.6    /   DBili --      02-12  TBili 5.2   /      /      /   AlkP 962   /   Tptn 7.4   /   Alb 3.5    /   DBili --      02-11      PT/INR - ( 19 Feb 2025 06:57 )   PT: 10.5 sec;   INR: 0.92 ratio         PTT - ( 19 Feb 2025 06:57 )  PTT:24.4 sec

## 2025-02-20 LAB
ALBUMIN SERPL ELPH-MCNC: 3.2 G/DL — LOW (ref 3.3–5)
ALP SERPL-CCNC: 557 U/L — HIGH (ref 40–120)
ALT FLD-CCNC: 239 U/L — HIGH (ref 10–45)
ANION GAP SERPL CALC-SCNC: 15 MMOL/L — SIGNIFICANT CHANGE UP (ref 5–17)
APTT BLD: 24.9 SEC — SIGNIFICANT CHANGE UP (ref 24.5–35.6)
AST SERPL-CCNC: 160 U/L — HIGH (ref 10–40)
BASOPHILS # BLD AUTO: 0.02 K/UL — SIGNIFICANT CHANGE UP (ref 0–0.2)
BASOPHILS NFR BLD AUTO: 0.2 % — SIGNIFICANT CHANGE UP (ref 0–2)
BILIRUB SERPL-MCNC: 1.9 MG/DL — HIGH (ref 0.2–1.2)
BUN SERPL-MCNC: 31 MG/DL — HIGH (ref 7–23)
CALCIUM SERPL-MCNC: 8.2 MG/DL — LOW (ref 8.4–10.5)
CHLORIDE SERPL-SCNC: 103 MMOL/L — SIGNIFICANT CHANGE UP (ref 96–108)
CO2 SERPL-SCNC: 20 MMOL/L — LOW (ref 22–31)
CREAT SERPL-MCNC: 1.37 MG/DL — HIGH (ref 0.5–1.3)
CYCLOSPORINE SER-MCNC: 235 NG/ML — SIGNIFICANT CHANGE UP (ref 150–400)
EGFR: 57 ML/MIN/1.73M2 — LOW
EOSINOPHIL # BLD AUTO: 0.19 K/UL — SIGNIFICANT CHANGE UP (ref 0–0.5)
EOSINOPHIL NFR BLD AUTO: 1.8 % — SIGNIFICANT CHANGE UP (ref 0–6)
GLUCOSE BLDC GLUCOMTR-MCNC: 173 MG/DL — HIGH (ref 70–99)
GLUCOSE BLDC GLUCOMTR-MCNC: 197 MG/DL — HIGH (ref 70–99)
GLUCOSE BLDC GLUCOMTR-MCNC: 202 MG/DL — HIGH (ref 70–99)
GLUCOSE BLDC GLUCOMTR-MCNC: 220 MG/DL — HIGH (ref 70–99)
GLUCOSE SERPL-MCNC: 149 MG/DL — HIGH (ref 70–99)
HCT VFR BLD CALC: 32.1 % — LOW (ref 39–50)
HGB BLD-MCNC: 9.8 G/DL — LOW (ref 13–17)
IMM GRANULOCYTES NFR BLD AUTO: 1.5 % — HIGH (ref 0–0.9)
INR BLD: 0.96 RATIO — SIGNIFICANT CHANGE UP (ref 0.85–1.16)
LYMPHOCYTES # BLD AUTO: 3.12 K/UL — SIGNIFICANT CHANGE UP (ref 1–3.3)
LYMPHOCYTES # BLD AUTO: 30.4 % — SIGNIFICANT CHANGE UP (ref 13–44)
MAGNESIUM SERPL-MCNC: 1.9 MG/DL — SIGNIFICANT CHANGE UP (ref 1.6–2.6)
MCHC RBC-ENTMCNC: 25.2 PG — LOW (ref 27–34)
MCHC RBC-ENTMCNC: 30.5 G/DL — LOW (ref 32–36)
MCV RBC AUTO: 82.5 FL — SIGNIFICANT CHANGE UP (ref 80–100)
MONOCYTES # BLD AUTO: 0.38 K/UL — SIGNIFICANT CHANGE UP (ref 0–0.9)
MONOCYTES NFR BLD AUTO: 3.7 % — SIGNIFICANT CHANGE UP (ref 2–14)
NEUTROPHILS # BLD AUTO: 6.42 K/UL — SIGNIFICANT CHANGE UP (ref 1.8–7.4)
NEUTROPHILS NFR BLD AUTO: 62.4 % — SIGNIFICANT CHANGE UP (ref 43–77)
NRBC BLD AUTO-RTO: 0 /100 WBCS — SIGNIFICANT CHANGE UP (ref 0–0)
PHOSPHATE SERPL-MCNC: 3.9 MG/DL — SIGNIFICANT CHANGE UP (ref 2.5–4.5)
PLATELET # BLD AUTO: 326 K/UL — SIGNIFICANT CHANGE UP (ref 150–400)
POTASSIUM SERPL-MCNC: 4.3 MMOL/L — SIGNIFICANT CHANGE UP (ref 3.5–5.3)
POTASSIUM SERPL-SCNC: 4.3 MMOL/L — SIGNIFICANT CHANGE UP (ref 3.5–5.3)
PROT SERPL-MCNC: 6.2 G/DL — SIGNIFICANT CHANGE UP (ref 6–8.3)
PROTHROM AB SERPL-ACNC: 11 SEC — SIGNIFICANT CHANGE UP (ref 9.9–13.4)
RBC # BLD: 3.89 M/UL — LOW (ref 4.2–5.8)
RBC # FLD: 18.2 % — HIGH (ref 10.3–14.5)
SODIUM SERPL-SCNC: 138 MMOL/L — SIGNIFICANT CHANGE UP (ref 135–145)
WBC # BLD: 10.28 K/UL — SIGNIFICANT CHANGE UP (ref 3.8–10.5)
WBC # FLD AUTO: 10.28 K/UL — SIGNIFICANT CHANGE UP (ref 3.8–10.5)

## 2025-02-20 PROCEDURE — 99232 SBSQ HOSP IP/OBS MODERATE 35: CPT | Mod: GC

## 2025-02-20 RX ORDER — PREDNISONE 20 MG/1
20 TABLET ORAL ONCE
Refills: 0 | Status: COMPLETED | OUTPATIENT
Start: 2025-02-20 | End: 2025-02-20

## 2025-02-20 RX ADMIN — INSULIN LISPRO 35 UNIT(S): 100 INJECTION, SOLUTION INTRAVENOUS; SUBCUTANEOUS at 12:58

## 2025-02-20 RX ADMIN — Medication 60 MILLIGRAM(S): at 05:40

## 2025-02-20 RX ADMIN — Medication 1 MILLIGRAM(S): at 21:25

## 2025-02-20 RX ADMIN — INSULIN GLARGINE-YFGN 40 UNIT(S): 100 INJECTION, SOLUTION SUBCUTANEOUS at 21:23

## 2025-02-20 RX ADMIN — Medication 500000 UNIT(S): at 11:27

## 2025-02-20 RX ADMIN — Medication 500000 UNIT(S): at 05:40

## 2025-02-20 RX ADMIN — INSULIN LISPRO 35 UNIT(S): 100 INJECTION, SOLUTION INTRAVENOUS; SUBCUTANEOUS at 08:40

## 2025-02-20 RX ADMIN — MYCOPHENOLATE MOFETIL 1000 MILLIGRAM(S): 500 TABLET, FILM COATED ORAL at 21:25

## 2025-02-20 RX ADMIN — URSODIOL 300 MILLIGRAM(S): 300 CAPSULE ORAL at 17:47

## 2025-02-20 RX ADMIN — PREDNISONE 20 MILLIGRAM(S): 20 TABLET ORAL at 05:40

## 2025-02-20 RX ADMIN — INSULIN LISPRO 1: 100 INJECTION, SOLUTION INTRAVENOUS; SUBCUTANEOUS at 12:57

## 2025-02-20 RX ADMIN — METOPROLOL SUCCINATE 50 MILLIGRAM(S): 50 TABLET, EXTENDED RELEASE ORAL at 17:47

## 2025-02-20 RX ADMIN — PREDNISONE 20 MILLIGRAM(S): 20 TABLET ORAL at 11:27

## 2025-02-20 RX ADMIN — VALGANCICLOVIR 450 MILLIGRAM(S): 450 TABLET, FILM COATED ORAL at 11:27

## 2025-02-20 RX ADMIN — INSULIN LISPRO 2: 100 INJECTION, SOLUTION INTRAVENOUS; SUBCUTANEOUS at 08:40

## 2025-02-20 RX ADMIN — INSULIN LISPRO 2: 100 INJECTION, SOLUTION INTRAVENOUS; SUBCUTANEOUS at 17:48

## 2025-02-20 RX ADMIN — URSODIOL 300 MILLIGRAM(S): 300 CAPSULE ORAL at 05:40

## 2025-02-20 RX ADMIN — METOPROLOL SUCCINATE 50 MILLIGRAM(S): 50 TABLET, EXTENDED RELEASE ORAL at 05:40

## 2025-02-20 RX ADMIN — CYCLOSPORINE 150 MILLIGRAM(S): 100 CAPSULE, LIQUID FILLED ORAL at 08:37

## 2025-02-20 RX ADMIN — Medication 400 MILLIGRAM(S): at 08:40

## 2025-02-20 RX ADMIN — Medication 500000 UNIT(S): at 17:53

## 2025-02-20 RX ADMIN — Medication 400 MILLIGRAM(S): at 17:47

## 2025-02-20 RX ADMIN — Medication 1 TABLET(S): at 11:27

## 2025-02-20 RX ADMIN — MYCOPHENOLATE MOFETIL 1000 MILLIGRAM(S): 500 TABLET, FILM COATED ORAL at 08:40

## 2025-02-20 RX ADMIN — CYCLOSPORINE 150 MILLIGRAM(S): 100 CAPSULE, LIQUID FILLED ORAL at 21:24

## 2025-02-20 RX ADMIN — Medication 500000 UNIT(S): at 23:41

## 2025-02-20 RX ADMIN — INSULIN LISPRO 35 UNIT(S): 100 INJECTION, SOLUTION INTRAVENOUS; SUBCUTANEOUS at 17:48

## 2025-02-20 NOTE — PROGRESS NOTE ADULT - ASSESSMENT
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    #Elevated liver enzymes 2/2 ACR w/ evolving chronic rejection  #s/p OLT 2019 for alcoholic cirrhosis  # H/O anastomotic stricture with choledocholithiasis s/p multiple ERCP most recently 2/7: no stricture appreciated and CBD was cleared   # steatohepatitis on liver bx 5/01/2024  Liver biopsy from 2/12 demonstrating moderate-late ACR (TAPIA 5/9) w/ evolving chronic rejection (w/ cytokeratin stains pending) as well as scattered histiocyte collections.   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  Liver chemistry is improving following treatment with steroids   - Solumedrol 500 mg IV (2/13-2/15)  - Increase prednisone 40 mg QD 2/2 worsening transaminases   - MMF 1000 mg BID  - continue Cyclosporine 150 mg + Cyclosporine 150 mg daily for goal trough level 200-250 ng/mL   - Continue ursodiol  - PPx w/ nystatin, bactrim and valcyte   - 2/20 - transaminases continuing to uptrend, though cyclosporine already at goal. adjustments in prednisone as above     #Steroid induced hyperglycemia  - Glucose 500s related to high-dose steroids, required insulin gtt, transitioned to basal/bolus insulin on 2/15 PM   - Glucose persistently elevated to 300s  - Endocrine recs:  - Adjust Admelog to 48u QHS  - c/w Admelog to 35u TID AC  - C/w moderate dose Admelog correctional scales TID AC and HS  - Will require insulin regimen for home-going fwd, follow up with endocrine for final recs     #KAYLA  sCr 2.1 from baseline creat ~1.6 on admission. BUN/Cr elevated > 20. Suspect volume-mediated KAYLA related to severe hyperglycemia and urinary losses  - s/p IVF, Cr trending down today to 1.53 around baseline   - Encouraged adequate oral hydration  - trend UOP, sCr     # HTN:   - increase nifedipine from 30 to 60 mg po daily  - a/w metoprolol 50 mg BID     Dispo: anticipate d/c home once transaminases are stable/improving     Note incomplete until finalized by attending signature/attestation.    Ike Lozada  GI/Hepatology Fellow, PGY-4    MONDAY-FRIDAY 8AM-5PM:  Please message via [employer] or email [email or web address]     On Weekends/Holidays (All day) and Weekdays after 5 PM to 8 AM  For nonurgent consults please email:  Please email [email or web address]  For urgent consults:  Please contact on call GI team. See Amion schedule ([facility]), [employer] paging system ([facility]), or call hospital  ([facility])

## 2025-02-20 NOTE — PROGRESS NOTE ADULT - ATTENDING COMMENTS
Post OLT for alcohol associated liver cirrhosis (4/22/2019 at [facility] CMV D+,R-) with his post transplant course complicated by CNI neurotoxicity necessitating conversion from prograf to cyclosporine, and biliary anastomotic stricture and choledocholithiasis (s/p multiple ERCPs, most recently on 2/7/25 w/ removal of CBD stone and no residual stricture) who was admitted with elevated liver enzymes on 2/11 concerning for rejection. He is s/p Liver biopsy on 2/12 with findings consistent with moderate ACR (5/9 TAPIA: 2 (portal inflammation) + 2 (bile duct inflammation and damage) + 1 (venous endothelial inflammation), no fibrosis, no iron stores, but with bile duct paucity 3/19 supportive evidence of chronic rejection with initial response to pulse dose steroids. Enzymes trended up yesterday after tapering to 20mg. We have increased his cylcosporine further and are aiming for a level of 150-200 for now in the setting of some chronic rejection. Prednisone 40mg today. Continue his Ppx medications (Bactrim, nystatin and Valcyte).  His hospitalization has been complicated by KAYLA (improving) and steroid induced hyperglycemia for which the endo team has been helping to manage his sugars.   Encouraged walking and getting OOB to chair.  Discharge home if glucose level gets better and his enzymes trend down.    Arminda Ronquillo MD/MPH  Transplant hepatology .
63 yo M with obesity, AUD, CKD, hypertension, DM, history of BLE DVTs (s/p anticoagulation with resolution), subcentimeter pancreatic cystic lesion, and a history of alcohol-associated cirrhosis s/p DDLT (4/22/19 at [facility], CMV D+/R-) with post-transplant course complicated by CNI neurotoxicity requiring early conversion from tacrolimus to cyclosporine, history of low-level CMV viremia (resolved), and a prior admission to [facility] (4/28/24-5/8/24) due to elevated liver chemistries with evidence of a biliary anastomotic stricture and choledocholithiasis (s/p serial ERCPs, most recently on 2/7/25 with removal of a CBD stone and no residual stricture with no stent replaced) as well as liver biopsy (5/3/24) with (per report) mild steatosis, stage 2 fibrosis, and indeterminate rejection versus DILI treated with an oral steroid taper, admitted on 2/11/25 due to worsening liver chemistries. Doppler US (2/11) with mild steatosis and patent hepatic vasculature. MRI/MRCP (2/12) with no biliary obstruction. DSA (2/11) with 2-9k DSAs against several class II HLAs. Liver biopsy (2/12) was reviewed today with pathology and showed moderate late ACR (TAPIA 5/9) evolving to chronic rejection (with cytokeratin stains pending to assess degree of ductopenia), also with scattered histiocyte collections (?granulomas) within the lobule of uncertain etiology with special stains pending. Starting Solumedrol 500 mg iv daily x3 days (2/13- ). MMF increased today to 1000 mg po bid. Sandimmune increased today to 125 mg po q12h for increased goal trough level 200-250 ng/mL as tolerated. Continuing ursodiol. Starting prophylaxis with nystatin, Bactrim, and Valcyte.    Kennedy Castaneda M.D., Ph.D.  Transplant Hepatology
65 yo M with obesity, AUD, CKD, hypertension, DM, history of BLE DVTs (s/p anticoagulation with resolution), and a history of alcohol-associated cirrhosis s/p DDLT (4/22/19 at [facility], CMV D+/R-) with post-transplant course complicated by CNI neurotoxicity requiring early conversion from tacrolimus to cyclosporine, history of low-level CMV viremia (resolved), and a prior admission to [facility] (4/28/24-5/8/24) due to elevated liver chemistries with evidence of a biliary anastomotic stricture and choledocholithiasis (s/p serial ERCPs, most recently on 2/7/25 with removal of a CBD stone and no residual stricture with no stent replaced) as well as liver biopsy (5/3/24) with mild steatosis, stage 2 fibrosis, and indeterminate rejection versus DILI treated with an oral steroid taper, admitted on 2/11/25 due to worsening liver chemistries. Doppler US (2/11) with mild steatosis and patent hepatic vasculature. Repeat MRI/MRCP ordered today. Liver biopsy performed today and will be reviewed tomorrow. Pending the biopsy results, continuing home immunosuppression with Sandimmune 75 mg po q12h and  mg po bid. He is currently off steroids. Continuing ursodiol.     Kennedy Castaneda M.D., Ph.D.  Transplant Hepatology
65 yo M with obesity, AUD, CKD, hypertension, DM, history of BLE DVTs (s/p anticoagulation with resolution), subcentimeter pancreatic cystic lesion, and a history of alcohol-associated cirrhosis s/p DDLT (4/22/19 at [facility], CMV D+/R-) with post-transplant course complicated by CNI neurotoxicity requiring early conversion from tacrolimus to cyclosporine, history of low-level CMV viremia (resolved), and a prior admission to [facility] (4/28/24-5/8/24) due to elevated liver chemistries with evidence of a biliary anastomotic stricture and choledocholithiasis (s/p serial ERCPs, most recently on 2/7/25 with removal of a CBD stone and no residual stricture with no stent replaced) as well as liver biopsy (5/3/24) with (per report) mild steatosis, stage 2 fibrosis, and indeterminate rejection versus DILI treated with an oral steroid taper, admitted on 2/11/25 due to worsening liver chemistries. Doppler US (2/11) with mild steatosis and patent hepatic vasculature. MRI/MRCP (2/12) with no biliary obstruction. DSA (2/11) with 2-9k DSAs against several class II HLAs. Liver biopsy (2/12) was reviewed with pathology yesterday (2/13) and showed moderate late ACR (TAPIA 5/9) evolving to chronic rejection (with cytokeratin stains pending to assess degree of ductopenia), also with scattered histiocyte collections (?granulomas) within the lobule of uncertain etiology with special stains pending. S/p Solumedrol 500 mg iv x1 yesterday (2/13) and planned for his second of three planned doses of Solumedrol 500 mg iv daily again today but with dose being given late this afternoon due to marked hyperglycemia despite subcutaneous insulin regimen adjustments and for which he is currently on an insulin drip. Appreciate Endocrinology input. Liver enzymes are starting to improve but the chronic rejection component will likely takes weeks to months. Continuing MMF 1000 mg po bid (increased from 500 mg po bid yesterday). Continuing Sandimmune 125 mg po q12h (increased yesterday) for higher goal trough level of 200-250 ng/mL as tolerated. Continuing ursodiol, nystatin, Bactrim, and Valcyte 900 mg po daily.    Kennedy Castaneda M.D., Ph.D.  Transplant Hepatology
ACR responding to pulse dose steroids. Prednisone 20mg today. Continue his cyclosporine & Ppx medications (Bactrim, nystatin and Valcyte).  He remains in the hospital for management of his hyperglycemia but should be able to go home tomorrow. appreciate Endo team's help.  Also has developed an KAYLA likely in the setting of dehydration (with hyperglycemia) which is improving.   Encouraged walking and getting OOB to chair.    Arminda Ronquillo MD/MPH  Transplant hepatology .
ACR responding to pulse dose steroids. Prednisone 40mg today. Continue his cyclosporine & Ppx medications (Bactrim, nystatin and Valcyte).  He remains in the hospital for management of his hyperglycemia. appreciate Endo team's help.  Also has developed an KAYLA likely in the setting of dehydration (with hyperglycemia). Will give fluid bolus again today with hopes that it will improve.  Encouraged walking and getting OOB to chair.    Arminda Ronquillo MD/MPH  Transplant hepatology .
ACR responding to pulse dose steroids. He remains in the hospital for management of his hyperglycemia. appreciate Endo team's help.  Encouraged walking and getting OOB to chair.    Arminda Ronquillo MD/MPH  Transplant hepatology

## 2025-02-20 NOTE — PROGRESS NOTE ADULT - SUBJECTIVE AND OBJECTIVE BOX
Chief Complaint: Diabetes Mellitus follow up    INTERVAL HX:  Patient seen at bedside.  Reports he received a second dose of prednisone 20mg this morning  Reports eating well, finishes 100% of food on each tray. BG over the last 24 hrs have been within goal range 100-180mg/dl. No hypoglycemia.     Review of Systems:  General: As above  GI: No nausea, vomiting  Endocrine: no  S&Sx of hypoglycemia    Allergies    No Known Allergies    Intolerances      MEDICATIONS  (STANDING):  cycloSPORINE  , modified (GENGRAF) 150 milliGRAM(s) Oral <User Schedule>  dextrose 5%. 1000 milliLiter(s) (50 mL/Hr) IV Continuous <Continuous>  dextrose 5%. 1000 milliLiter(s) (100 mL/Hr) IV Continuous <Continuous>  dextrose 50% Injectable 25 Gram(s) IV Push once  dextrose 50% Injectable 12.5 Gram(s) IV Push once  dextrose 50% Injectable 25 Gram(s) IV Push once  glucagon  Injectable 1 milliGRAM(s) IntraMuscular once  insulin glargine Injectable (LANTUS) 40 Unit(s) SubCutaneous at bedtime  insulin lispro (ADMELOG) corrective regimen sliding scale   SubCutaneous three times a day before meals  insulin lispro (ADMELOG) corrective regimen sliding scale   SubCutaneous at bedtime  insulin lispro Injectable (ADMELOG) 35 Unit(s) SubCutaneous three times a day before meals  magnesium oxide 400 milliGRAM(s) Oral two times a day with meals  metoprolol tartrate 50 milliGRAM(s) Oral every 12 hours  mycophenolate mofetil 1000 milliGRAM(s) Oral <User Schedule>  NIFEdipine XL 60 milliGRAM(s) Oral daily  nystatin    Suspension 544852 Unit(s) Swish and Swallow four times a day  risperiDONE   Tablet 1 milliGRAM(s) Oral at bedtime  trimethoprim   80 mG/sulfamethoxazole 400 mG 1 Tablet(s) Oral daily  ursodiol Capsule 300 milliGRAM(s) Oral two times a day  valGANciclovir 450 milliGRAM(s) Oral daily        insulin glargine Injectable (LANTUS)   40 Unit(s) SubCutaneous (02-19-25 @ 22:39)    insulin lispro (ADMELOG) corrective regimen sliding scale   1 Unit(s) SubCutaneous (02-20-25 @ 12:57)   2 Unit(s) SubCutaneous (02-20-25 @ 08:40)    insulin lispro Injectable (ADMELOG)   35 Unit(s) SubCutaneous (02-20-25 @ 12:58)   35 Unit(s) SubCutaneous (02-20-25 @ 08:40)   35 Unit(s) SubCutaneous (02-19-25 @ 17:01)    predniSONE   Tablet   20 milliGRAM(s) Oral (02-20-25 @ 05:40)    predniSONE   Tablet   20 milliGRAM(s) Oral (02-20-25 @ 11:27)        PHYSICAL EXAM:  VITALS: T(C): 36.9 (02-20-25 @ 13:00)  T(F): 98.4 (02-20-25 @ 13:00), Max: 98.8 (02-19-25 @ 16:30)  HR: 79 (02-20-25 @ 13:00) (71 - 79)  BP: 146/78 (02-20-25 @ 13:00) (132/72 - 170/91)  RR:  (16 - 18)  SpO2:  (94% - 98%)  Wt(kg): --  GENERAL: NAD  Respiratory: Respirations unlabored   Extremities: Warm, no edema  NEURO: Alert , appropriate     LABS:  POCT Blood Glucose.: 173 mg/dL (02-20-25 @ 12:56)  POCT Blood Glucose.: 202 mg/dL (02-20-25 @ 08:35)  POCT Blood Glucose.: 105 mg/dL (02-19-25 @ 22:12)  POCT Blood Glucose.: 92 mg/dL (02-19-25 @ 20:54)  POCT Blood Glucose.: 144 mg/dL (02-19-25 @ 16:28)  POCT Blood Glucose.: 166 mg/dL (02-19-25 @ 12:57)  POCT Blood Glucose.: 208 mg/dL (02-19-25 @ 08:33)  POCT Blood Glucose.: 205 mg/dL (02-18-25 @ 21:09)  POCT Blood Glucose.: 182 mg/dL (02-18-25 @ 17:21)  POCT Blood Glucose.: 240 mg/dL (02-18-25 @ 12:31)  POCT Blood Glucose.: 222 mg/dL (02-18-25 @ 08:47)  POCT Blood Glucose.: 209 mg/dL (02-18-25 @ 05:48)  POCT Blood Glucose.: 207 mg/dL (02-18-25 @ 01:43)  POCT Blood Glucose.: 267 mg/dL (02-17-25 @ 21:17)  POCT Blood Glucose.: 232 mg/dL (02-17-25 @ 17:20)                          9.8    10.28 )-----------( 326      ( 20 Feb 2025 07:15 )             32.1     02-20    138  |  103  |  31[H]  ----------------------------<  149[H]  4.3   |  20[L]  |  1.37[H]    Ca    8.2[L]      20 Feb 2025 07:13  Phos  3.9     02-20  Mg     1.9     02-20    TPro  6.2  /  Alb  3.2[L]  /  TBili  1.9[H]  /  DBili  x   /  AST  160[H]  /  ALT  239[H]  /  AlkPhos  557[H]  02-20    eGFR: 57 mL/min/1.73m2 (20 Feb 2025 07:13)      Thyroid Function Tests:      A1C with Estimated Average Glucose Result: 8.9 % (01-14-25 @ 11:55)    Estimated Average Glucose: 209 mg/dL (01-14-25 @ 11:55)        Diet, Consistent Carbohydrate/No Snacks (02-15-25 @ 08:47) [Active]

## 2025-02-20 NOTE — PROGRESS NOTE ADULT - ASSESSMENT
Steroid induced hyperglycemia  in pt with rejection of liver transplant,  given high dose solumedrol now on Pred 20 mg. BS appear improved.       Endocrine consulted for further management (high risk patient with severe hyperglycemia with glucose values > 400's at high risk of CAD and CVA with high medical complexity and high level decision-making). Patient now continues off insulin drip since 2/14. Patient is eating well, tolerating POs. Patient now with improved hyperglycemia and taper of steroids. FBG elevated, will increase Lantus to 50u QHS for tighter BG control. Given persistent hyperglycemia to 200s, will increase mealtime insulin as well. No hypoglycemia. Now on Pred 20mg QD, final d/c dose. Patient is safe for discharge as long as BG is <250mg/dL x3 and okay with transplant team as patient would like to leave. Endocrine will closely monitor BG and adjust insulin as needed for BG goal 100-180mg/dL inpatient.     #Steroid-induced hyperglycemia  #T2DM  - A1c 8.9%  - Home meds: Lantus 10 units qhs, Humalog 10 units ac  - Follows with Dr. Kady Peguero                   Nutritional Assessment:  · Nutritional Assessment	Diet, Consistent Carbohydrate/No Snacks (02-15-25 @ 08:47) [Active]     Problem/Plan - 1:  ·  Problem: Uncontrolled type 2 diabetes mellitus with hyperglycemia, with long-term current use of insulin.   ·  Plan: Inpatient Plan:  - Check BG TID AC, HS, and 2AM  - Adjust Admelog to 50units QHS  - Adjust Admelog 35units TID AC (HOLD if NPO)  - Adjust to low dose Admelog correctional scales TID AC and HS give better BG control now with postprandial     Discharge Planning:  - Discharge on Lantus 50u QHS and Admelog 35u TID AC (hold admelog if pre-meal BG <120mg/dL or patient is not  eating meal).  - Patient should check BG TID AC and HS. Please tell patient to contact Endocrinologist if BG <70mg/dL x1 or >200mg/dL consistently or >400mg/dL x1, close f/u with Endo outpatient.   - Order glucagon (Gvoke 1mg prefilled syringe OR Baqsimi 3mg intranasal) to be given in case of severe hypoglycemia and patient is unable to ingest glucose source.  - Patient previously following with Dr. Peguero, who is no longer at [facility]. Patient can follow up at [facility] ([phone]) at [address] [place] [address] [address] [facility], [place] [address], please provide in d/c paperwork to schedule appt.   - Routine follow-up with Ophthalmology and Podiatry.   - Patient will need RX for basal insulin pen (ie. Basaglar, Lantus, Tresiba, Toujeo) and bolus insulin pen (ie. humalog/novolog/admelog) depending on insurance coverage; please send test scripts to see which is covered. Please send prescriptions for diabetes supplies (glucometer, test strips, lancets, alcohol swabs, insulin pen needles).     Problem/Plan - 2:  ·  Problem: Current chronic use of systemic steroids.   ·  Plan: - Steroids should not be discontinued abruptly as his long-term steroid use likely is causing some degree of HPA axis suppression and he risks adrenal crisis with sudden discontinuation of his steroids  - Discussed osteoporosis screening with patient although patient feels that it is futile as his life expectancy is not long enough for osteoporosis interventions to have benefits.   - Patient to be discharged on oral Prednisone 20mg once daily.     Problem/Plan - 3:  ·  Problem: Steroid-induced hyperglycemia.   ·  Plan: Patient is s/p IV methylprednisolone 500 mg x2, on prednisone taper currently on 20 mg   but received an additional dose of prednisone 20mg this morning.      Problem/Plan - 4:  ·  Problem: HTN (hypertension).   ·  Plan: - Goal BP <130/80  - Management as per primary team  - check urine microalbumin level as outpatient.     Problem/Plan - 5:  ·  Problem: HLD (hyperlipidemia).   ·  Plan: - LDL goal <70   - C/w statin therapy or recommend statin therapy if not contraindicated   - Check lipid panel as outpatient on a yearly basis  - Please check lipid panel in am.      Additional Information:  Contact via [employer] Teams during business hours  To reach covering provider access AMION via sunrise tools  For Urgent matters/after-hours/weekends/holidays please page endocrine fellow on call   For nonurgent matters please email [email or web address]    Please note that this patient may be followed by different provider tomorrow.  Notify endocrine 24 hours prior to discharge for final recommendations

## 2025-02-20 NOTE — PROGRESS NOTE ADULT - SUBJECTIVE AND OBJECTIVE BOX
Patient is a 64y old  Male who presents with a chief complaint of transplant rejection (2025 16:46)      Interval Events:   - Patient w/ elevated sugars this AM after eating muffin  - Feels otherwise well, hopeful to go home as soon as possible     ROS:   A 12-point ROS was performed and negative except as noted in HPI.    Hospital Medications:  cycloSPORINE  , modified (GENGRAF) 150 milliGRAM(s) Oral <User Schedule>  dextrose 5%. 1000 milliLiter(s) IV Continuous <Continuous>  dextrose 5%. 1000 milliLiter(s) IV Continuous <Continuous>  dextrose 50% Injectable 25 Gram(s) IV Push once  dextrose 50% Injectable 12.5 Gram(s) IV Push once  dextrose 50% Injectable 25 Gram(s) IV Push once  dextrose Oral Gel 15 Gram(s) Oral once PRN  glucagon  Injectable 1 milliGRAM(s) IntraMuscular once  insulin glargine Injectable (LANTUS) 40 Unit(s) SubCutaneous at bedtime  insulin lispro (ADMELOG) corrective regimen sliding scale   SubCutaneous three times a day before meals  insulin lispro (ADMELOG) corrective regimen sliding scale   SubCutaneous at bedtime  insulin lispro Injectable (ADMELOG) 35 Unit(s) SubCutaneous three times a day before meals  magnesium oxide 400 milliGRAM(s) Oral two times a day with meals  metoprolol tartrate 50 milliGRAM(s) Oral every 12 hours  mycophenolate mofetil 1000 milliGRAM(s) Oral <User Schedule>  NIFEdipine XL 60 milliGRAM(s) Oral daily  nystatin    Suspension 885652 Unit(s) Swish and Swallow four times a day  risperiDONE   Tablet 1 milliGRAM(s) Oral at bedtime  trimethoprim   80 mG/sulfamethoxazole 400 mG 1 Tablet(s) Oral daily  ursodiol Capsule 300 milliGRAM(s) Oral two times a day  valGANciclovir 450 milliGRAM(s) Oral daily      PHYSICAL EXAM:   Vital Signs:  Vital Signs Last 24 Hrs  T(C): 36.9 (2025 13:00), Max: 37.1 (2025 16:30)  T(F): 98.4 (2025 13:00), Max: 98.8 (2025 16:30)  HR: 79 (2025 13:00) (71 - 79)  BP: 146/78 (2025 13:00) (132/72 - 170/91)  BP(mean): --  RR: 18 (2025 13:00) (16 - 18)  SpO2: 97% (2025 13:00) (94% - 98%)    Parameters below as of 2025 13:00  Patient On (Oxygen Delivery Method): room air      Daily     Daily Weight in k (2025 05:45)    GENERAL: no acute distress  NEURO: alert  HEENT: anicteric sclera, no conjunctival pallor appreciated  CHEST: no respiratory distress, no accessory muscle use  CARDIAC: regular rate  ABDOMEN: soft, nondistended, nontender, no rebound or guarding  EXTREMITIES: warm, well perfused, no edema  SKIN: no lesions noted    LABS: reviewed                        9.8    10 )-----------( 326      ( 2025 07:15 )             32.1     02-20    138  |  103  |  31[H]  ----------------------------<  149[H]  4.3   |  20[L]  |  1.37[H]    Ca    8.2[L]      2025 07:13  Phos  3.9     02-20  Mg     1.9     02-20    TPro  6.2  /  Alb  3.2[L]  /  TBili  1.9[H]  /  DBili  x   /  AST  160[H]  /  ALT  239[H]  /  AlkPhos  557[H]  02-20    LIVER FUNCTIONS - ( 2025 07:13 )  Alb: 3.2 g/dL / Pro: 6.2 g/dL / ALK PHOS: 557 U/L / ALT: 239 U/L / AST: 160 U/L / GGT: x             Interval Diagnostic Studies: see sunrise for full report

## 2025-02-21 ENCOUNTER — TRANSCRIPTION ENCOUNTER (OUTPATIENT)
Age: 65
End: 2025-02-21

## 2025-02-21 VITALS
DIASTOLIC BLOOD PRESSURE: 78 MMHG | RESPIRATION RATE: 18 BRPM | OXYGEN SATURATION: 96 % | SYSTOLIC BLOOD PRESSURE: 138 MMHG | TEMPERATURE: 99 F | HEART RATE: 76 BPM

## 2025-02-21 LAB
ALBUMIN SERPL ELPH-MCNC: 3.2 G/DL — LOW (ref 3.3–5)
ALP SERPL-CCNC: 547 U/L — HIGH (ref 40–120)
ALT FLD-CCNC: 212 U/L — HIGH (ref 10–45)
ANION GAP SERPL CALC-SCNC: 13 MMOL/L — SIGNIFICANT CHANGE UP (ref 5–17)
APTT BLD: 24.5 SEC — SIGNIFICANT CHANGE UP (ref 24.5–35.6)
AST SERPL-CCNC: 112 U/L — HIGH (ref 10–40)
BASOPHILS # BLD AUTO: 0.01 K/UL — SIGNIFICANT CHANGE UP (ref 0–0.2)
BASOPHILS NFR BLD AUTO: 0.1 % — SIGNIFICANT CHANGE UP (ref 0–2)
BILIRUB SERPL-MCNC: 1.7 MG/DL — HIGH (ref 0.2–1.2)
BLD GP AB SCN SERPL QL: NEGATIVE — SIGNIFICANT CHANGE UP
BUN SERPL-MCNC: 35 MG/DL — HIGH (ref 7–23)
CALCIUM SERPL-MCNC: 8.3 MG/DL — LOW (ref 8.4–10.5)
CHLORIDE SERPL-SCNC: 103 MMOL/L — SIGNIFICANT CHANGE UP (ref 96–108)
CO2 SERPL-SCNC: 21 MMOL/L — LOW (ref 22–31)
CREAT SERPL-MCNC: 1.46 MG/DL — HIGH (ref 0.5–1.3)
CYCLOSPORINE SER-MCNC: 226 NG/ML — SIGNIFICANT CHANGE UP (ref 150–400)
EGFR: 53 ML/MIN/1.73M2 — LOW
EOSINOPHIL # BLD AUTO: 0.07 K/UL — SIGNIFICANT CHANGE UP (ref 0–0.5)
EOSINOPHIL NFR BLD AUTO: 0.7 % — SIGNIFICANT CHANGE UP (ref 0–6)
GLUCOSE BLDC GLUCOMTR-MCNC: 114 MG/DL — HIGH (ref 70–99)
GLUCOSE BLDC GLUCOMTR-MCNC: 172 MG/DL — HIGH (ref 70–99)
GLUCOSE BLDC GLUCOMTR-MCNC: 92 MG/DL — SIGNIFICANT CHANGE UP (ref 70–99)
GLUCOSE SERPL-MCNC: 192 MG/DL — HIGH (ref 70–99)
HCT VFR BLD CALC: 30.9 % — LOW (ref 39–50)
HGB BLD-MCNC: 9.6 G/DL — LOW (ref 13–17)
IMM GRANULOCYTES NFR BLD AUTO: 0.9 % — SIGNIFICANT CHANGE UP (ref 0–0.9)
INR BLD: 0.96 RATIO — SIGNIFICANT CHANGE UP (ref 0.85–1.16)
LYMPHOCYTES # BLD AUTO: 2.51 K/UL — SIGNIFICANT CHANGE UP (ref 1–3.3)
LYMPHOCYTES # BLD AUTO: 24.4 % — SIGNIFICANT CHANGE UP (ref 13–44)
MAGNESIUM SERPL-MCNC: 2 MG/DL — SIGNIFICANT CHANGE UP (ref 1.6–2.6)
MCHC RBC-ENTMCNC: 25.9 PG — LOW (ref 27–34)
MCHC RBC-ENTMCNC: 31.1 G/DL — LOW (ref 32–36)
MCV RBC AUTO: 83.3 FL — SIGNIFICANT CHANGE UP (ref 80–100)
MONOCYTES # BLD AUTO: 0.41 K/UL — SIGNIFICANT CHANGE UP (ref 0–0.9)
MONOCYTES NFR BLD AUTO: 4 % — SIGNIFICANT CHANGE UP (ref 2–14)
NEUTROPHILS # BLD AUTO: 7.18 K/UL — SIGNIFICANT CHANGE UP (ref 1.8–7.4)
NEUTROPHILS NFR BLD AUTO: 69.9 % — SIGNIFICANT CHANGE UP (ref 43–77)
NRBC BLD AUTO-RTO: 0 /100 WBCS — SIGNIFICANT CHANGE UP (ref 0–0)
PHOSPHATE SERPL-MCNC: 3.7 MG/DL — SIGNIFICANT CHANGE UP (ref 2.5–4.5)
PLATELET # BLD AUTO: 316 K/UL — SIGNIFICANT CHANGE UP (ref 150–400)
POTASSIUM SERPL-MCNC: 4.5 MMOL/L — SIGNIFICANT CHANGE UP (ref 3.5–5.3)
POTASSIUM SERPL-SCNC: 4.5 MMOL/L — SIGNIFICANT CHANGE UP (ref 3.5–5.3)
PROT SERPL-MCNC: 6 G/DL — SIGNIFICANT CHANGE UP (ref 6–8.3)
PROTHROM AB SERPL-ACNC: 10.9 SEC — SIGNIFICANT CHANGE UP (ref 9.9–13.4)
RBC # BLD: 3.71 M/UL — LOW (ref 4.2–5.8)
RBC # FLD: 18.6 % — HIGH (ref 10.3–14.5)
RH IG SCN BLD-IMP: POSITIVE — SIGNIFICANT CHANGE UP
SODIUM SERPL-SCNC: 137 MMOL/L — SIGNIFICANT CHANGE UP (ref 135–145)
WBC # BLD: 10.27 K/UL — SIGNIFICANT CHANGE UP (ref 3.8–10.5)
WBC # FLD AUTO: 10.27 K/UL — SIGNIFICANT CHANGE UP (ref 3.8–10.5)

## 2025-02-21 PROCEDURE — 82010 KETONE BODYS QUAN: CPT

## 2025-02-21 PROCEDURE — 76942 ECHO GUIDE FOR BIOPSY: CPT

## 2025-02-21 PROCEDURE — 88342 IMHCHEM/IMCYTCHM 1ST ANTB: CPT

## 2025-02-21 PROCEDURE — 84295 ASSAY OF SERUM SODIUM: CPT

## 2025-02-21 PROCEDURE — 82947 ASSAY GLUCOSE BLOOD QUANT: CPT

## 2025-02-21 PROCEDURE — 88341 IMHCHEM/IMCYTCHM EA ADD ANTB: CPT

## 2025-02-21 PROCEDURE — 87799 DETECT AGENT NOS DNA QUANT: CPT

## 2025-02-21 PROCEDURE — 86038 ANTINUCLEAR ANTIBODIES: CPT

## 2025-02-21 PROCEDURE — 86900 BLOOD TYPING SEROLOGIC ABO: CPT

## 2025-02-21 PROCEDURE — 99232 SBSQ HOSP IP/OBS MODERATE 35: CPT

## 2025-02-21 PROCEDURE — 83605 ASSAY OF LACTIC ACID: CPT

## 2025-02-21 PROCEDURE — 82330 ASSAY OF CALCIUM: CPT

## 2025-02-21 PROCEDURE — A9585: CPT

## 2025-02-21 PROCEDURE — 80074 ACUTE HEPATITIS PANEL: CPT

## 2025-02-21 PROCEDURE — 88364 INSITU HYBRIDIZATION (FISH): CPT

## 2025-02-21 PROCEDURE — 85014 HEMATOCRIT: CPT

## 2025-02-21 PROCEDURE — 85730 THROMBOPLASTIN TIME PARTIAL: CPT

## 2025-02-21 PROCEDURE — 87522 HEPATITIS C REVRS TRNSCRPJ: CPT

## 2025-02-21 PROCEDURE — 93975 VASCULAR STUDY: CPT

## 2025-02-21 PROCEDURE — 80053 COMPREHEN METABOLIC PANEL: CPT

## 2025-02-21 PROCEDURE — 88365 INSITU HYBRIDIZATION (FISH): CPT

## 2025-02-21 PROCEDURE — 47000 NEEDLE BIOPSY OF LIVER PERQ: CPT

## 2025-02-21 PROCEDURE — 86901 BLOOD TYPING SEROLOGIC RH(D): CPT

## 2025-02-21 PROCEDURE — 84132 ASSAY OF SERUM POTASSIUM: CPT

## 2025-02-21 PROCEDURE — 87040 BLOOD CULTURE FOR BACTERIA: CPT

## 2025-02-21 PROCEDURE — 87517 HEPATITIS B DNA QUANT: CPT

## 2025-02-21 PROCEDURE — 80158 DRUG ASSAY CYCLOSPORINE: CPT

## 2025-02-21 PROCEDURE — 85610 PROTHROMBIN TIME: CPT

## 2025-02-21 PROCEDURE — 85018 HEMOGLOBIN: CPT

## 2025-02-21 PROCEDURE — 86850 RBC ANTIBODY SCREEN: CPT

## 2025-02-21 PROCEDURE — 86255 FLUORESCENT ANTIBODY SCREEN: CPT

## 2025-02-21 PROCEDURE — 76705 ECHO EXAM OF ABDOMEN: CPT

## 2025-02-21 PROCEDURE — 82962 GLUCOSE BLOOD TEST: CPT

## 2025-02-21 PROCEDURE — 81001 URINALYSIS AUTO W/SCOPE: CPT

## 2025-02-21 PROCEDURE — 84100 ASSAY OF PHOSPHORUS: CPT

## 2025-02-21 PROCEDURE — 88312 SPECIAL STAINS GROUP 1: CPT

## 2025-02-21 PROCEDURE — 85025 COMPLETE CBC W/AUTO DIFF WBC: CPT

## 2025-02-21 PROCEDURE — 87086 URINE CULTURE/COLONY COUNT: CPT

## 2025-02-21 PROCEDURE — 36415 COLL VENOUS BLD VENIPUNCTURE: CPT

## 2025-02-21 PROCEDURE — 99239 HOSP IP/OBS DSCHRG MGMT >30: CPT | Mod: GC

## 2025-02-21 PROCEDURE — 88307 TISSUE EXAM BY PATHOLOGIST: CPT

## 2025-02-21 PROCEDURE — 82435 ASSAY OF BLOOD CHLORIDE: CPT

## 2025-02-21 PROCEDURE — 82803 BLOOD GASES ANY COMBINATION: CPT

## 2025-02-21 PROCEDURE — 74183 MRI ABD W/O CNTR FLWD CNTR: CPT | Mod: MC

## 2025-02-21 PROCEDURE — 88313 SPECIAL STAINS GROUP 2: CPT

## 2025-02-21 PROCEDURE — 83735 ASSAY OF MAGNESIUM: CPT

## 2025-02-21 RX ORDER — MYCOPHENOLATE MOFETIL 500 MG/1
1000 TABLET, FILM COATED ORAL
Qty: 0 | Refills: 0 | DISCHARGE
Start: 2025-02-21

## 2025-02-21 RX ORDER — CYCLOSPORINE 100 MG/1
3 CAPSULE, LIQUID FILLED ORAL
Qty: 0 | Refills: 0 | DISCHARGE
Start: 2025-02-21

## 2025-02-21 RX ORDER — PREDNISONE 20 MG/1
1 TABLET ORAL
Qty: 9 | Refills: 0
Start: 2025-02-21 | End: 2025-03-01

## 2025-02-21 RX ORDER — URSODIOL 300 MG/1
1 CAPSULE ORAL
Qty: 0 | Refills: 0 | DISCHARGE
Start: 2025-02-21

## 2025-02-21 RX ORDER — INSULIN LISPRO 100 U/ML
35 INJECTION, SOLUTION INTRAVENOUS; SUBCUTANEOUS
Refills: 0 | Status: DISCONTINUED | OUTPATIENT
Start: 2025-02-21 | End: 2025-02-21

## 2025-02-21 RX ORDER — VALGANCICLOVIR 450 MG/1
1 TABLET, FILM COATED ORAL
Qty: 30 | Refills: 0
Start: 2025-02-21 | End: 2025-03-22

## 2025-02-21 RX ORDER — INSULIN GLARGINE-YFGN 100 [IU]/ML
40 INJECTION, SOLUTION SUBCUTANEOUS AT BEDTIME
Refills: 0 | Status: DISCONTINUED | OUTPATIENT
Start: 2025-02-21 | End: 2025-02-21

## 2025-02-21 RX ORDER — INSULIN LISPRO 100 U/ML
35 INJECTION, SOLUTION INTRAVENOUS; SUBCUTANEOUS
Qty: 0 | Refills: 0 | DISCHARGE
Start: 2025-02-21

## 2025-02-21 RX ORDER — METOPROLOL SUCCINATE 50 MG/1
1 TABLET, EXTENDED RELEASE ORAL
Qty: 0 | Refills: 0 | DISCHARGE
Start: 2025-02-21

## 2025-02-21 RX ORDER — INSULIN LISPRO 100 U/ML
30 INJECTION, SOLUTION INTRAVENOUS; SUBCUTANEOUS
Refills: 0 | Status: DISCONTINUED | OUTPATIENT
Start: 2025-02-21 | End: 2025-02-21

## 2025-02-21 RX ORDER — RISPERIDONE 4 MG
1 TABLET ORAL
Qty: 0 | Refills: 0 | DISCHARGE
Start: 2025-02-21

## 2025-02-21 RX ORDER — SULFAMETHOXAZOLE/TRIMETHOPRIM 800-160 MG
1 TABLET ORAL
Qty: 30 | Refills: 0
Start: 2025-02-21 | End: 2025-03-22

## 2025-02-21 RX ORDER — PREDNISONE 20 MG/1
2 TABLET ORAL
Qty: 0 | Refills: 0 | DISCHARGE
Start: 2025-02-21

## 2025-02-21 RX ORDER — INSULIN GLARGINE-YFGN 100 [IU]/ML
35 INJECTION, SOLUTION SUBCUTANEOUS AT BEDTIME
Refills: 0 | Status: DISCONTINUED | OUTPATIENT
Start: 2025-02-21 | End: 2025-02-21

## 2025-02-21 RX ORDER — PREDNISONE 20 MG/1
40 TABLET ORAL DAILY
Refills: 0 | Status: DISCONTINUED | OUTPATIENT
Start: 2025-02-21 | End: 2025-02-21

## 2025-02-21 RX ORDER — MAGNESIUM OXIDE 400 MG
1 TABLET ORAL
Qty: 60 | Refills: 0
Start: 2025-02-21 | End: 2025-03-22

## 2025-02-21 RX ORDER — PREDNISONE 20 MG/1
2 TABLET ORAL
Refills: 0
Start: 2025-02-21

## 2025-02-21 RX ORDER — NIFEDIPINE 30 MG
1 TABLET, EXTENDED RELEASE 24 HR ORAL
Qty: 30 | Refills: 0
Start: 2025-02-21 | End: 2025-03-22

## 2025-02-21 RX ORDER — INSULIN GLARGINE-YFGN 100 [IU]/ML
40 INJECTION, SOLUTION SUBCUTANEOUS
Qty: 0 | Refills: 0 | DISCHARGE
Start: 2025-02-21

## 2025-02-21 RX ADMIN — VALGANCICLOVIR 450 MILLIGRAM(S): 450 TABLET, FILM COATED ORAL at 11:01

## 2025-02-21 RX ADMIN — MYCOPHENOLATE MOFETIL 1000 MILLIGRAM(S): 500 TABLET, FILM COATED ORAL at 08:02

## 2025-02-21 RX ADMIN — PREDNISONE 40 MILLIGRAM(S): 20 TABLET ORAL at 15:30

## 2025-02-21 RX ADMIN — Medication 500000 UNIT(S): at 11:02

## 2025-02-21 RX ADMIN — Medication 400 MILLIGRAM(S): at 08:02

## 2025-02-21 RX ADMIN — Medication 1 TABLET(S): at 11:02

## 2025-02-21 RX ADMIN — INSULIN LISPRO 1: 100 INJECTION, SOLUTION INTRAVENOUS; SUBCUTANEOUS at 08:42

## 2025-02-21 RX ADMIN — URSODIOL 300 MILLIGRAM(S): 300 CAPSULE ORAL at 17:20

## 2025-02-21 RX ADMIN — Medication 60 MILLIGRAM(S): at 06:09

## 2025-02-21 RX ADMIN — INSULIN LISPRO 35 UNIT(S): 100 INJECTION, SOLUTION INTRAVENOUS; SUBCUTANEOUS at 08:43

## 2025-02-21 RX ADMIN — METOPROLOL SUCCINATE 50 MILLIGRAM(S): 50 TABLET, EXTENDED RELEASE ORAL at 06:09

## 2025-02-21 RX ADMIN — Medication 500000 UNIT(S): at 17:20

## 2025-02-21 RX ADMIN — CYCLOSPORINE 150 MILLIGRAM(S): 100 CAPSULE, LIQUID FILLED ORAL at 08:02

## 2025-02-21 RX ADMIN — METOPROLOL SUCCINATE 50 MILLIGRAM(S): 50 TABLET, EXTENDED RELEASE ORAL at 17:20

## 2025-02-21 RX ADMIN — INSULIN LISPRO 35 UNIT(S): 100 INJECTION, SOLUTION INTRAVENOUS; SUBCUTANEOUS at 17:20

## 2025-02-21 RX ADMIN — Medication 400 MILLIGRAM(S): at 17:28

## 2025-02-21 RX ADMIN — Medication 500000 UNIT(S): at 06:10

## 2025-02-21 RX ADMIN — URSODIOL 300 MILLIGRAM(S): 300 CAPSULE ORAL at 06:09

## 2025-02-21 RX ADMIN — INSULIN LISPRO 35 UNIT(S): 100 INJECTION, SOLUTION INTRAVENOUS; SUBCUTANEOUS at 12:13

## 2025-02-21 NOTE — PROGRESS NOTE ADULT - SUBJECTIVE AND OBJECTIVE BOX
Chief Complaint: Diabetes Mellitus follow up    INTERVAL HX:  Patient seen at bedside.  He is waiting to find out if he will be discharged.   Reports eating well, finishes 100% of food on each tray. BG over the last 24 hrs have been within goal range 100-180mg/dl. No hypoglycemia.     Review of Systems:  General: As above  GI: No nausea, vomiting  Endocrine: no  S&Sx of hypoglycemia    Allergies    No Known Allergies    Intolerances      MEDICATIONS  (STANDING):  cycloSPORINE  , modified (GENGRAF) 150 milliGRAM(s) Oral <User Schedule>  dextrose 5%. 1000 milliLiter(s) (50 mL/Hr) IV Continuous <Continuous>  dextrose 5%. 1000 milliLiter(s) (100 mL/Hr) IV Continuous <Continuous>  dextrose 50% Injectable 25 Gram(s) IV Push once  dextrose 50% Injectable 12.5 Gram(s) IV Push once  dextrose 50% Injectable 25 Gram(s) IV Push once  glucagon  Injectable 1 milliGRAM(s) IntraMuscular once  insulin glargine Injectable (LANTUS) 40 Unit(s) SubCutaneous at bedtime  insulin lispro (ADMELOG) corrective regimen sliding scale   SubCutaneous three times a day before meals  insulin lispro (ADMELOG) corrective regimen sliding scale   SubCutaneous at bedtime  insulin lispro Injectable (ADMELOG) 35 Unit(s) SubCutaneous three times a day before meals  magnesium oxide 400 milliGRAM(s) Oral two times a day with meals  metoprolol tartrate 50 milliGRAM(s) Oral every 12 hours  mycophenolate mofetil 1000 milliGRAM(s) Oral <User Schedule>  NIFEdipine XL 60 milliGRAM(s) Oral daily  nystatin    Suspension [id number] Unit(s) Swish and Swallow four times a day  risperiDONE   Tablet 1 milliGRAM(s) Oral at bedtime  trimethoprim   80 mG/sulfamethoxazole 400 mG 1 Tablet(s) Oral daily  ursodiol Capsule 300 milliGRAM(s) Oral two times a day  valGANciclovir 450 milliGRAM(s) Oral daily        insulin glargine Injectable (LANTUS)   40 Unit(s) SubCutaneous (02-20-25 @ 21:23)    insulin lispro (ADMELOG) corrective regimen sliding scale   1 Unit(s) SubCutaneous (02-21-25 @ 08:42)   2 Unit(s) SubCutaneous (02-20-25 @ 17:48)    insulin lispro Injectable (ADMELOG)   35 Unit(s) SubCutaneous (02-21-25 @ 12:13)   35 Unit(s) SubCutaneous (02-21-25 @ 08:43)   35 Unit(s) SubCutaneous (02-20-25 @ 17:48)        PHYSICAL EXAM:  VITALS: T(C): 37.3 (02-21-25 @ 12:42)  T(F): 99.2 (02-21-25 @ 12:42), Max: 99.2 (02-21-25 @ 12:42)  HR: 76 (02-21-25 @ 12:42) (66 - 80)  BP: 138/78 (02-21-25 @ 12:42) (138/78 - 169/81)  RR:  (18 - 18)  SpO2:  (95% - 97%)  Wt(kg): --  GENERAL: NAD  Respiratory: Respirations unlabored   Extremities: Warm, no edema  NEURO: Alert , appropriate     LABS:  POCT Blood Glucose.: 114 mg/dL (02-21-25 @ 12:12)  POCT Blood Glucose.: 172 mg/dL (02-21-25 @ 08:40)  POCT Blood Glucose.: 197 mg/dL (02-20-25 @ 20:39)  POCT Blood Glucose.: 220 mg/dL (02-20-25 @ 17:26)  POCT Blood Glucose.: 173 mg/dL (02-20-25 @ 12:56)  POCT Blood Glucose.: 202 mg/dL (02-20-25 @ 08:35)  POCT Blood Glucose.: 105 mg/dL (02-19-25 @ 22:12)  POCT Blood Glucose.: 92 mg/dL (02-19-25 @ 20:54)  POCT Blood Glucose.: 144 mg/dL (02-19-25 @ 16:28)  POCT Blood Glucose.: 166 mg/dL (02-19-25 @ 12:57)  POCT Blood Glucose.: 208 mg/dL (02-19-25 @ 08:33)  POCT Blood Glucose.: 205 mg/dL (02-18-25 @ 21:09)  POCT Blood Glucose.: 182 mg/dL (02-18-25 @ 17:21)                          9.6    10.27 )-----------( 316      ( 21 Feb 2025 06:45 )             30.9     02-21    137  |  103  |  35[H]  ----------------------------<  192[H]  4.5   |  21[L]  |  1.46[H]    Ca    8.3[L]      21 Feb 2025 06:45  Phos  3.7     02-21  Mg     2.0     02-21    TPro  6.0  /  Alb  3.2[L]  /  TBili  1.7[H]  /  DBili  x   /  AST  112[H]  /  ALT  212[H]  /  AlkPhos  547[H]  02-21    eGFR: 53 mL/min/1.73m2 (21 Feb 2025 06:45)      Thyroid Function Tests:      A1C with Estimated Average Glucose Result: 8.9 % (01-14-25 @ 11:55)    Estimated Average Glucose: 209 mg/dL (01-14-25 @ 11:55)        Diet, Consistent Carbohydrate/No Snacks (02-15-25 @ 08:47) [Active]             Chief Complaint: Diabetes Mellitus follow up    INTERVAL HX:  Patient seen at bedside.   Reports eating well, finishes 100% of food on each tray.  BG over the last 24 hrs have been within goal range 100-180mg/dl. No hypoglycemia.     Review of Systems:  General: As above  GI: No nausea, vomiting  Endocrine: no  S&Sx of hypoglycemia    Allergies    No Known Allergies    Intolerances      MEDICATIONS  (STANDING):  cycloSPORINE  , modified (GENGRAF) 150 milliGRAM(s) Oral <User Schedule>  dextrose 5%. 1000 milliLiter(s) (50 mL/Hr) IV Continuous <Continuous>  dextrose 5%. 1000 milliLiter(s) (100 mL/Hr) IV Continuous <Continuous>  dextrose 50% Injectable 25 Gram(s) IV Push once  dextrose 50% Injectable 12.5 Gram(s) IV Push once  dextrose 50% Injectable 25 Gram(s) IV Push once  glucagon  Injectable 1 milliGRAM(s) IntraMuscular once  insulin glargine Injectable (LANTUS) 40 Unit(s) SubCutaneous at bedtime  insulin lispro (ADMELOG) corrective regimen sliding scale   SubCutaneous three times a day before meals  insulin lispro (ADMELOG) corrective regimen sliding scale   SubCutaneous at bedtime  insulin lispro Injectable (ADMELOG) 35 Unit(s) SubCutaneous three times a day before meals  magnesium oxide 400 milliGRAM(s) Oral two times a day with meals  metoprolol tartrate 50 milliGRAM(s) Oral every 12 hours  mycophenolate mofetil 1000 milliGRAM(s) Oral <User Schedule>  NIFEdipine XL 60 milliGRAM(s) Oral daily  nystatin    Suspension [id number] Unit(s) Swish and Swallow four times a day  risperiDONE   Tablet 1 milliGRAM(s) Oral at bedtime  trimethoprim   80 mG/sulfamethoxazole 400 mG 1 Tablet(s) Oral daily  ursodiol Capsule 300 milliGRAM(s) Oral two times a day  valGANciclovir 450 milliGRAM(s) Oral daily        insulin glargine Injectable (LANTUS)   40 Unit(s) SubCutaneous (02-20-25 @ 21:23)    insulin lispro (ADMELOG) corrective regimen sliding scale   1 Unit(s) SubCutaneous (02-21-25 @ 08:42)   2 Unit(s) SubCutaneous (02-20-25 @ 17:48)    insulin lispro Injectable (ADMELOG)   35 Unit(s) SubCutaneous (02-21-25 @ 12:13)   35 Unit(s) SubCutaneous (02-21-25 @ 08:43)   35 Unit(s) SubCutaneous (02-20-25 @ 17:48)        PHYSICAL EXAM:  VITALS: T(C): 37.3 (02-21-25 @ 12:42)  T(F): 99.2 (02-21-25 @ 12:42), Max: 99.2 (02-21-25 @ 12:42)  HR: 76 (02-21-25 @ 12:42) (66 - 80)  BP: 138/78 (02-21-25 @ 12:42) (138/78 - 169/81)  RR:  (18 - 18)  SpO2:  (95% - 97%)  Wt(kg): --  GENERAL: NAD  Respiratory: Respirations unlabored   Extremities: Warm, no edema  NEURO: Alert , appropriate     LABS:  POCT Blood Glucose.: 114 mg/dL (02-21-25 @ 12:12)  POCT Blood Glucose.: 172 mg/dL (02-21-25 @ 08:40)  POCT Blood Glucose.: 197 mg/dL (02-20-25 @ 20:39)  POCT Blood Glucose.: 220 mg/dL (02-20-25 @ 17:26)  POCT Blood Glucose.: 173 mg/dL (02-20-25 @ 12:56)  POCT Blood Glucose.: 202 mg/dL (02-20-25 @ 08:35)  POCT Blood Glucose.: 105 mg/dL (02-19-25 @ 22:12)  POCT Blood Glucose.: 92 mg/dL (02-19-25 @ 20:54)  POCT Blood Glucose.: 144 mg/dL (02-19-25 @ 16:28)  POCT Blood Glucose.: 166 mg/dL (02-19-25 @ 12:57)  POCT Blood Glucose.: 208 mg/dL (02-19-25 @ 08:33)  POCT Blood Glucose.: 205 mg/dL (02-18-25 @ 21:09)  POCT Blood Glucose.: 182 mg/dL (02-18-25 @ 17:21)                          9.6    10.27 )-----------( 316      ( 21 Feb 2025 06:45 )             30.9     02-21    137  |  103  |  35[H]  ----------------------------<  192[H]  4.5   |  21[L]  |  1.46[H]    Ca    8.3[L]      21 Feb 2025 06:45  Phos  3.7     02-21  Mg     2.0     02-21    TPro  6.0  /  Alb  3.2[L]  /  TBili  1.7[H]  /  DBili  x   /  AST  112[H]  /  ALT  212[H]  /  AlkPhos  547[H]  02-21    eGFR: 53 mL/min/1.73m2 (21 Feb 2025 06:45)      Thyroid Function Tests:      A1C with Estimated Average Glucose Result: 8.9 % (01-14-25 @ 11:55)    Estimated Average Glucose: 209 mg/dL (01-14-25 @ 11:55)        Diet, Consistent Carbohydrate/No Snacks (02-15-25 @ 08:47) [Active]

## 2025-02-21 NOTE — PROGRESS NOTE ADULT - SUBJECTIVE AND OBJECTIVE BOX
Patient is a 64y old  Male who presents with a chief complaint of transplant rejection (2025 16:02)      Interval Events:   Patient feels otherwise well, no complaints, eager to go home.    ROS:   A 12-point ROS was performed and negative except as noted in HPI.    Hospital Medications:  cycloSPORINE  , modified (GENGRAF) 150 milliGRAM(s) Oral <User Schedule>  dextrose 5%. 1000 milliLiter(s) IV Continuous <Continuous>  dextrose 5%. 1000 milliLiter(s) IV Continuous <Continuous>  dextrose 50% Injectable 25 Gram(s) IV Push once  dextrose 50% Injectable 12.5 Gram(s) IV Push once  dextrose 50% Injectable 25 Gram(s) IV Push once  dextrose Oral Gel 15 Gram(s) Oral once PRN  glucagon  Injectable 1 milliGRAM(s) IntraMuscular once  insulin glargine Injectable (LANTUS) 40 Unit(s) SubCutaneous at bedtime  insulin lispro (ADMELOG) corrective regimen sliding scale   SubCutaneous three times a day before meals  insulin lispro (ADMELOG) corrective regimen sliding scale   SubCutaneous at bedtime  insulin lispro Injectable (ADMELOG) 35 Unit(s) SubCutaneous three times a day before meals  magnesium oxide 400 milliGRAM(s) Oral two times a day with meals  metoprolol tartrate 50 milliGRAM(s) Oral every 12 hours  mycophenolate mofetil 1000 milliGRAM(s) Oral <User Schedule>  NIFEdipine XL 60 milliGRAM(s) Oral daily  nystatin    Suspension [id number] Unit(s) Swish and Swallow four times a day  risperiDONE   Tablet 1 milliGRAM(s) Oral at bedtime  trimethoprim   80 mG/sulfamethoxazole 400 mG 1 Tablet(s) Oral daily  ursodiol Capsule 300 milliGRAM(s) Oral two times a day  valGANciclovir 450 milliGRAM(s) Oral daily      PHYSICAL EXAM:   Vital Signs:  Vital Signs Last 24 Hrs  T(C): 37.1 (2025 08:49), Max: 37.1 (2025 20:53)  T(F): 98.8 (2025 08:49), Max: 98.8 (2025 20:53)  HR: 70 (2025 08:49) (66 - 80)  BP: 158/80 (2025 08:49) (146/78 - 169/81)  BP(mean): --  RR: 18 (2025 08:49) (18 - 18)  SpO2: 97% (2025 08:49) (95% - 97%)    Parameters below as of 2025 08:49  Patient On (Oxygen Delivery Method): room air      Daily     Daily Weight in k.2 (2025 05:58)    GENERAL: no acute distress  NEURO: alert  HEENT: anicteric sclera, no conjunctival pallor appreciated  CHEST: no respiratory distress, no accessory muscle use  CARDIAC: regular rate  ABDOMEN: soft, nondistended, nontender, no rebound or guarding  EXTREMITIES: warm, well perfused, no edema  SKIN: no lesions noted    LABS: reviewed                        9.6    10. )-----------( 316      ( 2025 06:45 )             30.9     02-    137  |  103  |  35[H]  ----------------------------<  192[H]  4.5   |  21[L]  |  1.46[H]    Ca    8.3[L]      2025 06:45  Phos  3.7     -  Mg     2.0     -    TPro  6.0  /  Alb  3.2[L]  /  TBili  1.7[H]  /  DBili  x   /  AST  112[H]  /  ALT  212[H]  /  AlkPhos  547[H]  -21    LIVER FUNCTIONS - ( 2025 06:45 )  Alb: 3.2 g/dL / Pro: 6.0 g/dL / ALK PHOS: 547 U/L / ALT: 212 U/L / AST: 112 U/L / GGT: x             Interval Diagnostic Studies: see sunrise for full report

## 2025-02-21 NOTE — PROGRESS NOTE ADULT - REASON FOR ADMISSION
Acute cellular rejection, hx of OLT
Transaminitis
transplant rejection
abnormal liver tests
abnormal liver tests
transplant rejection
transplant rejection
Acute cellular rejection, hx of OLT

## 2025-02-21 NOTE — DISCHARGE NOTE NURSING/CASE MANAGEMENT/SOCIAL WORK - FINANCIAL ASSISTANCE
[facility] provides services at a reduced cost to those who are determined to be eligible through [facility]’s financial assistance program. Information regarding [facility]’s financial assistance program can be found by going to [email or web address] or by calling [phone].

## 2025-02-21 NOTE — PROGRESS NOTE ADULT - PROVIDER SPECIALTY LIST ADULT
Transplant Hepatology
Endocrinology
Intervent Radiology
Transplant Hepatology
Endocrinology

## 2025-02-21 NOTE — PROGRESS NOTE ADULT - PROBLEM SELECTOR PROBLEM 1
Uncontrolled type 2 diabetes mellitus with hyperglycemia, with long-term current use of insulin
Steroid-induced hyperglycemia
Uncontrolled type 2 diabetes mellitus with hyperglycemia, with long-term current use of insulin

## 2025-02-21 NOTE — PROGRESS NOTE ADULT - ASSESSMENT
Assessment and Plan:   · Assessment	  Steroid induced hyperglycemia  in pt with rejection of liver transplant,  given high dose solumedrol now on Pred 20 mg. BS appear improved.       Endocrine consulted for further management (high risk patient with severe hyperglycemia with glucose values > 400's at high risk of CAD and CVA with high medical complexity and high level decision-making). Patient now continues off insulin drip since 2/14. Patient is eating well, tolerating POs. Patient now with improved hyperglycemia and currently on pr FBG elevated, will increase Lantus to 50u QHS for tighter BG control. Given persistent hyperglycemia to 200s, will increase mealtime insulin as well. No hypoglycemia. Now on Pred 20mg QD, final d/c dose. Patient is safe for discharge as long as BG is <250mg/dL x3 and okay with transplant team as patient would like to leave. Endocrine will closely monitor BG and adjust insulin as needed for BG goal 100-180mg/dL inpatient.     #Steroid-induced hyperglycemia  #T2DM  - A1c 8.9%  - Home meds: Lantus 10 units qhs, Humalog 10 units ac  - Follows with Dr. Kady Peguero                   Nutritional Assessment:  · Nutritional Assessment	Diet, Consistent Carbohydrate/No Snacks (02-15-25 @ 08:47) [Active]     Problem/Plan - 1:  ·  Problem: Uncontrolled type 2 diabetes mellitus with hyperglycemia, with long-term current use of insulin.   ·  Plan: Inpatient Plan:  - Check BG TID AC, HS, and 2AM  - Adjust Admelog to 50units QHS  - Adjust Admelog 35units TID AC (HOLD if NPO)  - Adjust to low dose Admelog correctional scales TID AC and HS give better BG control now with postprandial     Discharge Planning:  - Discharge on Lantus 50u QHS and Admelog 35u TID AC (hold admelog if pre-meal BG <120mg/dL or patient is not  eating meal).  - Patient should check BG TID AC and HS. Please tell patient to contact Endocrinologist if BG <70mg/dL x1 or >200mg/dL consistently or >400mg/dL x1, close f/u with Endo outpatient.   - Order glucagon (Gvoke 1mg prefilled syringe OR Baqsimi 3mg intranasal) to be given in case of severe hypoglycemia and patient is unable to ingest glucose source.  - Patient previously following with Dr. Peguero, who is no longer at [facility]. [facility] up at [facility] ([phone]) at [address] [place] [address] [address], [place] [address], please provide in d/c paperwork to schedule appt.   - Routine follow-up with Ophthalmology and Podiatry.   - Patient will need RX for basal insulin pen (ie. Basaglar, Lantus, Tresiba, Toujeo) and bolus insulin pen (ie. humalog/novolog/admelog) depending on insurance coverage; please send test scripts to see which is covered. Please send prescriptions for diabetes supplies (glucometer, test strips, lancets, alcohol swabs, insulin pen needles).     Problem/Plan - 2:  ·  Problem: Current chronic use of systemic steroids.   ·  Plan: - Steroids should not be discontinued abruptly as his long-term steroid use likely is causing some degree of HPA axis suppression and he risks adrenal crisis with sudden discontinuation of his steroids  - Discussed osteoporosis screening with patient although patient feels that it is futile as his life expectancy is not long enough for osteoporosis interventions to have benefits.   - Patient to be discharged on oral Prednisone 20mg once daily.     Problem/Plan - 3:  ·  Problem: Steroid-induced hyperglycemia.   ·  Plan: Patient is s/p IV methylprednisolone 500 mg x2, on prednisone taper currently on 20 mg   but received an additional dose of prednisone 20mg this morning.      Problem/Plan - 4:  ·  Problem: HTN (hypertension).   ·  Plan: - Goal BP <130/80  - Management as per primary team  - check urine microalbumin level as outpatient.     Problem/Plan - 5:  ·  Problem: HLD (hyperlipidemia).   ·  Plan: - LDL goal <70   - C/w statin therapy or recommend statin therapy if not contraindicated   - Check lipid panel as outpatient on a yearly basis  - Please check lipid panel in am.      Additional Information:  Contact via Microsoft Teams during business hours  To reach covering provider access [employer] via [employer]  For Urgent matters/after-hours/weekends/holidays please page endocrine fellow on call   For nonurgent matters please email [email or web address]    Please note that this patient may be followed by different provider tomorrow.  Notify endocrine 24 hours prior to discharge for final recommendations       Assessment and Plan:   · Assessment	  Steroid induced hyperglycemia  in pt with rejection of liver transplant,  given high dose solumedrol now on Pred 20 mg. BS are improved.     Endocrine consulted for further management (high risk patient with severe hyperglycemia with glucose values > 400's at high risk of CAD and CVA with high medical complexity and high level decision-making). Patient now continues off insulin drip since 2/14. Patient is eating well, tolerating POs. Patient now with improved hyperglycemia and currently on prednisone 20mg but hasn't received his dose yet today.    FBG 172mg/dl this morning. No hypoglycemia. Endocrine will closely monitor BG and adjust insulin as needed for BG goal 100-180mg/dL inpatient.     #Steroid-induced hyperglycemia  #T2DM  - A1c 8.9%  - Home meds: Lantus 10 units qhs, Humalog 10 units ac  - [facility], Has an appt with MONSE Michael in March.      Problem/Plan - 1:  ·  Problem: Uncontrolled type 2 diabetes mellitus with hyperglycemia, with long-term current use of insulin.   ·  Plan: Inpatient Plan:  - Check BG TID AC, HS, and 2AM  - Decrease lantus to 35 units QHS  - Decrease Admelog 30units TID AC (HOLD if NPO)  - Continue  low dose Admelog correctional scales TID AC and HS give better BG control now with postprandial     Discharge Planning:  - Discharge on Lantus 35units QHS and Admelog 30units TID AC (hold admelog if pre-meal BG <120mg/dL or patient is not  eating meal).  - Patient should check BG TID AC and HS. Please tell patient to contact Endocrinologist if BG <70mg/dL x1 or >200mg/dL consistently or >400mg/dL x1, close f/u with Endo outpatient.   - Order glucagon (Gvoke 1mg prefilled syringe OR Baqsimi 3mg intranasal) to be given in case of severe hypoglycemia and patient is unable to ingest glucose source.  - Patient previously following with Dr. Peguero, who is no longer at [facility]. Patient has an appt with MONSE Michael at  [facility] ([phone]) at [address] [place] [address] [address] [place] [id number],   - Routine follow-up with Ophthalmology and Podiatry.   - Patient will need RX for basal insulin pen (ie. Basaglar, Lantus, Tresiba, Toujeo) and bolus insulin pen (ie. humalog/novolog/admelog) depending on insurance coverage; please send test scripts to see which is covered. Please send prescriptions for diabetes supplies (glucometer, test strips, lancets, alcohol swabs, insulin pen needles).     Problem/Plan - 2:  ·  Problem: Current chronic use of systemic steroids.   ·  Plan: - Steroids should not be discontinued abruptly as his long-term steroid use likely is causing some degree of HPA axis suppression and he risks adrenal crisis with sudden discontinuation of his steroids  - Discussed osteoporosis screening with patient although patient feels that it is futile as his life expectancy is not long enough for osteoporosis interventions to have benefits.   - Patient will likely be discharged on oral Prednisone 20mg once daily.     Problem/Plan - 3:  ·  Problem: Steroid-induced hyperglycemia.   ·  Plan: Patient is s/p IV methylprednisolone 500 mg x2, on prednisone taper currently on 20 mg but has not received dose yet.       Problem/Plan - 4:  ·  Problem: HTN (hypertension).   ·  Plan: - Goal BP <130/80  - Management as per primary team  - check urine microalbumin level as outpatient.     Problem/Plan - 5:  ·  Problem: HLD (hyperlipidemia).   ·  Plan: - LDL goal <70   - C/w statin therapy or recommend statin therapy if not contraindicated   - Check lipid panel as outpatient on a yearly basis  - Please check lipid panel in am.      Additional Information:  Contact via Microsoft Teams during business hours  To reach covering provider access AMION via [employer]  For Urgent matters/after-hours/weekends/holidays please page endocrine fellow on call   For nonurgent matters please email [email or web address]    Please note that this patient may be followed by different provider tomorrow.  Notify endocrine 24 hours prior to discharge for final recommendations       Assessment and Plan:   · Assessment	  Steroid induced hyperglycemia  in pt with rejection of liver transplant,  given high dose solumedrol now on Pred 20 mg. BS are improved.     Endocrine consulted for further management (high risk patient with severe hyperglycemia with glucose values > 400's at high risk of CAD and CVA with high medical complexity and high level decision-making). Patient now continues off insulin drip since 2/14. Patient is eating well, tolerating POs. Patient now with improved hyperglycemia and currently on prednisone 20mg but hasn't received his dose yet today.    FBG 172mg/dl this morning. No hypoglycemia. Endocrine will closely monitor BG and adjust insulin as needed for BG goal 100-180mg/dL inpatient.     #Steroid-induced hyperglycemia  #T2DM  - A1c 8.9%  - Home meds: Lantus 10 units qhs, Humalog 10 units ac  - [facility], Has an appt with MONSE Michael in March.      Problem/Plan - 1:  ·  Problem: Uncontrolled type 2 diabetes mellitus with hyperglycemia, with long-term current use of insulin.   ·  Plan: Inpatient Plan:  - Check BG TID AC, HS, and 2AM  - Continue lantus to 40 units QHS  - Continue  Admelog 35units TID AC (HOLD if NPO)  - Continue  low dose Admelog correctional scales TID AC and HS give better BG control now with postprandial     Discharge Planning:  patient will be discharged on prednisone taper 40mg x 5 days, 30mg x5 days and 20mg daily thereafter  - While on prednisone 40mg 2/21-2/25   -Take Lantus 40units QHS and Admelog 35units TID AC (hold admelog if pre-meal BG <120mg/dL or patient is not  eating meal).  While on prednisone 30mg 2/26-3/2  -Take Lantus 30 units QHS and Admelog 25 units with each meal, hold if not eating or BG less than 120mg/dl.  While on prednisone 20mg.   Take Lantus 20 units QHS and Admelog 20 units with each meal hold if not eating or BG less than 120mg/dl.  Should follow closely with his endocrinologist MONSE Michael as doses may need to be adjusted while at home.   - Patient should check BG TID AC and HS. Please tell patient to contact Endocrinologist if BG <70mg/dL x1 or >200mg/dL consistently or >400mg/dL x1, close f/u with Endo outpatient.   - Order glucagon (Gvoke 1mg prefilled syringe OR Baqsimi 3mg intranasal) to be given in case of severe hypoglycemia and patient is unable to ingest glucose source.  - Patient previously following with Dr. Peguero, who is no longer at [facility]. Patient has an appt with MONSE Michael at  [facility] ([phone]) at [address] [place] [address] [address], [place] [address],   - Routine follow-up with Ophthalmology and Podiatry.   - Patient will need RX for basal insulin pen (ie. Basaglar, Lantus, Tresiba, Toujeo) and bolus insulin pen (ie. humalog/novolog/admelog) depending on insurance coverage; please send test scripts to see which is covered. Please send prescriptions for diabetes supplies (glucometer, test strips, lancets, alcohol swabs, insulin pen needles).  Patient can restart home Ozempic at home dose.      Problem/Plan - 2:  ·  Problem: Current chronic use of systemic steroids.   ·  Plan: - Steroids should not be discontinued abruptly as his long-term steroid use likely is causing some degree of HPA axis suppression and he risks adrenal crisis with sudden discontinuation of his steroids  - Discussed osteoporosis screening with patient although patient feels that it is futile as his life expectancy is not long enough for osteoporosis interventions to have benefits.   Patient discharged on      Problem/Plan - 3:  ·  Problem: Steroid-induced hyperglycemia.   ·  Plan: Patient is s/p IV methylprednisolone 500 mg x2, on prednisone taper currently on 20 mg but has not received dose yet.  - Will be discharged on steroid taper as above.     Problem/Plan - 4:  ·  Problem: HTN (hypertension).   ·  Plan: - Goal BP <130/80  - Management as per primary team  - check urine microalbumin level as outpatient.     Problem/Plan - 5:  ·  Problem: HLD (hyperlipidemia).   ·  Plan: - LDL goal <70   - C/w statin therapy or recommend statin therapy if not contraindicated   - Check lipid panel as outpatient on a yearly basis  - Please check lipid panel in am.      Additional Information:  Contact via Microsoft Teams during business hours  To reach covering provider access AMION via [employer]  For Urgent matters/after-hours/weekends/holidays please page endocrine fellow on call   For nonurgent matters please email [email or web address]    Please note that this patient may be followed by different provider tomorrow.  Notify endocrine 24 hours prior to discharge for final recommendations

## 2025-02-21 NOTE — DISCHARGE NOTE NURSING/CASE MANAGEMENT/SOCIAL WORK - PATIENT PORTAL LINK FT
You can access the FollowMyHealth Patient Portal offered by [facility] by registering at the following website: [email or web address]. By joining [facility] [facility]’s FollowMyHealth portal, you will also be able to view your health information using other applications (apps) compatible with our system.

## 2025-02-21 NOTE — PROGRESS NOTE ADULT - ASSESSMENT
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    #Elevated liver enzymes 2/2 ACR w/ evolving chronic rejection  #s/p OLT 2019 for alcoholic cirrhosis  # H/O anastomotic stricture with choledocholithiasis s/p multiple ERCP most recently 2/7: no stricture appreciated and CBD was cleared   # steatohepatitis on liver bx 5/01/2024  Liver biopsy from 2/12 demonstrating moderate-late ACR (TAPIA 5/9) w/ evolving chronic rejection (w/ cytokeratin stains pending) as well as scattered histiocyte collections.   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  Liver chemistry is improving following treatment with steroids   - Solumedrol 500 mg IV (2/13-2/15)  - Continue on 40 mg prednisone given improving LFTs, to be titrated down as an outpatient   - MMF 1000 mg BID  - continue Cyclosporine 150 mg + Cyclosporine 150 mg daily for goal trough level 200-250 ng/mL   - Continue ursodiol  - PPx w/ nystatin, bactrim and valcyte   - 2/20 - transaminases continuing to uptrend, though cyclosporine already at goal. adjustments in prednisone as above     #Steroid induced hyperglycemia  Initially w/ significant steroid induced hyperglycemia, now controlled w/ endocrine assistance.   - Adjust Lantus to 48u QHS  - c/w Admelog to 35u TID AC  - C/w moderate dose Admelog correctional scales TID AC and HS  - Will require insulin regimen for home-going fwd, appreciate endocrine recommendations for discharge recs     # HTN:   - increase nifedipine from 30 to 60 mg po daily  - a/w metoprolol 50 mg BID     Dispo: anticipate d/c home today given improved transaminases with close outpatient follow up with Dr. Mejia for repeat labs and steroid tapering.     Note incomplete until finalized by attending signature/attestation.    Ike Lozada  GI/Hepatology Fellow, PGY-4    MONDAY-FRIDAY 8AM-5PM:  Please message via [employer] or email [email or web address]     On Weekends/Holidays (All day) and Weekdays after 5 PM to 8 AM  For nonurgent consults please email:  Please email [email or web address]  For urgent consults:  Please contact on call GI team. See Amion schedule ([facility]), gamigo paging system ([facility]), or call hospital  ([facility])

## 2025-02-26 LAB
ALBUMIN SERPL ELPH-MCNC: 3.5 G/DL
ALP BLD-CCNC: 619 U/L
ALT SERPL-CCNC: 237 U/L
ANION GAP SERPL CALC-SCNC: 13 MMOL/L
AST SERPL-CCNC: 104 U/L
BILIRUB SERPL-MCNC: 1.6 MG/DL
BUN SERPL-MCNC: 36 MG/DL
CALCIUM SERPL-MCNC: 8.4 MG/DL
CHLORIDE SERPL-SCNC: 98 MMOL/L
CMV DNA SPEC QL NAA+PROBE: NOT DETECTED IU/ML
CMVPCR LOG: NOT DETECTED LOG10IU/ML
CO2 SERPL-SCNC: 26 MMOL/L
CREAT SERPL-MCNC: 1.76 MG/DL
CYCLOSPORINE SER-MCNC: 107 NG/ML
EGFR: 42 ML/MIN/1.73M2
ESTIMATED AVERAGE GLUCOSE: 203 MG/DL
GGT SERPL-CCNC: 1045 U/L
GLUCOSE SERPL-MCNC: 218 MG/DL
HBA1C MFR BLD HPLC: 8.7 %
HCT VFR BLD CALC: 33.5 %
HGB BLD-MCNC: 10.4 G/DL
MAGNESIUM SERPL-MCNC: 1.9 MG/DL
MCHC RBC-ENTMCNC: 26.4 PG
MCHC RBC-ENTMCNC: 31 G/DL
MCV RBC AUTO: 85 FL
PHOSPHATE SERPL-MCNC: 3.4 MG/DL
PLATELET # BLD AUTO: 396 K/UL
POTASSIUM SERPL-SCNC: 4.4 MMOL/L
PROT SERPL-MCNC: 6.2 G/DL
RBC # BLD: 3.94 M/UL
RBC # FLD: 18.8 %
SODIUM SERPL-SCNC: 137 MMOL/L
WBC # FLD AUTO: 9.84 K/UL

## 2025-02-27 ENCOUNTER — APPOINTMENT (OUTPATIENT)
Dept: HEPATOLOGY | Facility: CLINIC | Age: 65
End: 2025-02-27
Payer: MEDICARE

## 2025-02-27 VITALS
TEMPERATURE: 97.8 F | HEART RATE: 69 BPM | OXYGEN SATURATION: 97 % | BODY MASS INDEX: 30.22 KG/M2 | WEIGHT: 228 LBS | SYSTOLIC BLOOD PRESSURE: 138 MMHG | DIASTOLIC BLOOD PRESSURE: 75 MMHG | HEIGHT: 73 IN

## 2025-02-27 DIAGNOSIS — Z79.60 LONG TERM (CURRENT) USE OF UNSPECIFIED IMMUNOMODULATORS AND IMMUNOSUPPRESSANTS: ICD-10-CM

## 2025-02-27 DIAGNOSIS — Z87.898 PERSONAL HISTORY OF OTHER SPECIFIED CONDITIONS: ICD-10-CM

## 2025-02-27 PROBLEM — T86.41 CHRONIC REJECTION OF LIVER TRANSPLANT: Status: ACTIVE | Noted: 2025-02-27

## 2025-02-27 PROCEDURE — 99215 OFFICE O/P EST HI 40 MIN: CPT

## 2025-02-27 RX ORDER — LEVETIRACETAM 500 MG/1
500 TABLET, FILM COATED ORAL TWICE DAILY
Qty: 60 | Refills: 2 | Status: ACTIVE | COMMUNITY
Start: 2025-02-27 | End: 1900-01-01

## 2025-02-27 RX ORDER — TACROLIMUS 1 MG/1
1 CAPSULE ORAL TWICE DAILY
Qty: 360 | Refills: 0 | Status: ACTIVE | COMMUNITY
Start: 2025-02-27 | End: 1900-01-01

## 2025-02-28 RX ORDER — SULFAMETHOXAZOLE AND TRIMETHOPRIM 400; 80 MG/1; MG/1
400-80 TABLET ORAL DAILY
Qty: 30 | Refills: 1 | Status: ACTIVE | COMMUNITY
Start: 2025-02-27 | End: 1900-01-01

## 2025-03-04 LAB
ALBUMIN SERPL ELPH-MCNC: 3.9 G/DL
ALP BLD-CCNC: 457 U/L
ALT SERPL-CCNC: 142 U/L
ANION GAP SERPL CALC-SCNC: 13 MMOL/L
AST SERPL-CCNC: 36 U/L
BASOPHILS # BLD AUTO: 0 K/UL
BASOPHILS NFR BLD AUTO: 0 %
BILIRUB SERPL-MCNC: 1.4 MG/DL
BUN SERPL-MCNC: 39 MG/DL
CALCIUM SERPL-MCNC: 8.7 MG/DL
CHLORIDE SERPL-SCNC: 97 MMOL/L
CMV DNA SPEC QL NAA+PROBE: NOT DETECTED IU/ML
CMVPCR LOG: NOT DETECTED LOG10IU/ML
CO2 SERPL-SCNC: 26 MMOL/L
CREAT SERPL-MCNC: 1.63 MG/DL
CYCLOSPORINE SER-MCNC: 93 NG/ML
EGFR: 46 ML/MIN/1.73M2
EOSINOPHIL # BLD AUTO: 0 K/UL
EOSINOPHIL NFR BLD AUTO: 0 %
ESTIMATED AVERAGE GLUCOSE: 192 MG/DL
GGT SERPL-CCNC: 943 U/L
GLUCOSE SERPL-MCNC: 200 MG/DL
HBA1C MFR BLD HPLC: 8.3 %
HCT VFR BLD CALC: 34.8 %
HGB BLD-MCNC: 11 G/DL
IMM GRANULOCYTES NFR BLD AUTO: 0.2 %
LYMPHOCYTES # BLD AUTO: 1.62 K/UL
LYMPHOCYTES NFR BLD AUTO: 19.5 %
MAGNESIUM SERPL-MCNC: 1.8 MG/DL
MAN DIFF?: NORMAL
MCHC RBC-ENTMCNC: 27.2 PG
MCHC RBC-ENTMCNC: 31.6 G/DL
MCV RBC AUTO: 86.1 FL
MONOCYTES # BLD AUTO: 0.16 K/UL
MONOCYTES NFR BLD AUTO: 1.9 %
NEUTROPHILS # BLD AUTO: 6.49 K/UL
NEUTROPHILS NFR BLD AUTO: 78.4 %
PHOSPHATE SERPL-MCNC: 4.1 MG/DL
PLATELET # BLD AUTO: 302 K/UL
POTASSIUM SERPL-SCNC: 4.6 MMOL/L
PROT SERPL-MCNC: 6.1 G/DL
RBC # BLD: 4.04 M/UL
RBC # FLD: 18.4 %
SODIUM SERPL-SCNC: 137 MMOL/L
TACROLIMUS SERPL-MCNC: 4.4 NG/ML
WBC # FLD AUTO: 8.29 K/UL

## 2025-03-06 LAB
ALBUMIN SERPL ELPH-MCNC: 4.1 G/DL
ALP BLD-CCNC: 487 U/L
ALT SERPL-CCNC: 199 U/L
ANION GAP SERPL CALC-SCNC: 13 MMOL/L
AST SERPL-CCNC: 74 U/L
BASOPHILS # BLD AUTO: 0 K/UL
BASOPHILS NFR BLD AUTO: 0 %
BILIRUB SERPL-MCNC: 1.1 MG/DL
BUN SERPL-MCNC: 32 MG/DL
CALCIUM SERPL-MCNC: 8.7 MG/DL
CHLORIDE SERPL-SCNC: 99 MMOL/L
CO2 SERPL-SCNC: 26 MMOL/L
CREAT SERPL-MCNC: 1.61 MG/DL
EGFRCR SERPLBLD CKD-EPI 2021: 47 ML/MIN/1.73M2
EOSINOPHIL # BLD AUTO: 0 K/UL
EOSINOPHIL NFR BLD AUTO: 0 %
GGT SERPL-CCNC: 1156 U/L
GLUCOSE SERPL-MCNC: 310 MG/DL
HCT VFR BLD CALC: 34.7 %
HGB BLD-MCNC: 10.8 G/DL
IMM GRANULOCYTES NFR BLD AUTO: 0.4 %
LYMPHOCYTES # BLD AUTO: 1.54 K/UL
LYMPHOCYTES NFR BLD AUTO: 19 %
MAN DIFF?: NORMAL
MCHC RBC-ENTMCNC: 26.6 PG
MCHC RBC-ENTMCNC: 31.1 G/DL
MCV RBC AUTO: 85.5 FL
MONOCYTES # BLD AUTO: 0.28 K/UL
MONOCYTES NFR BLD AUTO: 3.5 %
NEUTROPHILS # BLD AUTO: 6.25 K/UL
NEUTROPHILS NFR BLD AUTO: 77.1 %
PLATELET # BLD AUTO: 245 K/UL
POTASSIUM SERPL-SCNC: 5 MMOL/L
PROT SERPL-MCNC: 6.3 G/DL
RBC # BLD: 4.06 M/UL
RBC # FLD: 18.4 %
SODIUM SERPL-SCNC: 138 MMOL/L
TACROLIMUS SERPL-MCNC: 5.7 NG/ML
WBC # FLD AUTO: 8.1 K/UL

## 2025-03-06 RX ORDER — TACROLIMUS 0.5 MG/1
0.5 CAPSULE ORAL
Qty: 180 | Refills: 0 | Status: ACTIVE | COMMUNITY
Start: 2025-03-06 | End: 1900-01-01

## 2025-03-11 LAB
ALBUMIN SERPL ELPH-MCNC: 3.7 G/DL
ALP BLD-CCNC: 437 U/L
ALT SERPL-CCNC: 182 U/L
ANION GAP SERPL CALC-SCNC: 11 MMOL/L
AST SERPL-CCNC: 62 U/L
BASOPHILS # BLD AUTO: 0.01 K/UL
BASOPHILS NFR BLD AUTO: 0.1 %
BILIRUB SERPL-MCNC: 1 MG/DL
BUN SERPL-MCNC: 37 MG/DL
CALCIUM SERPL-MCNC: 8.3 MG/DL
CHLORIDE SERPL-SCNC: 101 MMOL/L
CMV DNA SPEC QL NAA+PROBE: NOT DETECTED IU/ML
CMVPCR LOG: NOT DETECTED LOG10IU/ML
CO2 SERPL-SCNC: 24 MMOL/L
CREAT SERPL-MCNC: 1.69 MG/DL
EGFRCR SERPLBLD CKD-EPI 2021: 44 ML/MIN/1.73M2
EOSINOPHIL # BLD AUTO: 0 K/UL
EOSINOPHIL NFR BLD AUTO: 0 %
GGT SERPL-CCNC: 1089 U/L
GLUCOSE SERPL-MCNC: 316 MG/DL
HCT VFR BLD CALC: 34.8 %
HGB BLD-MCNC: 11 G/DL
IMM GRANULOCYTES NFR BLD AUTO: 0.8 %
LYMPHOCYTES # BLD AUTO: 1.34 K/UL
LYMPHOCYTES NFR BLD AUTO: 18.7 %
MAN DIFF?: NORMAL
MCHC RBC-ENTMCNC: 27.2 PG
MCHC RBC-ENTMCNC: 31.6 G/DL
MCV RBC AUTO: 85.9 FL
MONOCYTES # BLD AUTO: 0.26 K/UL
MONOCYTES NFR BLD AUTO: 3.6 %
NEUTROPHILS # BLD AUTO: 5.48 K/UL
NEUTROPHILS NFR BLD AUTO: 76.8 %
PLATELET # BLD AUTO: 272 K/UL
POTASSIUM SERPL-SCNC: 4.5 MMOL/L
PROT SERPL-MCNC: 6.3 G/DL
RBC # BLD: 4.05 M/UL
RBC # FLD: 18.4 %
SODIUM SERPL-SCNC: 136 MMOL/L
TACROLIMUS SERPL-MCNC: 7.8 NG/ML
WBC # FLD AUTO: 7.15 K/UL

## 2025-03-12 LAB
ALBUMIN SERPL ELPH-MCNC: 3.9 G/DL
ALP BLD-CCNC: 386 U/L
ALT SERPL-CCNC: 177 U/L
ANION GAP SERPL CALC-SCNC: 12 MMOL/L
AST SERPL-CCNC: 62 U/L
BILIRUB SERPL-MCNC: 0.7 MG/DL
BUN SERPL-MCNC: 34 MG/DL
CALCIUM SERPL-MCNC: 8.2 MG/DL
CHLORIDE SERPL-SCNC: 100 MMOL/L
CO2 SERPL-SCNC: 25 MMOL/L
CREAT SERPL-MCNC: 1.72 MG/DL
EGFRCR SERPLBLD CKD-EPI 2021: 43 ML/MIN/1.73M2
GGT SERPL-CCNC: 999 U/L
GLUCOSE SERPL-MCNC: 287 MG/DL
POTASSIUM SERPL-SCNC: 5 MMOL/L
PROT SERPL-MCNC: 6.3 G/DL
SODIUM SERPL-SCNC: 137 MMOL/L

## 2025-03-13 ENCOUNTER — NON-APPOINTMENT (OUTPATIENT)
Age: 65
End: 2025-03-13

## 2025-03-13 LAB — TACROLIMUS SERPL-MCNC: 7.3 NG/ML

## 2025-03-17 DIAGNOSIS — R79.89 OTHER SPECIFIED ABNORMAL FINDINGS OF BLOOD CHEMISTRY: ICD-10-CM

## 2025-03-17 DIAGNOSIS — Z79.60 LONG TERM (CURRENT) USE OF UNSPECIFIED IMMUNOMODULATORS AND IMMUNOSUPPRESSANTS: ICD-10-CM

## 2025-03-17 LAB
ALBUMIN SERPL ELPH-MCNC: 3.8 G/DL
ALP BLD-CCNC: 328 U/L
ALT SERPL-CCNC: 100 U/L
ANION GAP SERPL CALC-SCNC: 10 MMOL/L
AST SERPL-CCNC: 25 U/L
BASOPHILS # BLD AUTO: 0.01 K/UL
BASOPHILS NFR BLD AUTO: 0.1 %
BILIRUB SERPL-MCNC: 0.7 MG/DL
BUN SERPL-MCNC: 28 MG/DL
CALCIUM SERPL-MCNC: 8.5 MG/DL
CHLORIDE SERPL-SCNC: 100 MMOL/L
CMV DNA SPEC QL NAA+PROBE: NOT DETECTED IU/ML
CMVPCR LOG: NOT DETECTED LOG10IU/ML
CO2 SERPL-SCNC: 28 MMOL/L
CREAT SERPL-MCNC: 1.7 MG/DL
EGFRCR SERPLBLD CKD-EPI 2021: 44 ML/MIN/1.73M2
EOSINOPHIL # BLD AUTO: 0.02 K/UL
EOSINOPHIL NFR BLD AUTO: 0.3 %
GGT SERPL-CCNC: 860 U/L
GLUCOSE SERPL-MCNC: 254 MG/DL
HCT VFR BLD CALC: 35.5 %
HGB BLD-MCNC: 11.1 G/DL
IMM GRANULOCYTES NFR BLD AUTO: 1.3 %
LYMPHOCYTES # BLD AUTO: 2.15 K/UL
LYMPHOCYTES NFR BLD AUTO: 28 %
MAN DIFF?: NORMAL
MCHC RBC-ENTMCNC: 27.4 PG
MCHC RBC-ENTMCNC: 31.3 G/DL
MCV RBC AUTO: 87.7 FL
MONOCYTES # BLD AUTO: 0.3 K/UL
MONOCYTES NFR BLD AUTO: 3.9 %
NEUTROPHILS # BLD AUTO: 5.09 K/UL
NEUTROPHILS NFR BLD AUTO: 66.4 %
PLATELET # BLD AUTO: 287 K/UL
POTASSIUM SERPL-SCNC: 4.6 MMOL/L
PROT SERPL-MCNC: 6.2 G/DL
RBC # BLD: 4.05 M/UL
RBC # FLD: 17.4 %
SODIUM SERPL-SCNC: 138 MMOL/L
TACROLIMUS SERPL-MCNC: 8.5 NG/ML
WBC # FLD AUTO: 7.67 K/UL

## 2025-03-20 ENCOUNTER — NON-APPOINTMENT (OUTPATIENT)
Age: 65
End: 2025-03-20

## 2025-03-20 DIAGNOSIS — Z94.4 LIVER TRANSPLANT STATUS: ICD-10-CM

## 2025-03-20 LAB
ALBUMIN SERPL ELPH-MCNC: 3.9 G/DL
ALP BLD-CCNC: 311 U/L
ALT SERPL-CCNC: 75 U/L
ANION GAP SERPL CALC-SCNC: 14 MMOL/L
AST SERPL-CCNC: 26 U/L
BASOPHILS # BLD AUTO: 0.03 K/UL
BASOPHILS NFR BLD AUTO: 0.4 %
BILIRUB SERPL-MCNC: 0.6 MG/DL
BUN SERPL-MCNC: 29 MG/DL
CALCIUM SERPL-MCNC: 8.9 MG/DL
CHLORIDE SERPL-SCNC: 102 MMOL/L
CO2 SERPL-SCNC: 26 MMOL/L
CREAT SERPL-MCNC: 1.86 MG/DL
EGFRCR SERPLBLD CKD-EPI 2021: 39 ML/MIN/1.73M2
EOSINOPHIL # BLD AUTO: 0.05 K/UL
EOSINOPHIL NFR BLD AUTO: 0.6 %
ESTIMATED AVERAGE GLUCOSE: 209 MG/DL
GGT SERPL-CCNC: 836 U/L
GLUCOSE SERPL-MCNC: 235 MG/DL
HBA1C MFR BLD HPLC: 8.9 %
HCT VFR BLD CALC: 36.9 %
HGB BLD-MCNC: 11.7 G/DL
IMM GRANULOCYTES NFR BLD AUTO: 0.6 %
LYMPHOCYTES # BLD AUTO: 3.43 K/UL
LYMPHOCYTES NFR BLD AUTO: 41.2 %
MAGNESIUM SERPL-MCNC: 1.7 MG/DL
MAN DIFF?: NORMAL
MCHC RBC-ENTMCNC: 27.7 PG
MCHC RBC-ENTMCNC: 31.7 G/DL
MCV RBC AUTO: 87.4 FL
MONOCYTES # BLD AUTO: 0.34 K/UL
MONOCYTES NFR BLD AUTO: 4.1 %
NEUTROPHILS # BLD AUTO: 4.43 K/UL
NEUTROPHILS NFR BLD AUTO: 53.1 %
PHOSPHATE SERPL-MCNC: 3.4 MG/DL
PLATELET # BLD AUTO: 269 K/UL
POTASSIUM SERPL-SCNC: 4.9 MMOL/L
PROT SERPL-MCNC: 6.2 G/DL
RBC # BLD: 4.22 M/UL
RBC # FLD: 17.9 %
SODIUM SERPL-SCNC: 141 MMOL/L
TACROLIMUS SERPL-MCNC: 9.7 NG/ML
WBC # FLD AUTO: 8.33 K/UL

## 2025-03-25 ENCOUNTER — APPOINTMENT (OUTPATIENT)
Dept: HEPATOLOGY | Facility: CLINIC | Age: 65
End: 2025-03-25
Payer: MEDICARE

## 2025-03-25 VITALS
HEART RATE: 90 BPM | OXYGEN SATURATION: 97 % | DIASTOLIC BLOOD PRESSURE: 84 MMHG | WEIGHT: 222 LBS | HEIGHT: 73 IN | SYSTOLIC BLOOD PRESSURE: 187 MMHG | BODY MASS INDEX: 29.42 KG/M2 | TEMPERATURE: 97.6 F

## 2025-03-25 PROCEDURE — 99215 OFFICE O/P EST HI 40 MIN: CPT

## 2025-03-25 RX ORDER — INSULIN ASPART 100 [IU]/ML
100 INJECTION, SOLUTION INTRAVENOUS; SUBCUTANEOUS
Qty: 2 | Refills: 5 | Status: ACTIVE | COMMUNITY
Start: 2025-03-25 | End: 1900-01-01

## 2025-03-26 ENCOUNTER — NON-APPOINTMENT (OUTPATIENT)
Age: 65
End: 2025-03-26

## 2025-03-26 DIAGNOSIS — T86.41 LIVER TRANSPLANT REJECTION: ICD-10-CM

## 2025-03-26 LAB
ALBUMIN SERPL ELPH-MCNC: 4.1 G/DL
ALP BLD-CCNC: 308 U/L
ALT SERPL-CCNC: 64 U/L
ANION GAP SERPL CALC-SCNC: 11 MMOL/L
AST SERPL-CCNC: 40 U/L
BILIRUB SERPL-MCNC: 0.8 MG/DL
BUN SERPL-MCNC: 26 MG/DL
CALCIUM SERPL-MCNC: 9.1 MG/DL
CHLORIDE SERPL-SCNC: 98 MMOL/L
CMV DNA SPEC QL NAA+PROBE: NOT DETECTED IU/ML
CMVPCR LOG: NOT DETECTED LOG10IU/ML
CO2 SERPL-SCNC: 26 MMOL/L
CREAT SERPL-MCNC: 1.85 MG/DL
EGFRCR SERPLBLD CKD-EPI 2021: 40 ML/MIN/1.73M2
GGT SERPL-CCNC: 876 U/L
GLUCOSE SERPL-MCNC: 226 MG/DL
HCT VFR BLD CALC: 36.6 %
HGB BLD-MCNC: 11.5 G/DL
MAGNESIUM SERPL-MCNC: 1.5 MG/DL
MCHC RBC-ENTMCNC: 26.9 PG
MCHC RBC-ENTMCNC: 31.4 G/DL
MCV RBC AUTO: 85.7 FL
PHOSPHATE SERPL-MCNC: 3.2 MG/DL
PLATELET # BLD AUTO: 260 K/UL
POTASSIUM SERPL-SCNC: 4.5 MMOL/L
PROT SERPL-MCNC: 6.8 G/DL
RBC # BLD: 4.27 M/UL
RBC # FLD: 17.5 %
SODIUM SERPL-SCNC: 136 MMOL/L
TACROLIMUS SERPL-MCNC: 7.4 NG/ML
WBC # FLD AUTO: 9.09 K/UL

## 2025-03-26 RX ORDER — PREDNISONE 5 MG/1
5 TABLET ORAL DAILY
Qty: 90 | Refills: 0 | Status: ACTIVE | COMMUNITY
Start: 2025-03-26 | End: 1900-01-01

## 2025-04-04 LAB
BASOPHILS # BLD AUTO: 0.04 K/UL
BASOPHILS NFR BLD AUTO: 0.4 %
EOSINOPHIL # BLD AUTO: 0.04 K/UL
EOSINOPHIL NFR BLD AUTO: 0.4 %
ESTIMATED AVERAGE GLUCOSE: 235 MG/DL
GGT SERPL-CCNC: 1186 U/L
HBA1C MFR BLD HPLC: 9.8 %
HCT VFR BLD CALC: 34.7 %
HGB BLD-MCNC: 11.1 G/DL
IMM GRANULOCYTES NFR BLD AUTO: 0.7 %
LYMPHOCYTES # BLD AUTO: 3.63 K/UL
LYMPHOCYTES NFR BLD AUTO: 35.2 %
MAGNESIUM SERPL-MCNC: 1.6 MG/DL
MAN DIFF?: NORMAL
MCHC RBC-ENTMCNC: 27.3 PG
MCHC RBC-ENTMCNC: 32 G/DL
MCV RBC AUTO: 85.3 FL
MONOCYTES # BLD AUTO: 0.55 K/UL
MONOCYTES NFR BLD AUTO: 5.3 %
NEUTROPHILS # BLD AUTO: 5.99 K/UL
NEUTROPHILS NFR BLD AUTO: 58 %
PHOSPHATE SERPL-MCNC: 3.4 MG/DL
PLATELET # BLD AUTO: 369 K/UL
RBC # BLD: 4.07 M/UL
RBC # FLD: 16.4 %
TACROLIMUS SERPL-MCNC: 6.9 NG/ML
WBC # FLD AUTO: 10.32 K/UL

## 2025-04-05 LAB
CMV DNA SPEC QL NAA+PROBE: NOT DETECTED IU/ML
CMVPCR LOG: NOT DETECTED LOG10IU/ML

## 2025-04-08 ENCOUNTER — NON-APPOINTMENT (OUTPATIENT)
Age: 65
End: 2025-04-08

## 2025-04-08 DIAGNOSIS — Z94.4 LIVER TRANSPLANT STATUS: ICD-10-CM

## 2025-04-08 DIAGNOSIS — T86.41 LIVER TRANSPLANT REJECTION: ICD-10-CM

## 2025-04-08 DIAGNOSIS — R79.89 OTHER SPECIFIED ABNORMAL FINDINGS OF BLOOD CHEMISTRY: ICD-10-CM

## 2025-04-08 LAB
ALBUMIN SERPL ELPH-MCNC: 3.9 G/DL
ALP BLD-CCNC: 397 U/L
ALT SERPL-CCNC: 74 U/L
ANION GAP SERPL CALC-SCNC: 20 MMOL/L
AST SERPL-CCNC: 53 U/L
BILIRUB SERPL-MCNC: 0.4 MG/DL
BUN SERPL-MCNC: 25 MG/DL
CALCIUM SERPL-MCNC: 9 MG/DL
CHLORIDE SERPL-SCNC: 99 MMOL/L
CO2 SERPL-SCNC: 21 MMOL/L
CREAT SERPL-MCNC: 1.89 MG/DL
EGFRCR SERPLBLD CKD-EPI 2021: 39 ML/MIN/1.73M2
GLUCOSE SERPL-MCNC: 290 MG/DL
POTASSIUM SERPL-SCNC: 4.3 MMOL/L
PROT SERPL-MCNC: 6.6 G/DL
SODIUM SERPL-SCNC: 140 MMOL/L

## 2025-04-14 ENCOUNTER — NON-APPOINTMENT (OUTPATIENT)
Age: 65
End: 2025-04-14

## 2025-04-14 LAB
ALBUMIN SERPL ELPH-MCNC: 4.2 G/DL
ALP BLD-CCNC: 471 U/L
ALT SERPL-CCNC: 82 U/L
ANION GAP SERPL CALC-SCNC: 15 MMOL/L
AST SERPL-CCNC: 35 U/L
BASOPHILS # BLD AUTO: 0.04 K/UL
BASOPHILS NFR BLD AUTO: 0.3 %
BILIRUB SERPL-MCNC: 0.6 MG/DL
BUN SERPL-MCNC: 33 MG/DL
CALCIUM SERPL-MCNC: 9.7 MG/DL
CHLORIDE SERPL-SCNC: 95 MMOL/L
CO2 SERPL-SCNC: 27 MMOL/L
CREAT SERPL-MCNC: 1.99 MG/DL
EGFRCR SERPLBLD CKD-EPI 2021: 36 ML/MIN/1.73M2
EOSINOPHIL # BLD AUTO: 0.07 K/UL
EOSINOPHIL NFR BLD AUTO: 0.5 %
GGT SERPL-CCNC: 1559 U/L
GLUCOSE SERPL-MCNC: 342 MG/DL
HCT VFR BLD CALC: 36.5 %
HGB BLD-MCNC: 11.4 G/DL
IMM GRANULOCYTES NFR BLD AUTO: 0.8 %
LYMPHOCYTES # BLD AUTO: 4.62 K/UL
LYMPHOCYTES NFR BLD AUTO: 35.5 %
MAN DIFF?: NORMAL
MCHC RBC-ENTMCNC: 26.9 PG
MCHC RBC-ENTMCNC: 31.2 G/DL
MCV RBC AUTO: 86.1 FL
MONOCYTES # BLD AUTO: 0.65 K/UL
MONOCYTES NFR BLD AUTO: 5 %
NEUTROPHILS # BLD AUTO: 7.53 K/UL
NEUTROPHILS NFR BLD AUTO: 57.9 %
PLATELET # BLD AUTO: 410 K/UL
POTASSIUM SERPL-SCNC: 4.3 MMOL/L
PROT SERPL-MCNC: 6.7 G/DL
RBC # BLD: 4.24 M/UL
RBC # FLD: 16 %
SODIUM SERPL-SCNC: 137 MMOL/L
TACROLIMUS SERPL-MCNC: 9.2 NG/ML
WBC # FLD AUTO: 13.02 K/UL

## 2025-04-29 ENCOUNTER — APPOINTMENT (OUTPATIENT)
Dept: HEPATOLOGY | Facility: CLINIC | Age: 65
End: 2025-04-29
Payer: MEDICARE

## 2025-04-29 ENCOUNTER — RX RENEWAL (OUTPATIENT)
Age: 65
End: 2025-04-29

## 2025-04-29 ENCOUNTER — APPOINTMENT (OUTPATIENT)
Dept: ENDOCRINOLOGY | Facility: CLINIC | Age: 65
End: 2025-04-29
Payer: MEDICARE

## 2025-04-29 VITALS
BODY MASS INDEX: 29.42 KG/M2 | TEMPERATURE: 98.1 F | DIASTOLIC BLOOD PRESSURE: 80 MMHG | SYSTOLIC BLOOD PRESSURE: 157 MMHG | WEIGHT: 222 LBS | RESPIRATION RATE: 16 BRPM | OXYGEN SATURATION: 97 % | HEART RATE: 88 BPM | HEIGHT: 73 IN

## 2025-04-29 VITALS
SYSTOLIC BLOOD PRESSURE: 157 MMHG | BODY MASS INDEX: 29.29 KG/M2 | OXYGEN SATURATION: 95 % | HEIGHT: 73 IN | WEIGHT: 221 LBS | HEART RATE: 83 BPM | DIASTOLIC BLOOD PRESSURE: 71 MMHG

## 2025-04-29 DIAGNOSIS — Z94.4 LIVER TRANSPLANT STATUS: ICD-10-CM

## 2025-04-29 DIAGNOSIS — Z79.4 TYPE 2 DIABETES MELLITUS W/OUT COMPLICATIONS: ICD-10-CM

## 2025-04-29 DIAGNOSIS — N18.30 CHRONIC KIDNEY DISEASE, STAGE 3 UNSPECIFIED: ICD-10-CM

## 2025-04-29 DIAGNOSIS — I10 ESSENTIAL (PRIMARY) HYPERTENSION: ICD-10-CM

## 2025-04-29 DIAGNOSIS — E11.9 TYPE 2 DIABETES MELLITUS W/OUT COMPLICATIONS: ICD-10-CM

## 2025-04-29 DIAGNOSIS — T86.41 LIVER TRANSPLANT REJECTION: ICD-10-CM

## 2025-04-29 LAB — TACROLIMUS SERPL-MCNC: 9.5 NG/ML

## 2025-04-29 PROCEDURE — 99215 OFFICE O/P EST HI 40 MIN: CPT

## 2025-04-29 PROCEDURE — G2211 COMPLEX E/M VISIT ADD ON: CPT

## 2025-04-29 RX ORDER — AMLODIPINE AND VALSARTAN 5; 160 MG/1; MG/1
5-160 TABLET, FILM COATED ORAL DAILY
Qty: 30 | Refills: 3 | Status: ACTIVE | COMMUNITY
Start: 2025-04-29 | End: 1900-01-01

## 2025-05-02 ENCOUNTER — RX RENEWAL (OUTPATIENT)
Age: 65
End: 2025-05-02

## 2025-05-05 RX ORDER — INSULIN LISPRO 100 U/ML
100 INJECTION, SOLUTION SUBCUTANEOUS
Qty: 4 | Refills: 0 | Status: ACTIVE | COMMUNITY
Start: 2025-05-05 | End: 1900-01-01

## 2025-05-13 ENCOUNTER — NON-APPOINTMENT (OUTPATIENT)
Age: 65
End: 2025-05-13

## 2025-05-16 DIAGNOSIS — Z79.4 TYPE 2 DIABETES MELLITUS W/OUT COMPLICATIONS: ICD-10-CM

## 2025-05-16 DIAGNOSIS — Z94.4 LIVER TRANSPLANT STATUS: ICD-10-CM

## 2025-05-16 DIAGNOSIS — T86.41 LIVER TRANSPLANT REJECTION: ICD-10-CM

## 2025-05-16 DIAGNOSIS — E11.9 TYPE 2 DIABETES MELLITUS W/OUT COMPLICATIONS: ICD-10-CM

## 2025-05-16 DIAGNOSIS — R79.89 OTHER SPECIFIED ABNORMAL FINDINGS OF BLOOD CHEMISTRY: ICD-10-CM

## 2025-05-20 ENCOUNTER — NON-APPOINTMENT (OUTPATIENT)
Age: 65
End: 2025-05-20

## 2025-05-20 LAB
ALBUMIN SERPL ELPH-MCNC: 3.7 G/DL
ALP BLD-CCNC: 533 U/L
ALT SERPL-CCNC: 129 U/L
ANION GAP SERPL CALC-SCNC: 16 MMOL/L
AST SERPL-CCNC: 91 U/L
BASOPHILS # BLD AUTO: 0.02 K/UL
BASOPHILS NFR BLD AUTO: 0.3 %
BILIRUB SERPL-MCNC: 0.4 MG/DL
BUN SERPL-MCNC: 23 MG/DL
CALCIUM SERPL-MCNC: 8.5 MG/DL
CHLORIDE SERPL-SCNC: 99 MMOL/L
CO2 SERPL-SCNC: 24 MMOL/L
CREAT SERPL-MCNC: 1.58 MG/DL
EGFRCR SERPLBLD CKD-EPI 2021: 48 ML/MIN/1.73M2
EOSINOPHIL # BLD AUTO: 0.06 K/UL
EOSINOPHIL NFR BLD AUTO: 0.8 %
GGT SERPL-CCNC: 1611 U/L
GLUCOSE SERPL-MCNC: 285 MG/DL
HCT VFR BLD CALC: 36.5 %
HGB BLD-MCNC: 11 G/DL
IMM GRANULOCYTES NFR BLD AUTO: 0.5 %
INR PPP: 0.8 RATIO
LYMPHOCYTES # BLD AUTO: 2.76 K/UL
LYMPHOCYTES NFR BLD AUTO: 35 %
MAN DIFF?: NORMAL
MCHC RBC-ENTMCNC: 26.1 PG
MCHC RBC-ENTMCNC: 30.1 G/DL
MCV RBC AUTO: 86.5 FL
MONOCYTES # BLD AUTO: 0.54 K/UL
MONOCYTES NFR BLD AUTO: 6.9 %
NEUTROPHILS # BLD AUTO: 4.46 K/UL
NEUTROPHILS NFR BLD AUTO: 56.5 %
PLATELET # BLD AUTO: 291 K/UL
POTASSIUM SERPL-SCNC: 4.4 MMOL/L
PROT SERPL-MCNC: 6 G/DL
PT BLD: 9.5 SEC
RBC # BLD: 4.22 M/UL
RBC # FLD: 14.2 %
SODIUM SERPL-SCNC: 139 MMOL/L
TACROLIMUS SERPL-MCNC: 9.3 NG/ML
WBC # FLD AUTO: 7.88 K/UL

## 2025-05-21 ENCOUNTER — RESULT REVIEW (OUTPATIENT)
Age: 65
End: 2025-05-21

## 2025-05-21 ENCOUNTER — OUTPATIENT (OUTPATIENT)
Dept: OUTPATIENT SERVICES | Facility: HOSPITAL | Age: 65
LOS: 1 days | End: 2025-05-21
Payer: MEDICARE

## 2025-05-21 ENCOUNTER — APPOINTMENT (OUTPATIENT)
Dept: ULTRASOUND IMAGING | Facility: HOSPITAL | Age: 65
End: 2025-05-21

## 2025-05-21 VITALS
TEMPERATURE: 98 F | RESPIRATION RATE: 17 BRPM | DIASTOLIC BLOOD PRESSURE: 89 MMHG | SYSTOLIC BLOOD PRESSURE: 143 MMHG | OXYGEN SATURATION: 95 % | HEART RATE: 87 BPM

## 2025-05-21 DIAGNOSIS — T86.41 LIVER TRANSPLANT REJECTION: ICD-10-CM

## 2025-05-21 DIAGNOSIS — Z94.4 LIVER TRANSPLANT STATUS: Chronic | ICD-10-CM

## 2025-05-21 DIAGNOSIS — Z98.890 OTHER SPECIFIED POSTPROCEDURAL STATES: Chronic | ICD-10-CM

## 2025-05-21 LAB
ESTIMATED AVERAGE GLUCOSE: 335 MG/DL
HBA1C MFR BLD HPLC: 13.3 %

## 2025-05-21 PROCEDURE — 47000 NEEDLE BIOPSY OF LIVER PERQ: CPT

## 2025-05-21 PROCEDURE — 88313 SPECIAL STAINS GROUP 2: CPT

## 2025-05-21 PROCEDURE — 76942 ECHO GUIDE FOR BIOPSY: CPT | Mod: 26

## 2025-05-21 PROCEDURE — 88341 IMHCHEM/IMCYTCHM EA ADD ANTB: CPT | Mod: 26

## 2025-05-21 PROCEDURE — 88307 TISSUE EXAM BY PATHOLOGIST: CPT

## 2025-05-21 PROCEDURE — 88312 SPECIAL STAINS GROUP 1: CPT

## 2025-05-21 PROCEDURE — 88313 SPECIAL STAINS GROUP 2: CPT | Mod: 26

## 2025-05-21 PROCEDURE — 88312 SPECIAL STAINS GROUP 1: CPT | Mod: 26

## 2025-05-21 PROCEDURE — 76942 ECHO GUIDE FOR BIOPSY: CPT

## 2025-05-21 PROCEDURE — 88342 IMHCHEM/IMCYTCHM 1ST ANTB: CPT | Mod: 26

## 2025-05-21 PROCEDURE — 88342 IMHCHEM/IMCYTCHM 1ST ANTB: CPT

## 2025-05-21 PROCEDURE — 88341 IMHCHEM/IMCYTCHM EA ADD ANTB: CPT

## 2025-05-21 PROCEDURE — 88307 TISSUE EXAM BY PATHOLOGIST: CPT | Mod: 26

## 2025-05-21 RX ORDER — ACETAMINOPHEN 500 MG/5ML
650 LIQUID (ML) ORAL EVERY 6 HOURS
Refills: 0 | Status: DISCONTINUED | OUTPATIENT
Start: 2025-05-21 | End: 2025-06-04

## 2025-05-27 LAB — SURGICAL PATHOLOGY STUDY: SIGNIFICANT CHANGE UP

## 2025-05-28 ENCOUNTER — RX RENEWAL (OUTPATIENT)
Age: 65
End: 2025-05-28

## 2025-06-02 ENCOUNTER — RX RENEWAL (OUTPATIENT)
Age: 65
End: 2025-06-02

## 2025-06-02 DIAGNOSIS — R79.89 OTHER SPECIFIED ABNORMAL FINDINGS OF BLOOD CHEMISTRY: ICD-10-CM

## 2025-06-05 ENCOUNTER — RX RENEWAL (OUTPATIENT)
Age: 65
End: 2025-06-05

## 2025-06-05 DIAGNOSIS — Z94.4 LIVER TRANSPLANT STATUS: ICD-10-CM

## 2025-06-05 LAB
ALBUMIN SERPL ELPH-MCNC: 3.9 G/DL
ALP BLD-CCNC: 591 U/L
ALT SERPL-CCNC: 138 U/L
ANION GAP SERPL CALC-SCNC: 16 MMOL/L
AST SERPL-CCNC: 68 U/L
BILIRUB SERPL-MCNC: 0.5 MG/DL
BUN SERPL-MCNC: 22 MG/DL
CALCIUM SERPL-MCNC: 9 MG/DL
CHLORIDE SERPL-SCNC: 98 MMOL/L
CO2 SERPL-SCNC: 23 MMOL/L
CREAT SERPL-MCNC: 1.55 MG/DL
EGFRCR SERPLBLD CKD-EPI 2021: 49 ML/MIN/1.73M2
GLUCOSE SERPL-MCNC: 170 MG/DL
HCT VFR BLD CALC: 37.5 %
HGB BLD-MCNC: 11 G/DL
MCHC RBC-ENTMCNC: 24.9 PG
MCHC RBC-ENTMCNC: 29.3 G/DL
MCV RBC AUTO: 84.8 FL
PLATELET # BLD AUTO: 319 K/UL
POTASSIUM SERPL-SCNC: 4.2 MMOL/L
PROT SERPL-MCNC: 6.5 G/DL
RBC # BLD: 4.42 M/UL
RBC # FLD: 14.2 %
SODIUM SERPL-SCNC: 137 MMOL/L
TACROLIMUS SERPL-MCNC: 4 NG/ML
WBC # FLD AUTO: 9.41 K/UL

## 2025-06-06 ENCOUNTER — RX RENEWAL (OUTPATIENT)
Age: 65
End: 2025-06-06

## 2025-06-20 ENCOUNTER — NON-APPOINTMENT (OUTPATIENT)
Age: 65
End: 2025-06-20

## 2025-07-01 ENCOUNTER — APPOINTMENT (OUTPATIENT)
Dept: HEPATOLOGY | Facility: CLINIC | Age: 65
End: 2025-07-01
Payer: MEDICARE

## 2025-07-01 VITALS
SYSTOLIC BLOOD PRESSURE: 154 MMHG | TEMPERATURE: 98.3 F | HEIGHT: 73 IN | WEIGHT: 225 LBS | OXYGEN SATURATION: 98 % | DIASTOLIC BLOOD PRESSURE: 79 MMHG | RESPIRATION RATE: 16 BRPM | HEART RATE: 96 BPM | BODY MASS INDEX: 29.82 KG/M2

## 2025-07-01 LAB
ALBUMIN SERPL ELPH-MCNC: 3.9 G/DL
ALP BLD-CCNC: 403 U/L
ALT SERPL-CCNC: 58 U/L
ANION GAP SERPL CALC-SCNC: 13 MMOL/L
AST SERPL-CCNC: 38 U/L
BILIRUB SERPL-MCNC: 0.4 MG/DL
BUN SERPL-MCNC: 17 MG/DL
CALCIUM SERPL-MCNC: 8.9 MG/DL
CHLORIDE SERPL-SCNC: 102 MMOL/L
CO2 SERPL-SCNC: 26 MMOL/L
CREAT SERPL-MCNC: 1.58 MG/DL
EGFRCR SERPLBLD CKD-EPI 2021: 48 ML/MIN/1.73M2
ESTIMATED AVERAGE GLUCOSE: 301 MG/DL
GLUCOSE SERPL-MCNC: 119 MG/DL
HBA1C MFR BLD HPLC: 12.1 %
HCT VFR BLD CALC: 37.9 %
HGB BLD-MCNC: 11.4 G/DL
INR PPP: 0.83 RATIO
MCHC RBC-ENTMCNC: 24.7 PG
MCHC RBC-ENTMCNC: 30.1 G/DL
MCV RBC AUTO: 82.2 FL
PLATELET # BLD AUTO: 323 K/UL
POTASSIUM SERPL-SCNC: 3.7 MMOL/L
PROT SERPL-MCNC: 6.8 G/DL
PT BLD: 9.8 SEC
RBC # BLD: 4.61 M/UL
RBC # FLD: 14.6 %
SODIUM SERPL-SCNC: 141 MMOL/L
WBC # FLD AUTO: 9.07 K/UL

## 2025-07-01 PROCEDURE — 99215 OFFICE O/P EST HI 40 MIN: CPT

## 2025-07-01 RX ORDER — URSODIOL 500 MG/1
500 TABLET ORAL TWICE DAILY
Qty: 60 | Refills: 3 | Status: ACTIVE | COMMUNITY
Start: 2025-07-01 | End: 1900-01-01

## 2025-07-02 LAB — TACROLIMUS SERPL-MCNC: 11.6 NG/ML

## 2025-07-03 LAB
CMV DNA SPEC QL NAA+PROBE: NOT DETECTED IU/ML
CMVPCR LOG: NOT DETECTED LOG10IU/ML

## 2025-07-21 DIAGNOSIS — R79.89 OTHER SPECIFIED ABNORMAL FINDINGS OF BLOOD CHEMISTRY: ICD-10-CM

## 2025-07-21 DIAGNOSIS — T86.41 LIVER TRANSPLANT REJECTION: ICD-10-CM

## 2025-08-12 ENCOUNTER — APPOINTMENT (OUTPATIENT)
Dept: HEPATOLOGY | Facility: CLINIC | Age: 65
End: 2025-08-12

## 2025-08-15 ENCOUNTER — APPOINTMENT (OUTPATIENT)
Dept: ENDOCRINOLOGY | Facility: CLINIC | Age: 65
End: 2025-08-15

## 2025-08-22 ENCOUNTER — RX RENEWAL (OUTPATIENT)
Age: 65
End: 2025-08-22

## 2025-08-26 ENCOUNTER — NON-APPOINTMENT (OUTPATIENT)
Age: 65
End: 2025-08-26

## 2025-09-17 ENCOUNTER — NON-APPOINTMENT (OUTPATIENT)
Age: 65
End: 2025-09-17

## 2025-09-18 ENCOUNTER — NON-APPOINTMENT (OUTPATIENT)
Age: 65
End: 2025-09-18

## 2025-09-18 DIAGNOSIS — T86.41 LIVER TRANSPLANT REJECTION: ICD-10-CM

## 2025-09-18 DIAGNOSIS — Z79.4 TYPE 2 DIABETES MELLITUS W/OUT COMPLICATIONS: ICD-10-CM

## 2025-09-18 DIAGNOSIS — N18.4 CHRONIC KIDNEY DISEASE, STAGE 4 (SEVERE): ICD-10-CM

## 2025-09-18 DIAGNOSIS — E11.9 TYPE 2 DIABETES MELLITUS W/OUT COMPLICATIONS: ICD-10-CM

## (undated) DEVICE — BITE BLOCK ADULT 20 X 27MM (GREEN)

## (undated) DEVICE — DRSG 2X2

## (undated) DEVICE — POSITIONER FOAM HEAD CRADLE (PINK)

## (undated) DEVICE — NDL INJ SCLERO INTERJECT 25G

## (undated) DEVICE — TUBING SUCTION 20FT

## (undated) DEVICE — BIOPSY FORCEP COLD DISP

## (undated) DEVICE — BRUSH CYTO RAP EXCHG 3MM

## (undated) DEVICE — SUCTION YANKAUER NO CONTROL VENT

## (undated) DEVICE — LUBRICATING JELLY HR ONE SHOT 3G

## (undated) DEVICE — BASIN EMESIS 10IN GRADUATED MAUVE

## (undated) DEVICE — TUBING IV SET GRAVITY 3Y 100" MACRO

## (undated) DEVICE — NDL INJ SCLERO INTERJECT 23G

## (undated) DEVICE — PAPIL BILRTH II 6-5FRX0.035IN

## (undated) DEVICE — PACK IV START WITH CHG

## (undated) DEVICE — LITHOTRIPY BASKET TRAPEZOID 2.5CM

## (undated) DEVICE — SYR LUER LOK 10CC

## (undated) DEVICE — SOL INJ NS 0.9% 500ML 2 PORT

## (undated) DEVICE — CATH IV SAFE BC 22G X 1" (BLUE)

## (undated) DEVICE — BIOPSY FORCEP RADIAL JAW 4 STANDARD WITH NEEDLE

## (undated) DEVICE — SOL IRR POUR NS 0.9% 500ML

## (undated) DEVICE — CONTAINER FORMALIN 80ML YELLOW

## (undated) DEVICE — SNARE POLYP SENS SM 13MM 240CM

## (undated) DEVICE — SNARE CAPTIVATOR II 15MM

## (undated) DEVICE — SENSOR O2 FINGER ADULT

## (undated) DEVICE — FOLEY HOLDER STATLOCK 2 WAY ADULT

## (undated) DEVICE — SPHINCTEROTOME CLEVERCUT WIRE 25MM  2.8MM X 170CM

## (undated) DEVICE — TUBING TRUWAVE PRESSURE MALE/FEMALE 72"

## (undated) DEVICE — CLAMP BX HOT RAD JAW 3

## (undated) DEVICE — DVC SAMPLING INFINITY ERCP 7.5FR 200CM

## (undated) DEVICE — BRUSH COLONOSCOPY CYTOLOGY

## (undated) DEVICE — DVC AUTO CAP RX LOKG

## (undated) DEVICE — CATH IV SAFE BC 20G X 1.16" (PINK)

## (undated) DEVICE — SALIVA EJECTOR (BLUE)

## (undated) DEVICE — SNARE POLYP MINI ACCUSNR 1.5 X 3CM

## (undated) DEVICE — ELCTR GROUNDING PAD ADULT COVIDIEN

## (undated) DEVICE — UNDERPAD LINEN SAVER 17 X 24"

## (undated) DEVICE — NDL HYPO SAFE 22G X 1" (BLACK)

## (undated) DEVICE — INJ SYS RAP REFIL

## (undated) DEVICE — SOL INJ NS 0.9% 500ML 1-PORT

## (undated) DEVICE — GOWN LG

## (undated) DEVICE — DRSG CURITY GAUZE SPONGE 4 X 4" 12-PLY NON-STERILE

## (undated) DEVICE — DENTURE CUP PINK

## (undated) DEVICE — DRSG BANDAID 0.75X3"

## (undated) DEVICE — ELCTR ECG CONDUCTIVE ADHESIVE

## (undated) DEVICE — OLYMPUS DISTAL COVER ENDOSCOPE

## (undated) DEVICE — TUBING SUCTION NONCONDUCTIVE 6MM X 12FT

## (undated) DEVICE — OMNIPAQUE 300  30ML

## (undated) DEVICE — ONCORE 26 INSUFLATION DEVICE

## (undated) DEVICE — TUBING MEDI-VAC W MAXIGRIP CONNECTORS 1/4"X6'

## (undated) DEVICE — SYR LUER LOK 3CC